# Patient Record
Sex: MALE | Race: WHITE | NOT HISPANIC OR LATINO | Employment: OTHER | URBAN - NONMETROPOLITAN AREA
[De-identification: names, ages, dates, MRNs, and addresses within clinical notes are randomized per-mention and may not be internally consistent; named-entity substitution may affect disease eponyms.]

---

## 2017-02-07 ENCOUNTER — OFFICE VISIT (OUTPATIENT)
Dept: ORTHOPEDIC SURGERY | Facility: CLINIC | Age: 67
End: 2017-02-07

## 2017-02-07 DIAGNOSIS — M25.512 ACUTE PAIN OF LEFT SHOULDER: ICD-10-CM

## 2017-02-07 DIAGNOSIS — M19.011 PRIMARY OSTEOARTHRITIS OF RIGHT SHOULDER: Primary | ICD-10-CM

## 2017-02-07 PROCEDURE — 99213 OFFICE O/P EST LOW 20 MIN: CPT | Performed by: ORTHOPAEDIC SURGERY

## 2017-02-07 PROCEDURE — 73030 X-RAY EXAM OF SHOULDER: CPT | Performed by: ORTHOPAEDIC SURGERY

## 2017-02-07 RX ORDER — ALPRAZOLAM 1 MG/1
1 TABLET ORAL 3 TIMES DAILY PRN
COMMUNITY
End: 2023-01-09 | Stop reason: SDUPTHER

## 2017-02-07 RX ORDER — LISINOPRIL 5 MG/1
5 TABLET ORAL DAILY
COMMUNITY
End: 2020-01-14 | Stop reason: SDUPTHER

## 2017-02-07 RX ORDER — IMIPRAMINE HCL 50 MG
50 TABLET ORAL EVERY MORNING
COMMUNITY
End: 2021-06-16 | Stop reason: SDUPTHER

## 2017-02-07 RX ORDER — GLIPIZIDE 10 MG/1
10 TABLET ORAL
COMMUNITY
End: 2020-01-14 | Stop reason: SDUPTHER

## 2017-02-07 RX ORDER — CARVEDILOL 12.5 MG/1
12.5 TABLET ORAL 2 TIMES DAILY
COMMUNITY
Start: 2014-07-11 | End: 2021-06-16 | Stop reason: SDUPTHER

## 2017-02-07 RX ORDER — ASPIRIN 81 MG/1
81 TABLET ORAL
COMMUNITY
End: 2020-01-14 | Stop reason: CLARIF

## 2017-02-07 RX ORDER — AMLODIPINE BESYLATE 10 MG/1
10 TABLET ORAL
COMMUNITY
Start: 2014-07-11 | End: 2020-11-13

## 2017-02-07 RX ORDER — ISOSORBIDE MONONITRATE 60 MG/1
30 TABLET, EXTENDED RELEASE ORAL
COMMUNITY
End: 2020-01-14 | Stop reason: SDUPTHER

## 2017-02-07 RX ORDER — FENOFIBRATE 145 MG/1
145 TABLET, COATED ORAL DAILY
COMMUNITY
End: 2022-07-07

## 2017-02-07 RX ORDER — ATORVASTATIN CALCIUM 20 MG/1
20 TABLET, FILM COATED ORAL DAILY
COMMUNITY
End: 2020-01-14 | Stop reason: SDUPTHER

## 2017-02-07 RX ORDER — ALLOPURINOL 300 MG/1
300 TABLET ORAL DAILY
COMMUNITY
End: 2023-01-09 | Stop reason: SDUPTHER

## 2017-02-07 NOTE — PROGRESS NOTES
Chief Complaint   Patient presents with   • Left Shoulder - Pain             HPI  Patient is an established patient with left shoulder pain.  It has been painful for over a year and worse the past 2-3 month.  He can't lay on it.  He can not recall any injury.  Patient describes his pain is sharp and stabbing in character.  It is worse when he is reaching into the overhead, abducted position.  Cross body activities bother the patient significantly.  The patient radiates to the the patients pain radiates to the lateral aspect of the shoulder and the the deltoid region.  He cannot lift any heavy weights in the overhead position for a sustained a period of time.  There is a constant dull ache that is present and then there is sharp shooting pain which is exacerbated by heavy lifting.  He has had similar symptoms on the right side where he had a rotator cuff tear which was treated with a repair and he feels like his symptoms are very similar to that side.  Both his total knee replacements are doing extremely well at this point and he has no issues whatsoever.               Allergies   Allergen Reactions   • Bupropion Other (See Comments)     MAKES PATIENT VERY AGGRESSIVE   • Lorazepam Other (See Comments)     MADE PATIENT VERY AGGRESSIVE   • Propoxyphene Nausea And Vomiting         Social History     Social History   • Marital status:      Spouse name: N/A   • Number of children: N/A   • Years of education: N/A     Occupational History   • Not on file.     Social History Main Topics   • Smoking status: Never Smoker   • Smokeless tobacco: Not on file   • Alcohol use No   • Drug use: Not on file   • Sexual activity: Not on file     Other Topics Concern   • Not on file     Social History Narrative   • No narrative on file       Family History   Problem Relation Age of Onset   • Heart attack Mother        Past Surgical History   Procedure Laterality Date   • Joint replacement     • Knee surgery       right and left    • Back surgery     • Cardiac surgery     • Cholecystectomy     • Hernia repair         Past Medical History   Diagnosis Date   • Anxiety    • Diabetes    • Joint pain    • Knee swelling    • Lumbosacral disc disease    • Rotator cuff syndrome      right           There were no vitals filed for this visit.          Review of Systems   Constitutional: Negative for chills, diaphoresis, fever and unexpected weight change.   HENT: Negative for hearing loss, nosebleeds, sore throat and tinnitus.    Eyes: Negative for pain and visual disturbance.   Respiratory: Negative for cough, shortness of breath and wheezing.    Cardiovascular: Negative for chest pain and palpitations.   Gastrointestinal: Negative for abdominal pain, diarrhea, nausea and vomiting.   Endocrine: Negative for cold intolerance, heat intolerance and polydipsia.   Genitourinary: Negative for difficulty urinating, dysuria and hematuria.   Musculoskeletal: Negative for arthralgias, joint swelling and myalgias.   Skin: Negative for rash and wound.   Allergic/Immunologic: Negative for environmental allergies.   Neurological: Negative for dizziness, syncope and numbness.   Hematological: Does not bruise/bleed easily.   Psychiatric/Behavioral: Negative for dysphoric mood and sleep disturbance. The patient is not nervous/anxious.            Physical Exam   Constitutional: He appears well-nourished.   Eyes: Conjunctivae are normal. Pupils are equal, round, and reactive to light.   Neck: Normal range of motion.   Cardiovascular: Normal rate and intact distal pulses.    Pulmonary/Chest: Effort normal and breath sounds normal.   Abdominal: Bowel sounds are normal.   Musculoskeletal: Normal range of motion.   Neurological: He is alert. He has normal reflexes.   Skin: Skin is warm.   Psychiatric: He has a normal mood and affect. His behavior is normal. Judgment and thought content normal.   Nursing note and vitals reviewed.              Joint/Body Part Specific  Exam:  left shoulder. Rotator cuff function is extremely impaired. There is a high riding humeral head with articulation of the humerus to the undersurface of the acromiohumeral articulation. AC joint is exquisitely tender and painful for patient. Axillary nerve function is well preserved. There is significant winging of the scapula with hollowing of the fossae over the proximal and distal aspects of the spine of the scapula. Forward flexion is 0-90 degrees, active abduction is 0-90 degrees. Drop arm sign is positive. External rotation against resistance is extremely painful and limited for the patient. Skin and soft tissues are essentially normal other than some amounts of swelling. The pain level is 6            X-RAY Report:  left Shoulder X-Ray  Indication: Pain with limited range of motion  AP and Axillary  Findings: High riding humeral head with some narrowing of the acromioclavicular joint space  no bony lesion  Soft tissues within normal limits  decreased joint spaces  Hardware appropriately positioned not applicable      no prior studies available for comparison.    X-RAY was ordered and reviewed by Theo Her MD              Diagnostics:            Kyler was seen today for pain.    Diagnoses and all orders for this visit:    Primary osteoarthritis of right shoulder  -     XR Shoulder 2+ View Left    Acute pain of left shoulder  -     MRI Shoulder Left Without Contrast; Future          Procedures          I provided this patient with educational materials regarding exercise and bone health.        Plan:   Home based exercise program with stretching sensing exercises to prevent worsening of loss of motion.    Tablet ibuprofen 600 mg tab 1 by mouth twice a day when necessary pain and discomfort.    Schedule an MRI of the left shoulder for evaluation of the function and structure of the rotator cuff muscles he'll    If the MRI shows that his rotator cuff is damaged beyond repair 10 he might be a candidate  for reverse total shoulder arthroplasty.    If the MRI shows that the rotator cuff structures intact then he could get by with the anatomic total shoulder arthroplasty.    Intra-articular steroid injection discussed and offered to the patient but he declines at this visit wanting to get his MRI done first.    Follow-up in my office in 2-3 weeks after the MRIs been done.            CC To Forrest Will MD

## 2017-02-11 PROBLEM — M19.011 PRIMARY OSTEOARTHRITIS OF RIGHT SHOULDER: Status: ACTIVE | Noted: 2017-02-11

## 2017-02-11 PROBLEM — M25.512 ACUTE PAIN OF LEFT SHOULDER: Status: ACTIVE | Noted: 2017-02-11

## 2017-02-21 ENCOUNTER — OFFICE VISIT (OUTPATIENT)
Dept: ORTHOPEDIC SURGERY | Facility: CLINIC | Age: 67
End: 2017-02-21

## 2017-02-21 DIAGNOSIS — M25.512 ACUTE PAIN OF LEFT SHOULDER: ICD-10-CM

## 2017-02-21 PROCEDURE — 73221 MRI JOINT UPR EXTREM W/O DYE: CPT | Performed by: ORTHOPAEDIC SURGERY

## 2017-02-24 ENCOUNTER — TELEPHONE (OUTPATIENT)
Dept: ORTHOPEDIC SURGERY | Facility: CLINIC | Age: 67
End: 2017-02-24

## 2017-02-25 NOTE — TELEPHONE ENCOUNTER
Please make an appointment to see me to re-ordering  mri  and options to proceed forwards. Apptmnt this coming Tuesday 28 February at the [place] office is okay.  Call for a exact time off appointment

## 2017-02-28 ENCOUNTER — OFFICE VISIT (OUTPATIENT)
Dept: ORTHOPEDIC SURGERY | Facility: CLINIC | Age: 67
End: 2017-02-28

## 2017-02-28 DIAGNOSIS — M75.122 COMPLETE TEAR OF LEFT ROTATOR CUFF: Primary | ICD-10-CM

## 2017-02-28 PROCEDURE — 99214 OFFICE O/P EST MOD 30 MIN: CPT | Performed by: ORTHOPAEDIC SURGERY

## 2017-03-24 ENCOUNTER — OUTSIDE FACILITY SERVICE (OUTPATIENT)
Dept: ORTHOPEDIC SURGERY | Facility: CLINIC | Age: 67
End: 2017-03-24

## 2017-04-24 ENCOUNTER — OUTSIDE FACILITY SERVICE (OUTPATIENT)
Dept: ORTHOPEDIC SURGERY | Facility: CLINIC | Age: 67
End: 2017-04-24

## 2017-04-24 PROCEDURE — 29826 SHO ARTHRS SRG DECOMPRESSION: CPT | Performed by: ORTHOPAEDIC SURGERY

## 2017-04-24 PROCEDURE — 23412 REPAIR ROTATOR CUFF CHRONIC: CPT | Performed by: ORTHOPAEDIC SURGERY

## 2017-04-24 PROCEDURE — 29824 SHO ARTHRS SRG DSTL CLAVICLC: CPT | Performed by: ORTHOPAEDIC SURGERY

## 2017-04-27 ENCOUNTER — OFFICE VISIT (OUTPATIENT)
Dept: ORTHOPEDIC SURGERY | Facility: CLINIC | Age: 67
End: 2017-04-27

## 2017-04-27 DIAGNOSIS — Z98.890 STATUS POST COMPLETE REPAIR OF ROTATOR CUFF: Primary | ICD-10-CM

## 2017-04-27 PROCEDURE — 99024 POSTOP FOLLOW-UP VISIT: CPT | Performed by: ORTHOPAEDIC SURGERY

## 2017-05-01 PROBLEM — Z98.890 STATUS POST COMPLETE REPAIR OF ROTATOR CUFF: Status: ACTIVE | Noted: 2017-05-01

## 2017-05-17 ENCOUNTER — TELEPHONE (OUTPATIENT)
Dept: ORTHOPEDIC SURGERY | Facility: CLINIC | Age: 67
End: 2017-05-17

## 2017-05-23 ENCOUNTER — OFFICE VISIT (OUTPATIENT)
Dept: ORTHOPEDIC SURGERY | Facility: CLINIC | Age: 67
End: 2017-05-23

## 2017-05-23 DIAGNOSIS — Z98.890 STATUS POST COMPLETE REPAIR OF ROTATOR CUFF: Primary | ICD-10-CM

## 2017-05-23 PROCEDURE — 99024 POSTOP FOLLOW-UP VISIT: CPT | Performed by: ORTHOPAEDIC SURGERY

## 2017-06-21 ENCOUNTER — TELEPHONE (OUTPATIENT)
Dept: ORTHOPEDIC SURGERY | Facility: CLINIC | Age: 67
End: 2017-06-21

## 2017-06-29 ENCOUNTER — OFFICE VISIT (OUTPATIENT)
Dept: ORTHOPEDIC SURGERY | Facility: CLINIC | Age: 67
End: 2017-06-29

## 2017-06-29 DIAGNOSIS — Z98.890 STATUS POST COMPLETE REPAIR OF ROTATOR CUFF: Primary | ICD-10-CM

## 2017-06-29 PROCEDURE — 99024 POSTOP FOLLOW-UP VISIT: CPT | Performed by: ORTHOPAEDIC SURGERY

## 2017-09-28 ENCOUNTER — OFFICE VISIT (OUTPATIENT)
Dept: ORTHOPEDIC SURGERY | Facility: CLINIC | Age: 67
End: 2017-09-28

## 2017-09-28 DIAGNOSIS — Z98.890 STATUS POST COMPLETE REPAIR OF ROTATOR CUFF: Primary | ICD-10-CM

## 2017-09-28 PROCEDURE — 99213 OFFICE O/P EST LOW 20 MIN: CPT | Performed by: ORTHOPAEDIC SURGERY

## 2018-07-23 ENCOUNTER — HOSPITAL ENCOUNTER (OUTPATIENT)
Dept: PREADMISSION TESTING | Facility: HOSPITAL | Age: 68
Discharge: HOME | End: 2018-07-23
Attending: ORTHOPAEDIC SURGERY | Admitting: ORTHOPAEDIC SURGERY

## 2018-07-23 LAB
ABO + RH BLD: NORMAL
ALBUMIN SERPL-MCNC: 3.7 G/DL (ref 3.5–4.8)
ALBUMIN/GLOB SERPL: 1.1 {RATIO} (ref 1–1.7)
ALP SERPL-CCNC: 62 IU/L (ref 32–91)
ALT SERPL-CCNC: 26 IU/L (ref 17–63)
ANION GAP SERPL CALC-SCNC: 11.6 MMOL/L (ref 10–20)
APTT BLD: 23.8 SEC (ref 24–31)
ARMBAND: NORMAL
AST SERPL-CCNC: 22 IU/L (ref 15–41)
BASOPHILS # BLD AUTO: 0 10*3/UL (ref 0–0.2)
BASOPHILS NFR BLD AUTO: 1 % (ref 0–2)
BILIRUB SERPL-MCNC: 0.6 MG/DL (ref 0.3–1.2)
BILIRUB UR QL STRIP: NEGATIVE MG/DL
BLD COMPONENT TYPE: NORMAL
BLD GP AB SCN SERPL QL: NEGATIVE
BPU ID: NORMAL
BUN SERPL-MCNC: 15 MG/DL (ref 8–20)
BUN/CREAT SERPL: 10 (ref 6.2–20.3)
CALCIUM SERPL-MCNC: 9.5 MG/DL (ref 8.9–10.3)
CASTS URNS QL MICRO: ABNORMAL /[LPF]
CHLORIDE SERPL-SCNC: 98 MMOL/L (ref 101–111)
COLOR UR: YELLOW
CONV BACTERIA IN URINE MICRO: NEGATIVE
CONV CLARITY OF URINE: CLEAR
CONV CO2: 23 MMOL/L (ref 22–32)
CONV HYALINE CASTS IN URINE MICRO: 0 /[LPF] (ref 0–5)
CONV PRODUCT 1 STATUS: NORMAL
CONV PROTEIN IN URINE BY AUTOMATED TEST STRIP: 30 MG/DL
CONV SMALL ROUND CELLS: ABNORMAL /[HPF]
CONV TOTAL PROTEIN: 7.2 G/DL (ref 6.1–7.9)
CONV UROBILINOGEN IN URINE BY AUTOMATED TEST STRIP: 0.2 MG/DL
CREAT UR-MCNC: 1.5 MG/DL (ref 0.7–1.2)
CROSSMATCH EXPIRATION: NORMAL
CROSSMATCH INTERPRETATION: NORMAL
CROSSMATCH: NORMAL
CULTURE INDICATED?: ABNORMAL
DIFFERENTIAL METHOD BLD: (no result)
EOSINOPHIL # BLD AUTO: 0.1 10*3/UL (ref 0–0.3)
EOSINOPHIL # BLD AUTO: 3 % (ref 0–3)
ERYTHROCYTE [DISTWIDTH] IN BLOOD BY AUTOMATED COUNT: 17.4 % (ref 11.5–14.5)
GLOBULIN UR ELPH-MCNC: 3.5 G/DL (ref 2.5–3.8)
GLUCOSE SERPL-MCNC: 346 MG/DL (ref 65–99)
GLUCOSE UR QL: >1000 MG/DL
HCT VFR BLD AUTO: 36.8 % (ref 40–54)
HGB BLD-MCNC: 12.7 G/DL (ref 14–18)
HGB UR QL STRIP: NEGATIVE
INR PPP: 1
KETONES UR QL STRIP: NEGATIVE MG/DL
LEUKOCYTE ESTERASE UR QL STRIP: NEGATIVE
LYMPHOCYTES # BLD AUTO: 1.5 10*3/UL (ref 0.8–4.8)
LYMPHOCYTES NFR BLD AUTO: 26 % (ref 18–42)
MCH RBC QN AUTO: 27.7 PG (ref 26–32)
MCHC RBC AUTO-ENTMCNC: 34.4 G/DL (ref 32–36)
MCV RBC AUTO: 80.5 FL (ref 80–94)
MONOCYTES # BLD AUTO: 0.4 10*3/UL (ref 0.1–1.3)
MONOCYTES NFR BLD AUTO: 7 % (ref 2–11)
NEUTROPHILS # BLD AUTO: 3.7 10*3/UL (ref 2.3–8.6)
NEUTROPHILS NFR BLD AUTO: 63 % (ref 50–75)
NITRITE UR QL STRIP: NEGATIVE
NRBC BLD AUTO-RTO: 0 /100{WBCS}
NRBC/RBC NFR BLD MANUAL: 0 10*3/UL
NUM BPU REQUESTED: 3
PH UR STRIP.AUTO: 5.5 [PH] (ref 4.5–8)
PLATELET # BLD AUTO: 348 10*3/UL (ref 150–450)
PMV BLD AUTO: 7.9 FL (ref 7.4–10.4)
POTASSIUM SERPL-SCNC: 4.6 MMOL/L (ref 3.6–5.1)
PRE-HGB: 12.7 MG/DL
PROTHROMBIN TIME: 10.4 SEC (ref 9.6–11.7)
RBC # BLD AUTO: 4.57 10*6/UL (ref 4.6–6)
RBC #/AREA URNS HPF: 0 /[HPF] (ref 0–3)
SODIUM SERPL-SCNC: 128 MMOL/L (ref 136–144)
SP GR UR: 1.02 (ref 1–1.03)
SPERM URNS QL MICRO: ABNORMAL /[HPF]
SQUAMOUS SPT QL MICRO: 0 /[HPF] (ref 0–5)
TRANS STATUS: NORMAL
UNIDENT CRYS URNS QL MICRO: ABNORMAL /[HPF]
UNIT DIVISION: 0
WBC # BLD AUTO: 5.7 10*3/UL (ref 4.5–11.5)
WBC #/AREA URNS HPF: 0 /[HPF] (ref 0–5)
YEAST SPEC QL WET PREP: ABNORMAL /[HPF]

## 2018-07-26 ENCOUNTER — HOSPITAL ENCOUNTER (OUTPATIENT)
Dept: OTHER | Facility: HOSPITAL | Age: 68
Setting detail: SPECIMEN
Discharge: HOME | End: 2018-07-26
Attending: ORTHOPAEDIC SURGERY | Admitting: ORTHOPAEDIC SURGERY

## 2018-07-26 LAB
BACTERIA SPEC AEROBE CULT: NORMAL
GRAM STN SPEC: NORMAL
Lab: NORMAL
MICRO REPORT STATUS: NORMAL
SPECIMEN SOURCE: NORMAL

## 2019-03-08 ENCOUNTER — HOSPITAL ENCOUNTER (OUTPATIENT)
Dept: OTHER | Facility: HOSPITAL | Age: 69
Discharge: HOME | End: 2019-03-08
Attending: INTERNAL MEDICINE

## 2019-03-08 LAB
CREAT BLD-MCNC: 1.4 MG/DL (ref 0.6–1.4)
GFR SERPLBLD BASED ON 1.73 SQ M-ARVRAT: 51 ML/MIN/{1.73_M2}

## 2019-03-25 ENCOUNTER — HOSPITAL ENCOUNTER (OUTPATIENT)
Dept: OTHER | Facility: HOSPITAL | Age: 69
Discharge: HOME | End: 2019-03-25
Attending: ORTHOPAEDIC SURGERY

## 2019-07-30 ENCOUNTER — HOSPITAL ENCOUNTER (OUTPATIENT)
Dept: OTHER | Facility: HOSPITAL | Age: 69
Discharge: HOME | End: 2019-07-30
Attending: INTERNAL MEDICINE

## 2020-01-14 ENCOUNTER — OFFICE VISIT (OUTPATIENT)
Dept: ORTHOPEDIC SURGERY | Facility: CLINIC | Age: 70
End: 2020-01-14

## 2020-01-14 VITALS — WEIGHT: 259 LBS | BODY MASS INDEX: 35.08 KG/M2 | HEIGHT: 72 IN | TEMPERATURE: 97.3 F

## 2020-01-14 DIAGNOSIS — E66.01 MORBIDLY OBESE (HCC): ICD-10-CM

## 2020-01-14 DIAGNOSIS — M25.551 PAIN OF RIGHT HIP JOINT: Primary | ICD-10-CM

## 2020-01-14 DIAGNOSIS — M76.31 IT BAND SYNDROME, RIGHT: ICD-10-CM

## 2020-01-14 DIAGNOSIS — M70.61 GREATER TROCHANTERIC BURSITIS OF RIGHT HIP: ICD-10-CM

## 2020-01-14 PROCEDURE — 99213 OFFICE O/P EST LOW 20 MIN: CPT | Performed by: PHYSICIAN ASSISTANT

## 2020-01-14 PROCEDURE — 73502 X-RAY EXAM HIP UNI 2-3 VIEWS: CPT | Performed by: PHYSICIAN ASSISTANT

## 2020-01-14 PROCEDURE — 20610 DRAIN/INJ JOINT/BURSA W/O US: CPT | Performed by: PHYSICIAN ASSISTANT

## 2020-01-14 RX ORDER — METHYLPREDNISOLONE ACETATE 80 MG/ML
160 INJECTION, SUSPENSION INTRA-ARTICULAR; INTRALESIONAL; INTRAMUSCULAR; SOFT TISSUE
Status: COMPLETED | OUTPATIENT
Start: 2020-01-14 | End: 2020-01-14

## 2020-01-14 RX ORDER — LOSARTAN POTASSIUM 50 MG/1
50 TABLET ORAL DAILY
COMMUNITY
End: 2021-01-28

## 2020-01-14 RX ORDER — GLIPIZIDE 10 MG/1
10 TABLET ORAL 2 TIMES DAILY
COMMUNITY
Start: 2018-03-09 | End: 2023-01-09

## 2020-01-14 RX ORDER — CYCLOBENZAPRINE HCL 10 MG
10 TABLET ORAL AS NEEDED
COMMUNITY
Start: 2019-10-15 | End: 2021-02-14 | Stop reason: HOSPADM

## 2020-01-14 RX ORDER — LIDOCAINE HYDROCHLORIDE 10 MG/ML
2 INJECTION, SOLUTION EPIDURAL; INFILTRATION; INTRACAUDAL; PERINEURAL
Status: COMPLETED | OUTPATIENT
Start: 2020-01-14 | End: 2020-01-14

## 2020-01-14 RX ORDER — METFORMIN HYDROCHLORIDE 500 MG/1
TABLET, EXTENDED RELEASE ORAL
COMMUNITY
Start: 2020-01-06 | End: 2020-11-13

## 2020-01-14 RX ORDER — ATORVASTATIN CALCIUM 20 MG/1
20 TABLET, FILM COATED ORAL EVERY EVENING
COMMUNITY
End: 2023-02-08 | Stop reason: DRUGHIGH

## 2020-01-14 RX ORDER — CLOPIDOGREL BISULFATE 75 MG/1
75 TABLET ORAL DAILY
Status: ON HOLD | COMMUNITY
Start: 2019-12-27 | End: 2021-02-14 | Stop reason: SDUPTHER

## 2020-01-14 RX ORDER — ASPIRIN 325 MG
325 TABLET ORAL DAILY
COMMUNITY
Start: 2019-12-24 | End: 2020-12-23

## 2020-01-14 RX ORDER — PANTOPRAZOLE SODIUM 40 MG/1
40 TABLET, DELAYED RELEASE ORAL DAILY
COMMUNITY
Start: 2020-01-01 | End: 2023-01-09 | Stop reason: SDUPTHER

## 2020-01-14 RX ORDER — ISOSORBIDE MONONITRATE 30 MG/1
30 TABLET, EXTENDED RELEASE ORAL DAILY
COMMUNITY
Start: 2020-01-08 | End: 2021-01-28

## 2020-01-14 RX ORDER — HYDROCODONE BITARTRATE AND ACETAMINOPHEN 5; 325 MG/1; MG/1
1 TABLET ORAL EVERY 4 HOURS PRN
COMMUNITY
Start: 2019-06-13 | End: 2022-07-07

## 2020-01-14 RX ADMIN — LIDOCAINE HYDROCHLORIDE 2 ML: 10 INJECTION, SOLUTION EPIDURAL; INFILTRATION; INTRACAUDAL; PERINEURAL at 10:35

## 2020-01-14 RX ADMIN — METHYLPREDNISOLONE ACETATE 160 MG: 80 INJECTION, SUSPENSION INTRA-ARTICULAR; INTRALESIONAL; INTRAMUSCULAR; SOFT TISSUE at 10:35

## 2020-01-14 NOTE — PROGRESS NOTES
Large Joint Arthrocentesis: R greater trochanteric bursa  Date/Time: 1/14/2020 10:35 AM  Consent given by: patient  Site marked: site marked  Timeout: Immediately prior to procedure a time out was called to verify the correct patient, procedure, equipment, support staff and site/side marked as required   Supporting Documentation  Indications: pain   Procedure Details  Location: hip - R greater trochanteric bursa  Preparation: Patient was prepped and draped in the usual sterile fashion  Needle size: 22 G  Medications administered: 2 mL lidocaine PF 1% 1 %; 160 mg methylPREDNISolone acetate 80 MG/ML  Patient tolerance: patient tolerated the procedure well with no immediate complications      Electronically signed by Patrick Dunn PA-C, 01/14/20, 12:31 PM.

## 2020-01-14 NOTE — PROGRESS NOTES
NEW VISIT    Patient: Kyler Staley Jr.  ?  YOB: 1950    MRN: [id number]  ?  Chief Complaint   Patient presents with   • Right Hip - Pain      ?  HPI:   Mr. Staley is a 69-year-old male patient who presents with complaint of right hip pain x6 weeks.  He reports the pain started without any injury.  He denies any bladder or bowel dysfunction or paresthesias.  He is a diabetic but unsure of his hemoglobin A1c, stating his sugar usually runs around 200 every morning.  His medical history is remarkable for several back surgeries of the thoracic and lumbar spine.  He also reports some radiation of pain into the right foot at times.  Pain Location: right hip  Radiation: none from the hip  Quality: aching, stabbing  Intensity/Severity: moderate  Duration: 6 weeks  Onset quality: sudden  Timing: intermittent  Aggravating Factors: walking, standing  Alleviating Factors: Tylenol, rest  Previous Episodes: none mentioned  Associated Symptoms: pain  ADLs Affected: ambulating, recreational activities/sports    This patient is an established patient.  This problem is new to this examiner.      Allergies:   Allergies   Allergen Reactions   • Bupropion Other (See Comments)     MAKES PATIENT VERY AGGRESSIVE  MAKES PATIENT VERY AGGRESSIVE   • Lorazepam Other (See Comments)     MADE PATIENT VERY AGGRESSIVE  MADE PATIENT VERY AGGRESSIVE   • Propoxyphene Nausea And Vomiting   • Adhesive Tape Rash       Medications:   Home Medications:  Current Outpatient Medications on File Prior to Visit   Medication Sig   • aspirin 325 MG tablet Take 325 mg by mouth Daily.   • glipizide (GLUCOTROL) 10 MG tablet Take 10 mg by mouth Daily.   • glucose blood (ACCU-CHEK SUNDAY PLUS) test strip    • HYDROcodone-acetaminophen (NORCO) 5-325 MG per tablet TAKE 1 2 (ONE HALF) TO 1 TABLET BY MOUTH TWICE DAILY AS NEEDED   • allopurinol (ZYLOPRIM) 300 MG tablet Take 300 mg by mouth.   • ALPRAZolam (XANAX) 1 MG tablet Take 1 mg by mouth Daily.   •  amLODIPine (NORVASC) 10 MG tablet Take 10 mg by mouth.   • atorvastatin (LIPITOR) 20 MG tablet Take  by mouth.   • carvedilol (COREG) 12.5 MG tablet Take 6.25 mg by mouth.   • cholecalciferol (VITAMIN D3) 86440 UNITS capsule Take 10,000 Units by mouth.   • clopidogrel (PLAVIX) 75 MG tablet Take 75 mg by mouth Daily.   • cyclobenzaprine (FLEXERIL) 10 MG tablet TAKE 1 2 TO 1 (ONE HALF TO ONE) TABLET BY MOUTH TWICE DAILY AS NEEDED   • fenofibrate (TRICOR) 145 MG tablet Take 145 mg by mouth.   • imipramine (TOFRANIL) 50 MG tablet Take 50 mg by mouth.   • isosorbide mononitrate (IMDUR) 30 MG 24 hr tablet Take 30 mg by mouth Daily.   • losartan (COZAAR) 50 MG tablet Take 50 mg by mouth Daily.   • metFORMIN (GLUCOPHAGE) 1000 MG tablet Take 1,000 mg by mouth 2 (Two) Times a Day With Meals.   • metFORMIN ER (GLUCOPHAGE-XR) 500 MG 24 hr tablet TAKE TWO TABLETS BY MOUTH ONCE DAILY IN THE MORNING AND THEN TAKE ONE ONCE DAILY IN THE EVENING   • pantoprazole (PROTONIX) 40 MG EC tablet Take 40 mg by mouth Daily.   • [DISCONTINUED] aspirin 81 MG EC tablet Take 81 mg by mouth.   • [DISCONTINUED] atorvastatin (LIPITOR) 20 MG tablet Take 20 mg by mouth Daily.   • [DISCONTINUED] glipiZIDE (GLUCOTROL) 10 MG tablet Take 10 mg by mouth.   • [DISCONTINUED] isosorbide mononitrate (IMDUR) 60 MG 24 hr tablet Take 30 mg by mouth.   • [DISCONTINUED] lisinopril (PRINIVIL,ZESTRIL) 5 MG tablet Take 5 mg by mouth Daily.     No current facility-administered medications on file prior to visit.      Current Medications:  Scheduled Meds:  PRN Meds:.    I have reviewed the patient's medical history in detail and updated the computerized patient record.  Review and summarization of old records include:    Past Medical History:   Diagnosis Date   • Anxiety    • Back pain    • Cardiac disease    • Depression    • Diabetes (CMS/HCC)    • Fatigue    • Hearing loss    • Joint pain    • Kidney disease    • Knee swelling    • Lumbosacral disc disease    •  "Rotator cuff syndrome     right   • Sleep apnea    • Thyroid condition      Past Surgical History:   Procedure Laterality Date   • BACK SURGERY  2018    X2   • BACK SURGERY  2011   • CARDIAC SURGERY     • CHOLECYSTECTOMY     • CORONARY ARTERY BYPASS GRAFT     • HERNIA REPAIR     • JOINT REPLACEMENT     • KNEE SURGERY      right and left   • NOSE SURGERY     • SHOULDER SURGERY Bilateral     rotator cuff repair     Social History     Occupational History   • Not on file   Tobacco Use   • Smoking status: Never Smoker   Substance and Sexual Activity   • Alcohol use: No   • Drug use: Defer   • Sexual activity: Defer      Social History     Social History Narrative   • Not on file     Family History   Problem Relation Age of Onset   • Heart attack Mother          Review of Systems  Constitutional: Negative.  Negative for fever.   Eyes: Negative.    Respiratory: Negative.    Cardiovascular: Negative.    Endocrine: Negative.    Musculoskeletal: Positive for arthralgias and gait problem.   Skin: Negative.  Negative for rash and wound.   Allergic/Immunologic: Negative.    Neurological: Negative for numbness.   Hematological: Negative.    Psychiatric/Behavioral: Negative.         Wt Readings from Last 3 Encounters:   01/14/20 117 kg (259 lb)     Ht Readings from Last 3 Encounters:   01/14/20 181.6 cm (71.5\")     Body mass index is 35.62 kg/m².  Facility age limit for growth percentiles is 20 years.  Vitals:    01/14/20 0953   Temp: 97.3 °F (36.3 °C)         Physical Exam  Constitutional: Patient is oriented to person, place, and time. Appears well-developed and well-nourished.   HENT:   Head: Normocephalic and atraumatic.   Eyes: Conjunctivae and EOM are normal. Pupils are equal, round, and reactive to light.   Cardiovascular: Normal rate and intact distal pulses.   Pulmonary/Chest: Effort normal and breath sounds normal.   Musculoskeletal:   See detailed exam below   Neurological: Alert and oriented to person, place, and " time. No sensory deficit. Coordination normal.   Skin: Skin is warm and dry. Capillary refill takes less than 2 seconds. No rash noted. No erythema.   Psychiatric: Patient has a normal mood and affect. His behavior is normal. Judgment and thought content normal.   Nursing note and vitals reviewed.      Ortho Exam:   Right hip (gtb): Skin and soft tissues over the greater trochanteric bursa are tender upon palpation, along with IT band tenderness. Cross body adduction is positive. Internal and external rotations are bothersome and cause tenderness over the greater trochanter. There is no clinical deformity and no shortening. He does have a limp upon walking. There is piriformis tightness and there  is capsular tightness with any rotation. Dorsalis pedis and posterior tibial artery pulses are palpable. Neurovascular status is intact and capillary refill is less than 2 seconds. Common peroneal nerve function is well preserved.        Diagnostics:  X-Ray Report:  Pelvis with Right hip X-Ray  Indication: Evaluation of right hip pain  AP, Lateral views  Findings: No acute bony abnormality and joint spaces well-preserved  Bony lesion: no  Soft tissues: within normal limits  Joint spaces: within normal limits  Hardware appropriately positioned: not applicable  Prior studies available for comparison: no   X-RAY was ordered and reviewed by Patrick Dunn PA-C           Assessment:  Kyler was seen today for pain.    Diagnoses and all orders for this visit:    Pain of right hip joint  -     XR Hip With or Without Pelvis 2 - 3 View Right  -     Large Joint Arthrocentesis: R greater trochanteric bursa    Greater trochanteric bursitis of right hip    It band syndrome, right    Morbidly obese (CMS/HCC)          Procedures  See separate procedure note      Plan    · Steroid injection discussed-patient would like to proceed with an injection today  · Risks and benefits of injection therapy discussed with patient.  Possibility  of bruising, pain, swelling, infection, increased blood sugar levels and soft tissue atrophy discussed in detail.  · Injected patient's right hip-greater trochanteric bursa joint with steroid from a(n) lateral approach.  Patient tolerated the procedure well and no complications were encountered.  · Physical Therapy discussed   · Medication options discussed and recommended  · Rest, ice, compression, and elevation (RICE) therapy  · Stretching and strengthening exercises  · Alternate OTC Ibuprofen and Tylenol as needed/if clinically appropriate  · Follow up in 3 months    Date of encounter: 01/14/2020   Patrick Dunn PA-C    Electronically signed by Patrick Dunn PA-C, 01/14/20, 12:31 PM.

## 2020-02-27 ENCOUNTER — HOSPITAL ENCOUNTER (OUTPATIENT)
Dept: OTHER | Facility: HOSPITAL | Age: 70
Discharge: HOME | End: 2020-02-27
Attending: INTERNAL MEDICINE

## 2020-10-29 DIAGNOSIS — M25.552 LEFT HIP PAIN: Primary | ICD-10-CM

## 2020-11-12 ENCOUNTER — HOSPITAL ENCOUNTER (OUTPATIENT)
Dept: OTHER | Facility: HOSPITAL | Age: 70
Discharge: HOME | End: 2020-11-12
Attending: PHYSICIAN ASSISTANT

## 2020-11-13 ENCOUNTER — OFFICE VISIT (OUTPATIENT)
Dept: ORTHOPEDIC SURGERY | Facility: CLINIC | Age: 70
End: 2020-11-13

## 2020-11-13 VITALS — TEMPERATURE: 97.7 F | BODY MASS INDEX: 34.19 KG/M2 | HEIGHT: 73 IN | WEIGHT: 258 LBS

## 2020-11-13 DIAGNOSIS — M70.62 GREATER TROCHANTERIC BURSITIS OF LEFT HIP: ICD-10-CM

## 2020-11-13 DIAGNOSIS — M25.552 LEFT HIP PAIN: Primary | ICD-10-CM

## 2020-11-13 PROCEDURE — 99213 OFFICE O/P EST LOW 20 MIN: CPT | Performed by: PHYSICIAN ASSISTANT

## 2020-11-13 PROCEDURE — 20610 DRAIN/INJ JOINT/BURSA W/O US: CPT | Performed by: PHYSICIAN ASSISTANT

## 2020-11-13 RX ORDER — AMLODIPINE BESYLATE 5 MG/1
5 TABLET ORAL DAILY
COMMUNITY
Start: 2020-11-05 | End: 2022-07-07

## 2020-11-13 RX ORDER — LEVOTHYROXINE SODIUM 0.15 MG/1
150 TABLET ORAL DAILY
COMMUNITY
Start: 2020-09-28 | End: 2021-01-28

## 2020-11-13 RX ORDER — BENZOCAINE 20 %
1200 GEL (GRAM) MUCOUS MEMBRANE 2 TIMES DAILY
COMMUNITY

## 2020-11-13 RX ADMIN — METHYLPREDNISOLONE ACETATE 160 MG: 80 INJECTION, SUSPENSION INTRA-ARTICULAR; INTRALESIONAL; INTRAMUSCULAR; SOFT TISSUE at 14:26

## 2020-11-13 NOTE — PROGRESS NOTES
NEW VISIT    Patient: Kyler Staley Jr.  ?  YOB: 1950    MRN: [id number]  ?  Chief Complaint   Patient presents with   • Left Hip - Pain   • Establish Care      ?  HPI:   Kyler Staley Jr. is a 70 y.o. male who presents with new complaint of left hip pain.  He reports pain in the hip for the last 6 weeks and denies specific injury.  He reports radiation of the anterior groin and anterior thigh.  He states it started initially as the right hip did earlier this year.  He had great relief with a steroid injection into the bursa of the right hip.    Pain Location: LEFT hip  Radiation: Anterior groin and anterior thigh  Quality: aching, stabbing  Intensity/Severity: moderate  Duration: 6 weeks  Progression of symptoms: yes, progressive worsening  Onset quality: gradual   Timing: intermittent  Aggravating Factors: sitting, walking, standing  Alleviating Factors: Tylenol, NSAIDs  Previous Episodes: yes in opposite hip  Associated Symptoms: pain  ADLs Affected: ambulating    This patient is an established patient.  This problem is new to this examiner.      Allergies:   Allergies   Allergen Reactions   • Bupropion Other (See Comments)     MAKES PATIENT VERY AGGRESSIVE  MAKES PATIENT VERY AGGRESSIVE   • Lorazepam Other (See Comments)     MADE PATIENT VERY AGGRESSIVE  MADE PATIENT VERY AGGRESSIVE   • Propoxyphene Nausea And Vomiting   • Adhesive Tape Rash       Medications:   Home Medications:  Current Outpatient Medications on File Prior to Visit   Medication Sig   • allopurinol (ZYLOPRIM) 300 MG tablet Take 300 mg by mouth.   • ALPRAZolam (XANAX) 1 MG tablet Take 1 mg by mouth Daily.   • amLODIPine (NORVASC) 5 MG tablet Take 5 mg by mouth Daily.   • aspirin 325 MG tablet Take 325 mg by mouth Daily.   • atorvastatin (LIPITOR) 20 MG tablet Take  by mouth.   • carvedilol (COREG) 12.5 MG tablet Take 6.25 mg by mouth.   • clopidogrel (PLAVIX) 75 MG tablet Take 75 mg by mouth Daily.   • fenofibrate (TRICOR) 145 MG  tablet Take 145 mg by mouth.   • Flaxseed, Linseed, 1000 MG capsule Take  by mouth.   • glipizide (GLUCOTROL) 10 MG tablet Take 10 mg by mouth Daily.   • glucose blood (ACCU-CHEK SUNDAY PLUS) test strip    • levothyroxine (SYNTHROID, LEVOTHROID) 150 MCG tablet Take 150 mcg by mouth Daily.   • losartan (COZAAR) 50 MG tablet Take 50 mg by mouth Daily.   • metFORMIN (GLUCOPHAGE) 1000 MG tablet Take 1,000 mg by mouth 2 (Two) Times a Day With Meals.   • pantoprazole (PROTONIX) 40 MG EC tablet Take 40 mg by mouth Daily.   • cholecalciferol (VITAMIN D3) 61709 UNITS capsule Take 10,000 Units by mouth.   • cyclobenzaprine (FLEXERIL) 10 MG tablet TAKE 1 2 TO 1 (ONE HALF TO ONE) TABLET BY MOUTH TWICE DAILY AS NEEDED   • HYDROcodone-acetaminophen (NORCO) 5-325 MG per tablet TAKE 1 2 (ONE HALF) TO 1 TABLET BY MOUTH TWICE DAILY AS NEEDED   • imipramine (TOFRANIL) 50 MG tablet Take 50 mg by mouth.   • isosorbide mononitrate (IMDUR) 30 MG 24 hr tablet Take 30 mg by mouth Daily.     No current facility-administered medications on file prior to visit.      Current Medications:  Scheduled Meds:  PRN Meds:.    I have reviewed the patient's medical history in detail and updated the computerized patient record.  Review and summarization of old records include:    Past Medical History:   Diagnosis Date   • Anxiety    • Back pain    • Cardiac disease    • Depression    • Diabetes (CMS/HCC)    • Fatigue    • Hearing loss    • Joint pain    • Kidney disease    • Knee swelling    • Lumbosacral disc disease    • Rotator cuff syndrome     right   • Sleep apnea    • Thyroid condition      Past Surgical History:   Procedure Laterality Date   • BACK SURGERY  2018    X2   • BACK SURGERY  2011   • CARDIAC SURGERY     • CHOLECYSTECTOMY     • CORONARY ARTERY BYPASS GRAFT     • HERNIA REPAIR     • JOINT REPLACEMENT     • KNEE SURGERY      right and left   • NOSE SURGERY     • SHOULDER SURGERY Bilateral     rotator cuff repair     Social History  "    Occupational History   • Not on file   Tobacco Use   • Smoking status: Never Smoker   • Smokeless tobacco: Never Used   Substance and Sexual Activity   • Alcohol use: No   • Drug use: Defer   • Sexual activity: Defer      Family History   Problem Relation Age of Onset   • Heart attack Mother          Review of Systems  Constitutional: Negative.  Negative for fever.   Eyes: Negative.    Respiratory: Negative.    Cardiovascular: Negative.    Endocrine: Negative.    Musculoskeletal: Positive for arthralgias and gait problem.   Skin: Negative.  Negative for rash and wound.   Allergic/Immunologic: Negative.    Neurological: Negative for numbness.   Hematological: Negative.    Psychiatric/Behavioral: Negative.         Wt Readings from Last 3 Encounters:   11/13/20 117 kg (258 lb)   01/14/20 117 kg (259 lb)     Ht Readings from Last 3 Encounters:   11/13/20 185.4 cm (73\")   01/14/20 181.6 cm (71.5\")     Body mass index is 34.04 kg/m².  Facility age limit for growth percentiles is 20 years.  Vitals:    11/13/20 1409   Temp: 97.7 °F (36.5 °C)         Physical Exam  Constitutional: Patient is oriented to person, place, and time. Appears well-developed and well-nourished.   HENT:   Head: Normocephalic and atraumatic.   Eyes: Conjunctivae and EOM are normal. Pupils are equal, round, and reactive to light.   Cardiovascular: Normal rate and intact distal pulses.   Pulmonary/Chest: Effort normal.   Musculoskeletal:   See detailed exam below   Neurological: Alert and oriented to person, place, and time. No sensory deficit. Coordination normal.   Skin: Skin is warm and dry. Capillary refill takes less than 2 seconds. No rash noted. No erythema.   Psychiatric: Patient has a normal mood and affect. His behavior is normal. Judgment and thought content normal.   Nursing note and vitals reviewed.      Ortho Exam:   LEFT hip:  · There is tenderness with palpation over the greater trochanteric bursa.   · There is tenderness with " palpation of the IT band .  · Cross body adduction is positive.   · Positive for pain over the greater trochanter with internal and external rotation.   · There is no clinical deformity and no shortening of extremity.   · He does have a limp upon walking.   · There is piriformis tightness.  · Dorsalis pedis and posterior tibial artery pulses are palpable.   · Neurovascular status is intact and capillary refill is less than 2 seconds.   · Common peroneal nerve function is well preserved.      Diagnostics:  Left hip xrays 4 views were ordered by Patrick Dunn PA-C and performed at [facility] on 11/12/20. These images were independently viewed and interpreted by myself, my impression as follows:  Findings: Vascular calcification noted bilaterally, mild to moderate degenerative changes and appears to be decreased joint space the medial aspect of the left hip.  Lumbar hardware noted  Bony lesion: no  Soft tissues: within normal limits  Joint spaces: decreased  Hardware appropriately positioned: yes-appears appropriately positioned in lumbar spine  Prior studies available for comparison: no         Assessment:  Diagnoses and all orders for this visit:    1. Left hip pain (Primary)    2. Greater trochanteric bursitis of left hip  -     Large Joint Arthrocentesis          Large Joint Arthrocentesis  Date/Time: 11/13/2020 2:26 PM  Consent given by: patient  Site marked: site marked  Timeout: Immediately prior to procedure a time out was called to verify the correct patient, procedure, equipment, support staff and site/side marked as required   Supporting Documentation  Indications: pain   Procedure Details  Location: hip -   Preparation: Patient was prepped and draped in the usual sterile fashion  Needle size: 22 G  Approach: anterolateral  Medications administered: 160 mg methylPREDNISolone acetate 80 MG/ML; 2 mL lidocaine (cardiac)  Patient tolerance: patient tolerated the procedure well with  no immediate complications        ?    Plan    Orders Placed This Encounter   Procedures   • Large Joint Arthrocentesis      · Management of an acute uncomplicated injury    · Discussion of orthopaedic goals and activities.  · Risk, benefits, and merits of treatment alternatives reviewed with the patient.  Discussed trying injection of the left greater trochanter bursa and if that does not provide relief we will consider doing an MRI of the hip.  · Injected patient's left hip-greater trochanteric bursa joint with steroid from a(n) anterolateral approach.  Patient tolerated the procedure well and no complications were encountered.   · Ice, heat, and/or modalities as beneficial  · Patient is encouraged to call or return for any issues or concerns.  · Follow up as needed      Patrick Dunn PA-C  Date of encounter: 11/13/2020     Electronically signed by Patrick Dunn PA-C, 11/13/20, 2:14 PM EST.    EMR Dragon/Transcription disclaimer:  Much of this encounter note is an electronic transcription/translation of spoken language to printed text. The electronic translation of spoken language may permit erroneous, or at times, nonsensical words or phrases to be inadvertently transcribed; Although I have reviewed the note for such errors, some may still exist.

## 2020-11-16 PROBLEM — M25.552 LEFT HIP PAIN: Status: ACTIVE | Noted: 2020-11-16

## 2020-11-16 PROBLEM — M70.62 GREATER TROCHANTERIC BURSITIS OF LEFT HIP: Status: ACTIVE | Noted: 2020-11-16

## 2020-11-16 RX ORDER — METHYLPREDNISOLONE ACETATE 80 MG/ML
160 INJECTION, SUSPENSION INTRA-ARTICULAR; INTRALESIONAL; INTRAMUSCULAR; SOFT TISSUE
Status: COMPLETED | OUTPATIENT
Start: 2020-11-13 | End: 2020-11-13

## 2020-11-17 ENCOUNTER — HOSPITAL ENCOUNTER (OUTPATIENT)
Dept: OTHER | Facility: HOSPITAL | Age: 70
Discharge: HOME | End: 2020-11-17
Attending: INTERNAL MEDICINE

## 2020-11-18 ENCOUNTER — TELEPHONE (OUTPATIENT)
Dept: ORTHOPEDIC SURGERY | Facility: CLINIC | Age: 70
End: 2020-11-18

## 2020-11-18 NOTE — TELEPHONE ENCOUNTER
PATIENT CALLED AND STATED THAT THE INJECTION HE RECEIVED IN HIS LEFT HIP ON 11/13/20 ONLY HELPED FOR ABOUT 3 OR 4 DAYS.  HE STATED THAT HE IS RIGHT BACK WHERE HE WAS IN REGARDS TO PAIN.  PLEASE ADVISE

## 2020-11-20 NOTE — TELEPHONE ENCOUNTER
LEFT VOICEMAIL FOR PATIENT THAT LINDEN RECOMMENDS GETTING AN MRI OF HIS HIP AND ASKED THAT HE CALL OUR OFFICE BACK TO LET US KNOW IF HE WOULD LIKE TO PROCEED WITH MRI

## 2020-11-20 NOTE — TELEPHONE ENCOUNTER
I would recommend proceeding with MRI of the hip. If he would like to do this I will get scheduled

## 2020-11-23 DIAGNOSIS — M25.552 LEFT HIP PAIN: Primary | ICD-10-CM

## 2020-11-30 ENCOUNTER — HOSPITAL ENCOUNTER (OUTPATIENT)
Dept: OTHER | Facility: HOSPITAL | Age: 70
Discharge: HOME | End: 2020-11-30
Attending: INTERNAL MEDICINE

## 2020-12-04 ENCOUNTER — OFFICE VISIT (OUTPATIENT)
Dept: ORTHOPEDIC SURGERY | Facility: CLINIC | Age: 70
End: 2020-12-04

## 2020-12-04 ENCOUNTER — TRANSCRIBE ORDERS (OUTPATIENT)
Dept: ADMINISTRATIVE | Facility: HOSPITAL | Age: 70
End: 2020-12-04

## 2020-12-04 DIAGNOSIS — M76.02 GLUTEAL TENDINITIS OF LEFT BUTTOCK: Primary | ICD-10-CM

## 2020-12-04 DIAGNOSIS — R93.49 ABNORMAL RADIOLOGIC FINDINGS ON DIAGNOSTIC IMAGING OF OTHER URINARY ORGANS: ICD-10-CM

## 2020-12-04 DIAGNOSIS — M48.061 SPINAL STENOSIS, LUMBAR REGION, WITHOUT NEUROGENIC CLAUDICATION: Primary | ICD-10-CM

## 2020-12-04 DIAGNOSIS — M25.552 LEFT HIP PAIN: ICD-10-CM

## 2020-12-04 PROCEDURE — 99441 PR PHYS/QHP TELEPHONE EVALUATION 5-10 MIN: CPT | Performed by: PHYSICIAN ASSISTANT

## 2020-12-04 NOTE — PROGRESS NOTES
FOLLOW UP VISIT    Patient: Kyler Staley Jr.  ?  YOB: 1950    MRN: [id number]    You have chosen to receive care through a telephone visit. Do you consent to use a telephone visit for your medical care today? Yes  ?    Chief Complaint   Patient presents with   • Left Hip - Results      ?  HPI:   Kyler Staley Jr. is a 70 y.o. male who presents for telephone visit for follow-up on MRI results of the left hip.  I initially saw him 3 weeks ago for new onset of pain of left hip for 6 weeks.  At that visit he denied specific injury.  He was reporting radiation of the anterior groin and anterior thigh.   At last visit I did administer an injection of steroid into the greater trochanter bursa, which he reports has given moderate relief.    Pain Location: LEFT hip  Radiation: Anterior groin and anterior thigh  Quality: aching, stabbing  Intensity/Severity: moderate  Aggravating Factors: sitting, walking, standing  Alleviating Factors: Tylenol, NSAIDs  ADLs Affected: ambulating    This patient is an established patient.  This problem is not new to this examiner.      Allergies:   Allergies   Allergen Reactions   • Bupropion Other (See Comments)     MAKES PATIENT VERY AGGRESSIVE  MAKES PATIENT VERY AGGRESSIVE   • Lorazepam Other (See Comments)     MADE PATIENT VERY AGGRESSIVE  MADE PATIENT VERY AGGRESSIVE   • Propoxyphene Nausea And Vomiting   • Adhesive Tape Rash       Medications:   Home Medications:  Current Outpatient Medications on File Prior to Visit   Medication Sig   • allopurinol (ZYLOPRIM) 300 MG tablet Take 300 mg by mouth.   • ALPRAZolam (XANAX) 1 MG tablet Take 1 mg by mouth Daily.   • amLODIPine (NORVASC) 5 MG tablet Take 5 mg by mouth Daily.   • aspirin 325 MG tablet Take 325 mg by mouth Daily.   • atorvastatin (LIPITOR) 20 MG tablet Take  by mouth.   • carvedilol (COREG) 12.5 MG tablet Take 6.25 mg by mouth.   • cholecalciferol (VITAMIN D3) 49039 UNITS capsule Take 10,000 Units by mouth.   •  clopidogrel (PLAVIX) 75 MG tablet Take 75 mg by mouth Daily.   • cyclobenzaprine (FLEXERIL) 10 MG tablet TAKE 1 2 TO 1 (ONE HALF TO ONE) TABLET BY MOUTH TWICE DAILY AS NEEDED   • fenofibrate (TRICOR) 145 MG tablet Take 145 mg by mouth.   • Flaxseed, Linseed, 1000 MG capsule Take  by mouth.   • glipizide (GLUCOTROL) 10 MG tablet Take 10 mg by mouth Daily.   • glucose blood (ACCU-CHEK SUNDAY PLUS) test strip    • HYDROcodone-acetaminophen (NORCO) 5-325 MG per tablet TAKE 1 2 (ONE HALF) TO 1 TABLET BY MOUTH TWICE DAILY AS NEEDED   • imipramine (TOFRANIL) 50 MG tablet Take 50 mg by mouth.   • isosorbide mononitrate (IMDUR) 30 MG 24 hr tablet Take 30 mg by mouth Daily.   • levothyroxine (SYNTHROID, LEVOTHROID) 150 MCG tablet Take 150 mcg by mouth Daily.   • losartan (COZAAR) 50 MG tablet Take 50 mg by mouth Daily.   • metFORMIN (GLUCOPHAGE) 1000 MG tablet Take 1,000 mg by mouth 2 (Two) Times a Day With Meals.   • pantoprazole (PROTONIX) 40 MG EC tablet Take 40 mg by mouth Daily.     No current facility-administered medications on file prior to visit.      Current Medications:  Scheduled Meds:  PRN Meds:.    I have reviewed the patient's medical history in detail and updated the computerized patient record.  Review and summarization of old records include:    Past Medical History:   Diagnosis Date   • Anxiety    • Back pain    • Cardiac disease    • Depression    • Diabetes (CMS/HCC)    • Fatigue    • Hearing loss    • Joint pain    • Kidney disease    • Knee swelling    • Lumbosacral disc disease    • Rotator cuff syndrome     right   • Sleep apnea    • Thyroid condition      Past Surgical History:   Procedure Laterality Date   • BACK SURGERY  2018    X2   • BACK SURGERY  2011   • CARDIAC SURGERY     • CHOLECYSTECTOMY     • CORONARY ARTERY BYPASS GRAFT     • HERNIA REPAIR     • JOINT REPLACEMENT     • KNEE SURGERY      right and left   • NOSE SURGERY     • SHOULDER SURGERY Bilateral     rotator cuff repair     Social  "History     Occupational History   • Not on file   Tobacco Use   • Smoking status: Never Smoker   • Smokeless tobacco: Never Used   Substance and Sexual Activity   • Alcohol use: No   • Drug use: Defer   • Sexual activity: Defer      Family History   Problem Relation Age of Onset   • Heart attack Mother          Review of Systems  Constitutional: Negative.  Negative for fever.   Endocrine: Negative.    : Positive for difficulty starting urine stream/dribbling.  Musculoskeletal: Positive for arthralgias and gait problem.   Neurological: Negative for numbness.   Psychiatric/Behavioral: Negative.         Wt Readings from Last 3 Encounters:   11/13/20 117 kg (258 lb)   01/14/20 117 kg (259 lb)     Ht Readings from Last 3 Encounters:   11/13/20 185.4 cm (73\")   01/14/20 181.6 cm (71.5\")     There is no height or weight on file to calculate BMI.  No height and weight on file for this encounter.  There were no vitals filed for this visit.      Physical Exam  Constitutional: Patient is oriented to person, place, and time. Appears well-developed and well-nourished.   See detailed exam below   Psychiatric: Patient has a normal mood and affect. His behavior is normal. Judgment and thought content normal.   Nursing note and vitals reviewed.      Ortho Exam:   LEFT hip:  · There is tenderness with palpation over the greater trochanteric bursa.   · There is tenderness with palpation of the IT band .  · Cross body adduction is positive.   · Positive for pain over the greater trochanter with internal and external rotation.   · There is no clinical deformity and no shortening of extremity.   · He does have a limp upon walking.   · There is piriformis tightness.  · Dorsalis pedis and posterior tibial artery pulses are palpable.   · Neurovascular status is intact and capillary refill is less than 2 seconds.   · Common peroneal nerve function is well preserved.  The above information is historical and has been reviewed with no " changes made.  This has been used as a reference for today's telephone visit.    Diagnostics:  Reviewed MRI report of Left hip without contrast, performed at  [facility] on 12/2/2020, summary of impression below:   · Postoperative changes of the lumbar spine-multilevel lumbar fusion, posterior decompression with posterior spinal fixation  · Findings that indicate likely degenerative changes of the SI joint of the right  · Hip articular cartilage appears maintained  · There are degenerative findings of the labrum bilaterally but no definitive tear  · Gluteal tendon insertions on the left show minimal tendinosis and peritendinitis without high-grade tendinosis or tear  · Hamstring origins both show mild tendinosis  · Enlarged prostate and bladder moderately distended, correlate for outlet obstruction      Assessment:  Diagnoses and all orders for this visit:    1. Gluteal tendinitis of left buttock (Primary)    2. Left hip pain    3. Abnormal radiologic findings on diagnostic imaging of other urinary organs  -     Ambulatory Referral to Urology          ?    Plan    Orders Placed This Encounter   Procedures   • Ambulatory Referral to Urology      · Discussion of MRI findings and urinary symptoms. Discussion of orthopaedic goals and activities.  · Risk, benefits, and merits of treatment alternatives reviewed with the patient.  Recommended formal physical therapy for the gluteal tendinosis and tendinosis of the hamstrings.  Patient is not interested in formal physical therapy at this time due to Covid.  He would like to return to the office when able for an injection of the greater trochanter bursa. I also consider the possibility that his thigh symptoms could be originating from his back. If he has not had a lumbar spine MRI, that would be my next suggestion.  · Ice, heat, and/or modalities as beneficial  · Patient is encouraged to call or return for any issues or concerns.   · Also informed patient of  bladder/prostate findings and recommended referral to urology for further evaluation.  · Follow up in 2 months  This visit has been rescheduled as a phone visit to comply with patient safety concerns in accordance with CDC recommendations. Total time of discussion was 5 minutes.       Patrick Dunn PA-C  Date of encounter: 12/4/2020     Electronically signed by Patrick Dunn PA-C, 12/04/20, 3:43 PM EST.        EMR Dragon/Transcription disclaimer:  Much of this encounter note is an electronic transcription/translation of spoken language to printed text. The electronic translation of spoken language may permit erroneous, or at times, nonsensical words or phrases to be inadvertently transcribed; Although I have reviewed the note for such errors, some may still exist.

## 2020-12-11 ENCOUNTER — APPOINTMENT (OUTPATIENT)
Dept: CT IMAGING | Facility: HOSPITAL | Age: 70
End: 2020-12-11

## 2020-12-11 ENCOUNTER — HOSPITAL ENCOUNTER (OUTPATIENT)
Dept: GENERAL RADIOLOGY | Facility: HOSPITAL | Age: 70
Discharge: HOME | End: 2020-12-11

## 2020-12-15 ENCOUNTER — TRANSCRIBE ORDERS (OUTPATIENT)
Dept: ADMINISTRATIVE | Facility: HOSPITAL | Age: 70
End: 2020-12-15

## 2020-12-21 ENCOUNTER — TELEPHONE (OUTPATIENT)
Dept: INTERVENTIONAL RADIOLOGY/VASCULAR | Facility: HOSPITAL | Age: 70
End: 2020-12-21

## 2020-12-21 NOTE — TELEPHONE ENCOUNTER
Pre procedure phone call completed, reviewed info with his wife for the myelogram tomorrow as the patient is sleeping.  He will call me back when he awakens to discuss his med list. Request to film library to obtain pertinent imaging pre procedure, also, wife will bring the discs she has and the reports that she has.

## 2020-12-22 RX ORDER — MULTIVIT-MIN/IRON/FOLIC ACID/K 18-600-40
500 CAPSULE ORAL DAILY
COMMUNITY
End: 2022-01-24

## 2020-12-22 RX ORDER — FUROSEMIDE 20 MG/1
TABLET ORAL EVERY 24 HOURS
COMMUNITY
End: 2021-01-28

## 2020-12-22 RX ORDER — ALPRAZOLAM 1 MG/1
TABLET ORAL
COMMUNITY
End: 2020-12-22 | Stop reason: SDUPTHER

## 2020-12-22 RX ORDER — METFORMIN HYDROCHLORIDE 500 MG/1
500 TABLET, EXTENDED RELEASE ORAL
COMMUNITY
Start: 2020-12-20 | End: 2021-06-16 | Stop reason: SDUPTHER

## 2020-12-22 RX ORDER — ISOSORBIDE MONONITRATE 60 MG/1
60 TABLET, EXTENDED RELEASE ORAL DAILY
COMMUNITY
Start: 2020-12-17 | End: 2023-01-09 | Stop reason: SDUPTHER

## 2020-12-22 RX ORDER — CARVEDILOL 25 MG/1
12.5 TABLET ORAL 2 TIMES DAILY
COMMUNITY
Start: 2020-12-17 | End: 2021-01-28

## 2020-12-22 RX ORDER — LOSARTAN POTASSIUM 100 MG/1
100 TABLET ORAL DAILY
COMMUNITY
Start: 2020-12-14

## 2020-12-22 NOTE — PROGRESS NOTES
12/23/20 0000   Pre-Procedure Phone Call   Procedure Time Verified Yes   Arrival Time 0800   Procedure Location Verified Yes   NPO Status Reinforced Yes   Ride and Caregiver Arranged Yes   Patient Knows to Bring Current Medications   (updated, will hold metformin day of, need to clarify dosages, unable to edit in epic until today.)   Bring Outside Films Requested   (film library request sent 12/21/20)

## 2020-12-23 ENCOUNTER — APPOINTMENT (OUTPATIENT)
Dept: CT IMAGING | Facility: HOSPITAL | Age: 70
End: 2020-12-23

## 2020-12-23 ENCOUNTER — APPOINTMENT (OUTPATIENT)
Dept: OTHER | Facility: HOSPITAL | Age: 70
End: 2020-12-23

## 2020-12-23 ENCOUNTER — APPOINTMENT (OUTPATIENT)
Dept: GENERAL RADIOLOGY | Facility: HOSPITAL | Age: 70
End: 2020-12-23

## 2020-12-23 ENCOUNTER — HOSPITAL ENCOUNTER (OUTPATIENT)
Dept: CT IMAGING | Facility: HOSPITAL | Age: 70
Discharge: HOME | End: 2020-12-23

## 2020-12-23 ENCOUNTER — HOSPITAL ENCOUNTER (OUTPATIENT)
Dept: GENERAL RADIOLOGY | Facility: HOSPITAL | Age: 70
Discharge: HOME | End: 2020-12-23

## 2020-12-23 VITALS
OXYGEN SATURATION: 94 % | DIASTOLIC BLOOD PRESSURE: 78 MMHG | HEART RATE: 54 BPM | SYSTOLIC BLOOD PRESSURE: 134 MMHG | RESPIRATION RATE: 16 BRPM | TEMPERATURE: 97.3 F

## 2020-12-23 DIAGNOSIS — M48.061 SPINAL STENOSIS, LUMBAR REGION, WITHOUT NEUROGENIC CLAUDICATION: ICD-10-CM

## 2020-12-23 DIAGNOSIS — Z09 FOLLOW UP: ICD-10-CM

## 2020-12-23 PROCEDURE — 62284 INJECTION FOR MYELOGRAM: CPT

## 2020-12-23 PROCEDURE — 0 IOPAMIDOL 41 % SOLUTION: Performed by: RADIOLOGY

## 2020-12-23 PROCEDURE — 25010000003 LIDOCAINE 1 % SOLUTION: Performed by: RADIOLOGY

## 2020-12-23 PROCEDURE — A9270 NON-COVERED ITEM OR SERVICE: HCPCS | Performed by: RADIOLOGY

## 2020-12-23 PROCEDURE — 63710000001 HYDROCODONE-ACETAMINOPHEN 5-325 MG TABLET: Performed by: RADIOLOGY

## 2020-12-23 PROCEDURE — 63710000001 ALPRAZOLAM 1 MG TABLET: Performed by: RADIOLOGY

## 2020-12-23 PROCEDURE — 72132 CT LUMBAR SPINE W/DYE: CPT

## 2020-12-23 PROCEDURE — 72240 MYELOGRAPHY NECK SPINE: CPT

## 2020-12-23 PROCEDURE — 62304 MYELOGRAPHY LUMBAR INJECTION: CPT

## 2020-12-23 RX ORDER — ALPRAZOLAM 1 MG/1
1 TABLET ORAL ONCE
Status: COMPLETED | OUTPATIENT
Start: 2020-12-23 | End: 2020-12-23

## 2020-12-23 RX ORDER — FUROSEMIDE 40 MG/1
40 TABLET ORAL EVERY 24 HOURS
COMMUNITY
End: 2022-07-07

## 2020-12-23 RX ORDER — HYDROCODONE BITARTRATE AND ACETAMINOPHEN 5; 325 MG/1; MG/1
1 TABLET ORAL ONCE AS NEEDED
Status: COMPLETED | OUTPATIENT
Start: 2020-12-23 | End: 2020-12-23

## 2020-12-23 RX ORDER — LIDOCAINE HYDROCHLORIDE 10 MG/ML
10 INJECTION, SOLUTION INFILTRATION; PERINEURAL ONCE
Status: COMPLETED | OUTPATIENT
Start: 2020-12-23 | End: 2020-12-23

## 2020-12-23 RX ADMIN — LIDOCAINE HYDROCHLORIDE 4 ML: 10 INJECTION, SOLUTION INFILTRATION; PERINEURAL at 09:51

## 2020-12-23 RX ADMIN — IOPAMIDOL 20 ML: 408 INJECTION, SOLUTION INTRATHECAL at 09:53

## 2020-12-23 RX ADMIN — HYDROCODONE BITARTRATE AND ACETAMINOPHEN 1 TABLET: 5; 325 TABLET ORAL at 08:20

## 2020-12-23 RX ADMIN — ALPRAZOLAM 1 MG: 1 TABLET ORAL at 08:20

## 2020-12-23 NOTE — NURSING NOTE
Patient arrived in x-ray triage for lumbar myelogram. Pt wearing surgical mask, RN wearing surgical mask and goggles for all pt interactions.

## 2020-12-23 NOTE — NURSING NOTE
D/c instructions reviewed and d/c paper signed. Pt taken out to pt discharge via wheelchair, no issues or concerns.

## 2020-12-23 NOTE — DISCHARGE INSTRUCTIONS
EDUCATION /DISCHARGE INSTRUCTIONS:  A myelogram is a special radiology procedure of the spinal cord, spinal nerves and other related structures.  You will be awake during the examination.  An area of your lower back will be cleansed with an antiseptic solution.  The physician will inject a numbing medication in your lower back.  While your back is numb, a needle will be placed in the lower back area.  A small amount of spinal fluid may be withdrawn and sent to the lab if ordered by your physician. While the needle is in the back, an injection of a contrast material (xray dye) will be given through the needle.  The contrast material will allow the physician to see the spinal cord and spinal nerves.  Once injected, the needle will be removed and a band aid will be placed over the injection site.  The table will be tilted during the process to allow the contrast material to flow to particular areas in the spine.  Following the injection and xrays, you will be taken to the CT scan where more pictures will be taken. After the procedure is finished, the contrast material will be absorbed by your body and eliminated through your kidneys.  The radiologist will study and interpret your myelogram and send the results to your physician.  Procedure risks of a myelogram include, but are not limited to:  *  Bleeding   *  seizure  *  Infection   *  Headache, possibly severe requiring  *  Contrast reaction      a blood patch  *  Nerve or cord injury  *  Paralysis and death  Benefits of the procedure:  *  Best examination for delineating pathology related to spinal cord compression from a disc and/or nerve root compression  Alternatives to the procedure:  MRI - a non invasive procedure requiring intravenous contrast injection.  Cannot be done on patients with certain pacemakers or metal in the body.  MRI risks include possible reaction to the contrast material, movement of metal located in the body.   Benefit to MRI:  Non-invasive  and usually painless procedure.  THIS EDUCATION INFORMATION WAS REVIEWED PRIOR TO THE PROCEDURE AND CONSENT. Patient initials __________________Time_____0814____________  Important information following your myelogram:  *  Lie down with your head elevated no more than 2 pillows for the next 24 hours.   *  Sit up in the car going home.  Sit up to eat and use the restroom only,  for 24 hours.  *  24 HOURS COMPLETE AT _____12/24/2020 @ 10:30 ___________________   *  Tomorrow, after 24 hours complete, take it easy and rest.  *  Do not drive for 48 hours following a myelogram  *  You may remove the bandage and shower in the morning  *  Increase your fluids for the next 24 hours.  Caffeinated drinks are encouraged.   Resume taking your blood thinner or Aspirin on _____12/24/2020 @ 10:30 ____________________    Resume taking your (Glucophage/Metformin) in 48 hrs. Your next dose will be :  _______12/25/2020 @ 10:30 am__________    CALL YOUR PHYSICIAN FOR THE FOLLOWING:  * Pain at the injection site  * Reddness, swelling, bruising or drainage at the injection site  * A fever by mouth of 101.0  * Any new symptoms  If you have problems with a headache that is not relieved with rest and medication, please call the Radiology Triage Nurse desk  [phone]

## 2020-12-24 ENCOUNTER — TELEPHONE (OUTPATIENT)
Dept: INTERVENTIONAL RADIOLOGY/VASCULAR | Facility: HOSPITAL | Age: 70
End: 2020-12-24

## 2020-12-28 ENCOUNTER — OFFICE VISIT CONVERTED (OUTPATIENT)
Dept: UROLOGY | Facility: CLINIC | Age: 70
End: 2020-12-28
Attending: NURSE PRACTITIONER

## 2020-12-28 ENCOUNTER — CONVERSION ENCOUNTER (OUTPATIENT)
Dept: SURGERY | Facility: CLINIC | Age: 70
End: 2020-12-28

## 2021-01-05 ENCOUNTER — HOSPITAL ENCOUNTER (OUTPATIENT)
Dept: OTHER | Facility: HOSPITAL | Age: 71
Discharge: HOME | End: 2021-01-05
Attending: INTERNAL MEDICINE

## 2021-01-05 LAB
ALBUMIN SERPL-MCNC: 3.4 G/DL (ref 3.5–5)
ALBUMIN/GLOB SERPL: 1.4 {RATIO} (ref 1.4–2.6)
ALP SERPL-CCNC: 34 U/L (ref 56–155)
ALT SERPL-CCNC: 12 U/L (ref 10–40)
ANION GAP SERPL CALC-SCNC: 15 MMOL/L (ref 8–19)
APPEARANCE UR: CLEAR
AST SERPL-CCNC: 15 U/L (ref 15–50)
BILIRUB SERPL-MCNC: 0.19 MG/DL (ref 0.2–1.3)
BILIRUB UR QL: NEGATIVE
BUN SERPL-MCNC: 18 MG/DL (ref 5–25)
BUN/CREAT SERPL: 10 {RATIO} (ref 6–20)
CALCIUM SERPL-MCNC: 9.9 MG/DL (ref 8.7–10.4)
CHLORIDE SERPL-SCNC: 98 MMOL/L (ref 99–111)
COLOR UR: YELLOW
CONV CO2: 25 MMOL/L (ref 22–32)
CONV LEUKOCYTE ESTERASE: NEGATIVE
CONV TOTAL PROTEIN: 5.9 G/DL (ref 6.3–8.2)
CONV UROBILINOGEN IN URINE BY AUTOMATED TEST STRIP: 0.2 {EHRLICHU}/DL (ref 0.1–1)
CREAT UR-MCNC: 1.89 MG/DL (ref 0.7–1.2)
CRP SERPL-MCNC: 0.5 MG/L (ref 0–5)
ERYTHROCYTE [DISTWIDTH] IN BLOOD BY AUTOMATED COUNT: 15.4 % (ref 11.5–14.5)
ERYTHROCYTE [SEDIMENTATION RATE] IN BLOOD: 7 MM/H (ref 0–20)
GFR SERPLBLD BASED ON 1.73 SQ M-ARVRAT: 35 ML/MIN/{1.73_M2}
GLOBULIN UR ELPH-MCNC: 2.5 G/DL (ref 2–3.5)
GLUCOSE 24H UR-MCNC: NEGATIVE MG/DL
GLUCOSE SERPL-MCNC: 200 MG/DL (ref 70–99)
HBA1C MFR BLD: 12.4 G/DL (ref 14–18)
HCT VFR BLD AUTO: 37.8 % (ref 42–52)
HGB UR QL STRIP: NEGATIVE
KETONES UR QL STRIP: NEGATIVE MG/DL
MCH RBC QN AUTO: 29.4 PG (ref 27–31)
MCHC RBC AUTO-ENTMCNC: 32.8 G/DL (ref 33–37)
MCV RBC AUTO: 89.6 FL (ref 80–96)
NITRITE UR-MCNC: NEGATIVE MG/ML
OSMOLALITY SERPL CALC.SUM OF ELEC: 286 MOSM/KG (ref 273–304)
PH UR STRIP.AUTO: 5.5 [PH] (ref 5–8)
PHOSPHATE SERPL-MCNC: 2.9 MG/DL (ref 2.4–4.5)
PLATELET # BLD AUTO: 364 10*3/UL (ref 130–400)
PMV BLD AUTO: 9.1 FL (ref 7.4–10.4)
POTASSIUM SERPL-SCNC: 3.7 MMOL/L (ref 3.5–5.3)
PROT UR-MCNC: >=300 MG/DL
RBC # BLD AUTO: 4.22 10*6/UL (ref 4.7–6.1)
SODIUM SERPL-SCNC: 134 MMOL/L (ref 135–147)
SP GR UR STRIP: 1.02 (ref 1–1.03)
SPECIMEN SOURCE: ABNORMAL
WBC # BLD AUTO: 7.1 10*3/UL (ref 4.8–10.8)

## 2021-01-06 LAB
ALBUMIN SERPL-MCNC: 3.1 G/DL (ref 2.9–4.4)
ALBUMIN/GLOB SERPL: 1.1 {RATIO} (ref 0.7–1.7)
ALPHA2 GLOB SERPL ELPH-MCNC: 1 G/DL (ref 0.4–1)
BETA GLOBULIN: 1.1 G/DL (ref 0.7–1.3)
C3 SERPL-MCNC: 141 MG/DL (ref 82–167)
C4 SERPL-MCNC: 17 MG/DL (ref 12–38)
CONV ALPHA-1-GLOBULIN: 0.2 G/DL (ref 0–0.4)
CONV CREATININE URINE, RANDOM: 123.1 MG/DL (ref 10–300)
CONV HEPATITIS B SURFACE AG W CONFIRMATION RE: NEGATIVE
CONV PE INTERPRETATION: ABNORMAL
CONV PE NOTE: ABNORMAL
CONV PROTEIN TO CREATININE RATIO (RANDOM URINE): 6.27 {RATIO} (ref 0–0.1)
CONV TOTAL PROTEIN: 5.8 G/DL (ref 6–8.5)
DSDNA AB SER-ACNC: NEGATIVE [IU]/ML
ENA AB SER IA-ACNC: NEGATIVE {RATIO}
GAMMA GLOB SERPL ELPH-MCNC: 0.4 G/DL (ref 0.4–1.8)
GLOBULIN UR ELPH-MCNC: 2.7 G/DL (ref 2.2–3.9)
HBV CORE AB SER DONR QL IA: NEGATIVE
HBV SURFACE AB SER QL: NON REACTIVE
HCV AB SER DONR QL: <0.1 S/CO RATIO (ref 0–0.9)
M-SPIKE: ABNORMAL G/DL
PROT UR-MCNC: 771.7 MG/DL
PTH-INTACT SERPL-MCNC: 33.5 PG/ML (ref 11.1–79.5)

## 2021-01-09 LAB — CONV ANTI NEUTROPHILIC CYTOPLASMIC AB W/REFLEX: NORMAL

## 2021-01-11 ENCOUNTER — TRANSCRIBE ORDERS (OUTPATIENT)
Dept: ADMINISTRATIVE | Facility: HOSPITAL | Age: 71
End: 2021-01-11

## 2021-01-11 DIAGNOSIS — M48.061 DEGENERATIVE LUMBAR SPINAL STENOSIS: ICD-10-CM

## 2021-01-11 DIAGNOSIS — M54.16 LUMBAR RADICULOPATHY: Primary | ICD-10-CM

## 2021-01-20 ENCOUNTER — HOSPITAL ENCOUNTER (OUTPATIENT)
Dept: OTHER | Facility: HOSPITAL | Age: 71
Discharge: HOME | End: 2021-01-20
Attending: INTERNAL MEDICINE

## 2021-01-20 LAB
ALBUMIN SERPL-MCNC: 3.7 G/DL (ref 3.5–5)
ALBUMIN/GLOB SERPL: 1.4 {RATIO} (ref 1.4–2.6)
ALP SERPL-CCNC: 35 U/L (ref 56–155)
ALT SERPL-CCNC: 13 U/L (ref 10–40)
ANION GAP SERPL CALC-SCNC: 14 MMOL/L (ref 8–19)
APPEARANCE UR: CLEAR
AST SERPL-CCNC: 17 U/L (ref 15–50)
BILIRUB SERPL-MCNC: 0.3 MG/DL (ref 0.2–1.3)
BILIRUB UR QL: NEGATIVE
BUN SERPL-MCNC: 18 MG/DL (ref 5–25)
BUN/CREAT SERPL: 9 {RATIO} (ref 6–20)
CALCIUM SERPL-MCNC: 9.6 MG/DL (ref 8.7–10.4)
CHLORIDE SERPL-SCNC: 99 MMOL/L (ref 99–111)
COLOR UR: YELLOW
CONV CO2: 28 MMOL/L (ref 22–32)
CONV CREATININE URINE, RANDOM: 75.6 MG/DL (ref 10–300)
CONV LEUKOCYTE ESTERASE: NEGATIVE
CONV PROTEIN TO CREATININE RATIO (RANDOM URINE): 4.66 {RATIO} (ref 0–0.1)
CONV TOTAL PROTEIN: 6.3 G/DL (ref 6.3–8.2)
CONV UROBILINOGEN IN URINE BY AUTOMATED TEST STRIP: 0.2 {EHRLICHU}/DL (ref 0.1–1)
CREAT UR-MCNC: 1.96 MG/DL (ref 0.7–1.2)
ERYTHROCYTE [DISTWIDTH] IN BLOOD BY AUTOMATED COUNT: 15.2 % (ref 11.5–14.5)
GFR SERPLBLD BASED ON 1.73 SQ M-ARVRAT: 34 ML/MIN/{1.73_M2}
GLOBULIN UR ELPH-MCNC: 2.6 G/DL (ref 2–3.5)
GLUCOSE 24H UR-MCNC: NEGATIVE MG/DL
GLUCOSE SERPL-MCNC: 154 MG/DL (ref 70–99)
HBA1C MFR BLD: 12.9 G/DL (ref 14–18)
HCT VFR BLD AUTO: 38.6 % (ref 42–52)
HGB UR QL STRIP: NEGATIVE
KETONES UR QL STRIP: NEGATIVE MG/DL
MCH RBC QN AUTO: 29.7 PG (ref 27–31)
MCHC RBC AUTO-ENTMCNC: 33.4 G/DL (ref 33–37)
MCV RBC AUTO: 88.9 FL (ref 80–96)
NITRITE UR-MCNC: NEGATIVE MG/ML
OSMOLALITY SERPL CALC.SUM OF ELEC: 289 MOSM/KG (ref 273–304)
PH UR STRIP.AUTO: 6 [PH] (ref 5–8)
PLATELET # BLD AUTO: 329 10*3/UL (ref 130–400)
PMV BLD AUTO: 9.3 FL (ref 7.4–10.4)
POTASSIUM SERPL-SCNC: 3.8 MMOL/L (ref 3.5–5.3)
PROT UR-MCNC: 352.6 MG/DL
PROT UR-MCNC: >=300 MG/DL
RBC # BLD AUTO: 4.34 10*6/UL (ref 4.7–6.1)
SODIUM SERPL-SCNC: 137 MMOL/L (ref 135–147)
SP GR UR STRIP: 1.02 (ref 1–1.03)
SPECIMEN SOURCE: ABNORMAL
WBC # BLD AUTO: 7.08 10*3/UL (ref 4.8–10.8)

## 2021-01-21 ENCOUNTER — HOSPITAL ENCOUNTER (OUTPATIENT)
Dept: OTHER | Facility: HOSPITAL | Age: 71
Discharge: HOME | End: 2021-01-21
Attending: INTERNAL MEDICINE

## 2021-01-25 ENCOUNTER — TRANSCRIBE ORDERS (OUTPATIENT)
Dept: PREADMISSION TESTING | Facility: HOSPITAL | Age: 71
End: 2021-01-25

## 2021-01-25 DIAGNOSIS — Z01.818 OTHER SPECIFIED PRE-OPERATIVE EXAMINATION: Primary | ICD-10-CM

## 2021-01-28 ENCOUNTER — HOSPITAL ENCOUNTER (OUTPATIENT)
Dept: GENERAL RADIOLOGY | Facility: HOSPITAL | Age: 71
Discharge: HOME | End: 2021-01-28

## 2021-01-28 ENCOUNTER — HOSPITAL ENCOUNTER (OUTPATIENT)
Dept: BONE DENSITY | Facility: HOSPITAL | Age: 71
Discharge: HOME | End: 2021-01-28

## 2021-01-28 ENCOUNTER — APPOINTMENT (OUTPATIENT)
Dept: PREADMISSION TESTING | Facility: HOSPITAL | Age: 71
End: 2021-01-28

## 2021-01-28 VITALS
WEIGHT: 248 LBS | BODY MASS INDEX: 32.87 KG/M2 | HEIGHT: 73 IN | RESPIRATION RATE: 18 BRPM | SYSTOLIC BLOOD PRESSURE: 139 MMHG | HEART RATE: 66 BPM | TEMPERATURE: 97.7 F | DIASTOLIC BLOOD PRESSURE: 75 MMHG | OXYGEN SATURATION: 97 %

## 2021-01-28 DIAGNOSIS — M54.16 LUMBAR RADICULOPATHY: ICD-10-CM

## 2021-01-28 DIAGNOSIS — M48.061 DEGENERATIVE LUMBAR SPINAL STENOSIS: ICD-10-CM

## 2021-01-28 LAB
25(OH)D3 SERPL-MCNC: 48.1 NG/ML (ref 30–100)
ABO GROUP BLD: NORMAL
ALBUMIN SERPL-MCNC: 3.9 G/DL (ref 3.5–5.2)
ALBUMIN/GLOB SERPL: 1.8 G/DL
ALP SERPL-CCNC: 32 U/L (ref 39–117)
ALT SERPL W P-5'-P-CCNC: 18 U/L (ref 1–41)
ANION GAP SERPL CALCULATED.3IONS-SCNC: 9.5 MMOL/L (ref 5–15)
APTT PPP: 26.9 SECONDS (ref 22.7–35.4)
AST SERPL-CCNC: 21 U/L (ref 1–40)
BACTERIA UR QL AUTO: NORMAL /HPF
BILIRUB SERPL-MCNC: 0.3 MG/DL (ref 0–1.2)
BILIRUB UR QL STRIP: NEGATIVE
BLD GP AB SCN SERPL QL: NEGATIVE
BUN SERPL-MCNC: 16 MG/DL (ref 8–23)
BUN/CREAT SERPL: 8.1 (ref 7–25)
CALCIUM SPEC-SCNC: 9.7 MG/DL (ref 8.6–10.5)
CHLORIDE SERPL-SCNC: 99 MMOL/L (ref 98–107)
CLARITY UR: CLEAR
CO2 SERPL-SCNC: 27.5 MMOL/L (ref 22–29)
COLOR UR: YELLOW
CREAT SERPL-MCNC: 1.97 MG/DL (ref 0.76–1.27)
CRP SERPL-MCNC: <0.3 MG/DL (ref 0–0.5)
DEPRECATED RDW RBC AUTO: 47.5 FL (ref 37–54)
ERYTHROCYTE [DISTWIDTH] IN BLOOD BY AUTOMATED COUNT: 14.7 % (ref 12.3–15.4)
ERYTHROCYTE [SEDIMENTATION RATE] IN BLOOD: 13 MM/HR (ref 0–20)
GFR SERPL CREATININE-BSD FRML MDRD: 34 ML/MIN/1.73
GLOBULIN UR ELPH-MCNC: 2.2 GM/DL
GLUCOSE SERPL-MCNC: 128 MG/DL (ref 65–99)
GLUCOSE UR STRIP-MCNC: ABNORMAL MG/DL
HBA1C MFR BLD: 8.08 % (ref 4.8–5.6)
HCT VFR BLD AUTO: 37.8 % (ref 37.5–51)
HGB BLD-MCNC: 12.9 G/DL (ref 13–17.7)
HGB UR QL STRIP.AUTO: NEGATIVE
HYALINE CASTS UR QL AUTO: NORMAL /LPF
INR PPP: 1.03 (ref 0.9–1.1)
KETONES UR QL STRIP: NEGATIVE
LEUKOCYTE ESTERASE UR QL STRIP.AUTO: NEGATIVE
MCH RBC QN AUTO: 30.4 PG (ref 26.6–33)
MCHC RBC AUTO-ENTMCNC: 34.1 G/DL (ref 31.5–35.7)
MCV RBC AUTO: 89.2 FL (ref 79–97)
NITRITE UR QL STRIP: NEGATIVE
PH UR STRIP.AUTO: 5.5 [PH] (ref 5–8)
PLATELET # BLD AUTO: 396 10*3/MM3 (ref 140–450)
PMV BLD AUTO: 9.6 FL (ref 6–12)
POTASSIUM SERPL-SCNC: 3.9 MMOL/L (ref 3.5–5.2)
PROT SERPL-MCNC: 6.1 G/DL (ref 6–8.5)
PROT UR QL STRIP: ABNORMAL
PROTHROMBIN TIME: 13.3 SECONDS (ref 11.7–14.2)
QT INTERVAL: 411 MS
RBC # BLD AUTO: 4.24 10*6/MM3 (ref 4.14–5.8)
RBC # UR: NORMAL /HPF
REF LAB TEST METHOD: NORMAL
RH BLD: NEGATIVE
SODIUM SERPL-SCNC: 136 MMOL/L (ref 136–145)
SP GR UR STRIP: 1.02 (ref 1–1.03)
SQUAMOUS #/AREA URNS HPF: NORMAL /HPF
T&S EXPIRATION DATE: NORMAL
UROBILINOGEN UR QL STRIP: ABNORMAL
WBC # BLD AUTO: 6.92 10*3/MM3 (ref 3.4–10.8)
WBC UR QL AUTO: NORMAL /HPF

## 2021-01-28 PROCEDURE — 71046 X-RAY EXAM CHEST 2 VIEWS: CPT

## 2021-01-28 PROCEDURE — 81001 URINALYSIS AUTO W/SCOPE: CPT

## 2021-01-28 PROCEDURE — 85730 THROMBOPLASTIN TIME PARTIAL: CPT

## 2021-01-28 PROCEDURE — 85027 COMPLETE CBC AUTOMATED: CPT

## 2021-01-28 PROCEDURE — 77080 DXA BONE DENSITY AXIAL: CPT

## 2021-01-28 PROCEDURE — 82306 VITAMIN D 25 HYDROXY: CPT

## 2021-01-28 PROCEDURE — 86901 BLOOD TYPING SEROLOGIC RH(D): CPT

## 2021-01-28 PROCEDURE — 86140 C-REACTIVE PROTEIN: CPT

## 2021-01-28 PROCEDURE — 80053 COMPREHEN METABOLIC PANEL: CPT

## 2021-01-28 PROCEDURE — 93005 ELECTROCARDIOGRAM TRACING: CPT

## 2021-01-28 PROCEDURE — 86900 BLOOD TYPING SEROLOGIC ABO: CPT

## 2021-01-28 PROCEDURE — 85610 PROTHROMBIN TIME: CPT

## 2021-01-28 PROCEDURE — 86850 RBC ANTIBODY SCREEN: CPT

## 2021-01-28 PROCEDURE — 36415 COLL VENOUS BLD VENIPUNCTURE: CPT

## 2021-01-28 PROCEDURE — 93010 ELECTROCARDIOGRAM REPORT: CPT | Performed by: INTERNAL MEDICINE

## 2021-01-28 PROCEDURE — 83036 HEMOGLOBIN GLYCOSYLATED A1C: CPT

## 2021-01-28 PROCEDURE — 85652 RBC SED RATE AUTOMATED: CPT

## 2021-01-28 RX ORDER — IMIPRAMINE HCL 50 MG
100 TABLET ORAL NIGHTLY
COMMUNITY
End: 2023-01-09 | Stop reason: SDUPTHER

## 2021-01-28 RX ORDER — SPIRONOLACTONE 25 MG/1
25 TABLET ORAL EVERY 24 HOURS
COMMUNITY
End: 2023-01-09

## 2021-01-28 RX ORDER — TAMSULOSIN HYDROCHLORIDE 0.4 MG/1
0.4 CAPSULE ORAL EVERY 24 HOURS
COMMUNITY
End: 2022-03-07

## 2021-01-28 RX ORDER — ASPIRIN 325 MG
325 TABLET ORAL DAILY
COMMUNITY

## 2021-01-28 RX ORDER — LEVOTHYROXINE SODIUM 112 UG/1
112 TABLET ORAL DAILY
COMMUNITY
End: 2022-07-07

## 2021-02-08 ENCOUNTER — OFFICE VISIT CONVERTED (OUTPATIENT)
Dept: UROLOGY | Facility: CLINIC | Age: 71
End: 2021-02-08
Attending: NURSE PRACTITIONER

## 2021-02-08 ENCOUNTER — CONVERSION ENCOUNTER (OUTPATIENT)
Dept: SURGERY | Facility: CLINIC | Age: 71
End: 2021-02-08

## 2021-02-08 LAB
BILIRUB UR QL STRIP: NEGATIVE
COLOR UR: YELLOW
CONV BACTERIA IN URINE MICRO: 0
CONV CALCIUM OXALATE CRYSTALS /HPF IN URINE SEDIMENT BY MICROSCOPY: 0
CONV CLARITY OF URINE: CLEAR
CONV PROTEIN IN URINE BY AUTOMATED TEST STRIP: NORMAL
CONV UROBILINOGEN IN URINE BY AUTOMATED TEST STRIP: NORMAL
GLUCOSE UR QL: NEGATIVE
HGB UR QL STRIP: NORMAL
KETONES UR QL STRIP: NEGATIVE
LEUKOCYTE ESTERASE UR QL STRIP: NEGATIVE
NITRITE UR QL STRIP: NEGATIVE
PH UR STRIP.AUTO: 7 [PH]
RBC #/AREA URNS HPF: 0 /[HPF]
RENAL EPI CELLS #/AREA URNS HPF: 0 /[HPF]
SP GR UR: 1.02
SQUAMOUS SPT QL MICRO: 0
WBC #/AREA URNS HPF: 0 /[HPF]

## 2021-02-09 ENCOUNTER — LAB (OUTPATIENT)
Dept: LAB | Facility: HOSPITAL | Age: 71
End: 2021-02-09

## 2021-02-09 DIAGNOSIS — Z01.818 OTHER SPECIFIED PRE-OPERATIVE EXAMINATION: ICD-10-CM

## 2021-02-09 PROCEDURE — U0004 COV-19 TEST NON-CDC HGH THRU: HCPCS

## 2021-02-09 PROCEDURE — C9803 HOPD COVID-19 SPEC COLLECT: HCPCS

## 2021-02-10 LAB — SARS-COV-2 RNA RESP QL NAA+PROBE: NOT DETECTED

## 2021-02-11 ENCOUNTER — ANESTHESIA EVENT (OUTPATIENT)
Dept: PERIOP | Facility: HOSPITAL | Age: 71
End: 2021-02-11

## 2021-02-11 ENCOUNTER — APPOINTMENT (OUTPATIENT)
Dept: GENERAL RADIOLOGY | Facility: HOSPITAL | Age: 71
End: 2021-02-11

## 2021-02-11 ENCOUNTER — ANESTHESIA (OUTPATIENT)
Dept: PERIOP | Facility: HOSPITAL | Age: 71
End: 2021-02-11

## 2021-02-11 ENCOUNTER — HOSPITAL ENCOUNTER (INPATIENT)
Facility: HOSPITAL | Age: 71
LOS: 3 days | Discharge: HOME | End: 2021-02-14
Attending: ORTHOPAEDIC SURGERY | Admitting: ORTHOPAEDIC SURGERY

## 2021-02-11 PROBLEM — Z98.1 HISTORY OF FUSION OF LUMBAR SPINE: Status: ACTIVE | Noted: 2021-02-11

## 2021-02-11 LAB
ABO GROUP BLD: NORMAL
GLUCOSE BLDC GLUCOMTR-MCNC: 159 MG/DL (ref 70–130)
GLUCOSE BLDC GLUCOMTR-MCNC: 175 MG/DL (ref 70–130)
GLUCOSE BLDC GLUCOMTR-MCNC: 180 MG/DL (ref 70–130)
GLUCOSE BLDC GLUCOMTR-MCNC: 189 MG/DL (ref 70–130)
RH BLD: NEGATIVE

## 2021-02-11 PROCEDURE — 25010000002 ALBUMIN HUMAN 5% PER 50 ML: Performed by: NURSE ANESTHETIST, CERTIFIED REGISTERED

## 2021-02-11 PROCEDURE — 82947 ASSAY GLUCOSE BLOOD QUANT: CPT

## 2021-02-11 PROCEDURE — C1889 IMPLANT/INSERT DEVICE, NOC: HCPCS | Performed by: ORTHOPAEDIC SURGERY

## 2021-02-11 PROCEDURE — 25010000002 HYDROMORPHONE PER 4 MG: Performed by: NURSE ANESTHETIST, CERTIFIED REGISTERED

## 2021-02-11 PROCEDURE — 25010000003 CEFAZOLIN IN DEXTROSE 2-4 GM/100ML-% SOLUTION: Performed by: ORTHOPAEDIC SURGERY

## 2021-02-11 PROCEDURE — 25010000002 FENTANYL CITRATE (PF) 100 MCG/2ML SOLUTION: Performed by: NURSE ANESTHETIST, CERTIFIED REGISTERED

## 2021-02-11 PROCEDURE — 76000 FLUOROSCOPY <1 HR PHYS/QHP: CPT

## 2021-02-11 PROCEDURE — 25010000002 VANCOMYCIN 1 G RECONSTITUTED SOLUTION: Performed by: ORTHOPAEDIC SURGERY

## 2021-02-11 PROCEDURE — 0SB20ZZ EXCISION OF LUMBAR VERTEBRAL DISC, OPEN APPROACH: ICD-10-PCS | Performed by: ORTHOPAEDIC SURGERY

## 2021-02-11 PROCEDURE — C1713 ANCHOR/SCREW BN/BN,TIS/BN: HCPCS | Performed by: ORTHOPAEDIC SURGERY

## 2021-02-11 PROCEDURE — 82962 GLUCOSE BLOOD TEST: CPT

## 2021-02-11 PROCEDURE — 25010000002 HYDRALAZINE PER 20 MG: Performed by: NURSE ANESTHETIST, CERTIFIED REGISTERED

## 2021-02-11 PROCEDURE — 25010000002 ONDANSETRON PER 1 MG: Performed by: NURSE ANESTHETIST, CERTIFIED REGISTERED

## 2021-02-11 PROCEDURE — 63710000001 INSULIN REGULAR HUMAN PER 5 UNITS: Performed by: NURSE ANESTHETIST, CERTIFIED REGISTERED

## 2021-02-11 PROCEDURE — 25010000002 DIPHENHYDRAMINE PER 50 MG: Performed by: NURSE ANESTHETIST, CERTIFIED REGISTERED

## 2021-02-11 PROCEDURE — 85018 HEMOGLOBIN: CPT

## 2021-02-11 PROCEDURE — P9041 ALBUMIN (HUMAN),5%, 50ML: HCPCS | Performed by: NURSE ANESTHETIST, CERTIFIED REGISTERED

## 2021-02-11 PROCEDURE — 82803 BLOOD GASES ANY COMBINATION: CPT

## 2021-02-11 PROCEDURE — 3E0U0GB INTRODUCTION OF RECOMBINANT BONE MORPHOGENETIC PROTEIN INTO JOINTS, OPEN APPROACH: ICD-10-PCS | Performed by: ORTHOPAEDIC SURGERY

## 2021-02-11 PROCEDURE — 86901 BLOOD TYPING SEROLOGIC RH(D): CPT

## 2021-02-11 PROCEDURE — 25010000002 HYDROMORPHONE PER 4 MG: Performed by: ORTHOPAEDIC SURGERY

## 2021-02-11 PROCEDURE — 25010000002 PROPOFOL 10 MG/ML EMULSION: Performed by: NURSE ANESTHETIST, CERTIFIED REGISTERED

## 2021-02-11 PROCEDURE — 25010000002 PHENYLEPHRINE PER 1 ML: Performed by: NURSE ANESTHETIST, CERTIFIED REGISTERED

## 2021-02-11 PROCEDURE — 85014 HEMATOCRIT: CPT

## 2021-02-11 PROCEDURE — 72080 X-RAY EXAM THORACOLMB 2/> VW: CPT

## 2021-02-11 PROCEDURE — 0SP004Z REMOVAL OF INTERNAL FIXATION DEVICE FROM LUMBAR VERTEBRAL JOINT, OPEN APPROACH: ICD-10-PCS | Performed by: ORTHOPAEDIC SURGERY

## 2021-02-11 PROCEDURE — 86923 COMPATIBILITY TEST ELECTRIC: CPT

## 2021-02-11 PROCEDURE — 86900 BLOOD TYPING SEROLOGIC ABO: CPT

## 2021-02-11 PROCEDURE — 25010000002 DROPERIDOL PER 5 MG: Performed by: ANESTHESIOLOGY

## 2021-02-11 DEVICE — CONNECTOR 779145555 TI SDLD SD 5.5 CL5.5
Type: IMPLANTABLE DEVICE | Site: SPINE LUMBAR | Status: FUNCTIONAL
Brand: CD HORIZON® SPINAL SYSTEM

## 2021-02-11 DEVICE — DEV CONTRL TISS STRATAFIX SYMM PDS PLUS VIL CT-1 45CM: Type: IMPLANTABLE DEVICE | Site: SPINE LUMBAR | Status: FUNCTIONAL

## 2021-02-11 DEVICE — DEV CONTRL TISS STRATAFIX SPIRAL PDS PLS CT1 2-0 45CM: Type: IMPLANTABLE DEVICE | Site: SPINE LUMBAR | Status: FUNCTIONAL

## 2021-02-11 DEVICE — BONE CCCS 20 TO 80 30CC: Type: IMPLANTABLE DEVICE | Site: SPINE LUMBAR | Status: FUNCTIONAL

## 2021-02-11 DEVICE — DEV CONTRL TISS STRATAFIX SPIRAL PLS PDS CT1 2/0 22CM: Type: IMPLANTABLE DEVICE | Site: SPINE LUMBAR | Status: FUNCTIONAL

## 2021-02-11 DEVICE — FLOSEAL HEMOSTATIC MATRIX, 10ML
Type: IMPLANTABLE DEVICE | Site: SPINE LUMBAR | Status: FUNCTIONAL
Brand: FLOSEAL HEMOSTATIC MATRIX

## 2021-02-11 DEVICE — BONE GRAFT KIT 7510600 INFUSE LARGE
Type: IMPLANTABLE DEVICE | Site: SPINE LUMBAR | Status: FUNCTIONAL
Brand: INFUSE® BONE GRAFT

## 2021-02-11 DEVICE — IMPLANTABLE DEVICE: Type: IMPLANTABLE DEVICE | Site: SPINE LUMBAR | Status: FUNCTIONAL

## 2021-02-11 RX ORDER — DIPHENHYDRAMINE HYDROCHLORIDE 50 MG/ML
12.5 INJECTION INTRAMUSCULAR; INTRAVENOUS
Status: DISCONTINUED | OUTPATIENT
Start: 2021-02-11 | End: 2021-02-11 | Stop reason: HOSPADM

## 2021-02-11 RX ORDER — HYDRALAZINE HYDROCHLORIDE 20 MG/ML
5 INJECTION INTRAMUSCULAR; INTRAVENOUS
Status: DISCONTINUED | OUTPATIENT
Start: 2021-02-11 | End: 2021-02-11 | Stop reason: HOSPADM

## 2021-02-11 RX ORDER — CYCLOBENZAPRINE HCL 10 MG
10 TABLET ORAL 3 TIMES DAILY PRN
Status: DISCONTINUED | OUTPATIENT
Start: 2021-02-11 | End: 2021-02-14 | Stop reason: HOSPADM

## 2021-02-11 RX ORDER — DOCUSATE SODIUM 100 MG/1
100 CAPSULE, LIQUID FILLED ORAL 2 TIMES DAILY
Status: DISCONTINUED | OUTPATIENT
Start: 2021-02-11 | End: 2021-02-14 | Stop reason: HOSPADM

## 2021-02-11 RX ORDER — TAMSULOSIN HYDROCHLORIDE 0.4 MG/1
0.4 CAPSULE ORAL EVERY 24 HOURS
Status: DISCONTINUED | OUTPATIENT
Start: 2021-02-11 | End: 2021-02-14 | Stop reason: HOSPADM

## 2021-02-11 RX ORDER — ATORVASTATIN CALCIUM 20 MG/1
20 TABLET, FILM COATED ORAL EVERY EVENING
Status: DISCONTINUED | OUTPATIENT
Start: 2021-02-12 | End: 2021-02-14 | Stop reason: HOSPADM

## 2021-02-11 RX ORDER — NICOTINE POLACRILEX 4 MG
15 LOZENGE BUCCAL
Status: DISCONTINUED | OUTPATIENT
Start: 2021-02-11 | End: 2021-02-14 | Stop reason: HOSPADM

## 2021-02-11 RX ORDER — NALOXONE HCL 0.4 MG/ML
0.4 VIAL (ML) INJECTION
Status: DISCONTINUED | OUTPATIENT
Start: 2021-02-11 | End: 2021-02-13

## 2021-02-11 RX ORDER — ONDANSETRON 2 MG/ML
4 INJECTION INTRAMUSCULAR; INTRAVENOUS EVERY 6 HOURS PRN
Status: DISCONTINUED | OUTPATIENT
Start: 2021-02-11 | End: 2021-02-14 | Stop reason: HOSPADM

## 2021-02-11 RX ORDER — ACETAMINOPHEN 325 MG/1
650 TABLET ORAL EVERY 8 HOURS
Status: COMPLETED | OUTPATIENT
Start: 2021-02-11 | End: 2021-02-13

## 2021-02-11 RX ORDER — CEFAZOLIN SODIUM 2 G/100ML
2 INJECTION, SOLUTION INTRAVENOUS ONCE
Status: CANCELLED | OUTPATIENT
Start: 2021-02-11 | End: 2021-02-11

## 2021-02-11 RX ORDER — ONDANSETRON 4 MG/1
4 TABLET, FILM COATED ORAL EVERY 6 HOURS PRN
Status: DISCONTINUED | OUTPATIENT
Start: 2021-02-11 | End: 2021-02-14 | Stop reason: HOSPADM

## 2021-02-11 RX ORDER — EPHEDRINE SULFATE 50 MG/ML
INJECTION, SOLUTION INTRAVENOUS AS NEEDED
Status: DISCONTINUED | OUTPATIENT
Start: 2021-02-11 | End: 2021-02-11 | Stop reason: SURG

## 2021-02-11 RX ORDER — PANTOPRAZOLE SODIUM 40 MG/1
40 TABLET, DELAYED RELEASE ORAL
Status: DISCONTINUED | OUTPATIENT
Start: 2021-02-12 | End: 2021-02-14 | Stop reason: HOSPADM

## 2021-02-11 RX ORDER — HYDROMORPHONE HYDROCHLORIDE 1 MG/ML
0.5 INJECTION, SOLUTION INTRAMUSCULAR; INTRAVENOUS; SUBCUTANEOUS
Status: DISCONTINUED | OUTPATIENT
Start: 2021-02-11 | End: 2021-02-11 | Stop reason: HOSPADM

## 2021-02-11 RX ORDER — SODIUM CHLORIDE 0.9 % (FLUSH) 0.9 %
10 SYRINGE (ML) INJECTION AS NEEDED
Status: DISCONTINUED | OUTPATIENT
Start: 2021-02-11 | End: 2021-02-14 | Stop reason: HOSPADM

## 2021-02-11 RX ORDER — INSULIN LISPRO 100 [IU]/ML
0-7 INJECTION, SOLUTION INTRAVENOUS; SUBCUTANEOUS
Status: DISCONTINUED | OUTPATIENT
Start: 2021-02-12 | End: 2021-02-12

## 2021-02-11 RX ORDER — AMLODIPINE BESYLATE 5 MG/1
5 TABLET ORAL DAILY
Status: DISCONTINUED | OUTPATIENT
Start: 2021-02-12 | End: 2021-02-14 | Stop reason: HOSPADM

## 2021-02-11 RX ORDER — CHOLECALCIFEROL (VITAMIN D3) 125 MCG
5 CAPSULE ORAL NIGHTLY PRN
Status: DISCONTINUED | OUTPATIENT
Start: 2021-02-11 | End: 2021-02-14 | Stop reason: HOSPADM

## 2021-02-11 RX ORDER — LIDOCAINE HYDROCHLORIDE 20 MG/ML
INJECTION, SOLUTION INFILTRATION; PERINEURAL AS NEEDED
Status: DISCONTINUED | OUTPATIENT
Start: 2021-02-11 | End: 2021-02-11 | Stop reason: SURG

## 2021-02-11 RX ORDER — METFORMIN HYDROCHLORIDE 500 MG/1
500 TABLET, EXTENDED RELEASE ORAL
Status: DISCONTINUED | OUTPATIENT
Start: 2021-02-11 | End: 2021-02-11

## 2021-02-11 RX ORDER — VANCOMYCIN HYDROCHLORIDE 1 G/20ML
INJECTION, POWDER, LYOPHILIZED, FOR SOLUTION INTRAVENOUS AS NEEDED
Status: DISCONTINUED | OUTPATIENT
Start: 2021-02-11 | End: 2021-02-11 | Stop reason: HOSPADM

## 2021-02-11 RX ORDER — ONDANSETRON 2 MG/ML
4 INJECTION INTRAMUSCULAR; INTRAVENOUS ONCE AS NEEDED
Status: DISCONTINUED | OUTPATIENT
Start: 2021-02-11 | End: 2021-02-11 | Stop reason: HOSPADM

## 2021-02-11 RX ORDER — LEVOTHYROXINE SODIUM 112 UG/1
112 TABLET ORAL
Status: DISCONTINUED | OUTPATIENT
Start: 2021-02-12 | End: 2021-02-14 | Stop reason: HOSPADM

## 2021-02-11 RX ORDER — SODIUM CHLORIDE 0.9 % (FLUSH) 0.9 %
10 SYRINGE (ML) INJECTION AS NEEDED
Status: DISCONTINUED | OUTPATIENT
Start: 2021-02-11 | End: 2021-02-11 | Stop reason: HOSPADM

## 2021-02-11 RX ORDER — SODIUM CHLORIDE 0.9 % (FLUSH) 0.9 %
3 SYRINGE (ML) INJECTION EVERY 12 HOURS SCHEDULED
Status: DISCONTINUED | OUTPATIENT
Start: 2021-02-11 | End: 2021-02-14 | Stop reason: HOSPADM

## 2021-02-11 RX ORDER — HYDROMORPHONE HYDROCHLORIDE 1 MG/ML
0.5 INJECTION, SOLUTION INTRAMUSCULAR; INTRAVENOUS; SUBCUTANEOUS
Status: DISCONTINUED | OUTPATIENT
Start: 2021-02-11 | End: 2021-02-13

## 2021-02-11 RX ORDER — IMIPRAMINE HCL 50 MG
50 TABLET ORAL EVERY MORNING
Status: DISCONTINUED | OUTPATIENT
Start: 2021-02-12 | End: 2021-02-14 | Stop reason: HOSPADM

## 2021-02-11 RX ORDER — LABETALOL HYDROCHLORIDE 5 MG/ML
10 INJECTION, SOLUTION INTRAVENOUS ONCE
Status: COMPLETED | OUTPATIENT
Start: 2021-02-11 | End: 2021-02-11

## 2021-02-11 RX ORDER — FENTANYL CITRATE 50 UG/ML
INJECTION, SOLUTION INTRAMUSCULAR; INTRAVENOUS AS NEEDED
Status: DISCONTINUED | OUTPATIENT
Start: 2021-02-11 | End: 2021-02-11 | Stop reason: SURG

## 2021-02-11 RX ORDER — LIDOCAINE HYDROCHLORIDE 10 MG/ML
0.5 INJECTION, SOLUTION EPIDURAL; INFILTRATION; INTRACAUDAL; PERINEURAL ONCE AS NEEDED
Status: DISCONTINUED | OUTPATIENT
Start: 2021-02-11 | End: 2021-02-11 | Stop reason: HOSPADM

## 2021-02-11 RX ORDER — FENTANYL CITRATE 50 UG/ML
50 INJECTION, SOLUTION INTRAMUSCULAR; INTRAVENOUS
Status: DISCONTINUED | OUTPATIENT
Start: 2021-02-11 | End: 2021-02-11 | Stop reason: HOSPADM

## 2021-02-11 RX ORDER — OXYCODONE HYDROCHLORIDE 5 MG/1
5 TABLET ORAL EVERY 4 HOURS PRN
Status: DISCONTINUED | OUTPATIENT
Start: 2021-02-11 | End: 2021-02-14 | Stop reason: HOSPADM

## 2021-02-11 RX ORDER — SODIUM CHLORIDE 9 MG/ML
INJECTION, SOLUTION INTRAVENOUS CONTINUOUS PRN
Status: DISCONTINUED | OUTPATIENT
Start: 2021-02-11 | End: 2021-02-11 | Stop reason: SURG

## 2021-02-11 RX ORDER — LABETALOL HYDROCHLORIDE 5 MG/ML
5 INJECTION, SOLUTION INTRAVENOUS
Status: DISCONTINUED | OUTPATIENT
Start: 2021-02-11 | End: 2021-02-11 | Stop reason: HOSPADM

## 2021-02-11 RX ORDER — GLYCOPYRROLATE 0.2 MG/ML
INJECTION INTRAMUSCULAR; INTRAVENOUS AS NEEDED
Status: DISCONTINUED | OUTPATIENT
Start: 2021-02-11 | End: 2021-02-11 | Stop reason: SURG

## 2021-02-11 RX ORDER — CEFAZOLIN SODIUM 2 G/100ML
2 INJECTION, SOLUTION INTRAVENOUS EVERY 8 HOURS
Status: COMPLETED | OUTPATIENT
Start: 2021-02-11 | End: 2021-02-12

## 2021-02-11 RX ORDER — CLOPIDOGREL BISULFATE 75 MG/1
75 TABLET ORAL DAILY
Status: DISCONTINUED | OUTPATIENT
Start: 2021-02-12 | End: 2021-02-13

## 2021-02-11 RX ORDER — ROCURONIUM BROMIDE 10 MG/ML
INJECTION, SOLUTION INTRAVENOUS AS NEEDED
Status: DISCONTINUED | OUTPATIENT
Start: 2021-02-11 | End: 2021-02-11 | Stop reason: SURG

## 2021-02-11 RX ORDER — IPRATROPIUM BROMIDE AND ALBUTEROL SULFATE 2.5; .5 MG/3ML; MG/3ML
3 SOLUTION RESPIRATORY (INHALATION) ONCE AS NEEDED
Status: DISCONTINUED | OUTPATIENT
Start: 2021-02-11 | End: 2021-02-11 | Stop reason: HOSPADM

## 2021-02-11 RX ORDER — ALLOPURINOL 300 MG/1
300 TABLET ORAL DAILY
Status: DISCONTINUED | OUTPATIENT
Start: 2021-02-12 | End: 2021-02-14 | Stop reason: HOSPADM

## 2021-02-11 RX ORDER — VASOPRESSIN 20 U/ML
INJECTION PARENTERAL AS NEEDED
Status: DISCONTINUED | OUTPATIENT
Start: 2021-02-11 | End: 2021-02-11 | Stop reason: SURG

## 2021-02-11 RX ORDER — ONDANSETRON 2 MG/ML
INJECTION INTRAMUSCULAR; INTRAVENOUS AS NEEDED
Status: DISCONTINUED | OUTPATIENT
Start: 2021-02-11 | End: 2021-02-11 | Stop reason: SURG

## 2021-02-11 RX ORDER — SODIUM CHLORIDE, SODIUM LACTATE, POTASSIUM CHLORIDE, CALCIUM CHLORIDE 600; 310; 30; 20 MG/100ML; MG/100ML; MG/100ML; MG/100ML
9 INJECTION, SOLUTION INTRAVENOUS CONTINUOUS PRN
Status: DISCONTINUED | OUTPATIENT
Start: 2021-02-11 | End: 2021-02-13

## 2021-02-11 RX ORDER — AMOXICILLIN 250 MG
1 CAPSULE ORAL 2 TIMES DAILY
Status: DISCONTINUED | OUTPATIENT
Start: 2021-02-11 | End: 2021-02-14 | Stop reason: HOSPADM

## 2021-02-11 RX ORDER — NALOXONE HCL 0.4 MG/ML
0.2 VIAL (ML) INJECTION AS NEEDED
Status: DISCONTINUED | OUTPATIENT
Start: 2021-02-11 | End: 2021-02-11 | Stop reason: HOSPADM

## 2021-02-11 RX ORDER — IMIPRAMINE HCL 50 MG
100 TABLET ORAL NIGHTLY
Status: DISCONTINUED | OUTPATIENT
Start: 2021-02-11 | End: 2021-02-14 | Stop reason: HOSPADM

## 2021-02-11 RX ORDER — DROPERIDOL 2.5 MG/ML
0.62 INJECTION, SOLUTION INTRAMUSCULAR; INTRAVENOUS
Status: DISCONTINUED | OUTPATIENT
Start: 2021-02-11 | End: 2021-02-11 | Stop reason: HOSPADM

## 2021-02-11 RX ORDER — PROMETHAZINE HYDROCHLORIDE 25 MG/1
25 TABLET ORAL ONCE AS NEEDED
Status: DISCONTINUED | OUTPATIENT
Start: 2021-02-11 | End: 2021-02-11 | Stop reason: HOSPADM

## 2021-02-11 RX ORDER — GLIPIZIDE 10 MG/1
10 TABLET ORAL
Status: DISCONTINUED | OUTPATIENT
Start: 2021-02-12 | End: 2021-02-11

## 2021-02-11 RX ORDER — MAGNESIUM HYDROXIDE 1200 MG/15ML
LIQUID ORAL AS NEEDED
Status: DISCONTINUED | OUTPATIENT
Start: 2021-02-11 | End: 2021-02-11 | Stop reason: HOSPADM

## 2021-02-11 RX ORDER — SODIUM CHLORIDE 0.9 % (FLUSH) 0.9 %
10 SYRINGE (ML) INJECTION EVERY 12 HOURS SCHEDULED
Status: DISCONTINUED | OUTPATIENT
Start: 2021-02-11 | End: 2021-02-11 | Stop reason: HOSPADM

## 2021-02-11 RX ORDER — BISACODYL 5 MG/1
5 TABLET, DELAYED RELEASE ORAL DAILY PRN
Status: DISCONTINUED | OUTPATIENT
Start: 2021-02-11 | End: 2021-02-14 | Stop reason: HOSPADM

## 2021-02-11 RX ORDER — DIPHENHYDRAMINE HCL 25 MG
25 CAPSULE ORAL
Status: DISCONTINUED | OUTPATIENT
Start: 2021-02-11 | End: 2021-02-11 | Stop reason: HOSPADM

## 2021-02-11 RX ORDER — PROPOFOL 10 MG/ML
VIAL (ML) INTRAVENOUS AS NEEDED
Status: DISCONTINUED | OUTPATIENT
Start: 2021-02-11 | End: 2021-02-11 | Stop reason: SURG

## 2021-02-11 RX ORDER — LIDOCAINE HYDROCHLORIDE 40 MG/ML
SOLUTION TOPICAL AS NEEDED
Status: DISCONTINUED | OUTPATIENT
Start: 2021-02-11 | End: 2021-02-11 | Stop reason: SURG

## 2021-02-11 RX ORDER — HYDROCODONE BITARTRATE AND ACETAMINOPHEN 7.5; 325 MG/1; MG/1
1 TABLET ORAL ONCE AS NEEDED
Status: DISCONTINUED | OUTPATIENT
Start: 2021-02-11 | End: 2021-02-11 | Stop reason: HOSPADM

## 2021-02-11 RX ORDER — EPHEDRINE SULFATE 50 MG/ML
5 INJECTION, SOLUTION INTRAVENOUS ONCE AS NEEDED
Status: DISCONTINUED | OUTPATIENT
Start: 2021-02-11 | End: 2021-02-11 | Stop reason: HOSPADM

## 2021-02-11 RX ORDER — OXYCODONE AND ACETAMINOPHEN 7.5; 325 MG/1; MG/1
1 TABLET ORAL ONCE AS NEEDED
Status: DISCONTINUED | OUTPATIENT
Start: 2021-02-11 | End: 2021-02-11 | Stop reason: HOSPADM

## 2021-02-11 RX ORDER — ALBUMIN, HUMAN INJ 5% 5 %
SOLUTION INTRAVENOUS CONTINUOUS PRN
Status: DISCONTINUED | OUTPATIENT
Start: 2021-02-11 | End: 2021-02-11 | Stop reason: SURG

## 2021-02-11 RX ORDER — BISACODYL 10 MG
10 SUPPOSITORY, RECTAL RECTAL DAILY PRN
Status: DISCONTINUED | OUTPATIENT
Start: 2021-02-11 | End: 2021-02-14 | Stop reason: HOSPADM

## 2021-02-11 RX ORDER — LOSARTAN POTASSIUM 100 MG/1
100 TABLET ORAL DAILY
Status: DISCONTINUED | OUTPATIENT
Start: 2021-02-12 | End: 2021-02-14 | Stop reason: HOSPADM

## 2021-02-11 RX ORDER — SPIRONOLACTONE 25 MG/1
25 TABLET ORAL EVERY 24 HOURS
Status: DISCONTINUED | OUTPATIENT
Start: 2021-02-11 | End: 2021-02-12

## 2021-02-11 RX ORDER — DEXTROSE MONOHYDRATE 25 G/50ML
25 INJECTION, SOLUTION INTRAVENOUS
Status: DISCONTINUED | OUTPATIENT
Start: 2021-02-11 | End: 2021-02-14 | Stop reason: HOSPADM

## 2021-02-11 RX ORDER — ALPRAZOLAM 0.5 MG/1
1 TABLET ORAL 3 TIMES DAILY PRN
Status: DISCONTINUED | OUTPATIENT
Start: 2021-02-11 | End: 2021-02-13

## 2021-02-11 RX ORDER — OXYCODONE AND ACETAMINOPHEN 10; 325 MG/1; MG/1
1 TABLET ORAL EVERY 4 HOURS PRN
Status: DISCONTINUED | OUTPATIENT
Start: 2021-02-11 | End: 2021-02-14 | Stop reason: HOSPADM

## 2021-02-11 RX ORDER — ISOSORBIDE MONONITRATE 60 MG/1
60 TABLET, EXTENDED RELEASE ORAL DAILY
Status: DISCONTINUED | OUTPATIENT
Start: 2021-02-12 | End: 2021-02-14 | Stop reason: HOSPADM

## 2021-02-11 RX ORDER — PROMETHAZINE HYDROCHLORIDE 25 MG/1
25 SUPPOSITORY RECTAL ONCE AS NEEDED
Status: DISCONTINUED | OUTPATIENT
Start: 2021-02-11 | End: 2021-02-11 | Stop reason: HOSPADM

## 2021-02-11 RX ORDER — CEFAZOLIN SODIUM 2 G/100ML
2 INJECTION, SOLUTION INTRAVENOUS ONCE
Status: COMPLETED | OUTPATIENT
Start: 2021-02-11 | End: 2021-02-11

## 2021-02-11 RX ORDER — CARVEDILOL 12.5 MG/1
12.5 TABLET ORAL 2 TIMES DAILY WITH MEALS
Status: DISCONTINUED | OUTPATIENT
Start: 2021-02-11 | End: 2021-02-14 | Stop reason: HOSPADM

## 2021-02-11 RX ADMIN — TAMSULOSIN HYDROCHLORIDE 0.4 MG: 0.4 CAPSULE ORAL at 23:57

## 2021-02-11 RX ADMIN — PROPOFOL 25 MCG/KG/MIN: 10 INJECTION, EMULSION INTRAVENOUS at 08:33

## 2021-02-11 RX ADMIN — LABETALOL HYDROCHLORIDE 5 MG: 5 INJECTION, SOLUTION INTRAVENOUS at 19:20

## 2021-02-11 RX ADMIN — PHENYLEPHRINE HYDROCHLORIDE 1 MCG/KG/MIN: 10 INJECTION, SOLUTION INTRAMUSCULAR; INTRAVENOUS; SUBCUTANEOUS at 08:17

## 2021-02-11 RX ADMIN — CEFAZOLIN SODIUM 2 G: 2 INJECTION, SOLUTION INTRAVENOUS at 11:45

## 2021-02-11 RX ADMIN — DOCUSATE SODIUM 100 MG: 100 CAPSULE, LIQUID FILLED ORAL at 23:26

## 2021-02-11 RX ADMIN — HYDROMORPHONE HYDROCHLORIDE 0.5 MG: 1 INJECTION, SOLUTION INTRAMUSCULAR; INTRAVENOUS; SUBCUTANEOUS at 18:55

## 2021-02-11 RX ADMIN — HYDRALAZINE HYDROCHLORIDE 5 MG: 20 INJECTION, SOLUTION INTRAMUSCULAR; INTRAVENOUS at 20:10

## 2021-02-11 RX ADMIN — HYDRALAZINE HYDROCHLORIDE 5 MG: 20 INJECTION, SOLUTION INTRAMUSCULAR; INTRAVENOUS at 20:00

## 2021-02-11 RX ADMIN — CEFAZOLIN SODIUM 2 G: 2 INJECTION, SOLUTION INTRAVENOUS at 07:49

## 2021-02-11 RX ADMIN — PHENYLEPHRINE HYDROCHLORIDE 200 MCG: 10 INJECTION INTRAVENOUS at 14:43

## 2021-02-11 RX ADMIN — PROPOFOL 200 MG: 10 INJECTION, EMULSION INTRAVENOUS at 07:43

## 2021-02-11 RX ADMIN — ONDANSETRON HYDROCHLORIDE 4 MG: 2 SOLUTION INTRAMUSCULAR; INTRAVENOUS at 17:12

## 2021-02-11 RX ADMIN — SODIUM CHLORIDE, POTASSIUM CHLORIDE, SODIUM LACTATE AND CALCIUM CHLORIDE: 600; 310; 30; 20 INJECTION, SOLUTION INTRAVENOUS at 08:59

## 2021-02-11 RX ADMIN — CEFAZOLIN SODIUM 2 G: 2 INJECTION, SOLUTION INTRAVENOUS at 23:58

## 2021-02-11 RX ADMIN — FENTANYL CITRATE 50 MCG: 50 INJECTION, SOLUTION INTRAMUSCULAR; INTRAVENOUS at 18:09

## 2021-02-11 RX ADMIN — LIDOCAINE HYDROCHLORIDE 1 EACH: 40 SOLUTION TOPICAL at 07:47

## 2021-02-11 RX ADMIN — LIDOCAINE HYDROCHLORIDE 100 MG: 20 INJECTION, SOLUTION INFILTRATION; PERINEURAL at 07:43

## 2021-02-11 RX ADMIN — EPHEDRINE SULFATE 10 MG: 50 INJECTION INTRAVENOUS at 08:57

## 2021-02-11 RX ADMIN — EPHEDRINE SULFATE 10 MG: 50 INJECTION INTRAVENOUS at 07:52

## 2021-02-11 RX ADMIN — LABETALOL HYDROCHLORIDE 10 MG: 5 INJECTION, SOLUTION INTRAVENOUS at 21:54

## 2021-02-11 RX ADMIN — DOCUSATE SODIUM 50MG AND SENNOSIDES 8.6MG 1 TABLET: 8.6; 5 TABLET, FILM COATED ORAL at 23:26

## 2021-02-11 RX ADMIN — DIPHENHYDRAMINE HYDROCHLORIDE 12.5 MG: 50 INJECTION, SOLUTION INTRAMUSCULAR; INTRAVENOUS at 19:00

## 2021-02-11 RX ADMIN — INSULIN HUMAN 5 UNITS: 100 INJECTION, SOLUTION PARENTERAL at 15:25

## 2021-02-11 RX ADMIN — SPIRONOLACTONE 25 MG: 25 TABLET ORAL at 23:57

## 2021-02-11 RX ADMIN — VASOPRESSIN 2 UNITS: 20 INJECTION INTRAVENOUS at 09:00

## 2021-02-11 RX ADMIN — FENTANYL CITRATE 50 MCG: 50 INJECTION, SOLUTION INTRAMUSCULAR; INTRAVENOUS at 21:08

## 2021-02-11 RX ADMIN — HYDROMORPHONE HYDROCHLORIDE 0.5 MG: 1 INJECTION, SOLUTION INTRAMUSCULAR; INTRAVENOUS; SUBCUTANEOUS at 18:28

## 2021-02-11 RX ADMIN — SODIUM CHLORIDE, POTASSIUM CHLORIDE, SODIUM LACTATE AND CALCIUM CHLORIDE 9 ML/HR: 600; 310; 30; 20 INJECTION, SOLUTION INTRAVENOUS at 07:18

## 2021-02-11 RX ADMIN — CEFAZOLIN SODIUM 2 G: 2 INJECTION, SOLUTION INTRAVENOUS at 15:44

## 2021-02-11 RX ADMIN — SODIUM CHLORIDE, POTASSIUM CHLORIDE, SODIUM LACTATE AND CALCIUM CHLORIDE: 600; 310; 30; 20 INJECTION, SOLUTION INTRAVENOUS at 14:50

## 2021-02-11 RX ADMIN — VASOPRESSIN 1 UNITS: 20 INJECTION INTRAVENOUS at 11:46

## 2021-02-11 RX ADMIN — SODIUM CHLORIDE: 9 INJECTION, SOLUTION INTRAVENOUS at 08:15

## 2021-02-11 RX ADMIN — ACETAMINOPHEN 650 MG: 325 TABLET, FILM COATED ORAL at 23:26

## 2021-02-11 RX ADMIN — VASOPRESSIN 2 UNITS: 20 INJECTION INTRAVENOUS at 08:38

## 2021-02-11 RX ADMIN — ALBUMIN HUMAN: 0.05 INJECTION, SOLUTION INTRAVENOUS at 16:05

## 2021-02-11 RX ADMIN — FENTANYL CITRATE 50 MCG: 50 INJECTION INTRAMUSCULAR; INTRAVENOUS at 15:40

## 2021-02-11 RX ADMIN — HYDROMORPHONE HYDROCHLORIDE 0.5 MG: 1 INJECTION, SOLUTION INTRAMUSCULAR; INTRAVENOUS; SUBCUTANEOUS at 23:27

## 2021-02-11 RX ADMIN — HYDRALAZINE HYDROCHLORIDE 5 MG: 20 INJECTION, SOLUTION INTRAMUSCULAR; INTRAVENOUS at 19:50

## 2021-02-11 RX ADMIN — VASOPRESSIN 2 UNITS: 20 INJECTION INTRAVENOUS at 08:30

## 2021-02-11 RX ADMIN — FENTANYL CITRATE 50 MCG: 50 INJECTION INTRAMUSCULAR; INTRAVENOUS at 11:37

## 2021-02-11 RX ADMIN — DROPERIDOL 0.62 MG: 2.5 INJECTION, SOLUTION INTRAMUSCULAR; INTRAVENOUS at 18:41

## 2021-02-11 RX ADMIN — ALBUMIN HUMAN: 0.05 INJECTION, SOLUTION INTRAVENOUS at 12:00

## 2021-02-11 RX ADMIN — ALPRAZOLAM 1 MG: 0.5 TABLET ORAL at 22:57

## 2021-02-11 RX ADMIN — LABETALOL HYDROCHLORIDE 10 MG: 5 INJECTION, SOLUTION INTRAVENOUS at 21:06

## 2021-02-11 RX ADMIN — HYDROMORPHONE HYDROCHLORIDE 0.5 MG: 1 INJECTION, SOLUTION INTRAMUSCULAR; INTRAVENOUS; SUBCUTANEOUS at 20:10

## 2021-02-11 RX ADMIN — VASOPRESSIN 2 UNITS: 20 INJECTION INTRAVENOUS at 08:17

## 2021-02-11 RX ADMIN — FENTANYL CITRATE 50 MCG: 50 INJECTION INTRAMUSCULAR; INTRAVENOUS at 07:43

## 2021-02-11 RX ADMIN — ROCURONIUM BROMIDE 30 MG: 10 INJECTION INTRAVENOUS at 07:43

## 2021-02-11 RX ADMIN — PHENYLEPHRINE HYDROCHLORIDE 200 MCG: 10 INJECTION INTRAVENOUS at 12:59

## 2021-02-11 RX ADMIN — CARVEDILOL 12.5 MG: 12.5 TABLET, FILM COATED ORAL at 22:00

## 2021-02-11 RX ADMIN — HYDRALAZINE HYDROCHLORIDE 5 MG: 20 INJECTION, SOLUTION INTRAMUSCULAR; INTRAVENOUS at 19:40

## 2021-02-11 RX ADMIN — EPHEDRINE SULFATE 10 MG: 50 INJECTION INTRAVENOUS at 07:57

## 2021-02-11 RX ADMIN — VASOPRESSIN 1 UNITS: 20 INJECTION INTRAVENOUS at 09:17

## 2021-02-11 RX ADMIN — FENTANYL CITRATE 50 MCG: 50 INJECTION INTRAMUSCULAR; INTRAVENOUS at 14:51

## 2021-02-11 RX ADMIN — GLYCOPYRROLATE 0.2 MG: 0.2 INJECTION INTRAMUSCULAR; INTRAVENOUS at 07:50

## 2021-02-11 RX ADMIN — FENTANYL CITRATE 50 MCG: 50 INJECTION, SOLUTION INTRAMUSCULAR; INTRAVENOUS at 21:55

## 2021-02-11 RX ADMIN — FENTANYL CITRATE 50 MCG: 50 INJECTION, SOLUTION INTRAMUSCULAR; INTRAVENOUS at 18:15

## 2021-02-11 RX ADMIN — PHENYLEPHRINE HYDROCHLORIDE 200 MCG: 10 INJECTION INTRAVENOUS at 14:49

## 2021-02-11 RX ADMIN — SODIUM CHLORIDE, PRESERVATIVE FREE 3 ML: 5 INJECTION INTRAVENOUS at 23:59

## 2021-02-11 RX ADMIN — HYDROMORPHONE HYDROCHLORIDE 0.5 MG: 1 INJECTION, SOLUTION INTRAMUSCULAR; INTRAVENOUS; SUBCUTANEOUS at 18:40

## 2021-02-11 RX ADMIN — FENTANYL CITRATE 50 MCG: 50 INJECTION INTRAMUSCULAR; INTRAVENOUS at 08:49

## 2021-02-11 RX ADMIN — VASOPRESSIN 2 UNITS: 20 INJECTION INTRAVENOUS at 08:01

## 2021-02-11 RX ADMIN — VASOPRESSIN 2 UNITS: 20 INJECTION INTRAVENOUS at 14:53

## 2021-02-11 RX ADMIN — CYCLOBENZAPRINE 10 MG: 10 TABLET, FILM COATED ORAL at 21:21

## 2021-02-11 RX ADMIN — LABETALOL HYDROCHLORIDE 5 MG: 5 INJECTION, SOLUTION INTRAVENOUS at 19:25

## 2021-02-11 RX ADMIN — PROPOFOL 50 MG: 10 INJECTION, EMULSION INTRAVENOUS at 09:08

## 2021-02-11 RX ADMIN — IMIPRAMINE HYDROCHLORIDE 100 MG: 50 TABLET ORAL at 23:58

## 2021-02-11 RX ADMIN — VASOPRESSIN 2 UNITS: 20 INJECTION INTRAVENOUS at 08:23

## 2021-02-11 RX ADMIN — INSULIN HUMAN 10 UNITS: 100 INJECTION, SOLUTION PARENTERAL at 14:28

## 2021-02-11 NOTE — ANESTHESIA PROCEDURE NOTES
Arterial Line      Patient reassessed immediately prior to procedure    Patient location during procedure: holding area  Start time: 2/11/2021 7:18 AM  Stop Time:2/11/2021 7:20 AM       Line placed for hemodynamic monitoring, ABGs/Labs/ISTAT and MD/Surgeon request.  Performed By   Anesthesiologist: Lauro Low MD  Preanesthetic Checklist  Completed: patient identified, site marked, surgical consent, pre-op evaluation, timeout performed, IV checked, risks and benefits discussed and monitors and equipment checked  Arterial Line Prep   Sterile Tech: cap, gloves and mask  Prep: ChloraPrep  Patient monitoring: blood pressure monitoring, continuous pulse oximetry and EKG  Arterial Line Procedure   Laterality:right  Location:  radial artery  Catheter size: 20 G   Guidance: palpation technique  Number of attempts: 1  Successful placement: yes  Post Assessment   Dressing Type: occlusive dressing applied, secured with tape and wrist guard applied.   Complications no  Circ/Move/Sens Assessment: normal.   Patient Tolerance: patient tolerated the procedure well with no apparent complications

## 2021-02-11 NOTE — BRIEF OP NOTE
LUMBAR DISCECTOMY POSTERIOR WITH FUSION INSTRUMENTATION  Progress Note    Kyler Staley JrUday  2/11/2021    Pre-op Diagnosis:   Lumbar stenosis  Hx of lumbar fusion T12-S1         Post-Op Diagnosis Codes:  same    Procedure/CPT® Codes:        Procedure(s):  EXPLORATION OF FUSION T-12 S-1 REMOVAL OF HARDWARE REVISION OF FUSION AND INSTRUMENTATION T12-S1 WITH APPLICATION OF BONE MORPHOGENIC PROTEIN    Surgeon(s):  Baltazar Blankenship MD    Anesthesia: General    Staff:   Cell Saver : Wali Valera RN  Circulator: Pastora Alvarado RN; Naima Jama RN; Karen Richard RN  Radiology Technologist: Bethel Fonseca  Scrub Person: Lianne Sung; Jessica Funes; Ritu Loo; Kory Canchola  Vendor Representative: Meir Parker  Assistant: May Stephens RN  Assistant: May Stephens RN      Estimated Blood Loss: 350 mL    Urine Voided: 280 mL    Specimens:                None          Drains:   Closed/Suction Drain Back Accordion 10 Fr. (Active)       Urethral Catheter 16 Fr. (Active)       Findings: hardware failure T12-s1    Complications: none    Assistant: May Stephens RN  was responsible for performing the following activities: Retraction, Suction, Irrigation, Suturing, Closing and Placing Dressing and their skilled assistance was necessary for the success of this case.    Baltazar Blankenship MD     Date: 2/11/2021  Time: 17:28 EST

## 2021-02-11 NOTE — ANESTHESIA PROCEDURE NOTES
Airway  Urgency: elective    Date/Time: 2/11/2021 7:47 AM  Airway not difficult    General Information and Staff    Patient location during procedure: OR  Anesthesiologist: Lauro Low MD  CRNA: Ritu Kern CRNA    Indications and Patient Condition  Indications for airway management: airway protection    Preoxygenated: yes  Mask difficulty assessment: 2 - vent by mask + OA or adjuvant +/- NMBA    Final Airway Details  Final airway type: endotracheal airway      Successful airway: ETT  Cuffed: yes   Successful intubation technique: direct laryngoscopy  Facilitating devices/methods: intubating stylet  Endotracheal tube insertion site: oral  Blade: Taj  Blade size: 4  ETT size (mm): 7.5  Cormack-Lehane Classification: grade I - full view of glottis  Placement verified by: chest auscultation and capnometry   Cuff volume (mL): 9  Measured from: lips  ETT/EBT  to lips (cm): 23  Number of attempts at approach: 1  Assessment: lips, teeth, and gum same as pre-op and atraumatic intubation

## 2021-02-12 PROBLEM — E66.9 OBESITY (BMI 30-39.9): Status: ACTIVE | Noted: 2020-01-14

## 2021-02-12 LAB
ANION GAP SERPL CALCULATED.3IONS-SCNC: 12.6 MMOL/L (ref 5–15)
BASE EXCESS BLDA CALC-SCNC: 1 MMOL/L (ref -5–5)
BASE EXCESS BLDA CALC-SCNC: 2 MMOL/L (ref -5–5)
BASOPHILS # BLD AUTO: 0.04 10*3/MM3 (ref 0–0.2)
BASOPHILS NFR BLD AUTO: 0.4 % (ref 0–1.5)
BH BB BLOOD EXPIRATION DATE: NORMAL
BH BB BLOOD EXPIRATION DATE: NORMAL
BH BB BLOOD TYPE BARCODE: 1700
BH BB BLOOD TYPE BARCODE: 1700
BH BB DISPENSE STATUS: NORMAL
BH BB DISPENSE STATUS: NORMAL
BH BB PRODUCT CODE: NORMAL
BH BB PRODUCT CODE: NORMAL
BH BB UNIT NUMBER: NORMAL
BH BB UNIT NUMBER: NORMAL
BUN SERPL-MCNC: 16 MG/DL (ref 8–23)
BUN/CREAT SERPL: 8.5 (ref 7–25)
CA-I BLDA-SCNC: ABNORMAL MMOL/L
CA-I BLDA-SCNC: ABNORMAL MMOL/L
CALCIUM SPEC-SCNC: 8.6 MG/DL (ref 8.6–10.5)
CHLORIDE SERPL-SCNC: 98 MMOL/L (ref 98–107)
CO2 BLDA-SCNC: 27 MMOL/L (ref 24–29)
CO2 BLDA-SCNC: 28 MMOL/L (ref 24–29)
CO2 SERPL-SCNC: 22.4 MMOL/L (ref 22–29)
CREAT SERPL-MCNC: 1.89 MG/DL (ref 0.76–1.27)
CROSSMATCH INTERPRETATION: NORMAL
CROSSMATCH INTERPRETATION: NORMAL
DEPRECATED RDW RBC AUTO: 47 FL (ref 37–54)
EOSINOPHIL # BLD AUTO: 0.01 10*3/MM3 (ref 0–0.4)
EOSINOPHIL NFR BLD AUTO: 0.1 % (ref 0.3–6.2)
ERYTHROCYTE [DISTWIDTH] IN BLOOD BY AUTOMATED COUNT: 14.4 % (ref 12.3–15.4)
GFR SERPL CREATININE-BSD FRML MDRD: 35 ML/MIN/1.73
GLUCOSE BLDC GLUCOMTR-MCNC: 172 MG/DL (ref 70–130)
GLUCOSE BLDC GLUCOMTR-MCNC: 206 MG/DL (ref 70–130)
GLUCOSE BLDC GLUCOMTR-MCNC: 226 MG/DL (ref 70–130)
GLUCOSE BLDC GLUCOMTR-MCNC: 231 MG/DL (ref 70–130)
GLUCOSE BLDC GLUCOMTR-MCNC: 234 MG/DL (ref 70–130)
GLUCOSE BLDC GLUCOMTR-MCNC: 251 MG/DL (ref 70–130)
GLUCOSE BLDC GLUCOMTR-MCNC: 259 MG/DL (ref 70–130)
GLUCOSE SERPL-MCNC: 243 MG/DL (ref 65–99)
HCO3 BLDA-SCNC: 25.7 MMOL/L (ref 22–26)
HCO3 BLDA-SCNC: 26.6 MMOL/L (ref 22–26)
HCT VFR BLD AUTO: 33.2 % (ref 37.5–51)
HCT VFR BLDA CALC: 30 % (ref 38–51)
HCT VFR BLDA CALC: 30 % (ref 38–51)
HGB BLD-MCNC: 11.3 G/DL (ref 13–17.7)
HGB BLDA-MCNC: 10.2 G/DL (ref 12–17)
HGB BLDA-MCNC: 10.2 G/DL (ref 12–17)
IMM GRANULOCYTES # BLD AUTO: 0.04 10*3/MM3 (ref 0–0.05)
IMM GRANULOCYTES NFR BLD AUTO: 0.4 % (ref 0–0.5)
LYMPHOCYTES # BLD AUTO: 0.95 10*3/MM3 (ref 0.7–3.1)
LYMPHOCYTES NFR BLD AUTO: 9 % (ref 19.6–45.3)
MCH RBC QN AUTO: 30.9 PG (ref 26.6–33)
MCHC RBC AUTO-ENTMCNC: 34 G/DL (ref 31.5–35.7)
MCV RBC AUTO: 90.7 FL (ref 79–97)
MONOCYTES # BLD AUTO: 0.8 10*3/MM3 (ref 0.1–0.9)
MONOCYTES NFR BLD AUTO: 7.6 % (ref 5–12)
NEUTROPHILS NFR BLD AUTO: 8.71 10*3/MM3 (ref 1.7–7)
NEUTROPHILS NFR BLD AUTO: 82.5 % (ref 42.7–76)
NRBC BLD AUTO-RTO: 0 /100 WBC (ref 0–0.2)
PCO2 BLDA: 40.3 MM HG (ref 35–45)
PCO2 BLDA: 42.1 MM HG (ref 35–45)
PH BLDA: 7.4 PH UNITS (ref 7.35–7.6)
PH BLDA: 7.43 PH UNITS (ref 7.35–7.6)
PLATELET # BLD AUTO: 266 10*3/MM3 (ref 140–450)
PMV BLD AUTO: 9.9 FL (ref 6–12)
PO2 BLDA: 235 MMHG (ref 80–105)
PO2 BLDA: 301 MMHG (ref 80–105)
POTASSIUM BLDA-SCNC: 3.8 MMOL/L (ref 3.5–4.9)
POTASSIUM BLDA-SCNC: 4.6 MMOL/L (ref 3.5–4.9)
POTASSIUM SERPL-SCNC: 3.9 MMOL/L (ref 3.5–5.2)
RBC # BLD AUTO: 3.66 10*6/MM3 (ref 4.14–5.8)
SAO2 % BLDA: 100 % (ref 95–98)
SAO2 % BLDA: 100 % (ref 95–98)
SODIUM SERPL-SCNC: 133 MMOL/L (ref 136–145)
UNIT  ABO: NORMAL
UNIT  ABO: NORMAL
UNIT  RH: NORMAL
UNIT  RH: NORMAL
WBC # BLD AUTO: 10.55 10*3/MM3 (ref 3.4–10.8)

## 2021-02-12 PROCEDURE — 25010000003 CEFAZOLIN IN DEXTROSE 2-4 GM/100ML-% SOLUTION: Performed by: ORTHOPAEDIC SURGERY

## 2021-02-12 PROCEDURE — 97161 PT EVAL LOW COMPLEX 20 MIN: CPT

## 2021-02-12 PROCEDURE — 97110 THERAPEUTIC EXERCISES: CPT

## 2021-02-12 PROCEDURE — 25010000002 HYDROMORPHONE PER 4 MG: Performed by: ORTHOPAEDIC SURGERY

## 2021-02-12 PROCEDURE — 80048 BASIC METABOLIC PNL TOTAL CA: CPT | Performed by: ORTHOPAEDIC SURGERY

## 2021-02-12 PROCEDURE — 85025 COMPLETE CBC W/AUTO DIFF WBC: CPT | Performed by: ORTHOPAEDIC SURGERY

## 2021-02-12 PROCEDURE — 63710000001 INSULIN LISPRO (HUMAN) PER 5 UNITS: Performed by: NURSE PRACTITIONER

## 2021-02-12 PROCEDURE — 63710000001 INSULIN GLARGINE PER 5 UNITS: Performed by: HOSPITALIST

## 2021-02-12 PROCEDURE — 63710000001 INSULIN LISPRO (HUMAN) PER 5 UNITS: Performed by: HOSPITALIST

## 2021-02-12 PROCEDURE — 82962 GLUCOSE BLOOD TEST: CPT

## 2021-02-12 RX ORDER — INSULIN LISPRO 100 [IU]/ML
0-9 INJECTION, SOLUTION INTRAVENOUS; SUBCUTANEOUS
Status: DISCONTINUED | OUTPATIENT
Start: 2021-02-12 | End: 2021-02-14 | Stop reason: HOSPADM

## 2021-02-12 RX ORDER — INSULIN GLARGINE 100 [IU]/ML
10 INJECTION, SOLUTION SUBCUTANEOUS NIGHTLY
Status: DISCONTINUED | OUTPATIENT
Start: 2021-02-12 | End: 2021-02-14

## 2021-02-12 RX ORDER — CASTOR OIL AND BALSAM, PERU 788; 87 MG/G; MG/G
OINTMENT TOPICAL EVERY 12 HOURS SCHEDULED
Status: DISCONTINUED | OUTPATIENT
Start: 2021-02-12 | End: 2021-02-14 | Stop reason: HOSPADM

## 2021-02-12 RX ADMIN — ALPRAZOLAM 1 MG: 0.5 TABLET ORAL at 09:10

## 2021-02-12 RX ADMIN — DOCUSATE SODIUM 50MG AND SENNOSIDES 8.6MG 1 TABLET: 8.6; 5 TABLET, FILM COATED ORAL at 20:27

## 2021-02-12 RX ADMIN — IMIPRAMINE HYDROCHLORIDE 100 MG: 50 TABLET ORAL at 20:14

## 2021-02-12 RX ADMIN — ATORVASTATIN CALCIUM 20 MG: 20 TABLET, FILM COATED ORAL at 16:09

## 2021-02-12 RX ADMIN — INSULIN LISPRO 4 UNITS: 100 INJECTION, SOLUTION INTRAVENOUS; SUBCUTANEOUS at 12:45

## 2021-02-12 RX ADMIN — CASTOR OIL AND BALSAM, PERU 5 G: 788; 87 OINTMENT TOPICAL at 20:14

## 2021-02-12 RX ADMIN — SODIUM CHLORIDE, PRESERVATIVE FREE 3 ML: 5 INJECTION INTRAVENOUS at 20:15

## 2021-02-12 RX ADMIN — AMLODIPINE BESYLATE 5 MG: 5 TABLET ORAL at 09:10

## 2021-02-12 RX ADMIN — ACETAMINOPHEN 650 MG: 325 TABLET, FILM COATED ORAL at 07:02

## 2021-02-12 RX ADMIN — INSULIN LISPRO 2 UNITS: 100 INJECTION, SOLUTION INTRAVENOUS; SUBCUTANEOUS at 20:12

## 2021-02-12 RX ADMIN — CLOPIDOGREL 75 MG: 75 TABLET, FILM COATED ORAL at 09:10

## 2021-02-12 RX ADMIN — CYCLOBENZAPRINE 10 MG: 10 TABLET, FILM COATED ORAL at 20:14

## 2021-02-12 RX ADMIN — HYDROMORPHONE HYDROCHLORIDE 0.5 MG: 1 INJECTION, SOLUTION INTRAMUSCULAR; INTRAVENOUS; SUBCUTANEOUS at 12:45

## 2021-02-12 RX ADMIN — CYCLOBENZAPRINE 10 MG: 10 TABLET, FILM COATED ORAL at 10:45

## 2021-02-12 RX ADMIN — PANTOPRAZOLE SODIUM 40 MG: 40 TABLET, DELAYED RELEASE ORAL at 07:02

## 2021-02-12 RX ADMIN — CARVEDILOL 12.5 MG: 12.5 TABLET, FILM COATED ORAL at 09:10

## 2021-02-12 RX ADMIN — ISOSORBIDE MONONITRATE 60 MG: 60 TABLET ORAL at 09:09

## 2021-02-12 RX ADMIN — DOCUSATE SODIUM 50MG AND SENNOSIDES 8.6MG 1 TABLET: 8.6; 5 TABLET, FILM COATED ORAL at 09:09

## 2021-02-12 RX ADMIN — INSULIN LISPRO 3 UNITS: 100 INJECTION, SOLUTION INTRAVENOUS; SUBCUTANEOUS at 09:10

## 2021-02-12 RX ADMIN — HYDROMORPHONE HYDROCHLORIDE 0.5 MG: 1 INJECTION, SOLUTION INTRAMUSCULAR; INTRAVENOUS; SUBCUTANEOUS at 02:11

## 2021-02-12 RX ADMIN — INSULIN GLARGINE 10 UNITS: 100 INJECTION, SOLUTION SUBCUTANEOUS at 20:28

## 2021-02-12 RX ADMIN — HYDROMORPHONE HYDROCHLORIDE 0.5 MG: 1 INJECTION, SOLUTION INTRAMUSCULAR; INTRAVENOUS; SUBCUTANEOUS at 21:51

## 2021-02-12 RX ADMIN — CEFAZOLIN SODIUM 2 G: 2 INJECTION, SOLUTION INTRAVENOUS at 07:03

## 2021-02-12 RX ADMIN — CASTOR OIL AND BALSAM, PERU 5 G: 788; 87 OINTMENT TOPICAL at 12:20

## 2021-02-12 RX ADMIN — LOSARTAN POTASSIUM 100 MG: 100 TABLET, FILM COATED ORAL at 09:10

## 2021-02-12 RX ADMIN — IMIPRAMINE HYDROCHLORIDE 50 MG: 50 TABLET ORAL at 07:03

## 2021-02-12 RX ADMIN — OXYCODONE HYDROCHLORIDE AND ACETAMINOPHEN 1 TABLET: 10; 325 TABLET ORAL at 09:10

## 2021-02-12 RX ADMIN — OXYCODONE 5 MG: 5 TABLET ORAL at 23:36

## 2021-02-12 RX ADMIN — ALPRAZOLAM 1 MG: 0.5 TABLET ORAL at 20:14

## 2021-02-12 RX ADMIN — ACETAMINOPHEN 650 MG: 325 TABLET, FILM COATED ORAL at 23:36

## 2021-02-12 RX ADMIN — INSULIN LISPRO 3 UNITS: 100 INJECTION, SOLUTION INTRAVENOUS; SUBCUTANEOUS at 17:55

## 2021-02-12 RX ADMIN — CARVEDILOL 12.5 MG: 12.5 TABLET, FILM COATED ORAL at 17:34

## 2021-02-12 RX ADMIN — OXYCODONE HYDROCHLORIDE AND ACETAMINOPHEN 1 TABLET: 10; 325 TABLET ORAL at 01:28

## 2021-02-12 RX ADMIN — HYDROMORPHONE HYDROCHLORIDE 0.5 MG: 1 INJECTION, SOLUTION INTRAMUSCULAR; INTRAVENOUS; SUBCUTANEOUS at 05:10

## 2021-02-12 RX ADMIN — TAMSULOSIN HYDROCHLORIDE 0.4 MG: 0.4 CAPSULE ORAL at 23:36

## 2021-02-12 RX ADMIN — SODIUM CHLORIDE, PRESERVATIVE FREE 3 ML: 5 INJECTION INTRAVENOUS at 09:11

## 2021-02-12 RX ADMIN — DOCUSATE SODIUM 100 MG: 100 CAPSULE, LIQUID FILLED ORAL at 20:14

## 2021-02-12 RX ADMIN — ALLOPURINOL 300 MG: 300 TABLET ORAL at 09:10

## 2021-02-12 RX ADMIN — OXYCODONE HYDROCHLORIDE AND ACETAMINOPHEN 1 TABLET: 10; 325 TABLET ORAL at 17:55

## 2021-02-12 RX ADMIN — ACETAMINOPHEN 650 MG: 325 TABLET, FILM COATED ORAL at 16:09

## 2021-02-12 RX ADMIN — LEVOTHYROXINE SODIUM 112 MCG: 112 TABLET ORAL at 07:03

## 2021-02-12 NOTE — PROGRESS NOTES
Ortho Spine Surgery  S/p T12-S1 revision fusion    Back pain controlled.  Leg pain improved. Main complaint is blistering on left side of face.  Worsened overnight.    On exam he demonstrates facial swelling.  No signs of airway compromise.  Blistering on chin and left cheek. No drainage.  Minimal erythema.  BLE's neuro intact.  Alert and oriented. Conversive.    A/P  Mobilize  PT/OT  Pain control  Wound care consult for facial blisters  SCDs for DVT ppx  Hold plavix and ASA 72 hrs post operatively  DM - Blood glucose in 200's. Appreciate recs per medicine.   Dispo - pending PT and improvement in facial swelling

## 2021-02-12 NOTE — NURSING NOTE
Had Dr Coto with anesthesia come to bedside to assess the redness and swelling of patient's face. Dr Coto states redness and swelling due to patient positioning during surgery and concerns or new orders.

## 2021-02-12 NOTE — THERAPY EVALUATION
Patient Name: Kyler Staley Jr.  : 1950    MRN: [id number]                              Today's Date: 2021       Admit Date: 2021    Visit Dx: No diagnosis found.  Patient Active Problem List   Diagnosis   • Primary osteoarthritis of right shoulder   • Acute pain of left shoulder   • Complete tear of left rotator cuff   • Status post complete repair of rotator cuff   • Morbidly obese (CMS/HCC)   • Pain of right hip joint   • Greater trochanteric bursitis of right hip   • It band syndrome, right   • Greater trochanteric bursitis of left hip   • Left hip pain   • Abnormal radiologic findings on diagnostic imaging of other urinary organs   • Gluteal tendinitis of left buttock   • History of fusion of lumbar spine     Past Medical History:   Diagnosis Date   • Anxiety    • Atrial fibrillation (CMS/HCC)    • Back pain    • Cardiac disease    • Depression    • Diabetes (CMS/HCC)    • Fatigue    • GERD (gastroesophageal reflux disease)    • Hearing loss    • History of gastritis    • [place] (hard of hearing)    • Hyperlipidemia    • Hypertension    • Joint pain    • Kidney disease    • Knee swelling    • Lumbosacral disc disease    • Narrowing of airway 2012    PER ENT   • Neuropathy    • PONV (postoperative nausea and vomiting)    • Rotator cuff syndrome     REPAIRED BILATERAL   • Sleep apnea     BIPAP   • Swallowing difficulty    • Thin skin    • Thyroid condition    • Vocal cord dysfunction      HISTORY OF DISORDER ON ONE SIDE AFTER TRACHEA     Past Surgical History:   Procedure Laterality Date   • BACK SURGERY  2018    X2   • BACK SURGERY     • CARDIAC SURGERY     • CHOLECYSTECTOMY     • CORONARY ARTERY BYPASS GRAFT     • HERNIA REPAIR     • JOINT REPLACEMENT     • KNEE SURGERY      right and left   • LUMBAR DISCECTOMY FUSION INSTRUMENTATION N/A 2021    Procedure: EXPLORATION OF FUSION T-12 S-1 REMOVAL OF HARDWARE REVISION OF FUSION AND INSTRUMENTATION T12-S1 WITH APPLICATION OF BONE MORPHOGENIC  PROTEIN;  Surgeon: Baltazar Blankenship MD;  Location: [facility] MAIN OR;  Service: Orthopedic Spine;  Laterality: N/A;   • NOSE SURGERY     • SHOULDER SURGERY Bilateral     rotator cuff repair     General Information     Row Name 02/12/21 1218          Physical Therapy Time and Intention    Document Type  evaluation  -AR     Mode of Treatment  physical therapy  -AR     Row Name 02/12/21 1218          General Information    Patient Profile Reviewed  yes  -AR     Prior Level of Function  min assist: using cane  -AR     Existing Precautions/Restrictions  fall;spinal TLSO brace, brace at home, surgeon okay for  PT without brace  -AR     Row Name 02/12/21 1218          Living Environment    Lives With  spouse  -AR     Row Name 02/12/21 1218          Home Main Entrance    Number of Stairs, Main Entrance  three  -AR     Stair Railings, Main Entrance  railings safe and in good condition  -AR     Row Name 02/12/21 1218          Stairs Within Home, Primary    Number of Stairs, Within Home, Primary  none  -AR     Row Name 02/12/21 1218          Cognition    Orientation Status (Cognition)  oriented x 3  -AR     Row Name 02/12/21 1218          Safety Issues, Functional Mobility    Impairments Affecting Function (Mobility)  balance;endurance/activity tolerance;strength;pain  -AR       User Key  (r) = Recorded By, (t) = Taken By, (c) = Cosigned By    Initials Name Provider Type    AR Amber Laird PT Physical Therapist        Mobility     Row Name 02/12/21 1220          Bed Mobility    Bed Mobility  supine-sit;sit-supine  -AR     Supine-Sit [place] (Bed Mobility)  supervision  -AR     Sit-Supine [place] (Bed Mobility)  not tested  -AR     Assistive Device (Bed Mobility)  bed rails  -AR     Comment (Bed Mobility)  HOB slightly elevated; cues for log rolling  -AR     Row Name 02/12/21 1220          Sit-Stand Transfer    Sit-Stand [place] (Transfers)  contact guard  -AR     Assistive Device (Sit-Stand Transfers)  walker,  front-wheeled  -AR     Row Name 02/12/21 1220          Gait/Stairs (Locomotion)    [place] Level (Gait)  contact guard  -AR     Assistive Device (Gait)  walker, front-wheeled  -AR     Distance in Feet (Gait)  30' cues for posture, and keeping walker closer  -AR     Deviations/Abnormal Patterns (Gait)  festinating/shuffling;gait speed decreased;antalgic  -AR     Bilateral Gait Deviations  heel strike decreased;forward flexed posture  -AR       User Key  (r) = Recorded By, (t) = Taken By, (c) = Cosigned By    Initials Name Provider Type    AR Amber Laird, PT Physical Therapist        Obj/Interventions     Row Name 02/12/21 1221          Range of Motion Comprehensive    Comment, General Range of Motion  B LE WFL  -AR     Row Name 02/12/21 1221          Strength Comprehensive (MMT)    Comment, General Manual Muscle Testing (MMT) Assessment  B LE -4/5  -AR     Row Name 02/12/21 1221          Motor Skills    Therapeutic Exercise  -- B AP, LAQ 10x  -AR     Row Name 02/12/21 1221          Balance    Balance Assessment  sitting static balance;standing static balance;standing dynamic balance  -AR     Static Sitting Balance  WNL  -AR     Dynamic Standing Balance  mild impairment  -AR       User Key  (r) = Recorded By, (t) = Taken By, (c) = Cosigned By    Initials Name Provider Type    AR Amber Laird, PT Physical Therapist        Goals/Plan     Row Name 02/12/21 1224          Bed Mobility Goal 1 (PT)    Activity/Assistive Device (Bed Mobility Goal 1, PT)  sit to supine/supine to sit  -AR     [place] Level/Cues Needed (Bed Mobility Goal 1, PT)  supervision required  -AR     Time Frame (Bed Mobility Goal 1, PT)  1 week  -AR     Strategies/Barriers (Bed Mobility Goal 1, PT)  HOB flat, no bed rail  -AR     Row Name 02/12/21 1224          Transfer Goal 1 (PT)    Activity/Assistive Device (Transfer Goal 1, PT)  sit-to-stand/stand-to-sit;walker, rolling  -AR     [place] Level/Cues Needed (Transfer Goal 1, PT)   standby assist  -AR     Time Frame (Transfer Goal 1, PT)  1 week  -AR     Row Name 02/12/21 1224          Gait Training Goal 1 (PT)    Activity/Assistive Device (Gait Training Goal 1, PT)  gait (walking locomotion);walker, rolling  -AR     [place] Level (Gait Training Goal 1, PT)  standby assist  -AR     Distance (Gait Training Goal 1, PT)  150  -AR     Time Frame (Gait Training Goal 1, PT)  1 week  -AR       User Key  (r) = Recorded By, (t) = Taken By, (c) = Cosigned By    Initials Name Provider Type    AR Amber Laird, PT Physical Therapist        Clinical Impression     Row Name 02/12/21 1222          Pain    Additional Documentation  Pain Scale: Numbers Pre/Post-Treatment (Group)  -AR     Row Name 02/12/21 1222          Pain Scale: Numbers Pre/Post-Treatment    Pretreatment Pain Rating  5/10  -AR     Posttreatment Pain Rating  5/10  -AR     Pain Location  back  -AR     Pain Intervention(s)  Medication (See MAR);Repositioned  -AR     Row Name 02/12/21 1222          Plan of Care Review    Plan of Care Reviewed With  patient  -AR     Outcome Summary  POD 1 T12-S1 revision.  Pt reports using cane at home prior to revision.  Today pt demonstrates generalized post op weakness, impaired balance, gait and ind. w/ mobility but able to ambulate 30' w/ contact assist with RW.  Cues to improve gait pattern.  TLSO brace at home, surgeon okay for PT without brace.  Pt owns RW/commode.  Anticpate progress for DC to home.  -AR     Row Name 02/12/21 1222          Therapy Assessment/Plan (PT)    Rehab Potential (PT)  good, to achieve stated therapy goals  -AR     Criteria for Skilled Interventions Met (PT)  yes  -AR     Row Name 02/12/21 1222          Vital Signs    O2 Delivery Pre Treatment  room air  -AR     Row Name 02/12/21 1222          Positioning and Restraints    Pre-Treatment Position  in bed  -AR     Post Treatment Position  chair  -AR     In Chair  notified nsg;sitting;call light within reach;encouraged to  call for assist;exit alarm on;with nsg  -AR       User Key  (r) = Recorded By, (t) = Taken By, (c) = Cosigned By    Initials Name Provider Type    AR Amber Laird PT Physical Therapist        Outcome Measures     Row Name 02/12/21 1225          How much help from another person do you currently need...    Turning from your back to your side while in flat bed without using bedrails?  3  -AR     Moving from lying on back to sitting on the side of a flat bed without bedrails?  2  -AR     Moving to and from a bed to a chair (including a wheelchair)?  3  -AR     Standing up from a chair using your arms (e.g., wheelchair, bedside chair)?  3  -AR     Climbing 3-5 steps with a railing?  2  -AR     To walk in hospital room?  3  -AR     AM-PAC 6 Clicks Score (PT)  16  -AR     Row Name 02/12/21 1225          Functional Assessment    Outcome Measure Options  AM-PAC 6 Clicks Basic Mobility (PT)  -AR       User Key  (r) = Recorded By, (t) = Taken By, (c) = Cosigned By    Initials Name Provider Type    AR Amber Laird PT Physical Therapist        Physical Therapy Education                 Title: PT OT SLP Therapies (In Progress)     Topic: Physical Therapy (In Progress)     Point: Mobility training (In Progress)     Learning Progress Summary           Patient Acceptance, E, NR by AR at 2/12/2021 1225                   Point: Home exercise program (In Progress)     Learning Progress Summary           Patient Acceptance, E, NR by AR at 2/12/2021 1225                   Point: Body mechanics (In Progress)     Learning Progress Summary           Patient Acceptance, E, NR by AR at 2/12/2021 1225                   Point: Precautions (In Progress)     Learning Progress Summary           Patient Acceptance, E, NR by AR at 2/12/2021 1225                               User Key     Initials Effective Dates Name Provider Type Discipline    AR 04/03/18 -  Amber Laird PT Physical Therapist PT              PT Recommendation and  Plan  Planned Therapy Interventions (PT): balance training, bed mobility training, gait training, home exercise program, patient/family education, transfer training, ROM (range of motion), stair training, strengthening  Plan of Care Reviewed With: patient  Outcome Summary: POD 1 T12-S1 revision.  Pt reports using cane at home prior to revision.  Today pt demonstrates generalized post op weakness, impaired balance, gait and ind. w/ mobility but able to ambulate 30' w/ contact assist with RW.  Cues to improve gait pattern.  TLSO brace at home, surgeon okay for PT without brace.  Pt owns RW/commode.  Anticpate progress for DC to home.     Time Calculation:   PT Charges     Row Name 02/12/21 1217             Time Calculation    Start Time  0840  -AR      Stop Time  0908  -AR      Time Calculation (min)  28 min  -AR      PT Received On  02/12/21  -AR      PT - Next Appointment  02/13/21  -AR      PT Goal Re-Cert Due Date  02/19/21  -AR        User Key  (r) = Recorded By, (t) = Taken By, (c) = Cosigned By    Initials Name Provider Type    AR Amber Laird, PT Physical Therapist        Therapy Charges for Today     Code Description Service Date Service Provider Modifiers Qty    88402020610 HC PT EVAL LOW COMPLEXITY 2 2/12/2021 Amber Laird, PT GP 1    28529287556 HC PT THER PROC EA 15 MIN 2/12/2021 Amber Laird, PT GP 1          PT G-Codes  Outcome Measure Options: AM-PAC 6 Clicks Basic Mobility (PT)  AM-PAC 6 Clicks Score (PT): 16    Amber Laird PT  2/12/2021

## 2021-02-12 NOTE — PROGRESS NOTES
Ortho Spine Note  S/p T12-Sacrum revision fusion  Immediate post op    Doing wall.  Pain controlled.      Some swelling in the face as expected  Neuro intact both lower extremities  Sitting up in bed and appears comfortable    A/P  Tx to floor   Pain control  SCDs for DVT ppx  PT/OT - WBAT Mobilize as tolerated  Will bring TLSO brace from home  24 hrs IV abx  Disp - pending PT

## 2021-02-12 NOTE — NURSING NOTE
Spoke with Dr Barrios again regarding patient hypertension. Order for another dose of labetalol 10mg and to give patient's home dose of carvedilol (12.5mg) PO.    Call out to pharmacy to request a dose of carvedilol to be sent via tube system

## 2021-02-12 NOTE — NURSING NOTE
CWOCN consult- patient has pressure injury to face post op. Additionally his left face is quite edematous. Patient not able to put his dentures in. Recommend venelex to skin and ice pack for face. Will monitor.      02/12/21 0935   Wound 02/12/21 0933 chin Pressure Injury   Placement Date/Time: 02/12/21 0933   Present on Hospital Admission: No  Location: chin  Primary Wound Type: Pressure Injury  Stage, Pressure Injury : deep tissue injury   Dressing Appearance open to air   Base non-blanchable;maroon/purple;moist   Periwound intact   Periwound Temperature warm   Periwound Skin Turgor soft   Wound Length (cm) 4 cm   Wound Width (cm) 5 cm   Drainage Characteristics/Odor serous   Drainage Amount small   Care, Wound other (see comments)  (recommend venelex and dry dressing)   Wound 02/12/21 0933 Left cheek Pressure Injury   Placement Date/Time: 02/12/21 0933   Present on Hospital Admission: No  Side: Left  Location: cheek  Primary Wound Type: Pressure Injury  Stage, Pressure Injury : Stage 2   Dressing Appearance open to air   Base moist;pink;white;other (see comments)  (blisters- serous fluid filled)   Periwound swelling;other (see comments)  (tender)   Periwound Temperature warm   Periwound Skin Turgor soft   Edges irregular   Wound Length (cm) 1.5 cm   Wound Width (cm) 5 cm   Drainage Characteristics/Odor serous   Care, Wound   (recommend to gently apply venelex, may cover- dry dressing)   Wound 02/12/21 0934 forehead Other (comment)   Placement Date/Time: 02/12/21 0934   Present on Hospital Admission: No  Location: forehead  Primary Wound Type: Other (comment)   Dressing Appearance open to air   Base blanchable;red   Periwound intact   Periwound Temperature warm   Periwound Skin Turgor soft   Edges open   Wound Length (cm) 2 cm   Wound Width (cm) 8 cm   Drainage Amount none   Care, Wound   (apply venelex )   Wound 02/12/21 0934 Right cheek Other (comment)   Placement Date/Time: 02/12/21 0934   Present on Hospital  Admission: No  Side: Right  Location: cheek  Primary Wound Type: Other (comment)   Dressing Appearance open to air   Base blanchable;pink;red   Periwound intact   Periwound Temperature warm   Periwound Skin Turgor soft   Wound Length (cm)   (1.5x1.5cm & 2x5cm)   Drainage Amount none   Care, Wound   (venelex)

## 2021-02-12 NOTE — PLAN OF CARE
Goal Outcome Evaluation:     Progress: improving  Outcome Summary: Pt received from PACU around 2200. Patient had been having hypertension as well as severe anxiety. Xanax re-ordered and given along with dilaudid for pain control. BP much better after pain and anxiety were under control. Vital signs stable. Incision CDI with no drainage noted. Catheter removed and patient able to get up into chair for around an hour. Will continue to monitor.

## 2021-02-12 NOTE — CONSULTS
Patient Name:  Kyler Staley Jr.  YOB: 1950  MRN:  [id number]  Date of Admission:  2/11/2021  Date of Consult:  2/12/2021  Patient Care Team:  Forrest Will MD as PCP - General (Internal Medicine)  Baltazar Hardy APRN (Family Medicine)    Inpatient Hospitalist Consult  Consult performed by: Luis Diaz MD  Consult ordered by: Baltazar Blankenship MD  Reason for consult: Evaluate status and make recommendations regarding treatment for diabetes and hypertension.        Subjective   History of Present Illness  Mr. Staley is a 70 y.o. male that has been admitted to [facility] following elective EXPLORATION OF FUSION T-12 S-1 REMOVAL OF HARDWARE REVISION OF FUSION AND INSTRUMENTATION T12-S1 WITH APPLICATION OF BONE MORPHOGENIC PROTEIN.  He has been admitted to an orthopedic floor following surgery and we were asked to see and assist with his medical problems, specifically relating to his diabetes.  His surgery was very prolonged evidently taking approximately 10 hours.  He has resultant pressure injuries left side of his face.  7 difficulty chewing and swallowing because of this.  Denies any chest pain.  Diabetes has been under reasonable control prior to admission.  Patient has achieved about 20 pound weight loss and has his A1c from 10% down to 8%.  He does not take insulin at home.      Past Medical History:   Diagnosis Date   • Anxiety    • Atrial fibrillation (CMS/HCC)    • Back pain    • Cardiac disease    • Depression    • Diabetes (CMS/HCC)    • Fatigue    • GERD (gastroesophageal reflux disease)    • Hearing loss    • History of gastritis    • [place] (hard of hearing)    • Hyperlipidemia    • Hypertension    • Joint pain    • Kidney disease    • Knee swelling    • Lumbosacral disc disease    • Narrowing of airway 2012    PER ENT   • Neuropathy    • PONV (postoperative nausea and vomiting)    • Rotator cuff syndrome     REPAIRED BILATERAL   • Sleep apnea     BIPAP   • Swallowing  difficulty    • Thin skin    • Thyroid condition    • Vocal cord dysfunction      HISTORY OF DISORDER ON ONE SIDE AFTER TRACHEA     Past Surgical History:   Procedure Laterality Date   • BACK SURGERY  2018    X2   • BACK SURGERY     • CARDIAC SURGERY     • CHOLECYSTECTOMY     • CORONARY ARTERY BYPASS GRAFT     • HERNIA REPAIR     • JOINT REPLACEMENT     • KNEE SURGERY      right and left   • LUMBAR DISCECTOMY FUSION INSTRUMENTATION N/A 2021    Procedure: EXPLORATION OF FUSION T-12 S-1 REMOVAL OF HARDWARE REVISION OF FUSION AND INSTRUMENTATION T12-S1 WITH APPLICATION OF BONE MORPHOGENIC PROTEIN;  Surgeon: Baltazar Blankenship MD;  Location: [facility] OR;  Service: Orthopedic Spine;  Laterality: N/A;   • NOSE SURGERY     • SHOULDER SURGERY Bilateral     rotator cuff repair     Family History   Problem Relation Age of Onset   • Heart attack Mother    • Malig Hyperthermia Neg Hx      Social History     Tobacco Use   • Smoking status: Former Smoker     Packs/day: 2.00     Years: 10.00     Pack years: 20.00     Types: Cigarettes     Quit date:      Years since quittin.1   • Smokeless tobacco: Never Used   Substance Use Topics   • Alcohol use: No   • Drug use: Yes     Types: Hydrocodone     Medications Prior to Admission   Medication Sig Dispense Refill Last Dose   • ALPRAZolam (XANAX) 1 MG tablet Take 1 mg by mouth 3 (Three) Times a Day As Needed for Anxiety.   2021 at 0400   • amLODIPine (NORVASC) 5 MG tablet Take 5 mg by mouth Daily.   2021 at 0400   • atorvastatin (LIPITOR) 20 MG tablet Take 20 mg by mouth Every Evening.   2/10/2021 at 2130   • carvedilol (COREG) 12.5 MG tablet Take 12.5 mg by mouth 2 (two) times a day.   2021 at 0400   • cholecalciferol (VITAMIN D3) 98861 UNITS capsule Take 10,000 Units by mouth Daily.   2/10/2021 at Unknown time   • clopidogrel (PLAVIX) 75 MG tablet Take 75 mg by mouth Daily. TO FOLLOW MD ORDERS WHEN TO STOP   Past Week at Unknown time   • fenofibrate  (TRICOR) 145 MG tablet Take 145 mg by mouth Daily.   2/11/2021 at 0400   • Flaxseed, Linseed, 1000 MG capsule Take 1,200 mg by mouth Daily. HOLD FOR SURGERY   Past Week at Unknown time   • furosemide (LASIX) 40 MG tablet Take 40 mg by mouth Daily.   2/10/2021 at Unknown time   • glipizide (GLUCOTROL) 10 MG tablet Take 10 mg by mouth 2 (two) times a day.   2/10/2021 at 2130   • glucose blood (ACCU-CHEK SUNDAY PLUS) test strip    2/10/2021 at Unknown time   • HYDROcodone-acetaminophen (NORCO) 5-325 MG per tablet Take 1 tablet by mouth Every 4 (Four) Hours As Needed.   2/10/2021 at 2355   • imipramine (TOFRANIL) 50 MG tablet Take 50 mg by mouth Every Morning.   2/11/2021 at 0400   • imipramine (TOFRANIL) 50 MG tablet Take 100 mg by mouth Every Night.   2/10/2021 at Unknown time   • isosorbide mononitrate (IMDUR) 60 MG 24 hr tablet Take 60 mg by mouth Daily.   2/11/2021 at 0400   • levothyroxine (SYNTHROID, LEVOTHROID) 112 MCG tablet Take 112 mcg by mouth Daily.   2/11/2021 at 0400   • losartan (COZAAR) 100 MG tablet Take 100 mg by mouth Daily.   2/10/2021 at 0800   • metFORMIN (GLUCOPHAGE) 1000 MG tablet Take 1,000 mg by mouth Daily With Breakfast.   2/10/2021 at 0800   • metFORMIN ER (GLUCOPHAGE-XR) 500 MG 24 hr tablet Take 500 mg by mouth Daily Before Supper.   2/10/2021 at 2130   • pantoprazole (PROTONIX) 40 MG EC tablet Take 40 mg by mouth Daily.   2/11/2021 at 0400   • spironolactone (ALDACTONE) 25 MG tablet Take 25 mg by mouth Daily.   2/10/2021 at Unknown time   • tamsulosin (FLOMAX) 0.4 MG capsule 24 hr capsule Take 0.4 mg by mouth Daily.   2/10/2021 at Unknown time   • Zinc 50 MG capsule Take 50 mg by mouth Daily.   Past Week at Unknown time   • allopurinol (ZYLOPRIM) 300 MG tablet Take 300 mg by mouth Daily.      • Ascorbic Acid (Vitamin C) 500 MG capsule Take 500 mg by mouth Daily.   1/21/2021   • aspirin 325 MG tablet Take 325 mg by mouth Daily. TO FOLLOW MD ORDERS WHEN TO STOP   1/28/2021   •  cyclobenzaprine (FLEXERIL) 10 MG tablet Take 10 mg by mouth As Needed.   Unknown at Unknown time   • Melatonin 2.5 MG capsule Take 2.5 mg by mouth Every Night.   Unknown at Unknown time     Allergies:  Ativan [lorazepam], Bupropion, Propoxyphene, and Adhesive tape    Review of Systems   Constitutional: Negative.    HENT: Positive for mouth sores and trouble swallowing.    Eyes: Negative.    Respiratory: Negative.    Gastrointestinal: Negative.    Endocrine: Negative.    Genitourinary: Negative.    Musculoskeletal: Positive for back pain.   Skin: Negative.    Neurological: Negative.    Hematological: Negative.    Psychiatric/Behavioral: Negative.        Objective      Vital Signs  Temp:  [97.5 °F (36.4 °C)-98.1 °F (36.7 °C)] 97.9 °F (36.6 °C)  Heart Rate:  [82-96] 91  Resp:  [16-20] 20  BP: ()/() 155/82  Body mass index is 31.91 kg/m².    Physical Exam  Vitals signs and nursing note reviewed.   Constitutional:       General: He is not in acute distress.     Appearance: He is well-developed. He is ill-appearing.   HENT:      Mouth/Throat:      Mouth: Mucous membranes are dry.      Comments: Left upper lip swollen  Neck:      Vascular: No JVD.      Trachea: No tracheal deviation.   Cardiovascular:      Rate and Rhythm: Normal rate and regular rhythm.      Heart sounds: No murmur.   Pulmonary:      Effort: Pulmonary effort is normal. No respiratory distress.      Breath sounds: Normal breath sounds.   Abdominal:      General: Bowel sounds are normal. There is no distension.      Palpations: Abdomen is soft.      Tenderness: There is no abdominal tenderness.   Skin:     General: Skin is warm and dry.      Comments: Blistering and erythema left side face and chin   Neurological:      Mental Status: He is alert and oriented to person, place, and time.   Psychiatric:         Behavior: Behavior normal. Behavior is cooperative.         Results Review:   I reviewed the patient's new clinical results.  I reviewed  the patient's new imaging results and agree with the interpretation.  I reviewed the patient's other test results and agree with the interpretation  I personally viewed and interpreted the patient's EKG/Telemetry data         Assessment/Plan     Active Hospital Problems    Diagnosis POA   • **History of fusion of lumbar spine [Z98.1] Not Applicable   • Sleep apnea [G47.30] Yes     BIPAP     • Type 2 diabetes mellitus with hyperglycemia, without long-term current use of insulin (CMS/HCC) [E11.65] Yes   • Paroxysmal atrial fibrillation (CMS/HCC) [I48.0] Yes   • Hypertension [I10] Yes   • Obesity (BMI 30-39.9) [E66.9] Yes       Mr. Staley is a 70 y.o. male who is s/p EXPLORATION OF FUSION T-12 S-1 REMOVAL OF HARDWARE REVISION OF FUSION AND INSTRUMENTATION T12-S1 WITH APPLICATION OF BONE MORPHOGENIC PROTEIN.    Change diet to GI soft.  Holding parameters ordered for amlodipine, Imdur and Cozaar.  Will discontinue Aldactone for now.  Monitor volume status.  Add Lantus given hyperglycemia.  Change SSI from low-dose to moderate dose.  Continue to hold oral hypoglycemic medication.  Continue daily monitoring labs.  Will have RN reach out to surgery regarding Plavix.  Progress note states plan to hold 72 hours postoperatively.  There is active order for Plavix and appears he received this morning.  Pressure injuries related to prolonged operation addressed by primary and wound care nurse.      Thank you very much for asking [facility] to be involved in this patient's care. We will follow along with you.      Luis Diaz MD  [facility]  02/12/21  18:18 EST

## 2021-02-12 NOTE — ANESTHESIA POSTPROCEDURE EVALUATION
"Patient: Kyler Staley Jr.    Procedure Summary     Date: 02/11/21 Room / Location: [facility] OR [address] [place] [address] MAIN OR    Anesthesia Start: 0734 Anesthesia Stop: 1803    Procedure: EXPLORATION OF FUSION T-12 S-1 REMOVAL OF HARDWARE REVISION OF FUSION AND INSTRUMENTATION T12-S1 WITH APPLICATION OF BONE MORPHOGENIC PROTEIN (N/A Spine Lumbar) Diagnosis:     Surgeon: Baltazar Blankenship MD Provider: Lauro Lwo MD    Anesthesia Type: general ASA Status: 3          Anesthesia Type: general    Vitals  Vitals Value Taken Time   /84 02/11/21 2020   Temp     Pulse 84 02/11/21 2020   Resp 16 02/11/21 2020   SpO2 97 % 02/11/21 2020           Post Anesthesia Care and Evaluation    Patient location during evaluation: bedside  Patient participation: complete - patient participated  Level of consciousness: awake and alert  Pain management: adequate  Airway patency: patent  Anesthetic complications: No anesthetic complications    Cardiovascular status: acceptable  Respiratory status: acceptable  Hydration status: acceptable    Comments: /84   Pulse 84   Temp 36.4 °C (97.5 °F) (Oral)   Resp 16   Ht 185.4 cm (73\")   Wt 110 kg (241 lb 13.5 oz)   SpO2 97%   BMI 31.91 kg/m²       "

## 2021-02-12 NOTE — NURSING NOTE
Spoke with Dr Barrios with anesthesia regarding patient hypertension continued after treatment with PRN meds. Order to give 10mg labetalol now

## 2021-02-12 NOTE — PLAN OF CARE
Goal Outcome Evaluation:  Plan of Care Reviewed With: patient     Outcome Summary: POD 1 T12-S1 revision.  Pt reports using cane at home prior to revision.  Today pt demonstrates generalized post op weakness, impaired balance, gait and ind. w/ mobility but able to ambulate 30' w/ contact assist with RW.  Cues to improve gait pattern.  TLSO brace at home, surgeon okay for PT without brace.  Pt owns RW/commode.  Anticpate progress for DC to home.      ..Patient was intermittently wearing a face mask during this therapy encounter. Therapist used appropriate personal protective equipment including eye protection, mask, and gloves.  Mask used was standard procedure mask. Appropriate PPE was worn during the entire therapy session. Hand hygiene was completed before and after therapy session. Patient is not in enhanced droplet precautions. Damian present.

## 2021-02-12 NOTE — PROGRESS NOTES
Discharge Planning Assessment  [facility]     Patient Name: Kyler Staley Jr.  MRN: [id number]  Today's Date: 2/12/2021    Admit Date: 2/11/2021    Discharge Needs Assessment     Row Name 02/12/21 0958       Living Environment    Lives With  spouse    Name(s) of Who Lives With Patient  Citlaly Staley    Current Living Arrangements  home/apartment/condo    Potentially Unsafe Housing Conditions  other (see comments) no concerns    Primary Care Provided by  self    Provides Primary Care For  no one    Family Caregiver if Needed  spouse    Quality of Family Relationships  helpful;involved;supportive    Able to Return to Prior Arrangements  yes       Resource/Environmental Concerns    Resource/Environmental Concerns  none    Transportation Concerns  car, none       Transition Planning    Patient/Family Anticipates Transition to  home with family    Patient/Family Anticipated Services at Transition  home health care    Transportation Anticipated  family or friend will provide       Discharge Needs Assessment    Readmission Within the Last 30 Days  no previous admission in last 30 days    Current Outpatient/Agency/Support Group  homecare agency    Equipment Currently Used at Home  cane, straight;walker, rolling;shower chair    Concerns to be Addressed  no discharge needs identified;denies needs/concerns at this time    Anticipated Changes Related to Illness  none    Equipment Needed After Discharge  none        Discharge Plan     Row Name 02/12/21 0959       Plan    Plan  Home with spouse; Follow PT eval    Patient/Family in Agreement with Plan  yes    Plan Comments  CCP met with patient at bedside. CCP role explained and discharge planning discussed. Face sheet verified and IMM given. Patient’s PCP is Dr. Will. Patient lives with his wife, with three steps to the entrance of his home. Patient reports his son in law is installing a handrail to the steps at his entrance. Patient’s bedroom and bathroom are on the main  level. Patient has a shower chair present in his bathroom. Patient uses a cane for mobility but has access to a walker if needed. Patient has used [facility] in the past and denies any subacute rehab history. Patient’s preferred pharmacy is [facility] in [place]; patient denies having trouble obtaining his medications. Patient plans to return home with his wife at discharge. CCP discussed possible home health; patient reports he will discuss with his wife. CCP left home health list at bedside. CCP will need to follow for PT eval and discuss recommendations with patient. Rebeca BANEGAS        Continued Care and Services - Admitted Since 2/11/2021    Coordination has not been started for this encounter.         Demographic Summary     Row Name 02/12/21 0957       General Information    Admission Type  inpatient    Required Notices Provided  Important Message from Medicare    Referral Source  admission list    Reason for Consult  discharge planning    Preferred Language  English     Used During This Interaction  no        Functional Status     Row Name 02/12/21 0957       Functional Status    Usual Activity Tolerance  good    Current Activity Tolerance  moderate       Functional Status, IADL    Medications  assistive equipment    Meal Preparation  assistive equipment    Housekeeping  assistive equipment    Laundry  assistive equipment    Shopping  assistive equipment       Mental Status    General Appearance WDL  WDL       Mental Status Summary    Recent Changes in Mental Status/Cognitive Functioning  no changes        Psychosocial    No documentation.       Abuse/Neglect    No documentation.       Legal    No documentation.       Substance Abuse    No documentation.       Patient Forms    No documentation.           BASSAM Pa

## 2021-02-13 PROBLEM — N18.32 STAGE 3B CHRONIC KIDNEY DISEASE (HCC): Status: ACTIVE | Noted: 2021-02-13

## 2021-02-13 LAB
ANION GAP SERPL CALCULATED.3IONS-SCNC: 8.1 MMOL/L (ref 5–15)
BUN SERPL-MCNC: 15 MG/DL (ref 8–23)
BUN/CREAT SERPL: 8.9 (ref 7–25)
CALCIUM SPEC-SCNC: 8.4 MG/DL (ref 8.6–10.5)
CHLORIDE SERPL-SCNC: 101 MMOL/L (ref 98–107)
CO2 SERPL-SCNC: 25.9 MMOL/L (ref 22–29)
CREAT SERPL-MCNC: 1.68 MG/DL (ref 0.76–1.27)
DEPRECATED RDW RBC AUTO: 49 FL (ref 37–54)
ERYTHROCYTE [DISTWIDTH] IN BLOOD BY AUTOMATED COUNT: 14.1 % (ref 12.3–15.4)
GFR SERPL CREATININE-BSD FRML MDRD: 41 ML/MIN/1.73
GLUCOSE BLDC GLUCOMTR-MCNC: 173 MG/DL (ref 70–130)
GLUCOSE BLDC GLUCOMTR-MCNC: 191 MG/DL (ref 70–130)
GLUCOSE BLDC GLUCOMTR-MCNC: 198 MG/DL (ref 70–130)
GLUCOSE BLDC GLUCOMTR-MCNC: 210 MG/DL (ref 70–130)
GLUCOSE SERPL-MCNC: 202 MG/DL (ref 65–99)
HCT VFR BLD AUTO: 30.4 % (ref 37.5–51)
HGB BLD-MCNC: 9.9 G/DL (ref 13–17.7)
MCH RBC QN AUTO: 30.4 PG (ref 26.6–33)
MCHC RBC AUTO-ENTMCNC: 32.6 G/DL (ref 31.5–35.7)
MCV RBC AUTO: 93.3 FL (ref 79–97)
PLATELET # BLD AUTO: 222 10*3/MM3 (ref 140–450)
PMV BLD AUTO: 10 FL (ref 6–12)
POTASSIUM SERPL-SCNC: 3.6 MMOL/L (ref 3.5–5.2)
RBC # BLD AUTO: 3.26 10*6/MM3 (ref 4.14–5.8)
SODIUM SERPL-SCNC: 135 MMOL/L (ref 136–145)
WBC # BLD AUTO: 7.54 10*3/MM3 (ref 3.4–10.8)

## 2021-02-13 PROCEDURE — 25010000002 HYDROMORPHONE PER 4 MG: Performed by: ORTHOPAEDIC SURGERY

## 2021-02-13 PROCEDURE — 63710000001 INSULIN LISPRO (HUMAN) PER 5 UNITS: Performed by: HOSPITALIST

## 2021-02-13 PROCEDURE — 97166 OT EVAL MOD COMPLEX 45 MIN: CPT

## 2021-02-13 PROCEDURE — 85027 COMPLETE CBC AUTOMATED: CPT | Performed by: HOSPITALIST

## 2021-02-13 PROCEDURE — 97535 SELF CARE MNGMENT TRAINING: CPT

## 2021-02-13 PROCEDURE — 82962 GLUCOSE BLOOD TEST: CPT

## 2021-02-13 PROCEDURE — 80048 BASIC METABOLIC PNL TOTAL CA: CPT | Performed by: HOSPITALIST

## 2021-02-13 PROCEDURE — 63710000001 INSULIN GLARGINE PER 5 UNITS: Performed by: HOSPITALIST

## 2021-02-13 RX ORDER — ALPRAZOLAM 0.5 MG/1
0.5 TABLET ORAL 3 TIMES DAILY PRN
Status: DISCONTINUED | OUTPATIENT
Start: 2021-02-13 | End: 2021-02-14 | Stop reason: HOSPADM

## 2021-02-13 RX ORDER — INSULIN LISPRO 100 [IU]/ML
5 INJECTION, SOLUTION INTRAVENOUS; SUBCUTANEOUS
Status: DISCONTINUED | OUTPATIENT
Start: 2021-02-13 | End: 2021-02-14

## 2021-02-13 RX ADMIN — DOCUSATE SODIUM 100 MG: 100 CAPSULE, LIQUID FILLED ORAL at 21:09

## 2021-02-13 RX ADMIN — ACETAMINOPHEN 650 MG: 325 TABLET, FILM COATED ORAL at 12:12

## 2021-02-13 RX ADMIN — HYDROMORPHONE HYDROCHLORIDE 0.5 MG: 1 INJECTION, SOLUTION INTRAMUSCULAR; INTRAVENOUS; SUBCUTANEOUS at 08:36

## 2021-02-13 RX ADMIN — CLOPIDOGREL 75 MG: 75 TABLET, FILM COATED ORAL at 08:32

## 2021-02-13 RX ADMIN — CASTOR OIL AND BALSAM, PERU 5 G: 788; 87 OINTMENT TOPICAL at 08:32

## 2021-02-13 RX ADMIN — PANTOPRAZOLE SODIUM 40 MG: 40 TABLET, DELAYED RELEASE ORAL at 05:40

## 2021-02-13 RX ADMIN — LEVOTHYROXINE SODIUM 112 MCG: 112 TABLET ORAL at 05:42

## 2021-02-13 RX ADMIN — TAMSULOSIN HYDROCHLORIDE 0.4 MG: 0.4 CAPSULE ORAL at 22:55

## 2021-02-13 RX ADMIN — CARVEDILOL 12.5 MG: 12.5 TABLET, FILM COATED ORAL at 17:43

## 2021-02-13 RX ADMIN — INSULIN LISPRO 2 UNITS: 100 INJECTION, SOLUTION INTRAVENOUS; SUBCUTANEOUS at 17:43

## 2021-02-13 RX ADMIN — IMIPRAMINE HYDROCHLORIDE 100 MG: 50 TABLET ORAL at 21:09

## 2021-02-13 RX ADMIN — OXYCODONE HYDROCHLORIDE AND ACETAMINOPHEN 1 TABLET: 10; 325 TABLET ORAL at 02:35

## 2021-02-13 RX ADMIN — ALLOPURINOL 300 MG: 300 TABLET ORAL at 08:32

## 2021-02-13 RX ADMIN — ATORVASTATIN CALCIUM 20 MG: 20 TABLET, FILM COATED ORAL at 17:43

## 2021-02-13 RX ADMIN — AMLODIPINE BESYLATE 5 MG: 5 TABLET ORAL at 08:32

## 2021-02-13 RX ADMIN — CYCLOBENZAPRINE 10 MG: 10 TABLET, FILM COATED ORAL at 14:44

## 2021-02-13 RX ADMIN — CASTOR OIL AND BALSAM, PERU 5 G: 788; 87 OINTMENT TOPICAL at 21:09

## 2021-02-13 RX ADMIN — ISOSORBIDE MONONITRATE 60 MG: 60 TABLET ORAL at 08:32

## 2021-02-13 RX ADMIN — INSULIN LISPRO 5 UNITS: 100 INJECTION, SOLUTION INTRAVENOUS; SUBCUTANEOUS at 11:49

## 2021-02-13 RX ADMIN — DOCUSATE SODIUM 50MG AND SENNOSIDES 8.6MG 1 TABLET: 8.6; 5 TABLET, FILM COATED ORAL at 21:09

## 2021-02-13 RX ADMIN — INSULIN GLARGINE 10 UNITS: 100 INJECTION, SOLUTION SUBCUTANEOUS at 21:11

## 2021-02-13 RX ADMIN — HYDROMORPHONE HYDROCHLORIDE 0.5 MG: 1 INJECTION, SOLUTION INTRAMUSCULAR; INTRAVENOUS; SUBCUTANEOUS at 03:05

## 2021-02-13 RX ADMIN — CARVEDILOL 12.5 MG: 12.5 TABLET, FILM COATED ORAL at 08:32

## 2021-02-13 RX ADMIN — DOCUSATE SODIUM 50MG AND SENNOSIDES 8.6MG 1 TABLET: 8.6; 5 TABLET, FILM COATED ORAL at 08:32

## 2021-02-13 RX ADMIN — DOCUSATE SODIUM 100 MG: 100 CAPSULE, LIQUID FILLED ORAL at 08:32

## 2021-02-13 RX ADMIN — SODIUM CHLORIDE, PRESERVATIVE FREE 3 ML: 5 INJECTION INTRAVENOUS at 21:09

## 2021-02-13 RX ADMIN — SODIUM CHLORIDE, PRESERVATIVE FREE 3 ML: 5 INJECTION INTRAVENOUS at 08:31

## 2021-02-13 RX ADMIN — CYCLOBENZAPRINE 10 MG: 10 TABLET, FILM COATED ORAL at 05:40

## 2021-02-13 RX ADMIN — IMIPRAMINE HYDROCHLORIDE 50 MG: 50 TABLET ORAL at 07:16

## 2021-02-13 RX ADMIN — ALPRAZOLAM 0.5 MG: 0.5 TABLET ORAL at 14:44

## 2021-02-13 RX ADMIN — LOSARTAN POTASSIUM 100 MG: 100 TABLET, FILM COATED ORAL at 08:32

## 2021-02-13 RX ADMIN — INSULIN LISPRO 4 UNITS: 100 INJECTION, SOLUTION INTRAVENOUS; SUBCUTANEOUS at 08:32

## 2021-02-13 RX ADMIN — OXYCODONE 5 MG: 5 TABLET ORAL at 14:44

## 2021-02-13 RX ADMIN — INSULIN LISPRO 2 UNITS: 100 INJECTION, SOLUTION INTRAVENOUS; SUBCUTANEOUS at 11:48

## 2021-02-13 RX ADMIN — ALPRAZOLAM 1 MG: 0.5 TABLET ORAL at 05:40

## 2021-02-13 RX ADMIN — ACETAMINOPHEN 650 MG: 325 TABLET, FILM COATED ORAL at 07:15

## 2021-02-13 RX ADMIN — INSULIN LISPRO 5 UNITS: 100 INJECTION, SOLUTION INTRAVENOUS; SUBCUTANEOUS at 17:43

## 2021-02-13 NOTE — PLAN OF CARE
VSS, NSR ON MONITOR, 3L NC, PAIN CONTROLLED, AMB WITH WALKER AND 2 ASSIST. UP IN CHAIR X2. NO OTHER ISSUES WILL CONTINUE TO MONITOR    Problem: Adult Inpatient Plan of Care  Goal: Plan of Care Review  Outcome: Ongoing, Progressing  Flowsheets  Taken 2/12/2021 1222 by Abmer Laird PT  Plan of Care Reviewed With: patient  Outcome Summary: POD 1 T12-S1 revision.  Pt reports using cane at home prior to revision.  Today pt demonstrates generalized post op weakness, impaired balance, gait and ind. w/ mobility but able to ambulate 30' w/ contact assist with RW.  Cues to improve gait pattern.  TLSO brace at home, surgeon okay for PT without brace.  Pt owns RW/commode.  Anticpate progress for DC to home.  Taken 2/12/2021 0811 by Morris Card, RN  Progress: improving     Problem: Fall Injury Risk  Goal: Absence of Fall and Fall-Related Injury  Outcome: Ongoing, Progressing  Intervention: Identify and Manage Contributors to Fall Injury Risk  Recent Flowsheet Documentation  Taken 2/12/2021 2010 by Donny Delgadillo RN  Medication Review/Management: medications reviewed  Intervention: Promote Injury-Free Environment  Recent Flowsheet Documentation  Taken 2/13/2021 0400 by Donny Delgadillo RN  Safety Promotion/Fall Prevention:   activity supervised   assistive device/personal items within reach   clutter free environment maintained   fall prevention program maintained   safety round/check completed  Taken 2/13/2021 0219 by Donny Delgadillo RN  Safety Promotion/Fall Prevention:   activity supervised   assistive device/personal items within reach   clutter free environment maintained   fall prevention program maintained   safety round/check completed  Taken 2/13/2021 0006 by Donny Delgadillo, RN  Safety Promotion/Fall Prevention:   activity supervised   assistive device/personal items within reach   clutter free environment maintained   fall prevention program maintained   safety round/check completed  Taken 2/12/2021  2235 by Elie, Donny, RN  Safety Promotion/Fall Prevention:   activity supervised   assistive device/personal items within reach   clutter free environment maintained   fall prevention program maintained   safety round/check completed  Taken 2/12/2021 2010 by Donny Delgadillo RN  Safety Promotion/Fall Prevention:   activity supervised   assistive device/personal items within reach   clutter free environment maintained   fall prevention program maintained   safety round/check completed     Problem: Skin Injury Risk Increased  Goal: Skin Health and Integrity  Outcome: Ongoing, Progressing  Intervention: Optimize Skin Protection  Recent Flowsheet Documentation  Taken 2/13/2021 0400 by Donny Delgadillo RN  Head of Bed (HOB): HOB at 20 degrees  Taken 2/13/2021 0219 by Donny Delgadillo RN  Head of Bed (HOB): HOB at 20 degrees  Taken 2/13/2021 0006 by Donny Delgadillo RN  Head of Bed (HOB): HOB at 20 degrees  Taken 2/12/2021 2235 by Donny Delgadillo RN  Head of Bed (HOB): HOB at 20-30 degrees  Taken 2/12/2021 2010 by Donny Delgadillo RN  Pressure Reduction Techniques:   frequent weight shift encouraged   weight shift assistance provided  Pressure Reduction Devices:   pressure-redistributing mattress utilized   positioning supports utilized   Goal Outcome Evaluation:

## 2021-02-13 NOTE — PLAN OF CARE
Goal Outcome Evaluation:  Plan of Care Reviewed With: patient, spouse  Progress: improving       Vss. 2L NC while sleeping. A&Ox4. Mod assist X 2 with walker.  OOB to chair. Unable to walk today due to pain, fatigue, and weakness. Lilly COLLIER. Wife at bedside. Pain controlled with PRN medication. No distress noted. Will ctm.

## 2021-02-13 NOTE — THERAPY EVALUATION
Patient Name: Kyler Staley Jr.  : 1950    MRN: [id number]                              Today's Date: 2021       Admit Date: 2021    Visit Dx: No diagnosis found.  Patient Active Problem List   Diagnosis   • Primary osteoarthritis of right shoulder   • Acute pain of left shoulder   • Complete tear of left rotator cuff   • Status post complete repair of rotator cuff   • Obesity (BMI 30-39.9)   • Pain of right hip joint   • Greater trochanteric bursitis of right hip   • It band syndrome, right   • Greater trochanteric bursitis of left hip   • Left hip pain   • Abnormal radiologic findings on diagnostic imaging of other urinary organs   • Gluteal tendinitis of left buttock   • History of fusion of lumbar spine   • Sleep apnea   • Type 2 diabetes mellitus with hyperglycemia, without long-term current use of insulin (CMS/HCC)   • Paroxysmal atrial fibrillation (CMS/HCC)   • Hypertension   • Stage 3b chronic kidney disease (CMS/HCC)     Past Medical History:   Diagnosis Date   • Anxiety    • Atrial fibrillation (CMS/HCC)    • Back pain    • Cardiac disease    • Depression    • Diabetes (CMS/HCC)    • Fatigue    • GERD (gastroesophageal reflux disease)    • Hearing loss    • History of gastritis    • [place] (hard of hearing)    • Hyperlipidemia    • Hypertension    • Joint pain    • Kidney disease    • Knee swelling    • Lumbosacral disc disease    • Narrowing of airway 2012    PER ENT   • Neuropathy    • PONV (postoperative nausea and vomiting)    • Rotator cuff syndrome     REPAIRED BILATERAL   • Sleep apnea     BIPAP   • Swallowing difficulty    • Thin skin    • Thyroid condition    • Vocal cord dysfunction      HISTORY OF DISORDER ON ONE SIDE AFTER TRACHEA     Past Surgical History:   Procedure Laterality Date   • BACK SURGERY  2018    X2   • BACK SURGERY     • CARDIAC SURGERY     • CHOLECYSTECTOMY     • CORONARY ARTERY BYPASS GRAFT     • HERNIA REPAIR     • JOINT REPLACEMENT     • KNEE SURGERY       right and left   • LUMBAR DISCECTOMY FUSION INSTRUMENTATION N/A 2/11/2021    Procedure: EXPLORATION OF FUSION T-12 S-1 REMOVAL OF HARDWARE REVISION OF FUSION AND INSTRUMENTATION T12-S1 WITH APPLICATION OF BONE MORPHOGENIC PROTEIN;  Surgeon: Baltazar Blankenship MD;  Location: [facility] MAIN OR;  Service: Orthopedic Spine;  Laterality: N/A;   • NOSE SURGERY     • SHOULDER SURGERY Bilateral     rotator cuff repair     General Information     Row Name 02/13/21 1203          OT Time and Intention    Document Type  evaluation  -JOO     Mode of Treatment  individual therapy;occupational therapy  -JOO     Row Name 02/13/21 1203          General Information    Patient Profile Reviewed  yes  -JOO     Prior Level of Function  independent:;ADL's  -JOO     Existing Precautions/Restrictions  fall;spinal TLSO brace at home. Per chart, okay for therapy w/o it. Pt reports supposed to be brought to hospital  -JOO     Row Name 02/13/21 1203          Living Environment    Lives With  spouse  -JOO     Row Name 02/13/21 1203          Cognition    Orientation Status (Cognition)  oriented x 3  -JOO     Row Name 02/13/21 1203          Safety Issues, Functional Mobility    Impairments Affecting Function (Mobility)  balance;endurance/activity tolerance;strength;pain  -JOO       User Key  (r) = Recorded By, (t) = Taken By, (c) = Cosigned By    Initials Name Provider Type    JOO Keyona Foster OT Occupational Therapist          Mobility/ADL's     Row Name 02/13/21 1204          Bed Mobility    Bed Mobility  supine-sit;sit-supine  -JOO     Supine-Sit [place] (Bed Mobility)  minimum assist (75% patient effort);verbal cues  -JOO     Sit-Supine [place] (Bed Mobility)  minimum assist (75% patient effort);verbal cues;nonverbal cues (demo/gesture)  -JOO     Assistive Device (Bed Mobility)  bed rails;head of bed elevated  -JOO     Comment (Bed Mobility)  required cues for log rolling technique. A required for UB management from supine > sit and for lifting  BLEs back into bed sit > supine.  -[facility] Name 02/13/21 1204          Transfers    Transfers  sit-stand transfer  -     Sit-Stand [place] (Transfers)  contact guard  -[facility] Name 02/13/21 1204          Sit-Stand Transfer    Assistive Device (Sit-Stand Transfers)  walker, front-wheeled  -[facility] Name 02/13/21 1204          Activities of Daily Living    BADL Assessment/Intervention  toileting;feeding;lower body dressing;bathing  -[facility] 02/13/21 1204          Self-Feeding Assessment/Training    Comment (Feeding)  reports being able to feed self  -JOO     Row Name 02/13/21 1204          Toileting Assessment/Training    [place] Level (Toileting)  toileting skills;contact guard assist  -     Comment (Toileting)  CGA for standing balance during use of urinal in supported stand with RWx.  -JOO     Row Name 02/13/21 1204          Lower Body Dressing Assessment/Training    Comment (Lower Body Dressing)  educated pt on appriopriate AE for LB dressing ADLs (reacher, long handled shoe horn, sock aid)  -JOO     Row Name 02/13/21 1204          Bathing Assessment/Intervention    Comment (Bathing)  educated pt on appriopriate AE forbathing ADLs (long handled sponge)  -       User Key  (r) = Recorded By, (t) = Taken By, (c) = Cosigned By    Initials Name Provider Type    JOO Keyona Foster, OT Occupational Therapist        Obj/Interventions     [facility] Name 02/13/21 1207          Sensory Assessment (Somatosensory)    Sensory Assessment (Somatosensory)  UE sensation intact  -JOO     Row Name 02/13/21 1207          Vision Assessment/Intervention    Visual Impairment/Limitations  -- difficult for pt to keep L eye open, pt reports his eye has always been like that.  -[facility] Name 02/13/21 1207          Range of Motion Comprehensive    Comment, General Range of Motion  B UE WFL  -JOO     Row Name 02/13/21 1207          Strength Comprehensive (MMT)    Comment, General Manual Muscle Testing (MMT) Assessment  B  UE not formally assessed due to spinal precautions, grossly appears 4/5 during functional tasks and transfers  -JOO     Row Name 02/13/21 1207          Balance    Balance Assessment  sitting static balance;sitting dynamic balance;sit to stand dynamic balance;standing static balance  -JOO     Static Sitting Balance  WFL  -JOO     Dynamic Sitting Balance  WFL  -JOO     Sit to Stand Dynamic Balance  mild impairment;supported;standing  -JOO     Static Standing Balance  mild impairment;supported;standing CGA with rwx  -JOO     Dynamic Standing Balance  mild impairment;supported;standing CGA with rwx  -JOO     Balance Interventions  sitting;standing;sit to stand;supported;dynamic;static;occupation based/functional task  -JOO     Comment, Balance  performed toileting routine in standing via urinal usage. Required CGA with 1 UE supported on Rwx while one hand manipulated urinal. No overt LOBs  -JOO       User Key  (r) = Recorded By, (t) = Taken By, (c) = Cosigned By    Initials Name Provider Type    JOOKeyona Malave, OT Occupational Therapist        Goals/Plan     Row Name 02/13/21 1216          Bathing Goal 1 (OT)    Activity/Device (Bathing Goal 1, OT)  bathing skills, all;upper body bathing;lower body bathing  -JOO     [place] Level/Cues Needed (Bathing Goal 1, OT)  supervision required  -JOO     Time Frame (Bathing Goal 1, OT)  2 weeks  -JOO     Strategies/Barriers (Bathing Goal 1, OT)  with appropriate AE for adherence to spinal precautions  -JOO     Progress/Outcomes (Bathing Goal 1, OT)  goal ongoing  -JOO     Row Name 02/13/21 1216          Dressing Goal 1 (OT)    Activity/Device (Dressing Goal 1, OT)  lower body dressing  -JOO     [place]/Cues Needed (Dressing Goal 1, OT)  supervision required  -JOO     Time Frame (Dressing Goal 1, OT)  2 weeks;short term goal (STG)  -JOO     Strategies/Barriers (Dressing Goal 1, OT)  with appropriate AE for adherence to spinal precautions.  -JOO     Progress/Outcome (Dressing Goal 1,  OT)  goal ongoing  -JOO     Row Name 02/13/21 1216          Toileting Goal 1 (OT)    Activity/Device (Toileting Goal 1, OT)  toileting skills, all  -JOO     [place] Level/Cues Needed (Toileting Goal 1, OT)  standby assist  -JOO     Time Frame (Toileting Goal 1, OT)  2 weeks  -JOO     Progress/Outcome (Toileting Goal 1, OT)  goal ongoing  -JOO     Row Name 02/13/21 1216          ROM Goal 1 (OT)    ROM Goal 1 (OT)  pt will demonstrate indep with understanding spinal precautions during completion of ADL tasks.  -JOO     Time Frame (ROM Goal 1, OT)  2 weeks;short term goal (STG)  -JOO     Progress/Outcome (ROM Goal 1, OT)  goal ongoing  -JOO       User Key  (r) = Recorded By, (t) = Taken By, (c) = Cosigned By    Initials Name Provider Type    Keyona Jacobo, OT Occupational Therapist        Clinical Impression     Row Name 02/13/21 1211          Pain Assessment    Additional Documentation  Pain Scale: Numbers Pre/Post-Treatment (Group)  -JOO     Row Name 02/13/21 1211          Pain Scale: Numbers Pre/Post-Treatment    Pretreatment Pain Rating  3/10  -JOO     Posttreatment Pain Rating  3/10  -JOO     Pain Location  back  -JOO     Pain Intervention(s)  Repositioned;Rest  -JOO     Row Name 02/13/21 1211          Plan of Care Review    Plan of Care Reviewed With  patient  -JOO     Outcome Summary  Pt is 70 y.o. male presenting to OT evaluation POD 2 from T12-S1 revision. Pt reports living with spouse and indep with ADLs PTA. Pt able to perform bed mobility with min A utilizing log rolling techniquie to adhere to spinal precautions. Pt CGA with RWx for functional transfers. Pt CGA for completion of toileting routine in supported stand. Pt with TLSO at home, per chart pt okay to engage in therapy without. Pt overall demonstrated decreased functional activity tolerance, generalized weakness, impaired balance, and decreased overall safety and indep with ADLs. Pt would benefit from skilled OT services while admitted acutely. Pt with  support at home, anticipating d/c to home with assist when medically appropriate.  -JOO     Row Name 02/13/21 1211          Therapy Assessment/Plan (OT)    Rehab Potential (OT)  good, to achieve stated therapy goals  -JOO     Criteria for Skilled Therapeutic Interventions Met (OT)  yes;meets criteria  -JOO     Therapy Frequency (OT)  5 times/wk  -JOO     Row Name 02/13/21 1211          Positioning and Restraints    Pre-Treatment Position  in bed  -JOO     Post Treatment Position  bed  -JOO     In Bed  supine;call light within reach;encouraged to call for assist;exit alarm on  -JOO       User Key  (r) = Recorded By, (t) = Taken By, (c) = Cosigned By    Initials Name Provider Type    Keyona Jacobo OT Occupational Therapist        Outcome Measures     Row Name 02/13/21 1218          How much help from another is currently needed...    Putting on and taking off regular lower body clothing?  1  -JOO     Bathing (including washing, rinsing, and drying)  2  -JOO     Toileting (which includes using toilet bed pan or urinal)  3  -JOO     Putting on and taking off regular upper body clothing  2  -JOO     Taking care of personal grooming (such as brushing teeth)  3  -JOO     Eating meals  3  -JOO     AM-PAC 6 Clicks Score (OT)  14  -JOO     Row Name 02/13/21 1218          Functional Assessment    Outcome Measure Options  AM-PAC 6 Clicks Daily Activity (OT)  -JOO       User Key  (r) = Recorded By, (t) = Taken By, (c) = Cosigned By    Initials Name Provider Type    Keyona Jacobo OT Occupational Therapist        Occupational Therapy Education                 Title: PT OT SLP Therapies (In Progress)     Topic: Occupational Therapy (In Progress)     Point: ADL training (Done)     Description:   Instruct learner(s) on proper safety adaptation and remediation techniques during self care or transfers.   Instruct in proper use of assistive devices.              Learning Progress Summary           Patient Acceptance, E, VU by JOO at  2/13/2021 1219    Comment: Educated pt on OT role, OT plan of care, spinal precuations, and safety techniques for completion of ADLs. Educated on use of appropriate AE to adhere to precautions once d/c'd                   Point: Home exercise program (Not Started)     Description:   Instruct learner(s) on appropriate technique for monitoring, assisting and/or progressing therapeutic exercises/activities.              Learner Progress:  Not documented in this visit.          Point: Precautions (Done)     Description:   Instruct learner(s) on prescribed precautions during self-care and functional transfers.              Learning Progress Summary           Patient Acceptance, E, VU by JOO at 2/13/2021 1219    Comment: Educated pt on OT role, OT plan of care, spinal precuations, and safety techniques for completion of ADLs. Educated on use of appropriate AE to adhere to precautions once d/c'd                   Point: Body mechanics (Not Started)     Description:   Instruct learner(s) on proper positioning and spine alignment during self-care, functional mobility activities and/or exercises.              Learner Progress:  Not documented in this visit.                      User Key     Initials Effective Dates Name Provider Type Discipline    JOO 09/14/20 -  Keyona Foster, OT Occupational Therapist OT              OT Recommendation and Plan  Therapy Frequency (OT): 5 times/wk  Plan of Care Review  Plan of Care Reviewed With: patient  Outcome Summary: Pt is 70 y.o. male presenting to OT evaluation POD 2 from T12-S1 revision. Pt reports living with spouse and indep with ADLs PTA. Pt able to perform bed mobility with min A utilizing log rolling techniquie to adhere to spinal precautions. Pt CGA with RWx for functional transfers. Pt CGA for completion of toileting routine in supported stand. Pt with TLSO at home, per chart pt okay to engage in therapy without. Pt overall demonstrated decreased functional activity tolerance,  generalized weakness, impaired balance, and decreased overall safety and indep with ADLs. Pt would benefit from skilled OT services while admitted acutely. Pt with support at home, anticipating d/c to home with assist when medically appropriate.     Time Calculation:   Time Calculation- OT     Row Name 02/13/21 1221             Time Calculation- OT    OT Start Time  1000  -JOO      OT Stop Time  1023  -JOO      OT Time Calculation (min)  23 min  -JOO      Total Timed Code Minutes- OT  15 minute(s)  -JOO      OT Received On  02/13/21  -JOO      OT - Next Appointment  02/15/21  -JOO      OT Goal Re-Cert Due Date  02/27/21  -JOO        User Key  (r) = Recorded By, (t) = Taken By, (c) = Cosigned By    Initials Name Provider Type    Keyona Jacobo OT Occupational Therapist        Therapy Charges for Today     Code Description Service Date Service Provider Modifiers Qty    81045410369 HC OT EVAL MOD COMPLEXITY 2 2/13/2021 Keyona Foster OT GO 1    73839598119 HC OT SELF CARE/MGMT/TRAIN EA 15 MIN 2/13/2021 Keyona Foster OT GO 1               Keyona Foster OT  2/13/2021

## 2021-02-13 NOTE — PROGRESS NOTES
Name: Kyler Staley Jr. ADMIT: 2021   : 1950  PCP: Forrest Will MD    MRN: [id number] LOS: 2 days   AGE/SEX: 70 y.o. male  ROOM: [facility]     Subjective   Subjective   Feels rough in general.  Tolerating good PO.  No N/V.       Objective   Objective   Vital Signs  Temp:  [97.5 °F (36.4 °C)-99.6 °F (37.6 °C)] 97.9 °F (36.6 °C)  Heart Rate:  [84-91] 90  Resp:  [18-20] 18  BP: ()/(60-86) 119/70  SpO2:  [92 %-98 %] 98 %  on  Flow (L/min):  [2-3] 2;   Device (Oxygen Therapy): nasal cannula  Body mass index is 31.91 kg/m².  Physical Exam  Vitals signs and nursing note reviewed.   Constitutional:       General: He is not in acute distress.     Appearance: He is well-developed. He is ill-appearing.   HENT:      Mouth/Throat:      Comments: Left upper lip swollen  Neck:      Vascular: No JVD.      Trachea: No tracheal deviation.   Cardiovascular:      Rate and Rhythm: Normal rate and regular rhythm.      Heart sounds: No murmur.   Pulmonary:      Effort: Pulmonary effort is normal. No respiratory distress.      Breath sounds: Normal breath sounds.   Abdominal:      General: Bowel sounds are normal. There is no distension.      Palpations: Abdomen is soft.      Tenderness: There is no abdominal tenderness.   Skin:     General: Skin is warm and dry.      Comments: Facial rash slightly improved.   Neurological:      Mental Status: He is alert and oriented to person, place, and time.   Psychiatric:         Behavior: Behavior normal. Behavior is cooperative.         Results Review:       I reviewed the patient's new clinical results.  Results from last 7 days   Lab Units 21  0642 21  0531 21  1406 21  1059   WBC 10*3/mm3 7.54 10.55  --   --    HEMOGLOBIN g/dL 9.9* 11.3*  --   --    HEMOGLOBIN, POC g/dL  --   --  10.2* 10.2*   PLATELETS 10*3/mm3 222 266  --   --      Results from last 7 days   Lab Units 21  0642 21  0531   SODIUM mmol/L 135* 133*   POTASSIUM mmol/L 3.6 3.9  Subjective:       Patient ID: Carli Gunn is a 17 y.o. male.    Chief Complaint: Hearing evaluation; and Neurofibromatosis     HPI     HPI Carli Gunn is a 16 yo male presenting with his mother for hearing evaluation and with a history of Neurofibromatosis type I. He was last seen in 9/15/2016. He denies any change in his hearing. He also denies tinnitus, vertigo, headaches, seizures, vision changes, or balance problems. Today, audiogram and tympanogram were WNL.    Review of Systems   Constitutional: Negative for chills, fever and unexpected weight change.   HENT: Negative for facial swelling and hearing loss.    Eyes: Negative for visual disturbance.   Respiratory: Negative for wheezing and stridor.    Cardiovascular:        Neg for CHD   Gastrointestinal: Negative.  Negative for nausea and vomiting.   Genitourinary: Negative.         Neg for congenital abn   Musculoskeletal: Negative for arthralgias and myalgias.   Skin: Negative.    Neurological: Negative for seizures, speech difficulty and weakness.   Hematological: Negative for adenopathy. Does not bruise/bleed easily.   Psychiatric/Behavioral: Negative for behavioral problems.   All other systems reviewed and are negative.      Objective:      Physical Exam   Constitutional: He is oriented to person, place, and time. He appears well-developed and well-nourished.   HENT:   Head: Normocephalic.   Right Ear: Tympanic membrane and external ear normal. No middle ear effusion (ci).   Left Ear: Tympanic membrane and external ear normal.  No middle ear effusion (ci).   Nose: Nose normal. No nasal deformity.   Mouth/Throat: Oropharynx is clear and moist and mucous membranes are normal.   Eyes: Conjunctivae, EOM and lids are normal. Pupils are equal, round, and reactive to light.   Neck: Trachea normal. No thyroid mass present.   Cardiovascular: Normal rate and regular rhythm.    Pulmonary/Chest: Effort normal. No respiratory distress.   Musculoskeletal: Normal range    CHLORIDE mmol/L 101 98   CO2 mmol/L 25.9 22.4   BUN mg/dL 15 16   CREATININE mg/dL 1.68* 1.89*   GLUCOSE mg/dL 202* 243*   Estimated Creatinine Clearance: 53.2 mL/min (A) (by C-G formula based on SCr of 1.68 mg/dL (H)).    Results from last 7 days   Lab Units 02/13/21  0642 02/12/21  0531   CALCIUM mg/dL 8.4* 8.6       Glucose   Date/Time Value Ref Range Status   02/13/2021 1142 173 (H) 70 - 130 mg/dL Final   02/13/2021 0750 210 (H) 70 - 130 mg/dL Final   02/12/2021 2150 259 (H) 70 - 130 mg/dL Final   02/12/2021 1750 234 (H) 70 - 130 mg/dL Final   02/12/2021 1323 231 (H) 70 - 130 mg/dL Final   02/12/2021 1226 251 (H) 70 - 130 mg/dL Final   02/12/2021 0657 226 (H) 70 - 130 mg/dL Final       acetaminophen, 650 mg, Oral, Q8H  allopurinol, 300 mg, Oral, Daily  amLODIPine, 5 mg, Oral, Daily  atorvastatin, 20 mg, Oral, Q PM  carvedilol, 12.5 mg, Oral, BID With Meals  castor oil-balsam peru, , Topical, Q12H  docusate sodium, 100 mg, Oral, BID  imipramine, 100 mg, Oral, Nightly  imipramine, 50 mg, Oral, QAM  insulin glargine, 10 Units, Subcutaneous, Nightly  insulin lispro, 0-9 Units, Subcutaneous, TID AC  insulin lispro, 5 Units, Subcutaneous, TID With Meals  isosorbide mononitrate, 60 mg, Oral, Daily  levothyroxine, 112 mcg, Oral, Q AM  losartan, 100 mg, Oral, Daily  pantoprazole, 40 mg, Oral, Q AM  senna-docusate sodium, 1 tablet, Oral, BID  sodium chloride, 3 mL, Intravenous, Q12H  tamsulosin, 0.4 mg, Oral, Q24H      lactated ringers, 9 mL/hr, Last Rate: 9 mL/hr (02/11/21 0718)    Diet Regular; Consistent Carbohydrate, GI Soft       Assessment/Plan     Active Hospital Problems    Diagnosis  POA   • **History of fusion of lumbar spine [Z98.1]  Not Applicable   • Stage 3b chronic kidney disease (CMS/HCC) [N18.32]  Yes   • Sleep apnea [G47.30]  Yes   • Type 2 diabetes mellitus with hyperglycemia, without long-term current use of insulin (CMS/[facility]) [E11.65]  Yes   • Paroxysmal atrial fibrillation (CMS/[facility]) [I48.0]  Yes   •  of motion.   Lymphadenopathy:     He has no cervical adenopathy.   Neurological: He is alert and oriented to person, place, and time. No cranial nerve deficit.   Skin: Skin is warm. No rash noted.   Psychiatric: He has a normal mood and affect. His behavior is normal.         Cerumen removal: Ears cleared under microscopic vision with curette, forceps and suction as necessary. Child appropriately restrained by parent or/and papoose board.          Assessment:       1. Encounter for hearing evaluation - nl    2. NF (neurofibromatosis)    3. Impacted cerumen of right ear        Plan:       1. Reassure normal hearing   2. RTC in one year         Hypertension [I10]  Yes   • Obesity (BMI 30-39.9) [E66.9]  Yes      Resolved Hospital Problems   No resolved problems to display.       Mr. Staley is a 70 y.o. male who is s/p EXPLORATION OF FUSION T-12 S-1 REMOVAL OF HARDWARE REVISION OF FUSION AND INSTRUMENTATION T12-S1 WITH APPLICATION OF BONE MORPHOGENIC PROTEIN.    · /81 this am.  Continue holding parameters for amlodipine, Imdur and Cozaar.  · Continue to hold aldactone.  Can restart at discharge.  · Lantus started last PM.  Will add AC Humalog as well while here.  Can resume preadmit oral regimen at discharge.   · Continue to monitor renal function.   · WOCN following.   · Discussed with Dr. Blankenship.        Luis Diaz MD  [facility]  02/13/21  12:50 EST

## 2021-02-13 NOTE — SIGNIFICANT NOTE
02/13/21 1447   OTHER   Discipline physical therapist   Rehab Time/Intention   Session Not Performed other (see comments)  (When asked nursing about PT, she said not appropriate for today. He has been up, they just got him to the bed, and now giving pain meds. Will check back on patient tomorrow for PT treatment.)   Recommendation   PT - Next Appointment 02/14/21

## 2021-02-13 NOTE — PLAN OF CARE
Goal Outcome Evaluation:  Plan of Care Reviewed With: patient     Outcome Summary: Pt is 70 y.o. male presenting to OT evaluation POD 2 from T12-S1 revision. Pt reports living with spouse and indep with ADLs PTA. Pt able to perform bed mobility with min A utilizing log rolling techniquie to adhere to spinal precautions. Pt CGA with RWx for functional transfers. Pt CGA for completion of toileting routine in supported stand. Pt with TLSO at home, per chart pt okay to engage in therapy without. Pt overall demonstrated decreased functional activity tolerance, generalized weakness, impaired balance, and decreased overall safety and indep with ADLs. Pt would benefit from skilled OT services while admitted acutely. Pt with support at home, anticipating d/c to home with assist when medically appropriate. OT to monitor pt's level of progress.    Patient was not wearing a face mask during this therapy encounter. Therapist used appropriate personal protective equipment including surgical mask, eye shield and gloves during the entire therapy session. Hand hygiene was completed before and after therapy session. Patient is not in enhanced droplet precautions.

## 2021-02-13 NOTE — OP NOTE
LUMBAR DISCECTOMY POSTERIOR WITH FUSION INSTRUMENTATION  Procedure Report    Patient Name:  Kyler Staley Jr.  YOB: 1950    Date of Surgery:  2/11/2021     Indications: This is a 70-year-old male who presented to the office with low back pain and severe bilateral leg pain.  He has a history of multiple lumbar spine surgeries.  He now has a fusion and instrumentation from T12 to the sacrum.  He has fracturing of his rods over the L2-3 level as well as migration of the interbody cage at L2-3 impinging on the nerve roots within the canal.  I discussed treatment options and alternatives with him.  He is very miserable overall and his quality of life is suffered substantially.  Conservative interventions have failed to provide any lasting relief.  I discussed surgical intervention.  He wants to proceed with that.  And exploration of fusion with hardware removal and revision instrumentation and fusion was recommended.  I discussed the risk of the procedure which include but not limited to risk of anesthesia including heart attack stroke and even death as well as risk of the procedure including paralysis, nerve deficit, spinal fluid leak, infection, failure of fusion, failure of instrumentation, blood clot, and need for reoperation.  Patient understood these risks and agreed to proceed.  Informed consent was signed.    Pre-op Diagnosis:   Status post T12 to the sacrum instrumentation and fusion with and pseudoarthrosis with fracture of spinal rods and cage migration at L2-3 with subsequent back pain and lumbar radiculopathy       Post-Op Diagnosis Codes:  Same    Procedure/CPT® Codes:      Procedure(s):  EXPLORATION OF FUSION T-12 S-1   REMOVAL OF HARDWARE T-12 to S-1  REVISION RICHA-LAMINECTOMY LEFT AT L2-3  REVISION OF FUSION AND INSTRUMENTATION T12-S1 WITH APPLICATION OF BONE MORPHOGENIC PROTEIN.       Staff:  Surgeon(s):  Baltazar Blankenship MD    Assistant: May Stephens RN    Anesthesia:  General    Estimated Blood Loss: 350 mL    Implants:    Implant Name Type Inv. Item Serial No.  Lot No. LRB No. Used Action   KT SEAL HEMOS ABS FLOSEAL MATRX FAST/PREP 10ML - [id number] Implant KT SEAL HEMOS ABS FLOSEAL MATRX FAST/PREP 10ML  [employer] . N/A 1 Implanted   BONE CCCS 20 TO 80 30CC - [id number] Implant BONE CCCS 20 TO 80 30CC  [employer]IN[employer] CO [id number] N/A 1 Implanted   BONE CCCS 20 TO 80 30CC - [id number] Implant BONE CCCS 20 TO 80 30CC  SPINAL GRA[employer] A [employer] CO [id number] N/A 1 Implanted   KT GRFT BONE INFUSE ABS/COLLGN/SPNG 1X2IN 8CC LG - [id number] Implant KT GRFT BONE INFUSE ABS/COLLGN/SPNG 1X2IN 8CC LG  [employer] [id number] N/A 1 Implanted   LIZZY SOLERA STR TI 5.3S249K - [id number] Implant LIZZY SOLERA STR TI 5.9U411S  [employer] . N/A 2 Implanted   SCRW SET BREAKOFF TI FOR LEGACY - [id number] Implant SCRW SET BREAKOFF TI FOR LEGACY  [employer] . N/A 8 Implanted   CONN LIZZY LEGACY SD LOAD TI 5.5X5.5MM - [id number] Implant CONN LIZZY LEGACY SD LOAD TI 5.5X5.5MM  [employer] . N/A 4 Implanted   SCRW ST STD TI 1/4 32 GRY - [id number] Implant SCRW ST STD TI 1/4 32 GRY  [employer] . N/A 8 Implanted   SUT CONTRL TISS STRATAFIX SPIRAL PLS PDS CT1 2/0 22CM - [id number] Implant SUT CONTRL TISS STRATAFIX SPIRAL PLS PDS CT1 2/0 22CM  [employer]  DIV OF J AND J [id number] N/A 1 Implanted   SUT CONTRL TISS STRATAFIXSPIRALMNCRYL PLSPS2 REV3/0 30CM - [id number] Implant SUT CONTRL TISS STRATAFIXSPIRALMNCRYL PLSPS2 REV3/0 30CM  [employer]  DIV OF J AND J [id number] N/A 1 Implanted   SCRW ST SOLERA BRKOFF TI 5.5MM - [id number] Implant SCRW ST SOLERA BRKOFF TI 5.5MM  [employer] . N/A 6 Implanted   SUT CONTRL TISS STRATAFIX SYMM PDS PLUS DARYL CT-1 45CM - [id number] Implant SUT CONTRL TISS STRATAFIX SYMM PDS PLUS DARYL CT-1 45CM  [employer]  RUI GROVE N/A 2 Implanted   SUT CONTRL TISS STRATAFIX SPIRAL PDS PLS CT1 2-0 45CM - [id number] Implant SUT CONTRL TISS STRATAFIX  SPIRAL PDS PLS CT1 2-0 45CM  [employer]  DIV OF CARSON AND CARSON [id number] N/A 2 Implanted       Specimen:          None        Findings: Fractured rods at L2-3.  Posterior cage migration at L2-3 paracentral to the left.  Pseudoarthrosis status at L2-3 and L3-4 with remaining levels showing solid fusion    Complications: none    Description of Procedure: Patient was identified in the preoperative holding area.  Surgical area was marked.  Patient was brought to the operative suite by anesthesia.  General endotracheal anesthesia was induced on the hospital bed without difficulty.  A timeout was performed identifying patient characteristics.  Preoperative antibiotics were given.  Cell Saver was used during the case.  Neuro monitoring leads were hooked to the patient for intraoperative EMGs which were stable throughout the case.  Sequential compression devices were used during the case for DVT prophylaxis.  The patient was then placed prone on a Fredi frame.  Bony prominences were well-padded.  The operative site was prepped and draped in usual sterile manner.  An incision was made using a 10 blade through the prior scar.  Self-retaining retractors were placed.  Dissection was carried out exposing the instrumentation starting at T12 working down to S1.  Hemostasis was achieved using electrocautery and Surgi-Anibal.  The prior instrumentation including screw heads rods and connectors were all exposed.  The rods were then removed sequentially.  The set screws were removed as well as the dina connectors.  We were left with well fixed screws from T12-S1 bilaterally.  A substantial amount of bone was removed using osteotome around the instrumentation and saved for bone graft later.  We then turned our attention to L2-3.  We performed a hemilaminectomy on the left at L2-3 using a series of curettes and Kerrison punches.  Bone was removed over the remaining lamina of L2 working her way laterally exposing around the cage insertion  site.  Large amount of bone were  noted in the canal behind the cage through the insertion track.  This was removed using Kerrison rongeurs as well as a high-speed bur.  Taking care to protect the dura medially bone was removed working the way down to the cage interface.  The cage was identified and rather than remove the cage in its entirety and risk damage to the thecal sac it was decided to tamp the cage anteriorly further into the disc space.  This was done under fluoroscopic guidance and we noted that the cage was then reduced within the disc space and no longer within the canal.  Thrombin Gelfoam was placed over the laminectomy defect.  We then decorticated around the remaining bone particularly at L2-3 and L3-4 where there was pseudoarthrosis noted indicated by the broken rods at that level, cage migration all indications of excess motion.  Bone graft was then placed in the gutters and over the decorticated areas spanning from L2-L4.  We then selected a 5 5 titanium dina which was bent in the appropriate position and fixed in the tulip heads of the screws setscrews were then placed and tightened down appropriately for a well fitted dina on the right.  On the left in a similar manner a dina was bent and reduced within the screw heads followed by set screws to secure the dina.  We were unable to reduce the dina into the screw head on the left at L2 and a set screw was left out at that level.  We then placed connectors between the screw heads at L1-L2 and L4-5.  Within these dina connectors another dina was placed bilaterally.  For a quad dina construct spanning from L1-2 to L4-5, final x-rays were then obtained verifying the construct and screw positioning.  We then irrigated with copious amounts of normal saline.  A 10 Marshallese Hemovac drain was applied.  Vancomycin powder was applied to the wound.  The fascia was closed with a #1 PDS strata fix suture in a running fashion for a tight closure.  Subcu was closed with a  running 2-0 PDS strata fix suture.  Skin was closed with staples.  Local anesthetic was applied.  A sterile dressing was placed.  The patient was then placed supine back on the hospital bed.  He was woke from anesthesia and brought back to recovery area in stable condition.    Post procedure plan: Patient be admitted to floor status.  He will receive 24 hours of IV antibiotics.  He will work with physical therapy and be weightbearing as tolerated.  We will anticipate a home discharge versus rehab depending on his progress.    Assistant: May Stephens RN  was responsible for performing the following activities: Retraction, Suction, Irrigation, Suturing, Closing and Placing Dressing and their skilled assistance was necessary for the success of this case.    Baltazar Blankenship MD     Date: 2/13/2021  Time: 12:09 EST

## 2021-02-13 NOTE — PROGRESS NOTES
Ortho Spine Surgery  S/p T12-S1 revision fusion    Somnolent today. Back pain controlled. Left face swelling slightly improved.  Walked 30 feet yesterday. Voiding on own. No BM.  No leg pain    On exam he demonstrates facial swelling.  No signs of airway compromise.  Blistering on chin and left cheek. No drainage.  Minimal erythema.  BLE's neuro intact.  No focal deficits.   Hvac output ~500s.    A/P  Mobilize  PT/OT  Pain control - d/c IV pain meds.  Tx to oral only  Post op somnolence - decrease dose of xanax prn  Wound care consult for facial blisters - appreciate recs  SCDs for DVT ppx  Hold plavix and ASA 72 hrs post operatively  Maintain hemovac  Acute post op blood loss anemia - Hgb 9.9 today. Will monitor.  DM - tight glucose control.  Appreciate recs per medicine.   Dispo - pending PT and improvement in facial swelling.  Not a candidate for d/c today

## 2021-02-14 VITALS
SYSTOLIC BLOOD PRESSURE: 120 MMHG | BODY MASS INDEX: 32.05 KG/M2 | HEART RATE: 87 BPM | HEIGHT: 73 IN | DIASTOLIC BLOOD PRESSURE: 90 MMHG | WEIGHT: 241.84 LBS | RESPIRATION RATE: 16 BRPM | OXYGEN SATURATION: 94 % | TEMPERATURE: 98.8 F

## 2021-02-14 LAB
ANION GAP SERPL CALCULATED.3IONS-SCNC: 10.8 MMOL/L (ref 5–15)
BUN SERPL-MCNC: 17 MG/DL (ref 8–23)
BUN/CREAT SERPL: 9.9 (ref 7–25)
CALCIUM SPEC-SCNC: 9 MG/DL (ref 8.6–10.5)
CHLORIDE SERPL-SCNC: 99 MMOL/L (ref 98–107)
CO2 SERPL-SCNC: 23.2 MMOL/L (ref 22–29)
CREAT SERPL-MCNC: 1.72 MG/DL (ref 0.76–1.27)
GFR SERPL CREATININE-BSD FRML MDRD: 40 ML/MIN/1.73
GLUCOSE BLDC GLUCOMTR-MCNC: 162 MG/DL (ref 70–130)
GLUCOSE BLDC GLUCOMTR-MCNC: 182 MG/DL (ref 70–130)
GLUCOSE SERPL-MCNC: 152 MG/DL (ref 65–99)
POTASSIUM SERPL-SCNC: 3.5 MMOL/L (ref 3.5–5.2)
SODIUM SERPL-SCNC: 133 MMOL/L (ref 136–145)

## 2021-02-14 PROCEDURE — 82962 GLUCOSE BLOOD TEST: CPT

## 2021-02-14 PROCEDURE — 80048 BASIC METABOLIC PNL TOTAL CA: CPT | Performed by: HOSPITALIST

## 2021-02-14 PROCEDURE — 63710000001 INSULIN LISPRO (HUMAN) PER 5 UNITS: Performed by: HOSPITALIST

## 2021-02-14 PROCEDURE — 97110 THERAPEUTIC EXERCISES: CPT

## 2021-02-14 RX ORDER — AMOXICILLIN 250 MG
1 CAPSULE ORAL 2 TIMES DAILY
Qty: 30 TABLET | Refills: 0 | OUTPATIENT
Start: 2021-02-14 | End: 2022-07-07

## 2021-02-14 RX ORDER — CLOPIDOGREL BISULFATE 75 MG/1
TABLET ORAL
Qty: 30 TABLET
Start: 2021-02-14 | End: 2022-07-07

## 2021-02-14 RX ORDER — INSULIN GLARGINE 100 [IU]/ML
13 INJECTION, SOLUTION SUBCUTANEOUS NIGHTLY
Status: DISCONTINUED | OUTPATIENT
Start: 2021-02-14 | End: 2021-02-14 | Stop reason: HOSPADM

## 2021-02-14 RX ORDER — ONDANSETRON 4 MG/1
4 TABLET, FILM COATED ORAL EVERY 8 HOURS PRN
Qty: 25 TABLET | Refills: 0 | Status: SHIPPED | OUTPATIENT
Start: 2021-02-14 | End: 2022-07-07

## 2021-02-14 RX ORDER — TIZANIDINE 4 MG/1
4 TABLET ORAL EVERY 8 HOURS PRN
Qty: 60 TABLET | Refills: 0 | OUTPATIENT
Start: 2021-02-14 | End: 2022-07-07

## 2021-02-14 RX ORDER — INSULIN LISPRO 100 [IU]/ML
6 INJECTION, SOLUTION INTRAVENOUS; SUBCUTANEOUS
Status: DISCONTINUED | OUTPATIENT
Start: 2021-02-14 | End: 2021-02-14 | Stop reason: HOSPADM

## 2021-02-14 RX ADMIN — CASTOR OIL AND BALSAM, PERU 5 G: 788; 87 OINTMENT TOPICAL at 08:22

## 2021-02-14 RX ADMIN — LEVOTHYROXINE SODIUM 112 MCG: 112 TABLET ORAL at 06:35

## 2021-02-14 RX ADMIN — MAGNESIUM HYDROXIDE 10 ML: 2400 SUSPENSION ORAL at 08:22

## 2021-02-14 RX ADMIN — INSULIN LISPRO 2 UNITS: 100 INJECTION, SOLUTION INTRAVENOUS; SUBCUTANEOUS at 08:22

## 2021-02-14 RX ADMIN — ALLOPURINOL 300 MG: 300 TABLET ORAL at 08:22

## 2021-02-14 RX ADMIN — CARVEDILOL 12.5 MG: 12.5 TABLET, FILM COATED ORAL at 08:22

## 2021-02-14 RX ADMIN — DOCUSATE SODIUM 100 MG: 100 CAPSULE, LIQUID FILLED ORAL at 08:22

## 2021-02-14 RX ADMIN — OXYCODONE HYDROCHLORIDE AND ACETAMINOPHEN 1 TABLET: 10; 325 TABLET ORAL at 01:25

## 2021-02-14 RX ADMIN — SODIUM CHLORIDE, PRESERVATIVE FREE 3 ML: 5 INJECTION INTRAVENOUS at 08:23

## 2021-02-14 RX ADMIN — OXYCODONE 5 MG: 5 TABLET ORAL at 15:55

## 2021-02-14 RX ADMIN — OXYCODONE HYDROCHLORIDE AND ACETAMINOPHEN 1 TABLET: 10; 325 TABLET ORAL at 13:29

## 2021-02-14 RX ADMIN — PANTOPRAZOLE SODIUM 40 MG: 40 TABLET, DELAYED RELEASE ORAL at 06:34

## 2021-02-14 RX ADMIN — IMIPRAMINE HYDROCHLORIDE 50 MG: 50 TABLET ORAL at 06:35

## 2021-02-14 RX ADMIN — AMLODIPINE BESYLATE 5 MG: 5 TABLET ORAL at 08:22

## 2021-02-14 RX ADMIN — INSULIN LISPRO 5 UNITS: 100 INJECTION, SOLUTION INTRAVENOUS; SUBCUTANEOUS at 08:23

## 2021-02-14 RX ADMIN — INSULIN LISPRO 6 UNITS: 100 INJECTION, SOLUTION INTRAVENOUS; SUBCUTANEOUS at 13:31

## 2021-02-14 RX ADMIN — DOCUSATE SODIUM 50MG AND SENNOSIDES 8.6MG 1 TABLET: 8.6; 5 TABLET, FILM COATED ORAL at 08:22

## 2021-02-14 RX ADMIN — ISOSORBIDE MONONITRATE 60 MG: 60 TABLET ORAL at 08:22

## 2021-02-14 RX ADMIN — INSULIN LISPRO 2 UNITS: 100 INJECTION, SOLUTION INTRAVENOUS; SUBCUTANEOUS at 13:30

## 2021-02-14 RX ADMIN — OXYCODONE 5 MG: 5 TABLET ORAL at 08:22

## 2021-02-14 RX ADMIN — LOSARTAN POTASSIUM 100 MG: 100 TABLET, FILM COATED ORAL at 08:22

## 2021-02-14 NOTE — PROGRESS NOTES
Name: Kyler Staley Jr. ADMIT: 2021   : 1950  PCP: Forrest Will MD    MRN: [id number] LOS: 3 days   AGE/SEX: 70 y.o. male  [facility]     Subjective   Subjective   Looks and feels much better today.     Objective   Objective   Vital Signs  Temp:  [97.8 °F (36.6 °C)-98.4 °F (36.9 °C)] 98.2 °F (36.8 °C)  Heart Rate:  [79-90] 84  Resp:  [16-18] 16  BP: (103-149)/(60-88) 144/81  SpO2:  [92 %-98 %] 95 %  on  Flow (L/min):  [2-3] 2;   Device (Oxygen Therapy): nasal cannula  Body mass index is 31.91 kg/m².  Physical Exam  Vitals signs and nursing note reviewed.   Constitutional:       General: He is not in acute distress.     Appearance: He is well-developed. He is ill-appearing.   HENT:      Mouth/Throat:      Comments: Left upper lip swollen  Neck:      Vascular: No JVD.      Trachea: No tracheal deviation.   Cardiovascular:      Rate and Rhythm: Normal rate and regular rhythm.      Heart sounds: No murmur.   Pulmonary:      Effort: Pulmonary effort is normal. No respiratory distress.      Breath sounds: Normal breath sounds.   Abdominal:      General: Bowel sounds are normal. There is no distension.      Palpations: Abdomen is soft.      Tenderness: There is no abdominal tenderness.   Skin:     General: Skin is warm and dry.      Comments: Facial rash slightly improved.   Neurological:      Mental Status: He is alert and oriented to person, place, and time.   Psychiatric:         Behavior: Behavior normal. Behavior is cooperative.         Results Review:       I reviewed the patient's new clinical results.  Results from last 7 days   Lab Units 21  0642 21  0531 21  1406 21  1059   WBC 10*3/mm3 7.54 10.55  --   --    HEMOGLOBIN g/dL 9.9* 11.3*  --   --    HEMOGLOBIN, POC g/dL  --   --  10.2* 10.2*   PLATELETS 10*3/mm3 222 266  --   --      Results from last 7 days   Lab Units 21  0608 21  0642 21  0531   SODIUM mmol/L 133* 135* 133*   POTASSIUM mmol/L 3.5 3.6  3.9   CHLORIDE mmol/L 99 101 98   CO2 mmol/L 23.2 25.9 22.4   BUN mg/dL 17 15 16   CREATININE mg/dL 1.72* 1.68* 1.89*   GLUCOSE mg/dL 152* 202* 243*   Estimated Creatinine Clearance: 51.9 mL/min (A) (by C-G formula based on SCr of 1.72 mg/dL (H)).    Results from last 7 days   Lab Units 02/14/21  0608 02/13/21  0642 02/12/21  0531   CALCIUM mg/dL 9.0 8.4* 8.6       Glucose   Date/Time Value Ref Range Status   02/14/2021 0719 162 (H) 70 - 130 mg/dL Final   02/13/2021 2122 191 (H) 70 - 130 mg/dL Final   02/13/2021 1720 198 (H) 70 - 130 mg/dL Final   02/13/2021 1142 173 (H) 70 - 130 mg/dL Final   02/13/2021 0750 210 (H) 70 - 130 mg/dL Final   02/12/2021 2150 259 (H) 70 - 130 mg/dL Final   02/12/2021 1750 234 (H) 70 - 130 mg/dL Final       allopurinol, 300 mg, Oral, Daily  amLODIPine, 5 mg, Oral, Daily  atorvastatin, 20 mg, Oral, Q PM  carvedilol, 12.5 mg, Oral, BID With Meals  castor oil-balsam peru, , Topical, Q12H  docusate sodium, 100 mg, Oral, BID  imipramine, 100 mg, Oral, Nightly  imipramine, 50 mg, Oral, QAM  insulin glargine, 10 Units, Subcutaneous, Nightly  insulin lispro, 0-9 Units, Subcutaneous, TID AC  insulin lispro, 5 Units, Subcutaneous, TID With Meals  isosorbide mononitrate, 60 mg, Oral, Daily  levothyroxine, 112 mcg, Oral, Q AM  losartan, 100 mg, Oral, Daily  pantoprazole, 40 mg, Oral, Q AM  senna-docusate sodium, 1 tablet, Oral, BID  sodium chloride, 3 mL, Intravenous, Q12H  tamsulosin, 0.4 mg, Oral, Q24H       Diet Regular; Consistent Carbohydrate, GI Soft       Assessment/Plan     Active Hospital Problems    Diagnosis  POA   • **History of fusion of lumbar spine [Z98.1]  Not Applicable   • Stage 3b chronic kidney disease (CMS/HCC) [N18.32]  Yes   • Sleep apnea [G47.30]  Yes   • Type 2 diabetes mellitus with hyperglycemia, without long-term current use of insulin (CMS/HCC) [E11.65]  Yes   • Paroxysmal atrial fibrillation (CMS/HCC) [I48.0]  Yes   • Hypertension [I10]  Yes   • Obesity (BMI 30-39.9)  [E66.9]  Yes      Resolved Hospital Problems   No resolved problems to display.       Mr. Staley is a 70 y.o. male who is s/p EXPLORATION OF FUSION T-12 S-1 REMOVAL OF HARDWARE REVISION OF FUSION AND INSTRUMENTATION T12-S1 WITH APPLICATION OF BONE MORPHOGENIC PROTEIN.    · /81 this am.    · Okay to resume antihypertensive regimen at discharge.  · Lantus started on admit.  Received 6 units CF insulin since starting AC Humalog.  Will add this to regimen.   · Lantus 10 -> 13 units HS  · Humalong 5 -> 6 units AC  · Can resume preadmit oral diabetic regimen at discharge.   · SCr near baseline.  He is followed by a nephrologist as outpatient.    Okay with me to discharge.  Continue LWC per wound nurse.      Luis Diaz MD  [facility]  02/14/21  08:45 EST

## 2021-02-14 NOTE — PLAN OF CARE
Goal Outcome Evaluation:  Plan of Care Reviewed With: patient  Progress: improving  Outcome Summary: Patient participated well with skilled PT. Increased ambulation distance this visit; had increased lumbar pain after the ambulation. Will continue to progress patient towards goals. PT wore gloves, mask, and face shield.

## 2021-02-14 NOTE — CONSULTS
Ortho Spine Note  POD 3 s/p T12-S1 revision fusion      Pain controlled.  Walked significant distance with PT. Voiding on own an passing gas.  Would like to be discharged home    Neuro intact both lower extremities  Dresssing dry.  Abdomen soft/non distended.    A/P  Mobilize  PT/OT  SCDs for DVT ppx  May restart plavix tomorrow  Remove hemovac drain  Appreciate wound care recs for facial swelling  Dispo - home d/c follow up Dr. Blankenship in 2 weeks

## 2021-02-14 NOTE — PLAN OF CARE
VSS, 3L NC, NSR ON MONITOR, VOIDING INCREASING, ADLs INCREASING, FACIAL SWELLING DECREASING. AMB TO CHAIR AND BED. NO OTHER ISSUES NOTED WILL CONTINUE TO MONITOR    Problem: Adult Inpatient Plan of Care  Goal: Plan of Care Review  Outcome: Ongoing, Progressing  Flowsheets  Taken 2/13/2021 1802 by Stormy Marshall, RN  Progress: improving  Plan of Care Reviewed With:   patient   spouse  Taken 2/13/2021 1211 by Keyona Foster, OT  Outcome Summary: Pt is 70 y.o. male presenting to OT evaluation POD 2 from T12-S1 revision. Pt reports living with spouse and indep with ADLs PTA. Pt able to perform bed mobility with min A utilizing log rolling techniquie to adhere to spinal precautions. Pt CGA with RWx for functional transfers. Pt CGA for completion of toileting routine in supported stand. Pt with TLSO at home, per chart pt okay to engage in therapy without. Pt overall demonstrated decreased functional activity tolerance, generalized weakness, impaired balance, and decreased overall safety and indep with ADLs. Pt would benefit from skilled OT services while admitted acutely. Pt with support at home, anticipating d/c to home with assist when medically appropriate.     Problem: Fall Injury Risk  Goal: Absence of Fall and Fall-Related Injury  Outcome: Ongoing, Progressing  Intervention: Identify and Manage Contributors to Fall Injury Risk  Recent Flowsheet Documentation  Taken 2/13/2021 2005 by Donny Delgadillo RN  Medication Review/Management: medications reviewed  Self-Care Promotion: independence encouraged  Intervention: Promote Injury-Free Environment  Recent Flowsheet Documentation  Taken 2/14/2021 0402 by Donny Delgadillo, RN  Safety Promotion/Fall Prevention:   activity supervised   assistive device/personal items within reach   clutter free environment maintained   fall prevention program maintained   safety round/check completed  Taken 2/14/2021 0152 by Donny Delgadillo, RN  Safety Promotion/Fall Prevention:    activity supervised   assistive device/personal items within reach   clutter free environment maintained   fall prevention program maintained   safety round/check completed  Taken 2/14/2021 0022 by Donny Delgadillo RN  Safety Promotion/Fall Prevention:   activity supervised   assistive device/personal items within reach   clutter free environment maintained   fall prevention program maintained   safety round/check completed  Taken 2/13/2021 2243 by Donny Delgadillo RN  Safety Promotion/Fall Prevention:   activity supervised   assistive device/personal items within reach   clutter free environment maintained   fall prevention program maintained   safety round/check completed  Taken 2/13/2021 2005 by Donny Delgadillo RN  Safety Promotion/Fall Prevention:   activity supervised   assistive device/personal items within reach   clutter free environment maintained   fall prevention program maintained   safety round/check completed     Problem: Skin Injury Risk Increased  Goal: Skin Health and Integrity  Outcome: Ongoing, Progressing  Intervention: Optimize Skin Protection  Recent Flowsheet Documentation  Taken 2/14/2021 0402 by Donny Delgadillo RN  Head of Bed (HOB): HOB at 20-30 degrees  Taken 2/14/2021 0152 by Donny Delgadillo RN  Head of Bed (HOB): HOB at 20-30 degrees  Taken 2/14/2021 0022 by Donny Delgadillo RN  Head of Bed (HOB): HOB at 20-30 degrees  Taken 2/13/2021 2243 by Donny Delgadillo RN  Head of Bed (HOB): HOB elevated  Taken 2/13/2021 2005 by Donny Delgadillo RN  Pressure Reduction Techniques:   weight shift assistance provided   frequent weight shift encouraged  Head of Bed (HOB): HOB at 20-30 degrees  Pressure Reduction Devices:   pressure-redistributing mattress utilized   positioning supports utilized   alternating pressure pump (ADD)  Skin Protection:   adhesive use limited   incontinence pads utilized   tubing/devices free from skin contact   Goal Outcome Evaluation:

## 2021-02-14 NOTE — THERAPY TREATMENT NOTE
Patient Name: Kyler Staley Jr.  : 1950    MRN: [id number]                              Today's Date: 2021       Admit Date: 2021    Visit Dx: No diagnosis found.  Patient Active Problem List   Diagnosis   • Primary osteoarthritis of right shoulder   • Acute pain of left shoulder   • Complete tear of left rotator cuff   • Status post complete repair of rotator cuff   • Obesity (BMI 30-39.9)   • Pain of right hip joint   • Greater trochanteric bursitis of right hip   • It band syndrome, right   • Greater trochanteric bursitis of left hip   • Left hip pain   • Abnormal radiologic findings on diagnostic imaging of other urinary organs   • Gluteal tendinitis of left buttock   • History of fusion of lumbar spine   • Sleep apnea   • Type 2 diabetes mellitus with hyperglycemia, without long-term current use of insulin (CMS/HCC)   • Paroxysmal atrial fibrillation (CMS/HCC)   • Hypertension   • Stage 3b chronic kidney disease (CMS/HCC)     Past Medical History:   Diagnosis Date   • Anxiety    • Atrial fibrillation (CMS/HCC)    • Back pain    • Cardiac disease    • Depression    • Diabetes (CMS/HCC)    • Fatigue    • GERD (gastroesophageal reflux disease)    • Hearing loss    • History of gastritis    • [place] (hard of hearing)    • Hyperlipidemia    • Hypertension    • Joint pain    • Kidney disease    • Knee swelling    • Lumbosacral disc disease    • Narrowing of airway 2012    PER ENT   • Neuropathy    • PONV (postoperative nausea and vomiting)    • Rotator cuff syndrome     REPAIRED BILATERAL   • Sleep apnea     BIPAP   • Swallowing difficulty    • Thin skin    • Thyroid condition    • Vocal cord dysfunction      HISTORY OF DISORDER ON ONE SIDE AFTER TRACHEA     Past Surgical History:   Procedure Laterality Date   • BACK SURGERY  2018    X2   • BACK SURGERY     • CARDIAC SURGERY     • CHOLECYSTECTOMY     • CORONARY ARTERY BYPASS GRAFT     • HERNIA REPAIR     • JOINT REPLACEMENT     • KNEE SURGERY       right and left   • LUMBAR DISCECTOMY FUSION INSTRUMENTATION N/A 2/11/2021    Procedure: EXPLORATION OF FUSION T-12 S-1 REMOVAL OF HARDWARE REVISION OF FUSION AND INSTRUMENTATION T12-S1 WITH APPLICATION OF BONE MORPHOGENIC PROTEIN;  Surgeon: Baltazar Blankenship MD;  Location: [facility] OR;  Service: Orthopedic Spine;  Laterality: N/A;   • NOSE SURGERY     • SHOULDER SURGERY Bilateral     rotator cuff repair     General Information     Row Name 02/14/21 1040          Physical Therapy Time and Intention    Document Type  therapy note (daily note)  -AL     Mode of Treatment  physical therapy  -AL       User Key  (r) = Recorded By, (t) = Taken By, (c) = Cosigned By    Initials Name Provider Type    AL Marilyn Hoffmann, PT Physical Therapist        Mobility     Row Name 02/14/21 1041          Bed Mobility    Supine-Sit [place] (Bed Mobility)  not tested Up in chair.  -AL     Sit-Supine [place] (Bed Mobility)  not tested Up in chair.  -AL     Row Name 02/14/21 1041          Sit-Stand Transfer    Sit-Stand [place] (Transfers)  contact guard;verbal cues  -AL     Assistive Device (Sit-Stand Transfers)  walker, front-wheeled  -AL     Row Name 02/14/21 1041          Gait/Stairs (Locomotion)    [place] Level (Gait)  contact guard;verbal cues  -AL     Assistive Device (Gait)  walker, front-wheeled  -AL     Distance in Feet (Gait)  80  -AL     Comment (Gait/Stairs)  Increased ambulation distance. One standing rest; about 15 seconds. Increased fatigue.  -AL       User Key  (r) = Recorded By, (t) = Taken By, (c) = Cosigned By    Initials Name Provider Type    AL Marilyn Hoffmann, PT Physical Therapist        Obj/Interventions    No documentation.       Goals/Plan    No documentation.       Clinical Impression     Row Name 02/14/21 1041          Pain    Additional Documentation  Pain Scale: Numbers Pre/Post-Treatment (Group)  -AL     Row Name 02/14/21 1041          Pain Scale: Numbers Pre/Post-Treatment    Pretreatment Pain  Rating  4/10  -AL     Posttreatment Pain Rating  7/10  -AL     Pain Location - Side  Bilateral  -AL     Pain Location - Orientation  lower  -AL     Pain Location  back  -AL     Pain Intervention(s)  Ambulation/increased activity;Repositioned  -AL     Row Name 02/14/21 1041          Plan of Care Review    Plan of Care Reviewed With  patient  -AL     Progress  improving  -AL     [facility] Name 02/14/21 1041          Therapy Assessment/Plan (PT)    Rehab Potential (PT)  good, to achieve stated therapy goals  -AL     Criteria for Skilled Interventions Met (PT)  skilled treatment is necessary  -AL     [facility] Name 02/14/21 1041          Positioning and Restraints    Pre-Treatment Position  sitting in chair/recliner  -AL     Post Treatment Position  chair  -AL     In Chair  notified nsg;sitting;call light within reach;encouraged to call for assist;exit alarm on  -AL       User Key  (r) = Recorded By, (t) = Taken By, (c) = Cosigned By    Initials Name Provider Type    Marilyn Yusuf, PT Physical Therapist        Outcome Measures     Row Name 02/14/21 1042          How much help from another person do you currently need...    Turning from your back to your side while in flat bed without using bedrails?  3  -AL     Moving from lying on back to sitting on the side of a flat bed without bedrails?  3  -AL     Moving to and from a bed to a chair (including a wheelchair)?  3  -AL     Standing up from a chair using your arms (e.g., wheelchair, bedside chair)?  3  -AL     Climbing 3-5 steps with a railing?  2  -AL     To walk in hospital room?  3  -AL     AM-PAC 6 Clicks Score (PT)  17  -AL     [facility] Name 02/14/21 1042          Functional Assessment    Outcome Measure Options  AM-PAC 6 Clicks Basic Mobility (PT)  -AL       User Key  (r) = Recorded By, (t) = Taken By, (c) = Cosigned By    Initials Name Provider Type    Marilyn Yusuf, PT Physical Therapist        Physical Therapy Education                 Title: PT OT SLP Therapies (In  Progress)     Topic: Physical Therapy (Done)     Point: Mobility training (Done)     Learning Progress Summary           Patient Acceptance, E, VU by AL at 2/14/2021 1043    Acceptance, E, NR by AR at 2/12/2021 1225                   Point: Home exercise program (Done)     Learning Progress Summary           Patient Acceptance, E, VU by AL at 2/14/2021 1043    Acceptance, E, NR by AR at 2/12/2021 1225                   Point: Body mechanics (Done)     Learning Progress Summary           Patient Acceptance, E, VU by AL at 2/14/2021 1043    Acceptance, E, NR by AR at 2/12/2021 1225                   Point: Precautions (Done)     Learning Progress Summary           Patient Acceptance, E, VU by AL at 2/14/2021 1043    Acceptance, E, NR by AR at 2/12/2021 1225                               User Key     Initials Effective Dates Name Provider Type Discipline    AL 04/03/18 -  Marilyn Hoffmann, PT Physical Therapist PT    AR 04/03/18 -  Amber Laird PT Physical Therapist PT              PT Recommendation and Plan     Plan of Care Reviewed With: patient  Progress: improving  Outcome Summary: Patient participated well with skilled PT. Increased ambulation distance this visit; had increased lumbar pain after the ambulation. Will continue to progress patient towards goals. PT wore gloves, mask, and face shield.     Time Calculation:   PT Charges     Row Name 02/14/21 1042             Time Calculation    Start Time  1013  -AL      Stop Time  1023  -AL      Time Calculation (min)  10 min  -AL      PT Received On  02/14/21  -AL      PT - Next Appointment  02/15/21  -AL        User Key  (r) = Recorded By, (t) = Taken By, (c) = Cosigned By    Initials Name Provider Type    AL Marilyn Hoffmann, PT Physical Therapist        Therapy Charges for Today     Code Description Service Date Service Provider Modifiers Qty    49948570183 HC PT THER PROC EA 15 MIN 2/14/2021 Marilyn Hoffmann, PT GP 1          PT G-Codes  Outcome Measure Options: AM-PAC  6 Clicks Basic Mobility (PT)  AM-PAC 6 Clicks Score (PT): 17  AM-PAC 6 Clicks Score (OT): 14    Marilyn Hoffmann, PT  2/14/2021

## 2021-02-15 ENCOUNTER — READMISSION MANAGEMENT (OUTPATIENT)
Dept: CALL CENTER | Facility: HOSPITAL | Age: 71
End: 2021-02-15

## 2021-02-15 NOTE — OUTREACH NOTE
Prep Survey      Responses   [facility] facility patient discharged from?  [place]   Is LACE score < 7 ?  No   Emergency Room discharge w/ pulse ox?  No   Eligibility  Readm Mgmt   Discharge diagnosis  EXPLORATION OF FUSION T-12 S-1 REMOVAL OF HARDWARE REVISION OF FUSION AND INSTRUMENTATION T12-S1 WITH APPLICATION OF BONE MORPHOGENIC PROTEIN   Does the patient have one of the following disease processes/diagnoses(primary or secondary)?  General Surgery   Does the patient have Home health ordered?  No   Is there a DME ordered?  No   Prep survey completed?  Yes          Stormy Bacon RN

## 2021-02-15 NOTE — PROGRESS NOTES
Case Management Discharge Note                Selected Continued Care - Discharged on 2/14/2021 Admission date: 2/11/2021 - Discharge disposition: Home or Self Care    Destination    No services have been selected for the patient.              Durable Medical Equipment    No services have been selected for the patient.              Dialysis/Infusion    No services have been selected for the patient.              Home Medical Care    No services have been selected for the patient.              Therapy    No services have been selected for the patient.              Community Resources    No services have been selected for the patient.                  Transportation Services  Private: Car    Final Discharge Disposition Code: 01 - home or self-care

## 2021-02-16 NOTE — DISCHARGE SUMMARY
Date of Discharge:  2/14/2021    Discharge Diagnosis: s/p revision lumbar fusion    Presenting Problem/History of Present Illness  Active Hospital Problems    Diagnosis  POA   • **History of fusion of lumbar spine [Z98.1]  Not Applicable   • Stage 3b chronic kidney disease (CMS/[facility]) [N18.32]  Yes   • Sleep apnea [G47.30]  Yes   • Type 2 diabetes mellitus with hyperglycemia, without long-term current use of insulin (CMS/[facility]) [E11.65]  Yes   • Paroxysmal atrial fibrillation (CMS/[facility]) [I48.0]  Yes   • Hypertension [I10]  Yes   • Obesity (BMI 30-39.9) [E66.9]  Yes            Hospital Course  Patient is a 70 y.o. male presented with low back pain and bilateral leg pain.  He underwent revision thoracolumbar fusion from T12 to sacrum.  He tolerated the procedure well.  He was admitted to the floor post operatively.  His hospital course was complicated by left sided facial blistering.  Wound care was consulted with improvement in symptoms during his hospital course.  On day of discharge he was ambulatory, and voiding on own.  Pain was controlled.  Pain medication was called into patient pharmacy.  Hemovac drain was removed.    Procedures Performed    Procedure(s):  EXPLORATION OF FUSION T-12 S-1 REMOVAL OF HARDWARE REVISION OF FUSION AND INSTRUMENTATION T12-S1 WITH APPLICATION OF BONE MORPHOGENIC PROTEIN  -------------------       Consults:   Consults     Date and Time Order Name Status Description    2/11/2021 2228 Inpatient Hospitalist Consult Completed             Condition on Discharge:  stable    Vital Signs       Physical Exam:   Mild erythema to left side of chin and blistering left cheek without ulceration. Dressing dry. Neuro intact both lower extremities    Discharge Disposition  Home or Self Care    Discharge Medications     Discharge Medications      New Medications      Instructions Start Date   ondansetron 4 MG tablet  Commonly known as: Zofran   4 mg, Oral, Every 8 Hours PRN      sennosides-docusate 8.6-50  MG per tablet  Commonly known as: PERICOLACE   1 tablet, Oral, 2 times daily      tiZANidine 4 MG tablet  Commonly known as: Zanaflex   4 mg, Oral, Every 8 Hours PRN         Changes to Medications      Instructions Start Date   clopidogrel 75 MG tablet  Commonly known as: PLAVIX  What changed:   · how much to take  · how to take this  · when to take this  · additional instructions   Restart home dose of plavix tomorrow 2/15         Continue These Medications      Instructions Start Date   Accu-Chek Divina Plus test strip  Generic drug: glucose blood   No dose, route, or frequency recorded.      allopurinol 300 MG tablet  Commonly known as: ZYLOPRIM   300 mg, Oral, Daily      ALPRAZolam 1 MG tablet  Commonly known as: XANAX   1 mg, Oral, 3 Times Daily PRN      amLODIPine 5 MG tablet  Commonly known as: NORVASC   5 mg, Oral, Daily      aspirin 325 MG tablet   325 mg, Oral, Daily, TO FOLLOW MD ORDERS WHEN TO STOP       atorvastatin 20 MG tablet  Commonly known as: LIPITOR   20 mg, Oral, Every Evening      carvedilol 12.5 MG tablet  Commonly known as: COREG   12.5 mg, Oral, 2 times daily      cholecalciferol 250 MCG (21089 UT) capsule  Commonly known as: VITAMIN D3   10,000 Units, Oral, Daily      fenofibrate 145 MG tablet  Commonly known as: TRICOR   145 mg, Oral, Daily      Flaxseed (Linseed) 1000 MG capsule   1,200 mg, Oral, Daily, HOLD FOR SURGERY      furosemide 40 MG tablet  Commonly known as: LASIX   40 mg, Oral, Every 24 Hours      glipizide 10 MG tablet  Commonly known as: GLUCOTROL   10 mg, Oral, 2 times daily      HYDROcodone-acetaminophen 5-325 MG per tablet  Commonly known as: NORCO   1 tablet, Oral, Every 4 Hours PRN      imipramine 50 MG tablet  Commonly known as: TOFRANIL   100 mg, Oral, Nightly      imipramine 50 MG tablet  Commonly known as: TOFRANIL   50 mg, Oral, Every Morning      isosorbide mononitrate 60 MG 24 hr tablet  Commonly known as: IMDUR   60 mg, Oral, Daily      levothyroxine 112 MCG  tablet  Commonly known as: SYNTHROID, LEVOTHROID   112 mcg, Oral, Daily      losartan 100 MG tablet  Commonly known as: COZAAR   100 mg, Oral, Daily      Melatonin 2.5 MG capsule   2.5 mg, Oral, Nightly      metFORMIN  MG 24 hr tablet  Commonly known as: GLUCOPHAGE-XR   500 mg, Oral, Daily Before Supper      metFORMIN 1000 MG tablet  Commonly known as: GLUCOPHAGE   1,000 mg, Oral, Daily With Breakfast      pantoprazole 40 MG EC tablet  Commonly known as: PROTONIX   40 mg, Oral, Daily      spironolactone 25 MG tablet  Commonly known as: ALDACTONE   25 mg, Oral, Every 24 Hours      tamsulosin 0.4 MG capsule 24 hr capsule  Commonly known as: FLOMAX   0.4 mg, Oral, Every 24 Hours      Vitamin C 500 MG capsule   500 mg, Oral, Daily      Zinc 50 MG capsule   50 mg, Oral, Daily         Stop These Medications    cyclobenzaprine 10 MG tablet  Commonly known as: FLEXERIL            Discharge Diet:     Activity at Discharge:     Follow-up Appointments  No future appointments.      Test Results Pending at Discharge       Baltazar Blankenship MD  02/16/21  09:51 EST    Time: Discharge 25 min

## 2021-02-17 ENCOUNTER — READMISSION MANAGEMENT (OUTPATIENT)
Dept: CALL CENTER | Facility: HOSPITAL | Age: 71
End: 2021-02-17

## 2021-02-17 NOTE — OUTREACH NOTE
General Surgery Week 1 Survey      Responses   [facility] patient discharged from?  [place]   Does the patient have one of the following disease processes/diagnoses(primary or secondary)?  General Surgery   Week 1 attempt successful?  Yes   Call start time  1724   Call end time  1732   Discharge diagnosis  EXPLORATION OF FUSION T-12 S-1 REMOVAL OF HARDWARE REVISION OF FUSION AND INSTRUMENTATION T12-S1 WITH APPLICATION OF BONE MORPHOGENIC PROTEIN   List who call center can speak with  wife-Citlaly    Person spoke with today (if not patient) and relationship  citlaly   Meds reviewed with patient/caregiver?  Yes   Is the patient having any side effects they believe may be caused by any medication additions or changes?  No   Does the patient have all medications related to this admission filled (includes all antibiotics, pain medications, etc.)  Yes   Is the patient taking all medications as directed (includes completed medication regime)?  Yes   Does the patient have a follow up appointment scheduled with their surgeon?  Yes   Has the patient kept scheduled appointments due by today?  N/A   Comments  march 5 th   Psychosocial issues?  No   Did the patient receive a copy of their discharge instructions?  Yes   Nursing interventions  Reviewed instructions with patient   What is the patient's perception of their health status since discharge?  Improving   Nursing interventions  Nurse provided patient education   Is the patient /caregiver able to teach back basic post-op care?  Keep incision areas clean,dry and protected, Take showers only when approved by MD-sponge bathe until then   Is the patient/caregiver able to teach back signs and symptoms of incisional infection?  Increased redness, swelling or pain at the incisonal site, Increased drainage or bleeding, Incisional warmth, Pus or odor from incision, Fever   Is the patient/caregiver able to teach back steps to recovery at home?  Eat a well-balance diet   If  the patient is a current smoker, are they able to teach back resources for cessation?  Not a smoker   Is the patient/caregiver able to teach back the hierarchy of who to call/visit for symptoms/problems? PCP, Specialist, Home health nurse, Urgent Care, ED, 911  Yes   Week 1 call completed?  Yes   Wrap up additional comments  pressure sores from surgery Wife  is an RN [facility].          Belkis Lechuga, RN

## 2021-02-19 ENCOUNTER — HOSPITAL ENCOUNTER (OUTPATIENT)
Dept: VACCINE CLINIC | Facility: HOSPITAL | Age: 71
Discharge: HOME | End: 2021-02-19
Attending: INTERNAL MEDICINE

## 2021-02-25 ENCOUNTER — READMISSION MANAGEMENT (OUTPATIENT)
Dept: CALL CENTER | Facility: HOSPITAL | Age: 71
End: 2021-02-25

## 2021-02-25 NOTE — OUTREACH NOTE
General Surgery Week 2 Survey      Responses   [facility] patient discharged from?  [place]   Does the patient have one of the following disease processes/diagnoses(primary or secondary)?  General Surgery   Week 2 attempt successful?  Yes   Call start time  1051   Call end time  1052   Discharge diagnosis  EXPLORATION OF FUSION T-12 S-1 REMOVAL OF HARDWARE REVISION OF FUSION AND INSTRUMENTATION T12-S1 WITH APPLICATION OF BONE MORPHOGENIC PROTEIN   Is patient permission given to speak with other caregiver?  Yes   List who call center can speak with  wife-Citlaly    Person spoke with today (if not patient) and relationship  citlaly   Meds reviewed with patient/caregiver?  Yes   Is the patient having any side effects they believe may be caused by any medication additions or changes?  No   Does the patient have all medications related to this admission filled (includes all antibiotics, pain medications, etc.)  Yes   Is the patient taking all medications as directed (includes completed medication regime)?  Yes   Does the patient have a follow up appointment scheduled with their surgeon?  Yes   Has the patient kept scheduled appointments due by today?  N/A   Comments  Has followup on March 5th with surgeon and will have staples removed.    Has home health visited the patient within 72 hours of discharge?  N/A   Psychosocial issues?  No   Did the patient receive a copy of their discharge instructions?  Yes   Nursing interventions  Reviewed instructions with patient   What is the patient's perception of their health status since discharge?  Improving   Nursing interventions  Nurse provided patient education   Is the patient /caregiver able to teach back basic post-op care?  Keep incision areas clean,dry and protected, Take showers only when approved by MD-sponge bathe until then   Is the patient/caregiver able to teach back signs and symptoms of incisional infection?  Increased redness, swelling or pain at the  incisonal site, Increased drainage or bleeding, Incisional warmth, Pus or odor from incision, Fever   Is the patient/caregiver able to teach back steps to recovery at home?  Eat a well-balance diet   If the patient is a current smoker, are they able to teach back resources for cessation?  Not a smoker   Is the patient/caregiver able to teach back the hierarchy of who to call/visit for symptoms/problems? PCP, Specialist, Home health nurse, Urgent Care, ED, 911  Yes   Week 2 call completed?  Yes          Nikos Brower RN

## 2021-03-05 ENCOUNTER — READMISSION MANAGEMENT (OUTPATIENT)
Dept: CALL CENTER | Facility: HOSPITAL | Age: 71
End: 2021-03-05

## 2021-03-05 NOTE — OUTREACH NOTE
General Surgery Week 3 Survey      Responses   [facility] patient discharged from?  [place]   Does the patient have one of the following disease processes/diagnoses(primary or secondary)?  General Surgery   Week 3 attempt successful?  No   Unsuccessful attempts  Attempt 1          Hilary Alvarez RN

## 2021-03-09 ENCOUNTER — READMISSION MANAGEMENT (OUTPATIENT)
Dept: CALL CENTER | Facility: HOSPITAL | Age: 71
End: 2021-03-09

## 2021-03-09 ENCOUNTER — CONVERSION ENCOUNTER (OUTPATIENT)
Dept: PLASTIC SURGERY | Facility: CLINIC | Age: 71
End: 2021-03-09

## 2021-03-09 ENCOUNTER — OFFICE VISIT CONVERTED (OUTPATIENT)
Dept: PLASTIC SURGERY | Facility: CLINIC | Age: 71
End: 2021-03-09
Attending: NURSE PRACTITIONER

## 2021-03-09 NOTE — OUTREACH NOTE
General Surgery Week 3 Survey      Responses   [facility] patient discharged from?  [place]   Does the patient have one of the following disease processes/diagnoses(primary or secondary)?  General Surgery   Week 3 attempt successful?  No   Unsuccessful attempts  Attempt 2          Baltazar Lilly RN

## 2021-03-15 ENCOUNTER — CONVERSION ENCOUNTER (OUTPATIENT)
Dept: PLASTIC SURGERY | Facility: CLINIC | Age: 71
End: 2021-03-15

## 2021-03-15 ENCOUNTER — OFFICE VISIT CONVERTED (OUTPATIENT)
Dept: PLASTIC SURGERY | Facility: CLINIC | Age: 71
End: 2021-03-15
Attending: PLASTIC SURGERY

## 2021-03-22 ENCOUNTER — OFFICE VISIT CONVERTED (OUTPATIENT)
Dept: PLASTIC SURGERY | Facility: CLINIC | Age: 71
End: 2021-03-22
Attending: NURSE PRACTITIONER

## 2021-04-08 ENCOUNTER — OFFICE VISIT CONVERTED (OUTPATIENT)
Dept: PLASTIC SURGERY | Facility: CLINIC | Age: 71
End: 2021-04-08
Attending: NURSE PRACTITIONER

## 2021-05-10 ENCOUNTER — OFFICE VISIT CONVERTED (OUTPATIENT)
Dept: UROLOGY | Facility: CLINIC | Age: 71
End: 2021-05-10
Attending: NURSE PRACTITIONER

## 2021-05-10 LAB
BILIRUB UR QL STRIP: NEGATIVE
COLOR UR: YELLOW
CONV BACTERIA IN URINE MICRO: 0
CONV CALCIUM OXALATE CRYSTALS /HPF IN URINE SEDIMENT BY MICROSCOPY: 0
CONV CLARITY OF URINE: CLEAR
CONV PROTEIN IN URINE BY AUTOMATED TEST STRIP: NORMAL
CONV UROBILINOGEN IN URINE BY AUTOMATED TEST STRIP: NORMAL
GLUCOSE UR QL: NORMAL
HGB UR QL STRIP: NEGATIVE
KETONES UR QL STRIP: NEGATIVE
LEUKOCYTE ESTERASE UR QL STRIP: NEGATIVE
NITRITE UR QL STRIP: NEGATIVE
PH UR STRIP.AUTO: 6 [PH]
RBC #/AREA URNS HPF: 0 /[HPF]
RENAL EPI CELLS #/AREA URNS HPF: 0 /[HPF]
SP GR UR: 1.01
SQUAMOUS SPT QL MICRO: 0
WBC #/AREA URNS HPF: 0 /[HPF]

## 2021-05-10 NOTE — H&P
History and Physical      Patient Name: Kyler Staley Jr.   Patient ID: [id number]   Sex: Male   YOB: 1950    Primary Care Provider: Forrest Will MD   Referring Provider: Baltazar Blankenship    Visit Date: March 9, 2021    Provider: CASSIDY Guerra   Location: [facility]   Location Address: [address] [place] [address]  [id number]   Location Phone: [phone]          History Of Present Illness  Kyler Staley Jr. is a 70 year old /White male who presents to the office today as a consult from Baltazar Blankenship.      Here today to discuss pressure ulcer on chin and also scabbed areas above his eyebrows. Had back surgery on February 11th with Dr. Baltazar Blankenship. He woke up from surgery and areas started as a blisters. Surgery was 10 hours. Face was super swollen. Had a few open areas that was draining. Scabbed areas above eyebrows are still numb. Had 4 back surgeries now. Has degenerative disc disease. Open area pulls and is aggravating on chin. Puts Vaseline on the area at night. Was suppose to be set up to see wound care but no one ever got an appointment. On Plavix. Has lost 40lbs and hasn't had an appetite.              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); Thyroid Problems         Past Surgical History  Back surgery; CABG; cardiac stents; Gallbladder; Hernia Repair; Knee replacement, left; Knee replacement, right; nose; Rotator Cuff repair         Medication List  allopurinol oral; amlodipine besylate (bulk) 100 %  "miscellaneous powder; aspirin oral; atorvastatin 20 mg oral tablet; carvedilol oral; clopidogrel 75 mg oral tablet; fenofibrate 150 mg oral capsule; flaxseed oil 1,000 mg oral capsule; furosemide 40 mg oral tablet; glipizide oral; imipramine HCl oral; isosorbide (bulk) 98 % miscellaneous powder; Master 7-7-1.5 gram oral powder in packet; losartan oral; melatonin 3 mg oral capsule; metformin 1,000 mg oral tablet extended release 24hr; metformin 500 mg oral tablet extended release 24 hr; Protonix oral; Santyl 250 unit/gram topical ointment; spironolactone 25 mg oral tablet; Synthroid oral; tamsulosin 0.4 mg oral capsule; Tricor oral; Vitamin C 500 mg oral tablet; Vitamin D3 100 mcg (4,000 unit) oral capsule; Xanax oral; zinc 50 mg oral tablet         Allergy List  Ativan; Darvon; Wellbutrin SR         Family Medical History  *No Known Family History         Social History  Tobacco (Former)         Review of Systems  · Cardiovascular  o Denies  o : chest pain, lower extremity edema, Heart Attack, Abnormal EKG  · Respiratory  o Denies  o : shortness of breath, wheezing, cough  · Neurologic  o Denies  o : muscular weakness, incoordination, tingling or numbness, headaches  · Psychiatric  o Denies  o : anxiety, depression, difficulty sleeping      Vitals  Date Time BP Position Site L\R Cuff Size HR RR TEMP (F) WT  HT  BMI kg/m2 BSA m2 O2 Sat FR L/min FiO2 HC       03/09/2021 10:24 /72 Sitting    60 - R  98 236lbs 6oz 6'  1\" 31.19 2.35 99 %  21%          Physical Examination  · Head and Face  o Face  o :   § Inspection  § : resolving blisters over left cheek area with no erythema or pus; 3 x 2.5 cm pressure ulcer over chin with a thick dark eschar covering, 2 cm area of redness around ulceration, intact sensation  § Forehead  § : superior medial eyebrow ulcerations, 2 in x 0.5 cm running parallel to eye brows, epidermis peeling, decreased sensation of touch  o Visia  o :   § Spots  § : %  § Wrinkles  § : " %  § Texture  § : %  § Pores  § : %  § UV Spots  § : %  § Brown Spots  § : %  § Red Areas  § : %  § Porphyrins  § : %  · Respiratory  o Respiratory Effort  o : breathing unlabored   · Cardiovascular  o Heart  o : regular rate          Assessment  · Pressure ulcer       Pressure ulcer of unspecified site, unspecified stage     707.00/L89.90      Plan  · Orders  o Plastics reconstructive visit (PSREC) - - 03/10/2021  o Debridement (54923) - - 03/10/2021  · Medications  o Medications have been Reconciled  o Transition of Care or Provider Policy  · Instructions  o Cleaned with betadine then debrided small amount, cleaned with NS and applied bacitracin and santyl ointment. Will have patient continue Santyl until Monday and will try to debride necrotic tissue, may apply bacitracin to healing areas over eyebrows and midface, avoid any pressure to area, increase protein in diet, sent in Master, RTC Monday  o Electronically Identified Patient Education Materials Provided Electronically            Electronically Signed by: CASSIDY Guerra -Author on March 10, 2021 10:52:10 AM

## 2021-05-10 NOTE — H&P
History and Physical      Patient Name: Kyler Staley Jr.   Patient ID: [id number]   Sex: Male   YOB: 1950    Primary Care Provider: Forrest Will MD   Referring Provider: Patrick Dunn PA-C    Visit Date: December 28, 2020    Provider: CASSIDY Greer   Location: [facility] [place]   Location Address: [address] [place] [address]  [address] [place] [address]  [id number]   Location Phone: [phone]          Chief Complaint  · pt here for urological concerns      History Of Present Illness  The patient is a 70 year old /White male, who is sent by Patrick Dunn PA-C, for evaluation of Enlarged prostate found on MRI..   Symptoms  The patient's complaints are described as nocturia, hesitancy, and weak stream. He describes getting up 1-2 times during the night. The patient reports no additional symptoms. This patient denies gross hematuria, dysuria, and frequency.   He states that nothing has made symptoms better in the past, and nothing makes it worse. The patient's past medical history non-contributory.      Patient was recently found to have an enlarged prostate and distended bladder on MRI of his hip.    The patient reports that he does have some post void dribbling, hesitancy with urination, straining to urinate, nocturia x1-2.       Past Medical History  Arthritis; CHF (congestive heart failure); CKD (chronic kidney disease), stage 3 (moderate); Coronary artery disease; Depression; Diabetes; Hematospermia; High blood pressure; High cholesterol; Paralyzed vocal cords; Sleep apnea         Past Surgical History  Back surgery; CABG; cardiac stents; Gallbladder; Hernia Repair; Knee replacement, left; Knee replacement, right; nose; Rotator Cuff repair         Medication List  allopurinol oral; amlodipine besylate (bulk) 100 % miscellaneous powder; aspirin oral; atorvastatin 20 mg oral tablet; carvedilol oral; clopidogrel 75 mg oral tablet; fenofibrate 150 mg oral  "capsule; flaxseed oil 1,000 mg oral capsule; furosemide 40 mg oral tablet; glipizide oral; imipramine HCl oral; isosorbide (bulk) 98 % miscellaneous powder; losartan oral; melatonin 3 mg oral capsule; metformin 1,000 mg oral tablet extended release 24hr; metformin 500 mg oral tablet extended release 24 hr; Protonix oral; Synthroid oral; Tricor oral; Vitamin C 500 mg oral tablet; Vitamin D3 100 mcg (4,000 unit) oral capsule; Xanax oral; zinc 50 mg oral tablet         Allergy List  Ativan; Darvon; Wellbutrin SR         Family Medical History  *No Known Family History         Social History  Tobacco (Former)         Review of Systems  · Constitutional  o Denies  o : fever, chills  · Eyes  o Denies  o : double vision, cataracts  · HENT  o Denies  o : hearing loss, headaches  · Cardiovascular  o Denies  o : chest pain at rest, chest pain with exercise, irregular heart beats, palpitations, leg cramps with exercise  · Respiratory  o Denies  o : shortness of breath, wheezing, sleep apnea  · Gastrointestinal  o Denies  o : heartburn or indigestion, nausea or vomiting, change in abdominal girth, diarrhea, constipation, blood in stools  · Genitourinary  o Admits  o : additional symptoms as noted in HPI  · Integument  o Denies  o : rash, new skin lesions  · Neurologic  o Denies  o : memory difficulties, headache, mini-strokes, seizures  · Endocrine  o Denies  o : hot flashes, thyroid disorders  · Psychiatric  o Denies  o : depression, schizophrenia, bipolar disorder  · Heme-Lymph  o Denies  o : easy bleeding, easy bruising, sickle cell disease or trait, lymph node enlargement or tenderness  · Allergic-Immunologic  o Denies  o : immune deficiency, HIV, Hepatitis C      Vitals  Date Time BP Position Site L\R Cuff Size HR RR TEMP (F) WT  HT  BMI kg/m2 BSA m2 O2 Sat FR L/min FiO2 HC       12/28/2020 11:16 AM       16  253lbs 16oz 6'  1\" 33.51 2.44             Physical Examination  · Constitutional  o Appearance  o : Well nourished, " well developed patient in no acute distress. Ambulating without difficulty.  · Head and Face  o Head  o :   § Inspection  § : atraumatic, normocephalic  o Face  o :   § Inspection  § : no facial lesions  · Eyes  o Sclerae  o : sclerae white  · Ears, Nose, Mouth and Throat  o Ears  o :   § External Ears  § : appearance within normal limits, no lesions present  o Nose  o :   § External Nose  § : appearance normal  · Neck  o Inspection/Palpation  o : normal appearance, trachea midline  · Respiratory  o Respiratory Effort  o : breathing unlabored  o Inspection of Chest  o : normal appearance, no retractions  · Skin and Subcutaneous Tissue  o General Inspection  o : no rashes or lesions present, no lesions present, no areas of discoloration  · Neurologic  o Mental Status Examination  o :   § Orientation  § : grossly oriented to person, place and time  § Speech/Language  § : communication ability within normal limits  o Gait and Station  o : normal gait, able to stand without difficulty  · Psychiatric  o Judgement and Insight  o : judgment and insight intact, judgement for everyday activities and social situations within normal limits, insight intact  o Mood and Affect  o : mood normal, affect appropriate          Results  · In-Office Procedures  o Surgical procedure  § IOP - Bladder Scan/Residual Urine (05547)   § Specimen vol Ur: 0       Assessment  · BPH with Urinary Obstruction       Benign prostatic hyperplasia with lower urinary tract symptoms     600.01/N40.1  Other obstructive and reflux uropathy     600.01/N13.8      Plan  · Medications  o Medications have been Reconciled  o Transition of Care or Provider Policy  · Instructions  o DISCUSSION:  o Findings, possible etiologies and management options were discussed with patient in comprehensive detail. Discussed side effects of medication and for patient to call with any bothersome side effects and discontinue medication if it causes any additional  dizziness.  o PLAN: We will follow-up with patient in 6 weeks or sooner if needed.  o Rx. given for tamsulosin 0.4 mg daily  o Electronically Identified Patient Education Materials Provided Electronically            Electronically Signed by: CASSIDY Greer -Author on December 28, 2020 12:23:55 PM

## 2021-05-14 VITALS
OXYGEN SATURATION: 99 % | TEMPERATURE: 98 F | WEIGHT: 236.37 LBS | SYSTOLIC BLOOD PRESSURE: 139 MMHG | BODY MASS INDEX: 31.33 KG/M2 | DIASTOLIC BLOOD PRESSURE: 72 MMHG | HEART RATE: 60 BPM | HEIGHT: 73 IN

## 2021-05-14 VITALS — WEIGHT: 254 LBS | BODY MASS INDEX: 33.66 KG/M2 | RESPIRATION RATE: 16 BRPM | HEIGHT: 73 IN

## 2021-05-14 VITALS — BODY MASS INDEX: 32.49 KG/M2 | HEIGHT: 73 IN | WEIGHT: 245.12 LBS | RESPIRATION RATE: 16 BRPM

## 2021-05-14 VITALS
DIASTOLIC BLOOD PRESSURE: 70 MMHG | SYSTOLIC BLOOD PRESSURE: 129 MMHG | OXYGEN SATURATION: 100 % | HEART RATE: 58 BPM | TEMPERATURE: 97.7 F

## 2021-05-14 VITALS
TEMPERATURE: 97.7 F | OXYGEN SATURATION: 98 % | DIASTOLIC BLOOD PRESSURE: 80 MMHG | HEART RATE: 63 BPM | SYSTOLIC BLOOD PRESSURE: 194 MMHG

## 2021-05-14 VITALS
HEART RATE: 59 BPM | DIASTOLIC BLOOD PRESSURE: 97 MMHG | OXYGEN SATURATION: 100 % | TEMPERATURE: 98.5 F | SYSTOLIC BLOOD PRESSURE: 196 MMHG

## 2021-05-14 NOTE — PROGRESS NOTES
Progress Note      Patient Name: Kyler Staley Jr.   Patient ID: [id number]   Sex: Male   YOB: 1950    Primary Care Provider: Forrest Will MD   Referring Provider: Patrick Dunn PA-C    Visit Date: February 8, 2021    Provider: CASSIDY Greer   Location: [facility] [place]   Location Address: [address] [place] [address]  [address] [place] [address]  [id number]   Location Phone: [phone]          Chief Complaint  · pt here for urological concerns      History Of Present Illness  The patient is a 70 year old /White male , returns for follow-up on BPH with lower urinary tract symptoms.        Reports that he feels as if he does not see much of a difference in starting tamsulosin 0.4 mg.    He does state that he has nocturia 0-1 times a night.    PVR  0    The patient is having back surgery this week and is apprehensive to trial increasing medication.           Past Medical History  Arthritis; CHF (congestive heart failure); CKD (chronic kidney disease), stage 3 (moderate); Coronary artery disease; Depression; Diabetes; Hematospermia; High blood pressure; High cholesterol; Paralyzed vocal cords; Sleep apnea         Past Surgical History  Back surgery; CABG; cardiac stents; Gallbladder; Hernia Repair; Knee replacement, left; Knee replacement, right; nose; Rotator Cuff repair         Medication List  allopurinol oral; amlodipine besylate (bulk) 100 % miscellaneous powder; aspirin oral; atorvastatin 20 mg oral tablet; carvedilol oral; clopidogrel 75 mg oral tablet; fenofibrate 150 mg oral capsule; flaxseed oil 1,000 mg oral capsule; furosemide 40 mg oral tablet; glipizide oral; imipramine HCl oral; isosorbide (bulk) 98 % miscellaneous powder; losartan oral; melatonin 3 mg oral capsule; metformin 1,000 mg oral tablet extended release 24hr; metformin 500 mg oral tablet extended release 24 hr; Protonix oral; Synthroid oral; tamsulosin 0.4 mg oral capsule; Tricor oral;  "Vitamin C 500 mg oral tablet; Vitamin D3 100 mcg (4,000 unit) oral capsule; Xanax oral; zinc 50 mg oral tablet         Allergy List  Ativan; Darvon; Wellbutrin SR       Allergies Reconciled  Family Medical History  *No Known Family History         Social History  Tobacco (Former)         Review of Systems  · Constitutional  o Denies  o : fever, chills  · Eyes  o Denies  o : double vision, cataracts  · HENT  o Denies  o : hearing loss, headaches  · Cardiovascular  o Denies  o : chest pain at rest, chest pain with exercise, irregular heart beats, palpitations, leg cramps with exercise  · Respiratory  o Denies  o : shortness of breath, wheezing, sleep apnea  · Gastrointestinal  o Denies  o : heartburn or indigestion, nausea or vomiting, change in abdominal girth, diarrhea, constipation, blood in stools  · Genitourinary  o Admits  o : additional symptoms as noted in HPI  · Integument  o Denies  o : rash, new skin lesions  · Neurologic  o Denies  o : memory difficulties, headache, mini-strokes, seizures  · Endocrine  o Denies  o : hot flashes, thyroid disorders  · Psychiatric  o Denies  o : depression, schizophrenia, bipolar disorder  · Heme-Lymph  o Denies  o : easy bleeding, easy bruising, sickle cell disease or trait, lymph node enlargement or tenderness  · Allergic-Immunologic  o Denies  o : immune deficiency, HIV, Hepatitis C      Vitals  Date Time BP Position Site L\R Cuff Size HR RR TEMP (F) WT  HT  BMI kg/m2 BSA m2 O2 Sat FR L/min FiO2 HC       02/08/2021 01:09 PM       16  245lbs 2oz 6'  1\" 32.34 2.39             Physical Examination  · Constitutional  o Appearance  o : Well nourished, well developed patient in no acute distress. Ambulating without difficulty.  · Head and Face  o Head  o :   § Inspection  § : atraumatic, normocephalic  o Face  o :   § Inspection  § : no facial lesions  · Eyes  o Sclerae  o : sclerae white  · Ears, Nose, Mouth and Throat  o Ears  o :   § External Ears  § : appearance within " normal limits, no lesions present  o Nose  o :   § External Nose  § : appearance normal  · Neck  o Inspection/Palpation  o : normal appearance, trachea midline  · Respiratory  o Respiratory Effort  o : breathing unlabored  o Inspection of Chest  o : normal appearance, no retractions  · Skin and Subcutaneous Tissue  o General Inspection  o : no rashes or lesions present, no lesions present, no areas of discoloration  · Neurologic  o Mental Status Examination  o :   § Orientation  § : grossly oriented to person, place and time  § Speech/Language  § : communication ability within normal limits  o Gait and Station  o : normal gait, able to stand without difficulty  · Psychiatric  o Judgement and Insight  o : judgment and insight intact, judgement for everyday activities and social situations within normal limits, insight intact  o Mood and Affect  o : mood normal, affect appropriate          Results  · In-Office Procedures  o Lab procedure  § Automated dipstick urinalysis with microscopy (54533)   § Color Ur: Yellow   § Clarity Ur: Clear   § Glucose Ur Ql Strip: Negative   § Bilirub Ur Ql Strip: Negative   § Ketones Ur Ql Strip: Negative   § Sp Gr Ur Qn: 1.025   § Hgb Ur Ql Strip: Trace-Intact   § pH Ur-LsCnc: 7.0   § Prot Ur Ql Strip: 300 mg/dL   § Urobilinogen Ur Strip-mCnc: 0.2 E.U./dL   § Nitrite Ur Ql Strip: Negative   § WBC Est Ur Ql Strip: Negative   § RBC UrnS Qn HPF: 0   § WBC UrnS Qn HPF: 0   § Bacteria UrnS Qn HPF: 0   § Crystals UrnS Qn HPF: 0   § Epithelial Cells (non renal): 0 /HPF  § Epithelial Cells (renal): 0   o Surgical procedure  § IOP - Bladder Scan/Residual Urine (69208)   § Specimen vol Ur: 0       Assessment  · BPH with Urinary Obstruction       Benign prostatic hyperplasia with lower urinary tract symptoms     600.01/N40.1  Other obstructive and reflux uropathy     600.01/N13.8      Plan  · Medications  o tamsulosin 0.4 mg oral capsule   SIG: take 2 capsules (0.8 mg) by oral route once daily 1/2  hour following the same meal each day for 90 days   DISP: (180) Capsule with 4 refills  Adjusted on 02/08/2021     o Medications have been Reconciled  o Transition of Care or Provider Policy  · Instructions  o DISCUSSION:  o Lakhwinder with the patient that as anesthesia often causes urinary retention that it may benefit him further to increase his tamsulosin dosage to see if he can gain better control of his symptoms.  o PLAN: We will follow-up with patient in 3 months or sooner if needed. Will increase tamsulosin dosage to 0.8 mg daily.  o Rx. given for tamsulosin 0.4 mg daily  o Electronically Identified Patient Education Materials Provided Electronically            Electronically Signed by: CASSIDY Greer -Author on February 8, 2021 08:35:15 PM

## 2021-05-14 NOTE — PROGRESS NOTES
Progress Note      Patient Name: Kyler Staley Jr.   Patient ID: [id number]   Sex: Male   YOB: 1950    Primary Care Provider: Forrest Will MD   Referring Provider: Baltazar Blankenship    Visit Date: April 8, 2021    Provider: CASSIDY Guerra   Location: [facility]   Location Address: [address] [place] [address]  [id number]   Location Phone: [phone]          History Of Present Illness  Kyler Staley Jr. is a 70 year old /White male who presents to the office today as a consult from Baltazar Blankenship.      Here today to discuss pressure ulcer on chin and also scabbed areas above his eyebrows. Had back surgery on February 11th with Dr. Baltazar Blankenship. He woke up from surgery and areas started as a blisters. Surgery was 10 hours. Face was super swollen. Had a few open areas that was draining. Scabbed areas above eyebrows are still numb. Had 4 back surgeries now. Has degenerative disc disease. Open area pulls and is aggravating on chin. Puts Vaseline on the area at night. Was suppose to be set up to see wound care but no one ever got an appointment. On Plavix. Has lost 40lbs and hasn't had an appetite.      Chin ulcer has decreased in size ( 1 inch x 0.25 in) no necrotic or fibrous tissue. Ulcers above brows resolving. Wounds continuing to improve. Still has numbness over top of brows and gets nerve pain in ear when chin ulcer is touched. Left cheek mild scaring.              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); Thyroid  Problems         Past Surgical History  Back surgery; CABG; cardiac stents; Gallbladder; Hernia Repair; Knee replacement, left; Knee replacement, right; nose; Rotator Cuff repair         Medication List  allopurinol oral; amlodipine besylate (bulk) 100 % miscellaneous powder; aspirin oral; atorvastatin 20 mg oral tablet; carvedilol oral; clopidogrel 75 mg oral tablet; fenofibrate 150 mg oral capsule; flaxseed oil 1,000 mg oral capsule; furosemide 40 mg oral tablet; glipizide oral; imipramine HCl oral; isosorbide (bulk) 98 % miscellaneous powder; Master 7-7-1.5 gram oral powder in packet; losartan oral; melatonin 3 mg oral capsule; metformin 1,000 mg oral tablet extended release 24hr; metformin 500 mg oral tablet extended release 24 hr; Protonix oral; Santyl 250 unit/gram topical ointment; spironolactone 25 mg oral tablet; Synthroid oral; tamsulosin 0.4 mg oral capsule; Tricor oral; Vitamin C 500 mg oral tablet; Vitamin D3 100 mcg (4,000 unit) oral capsule; Xanax oral; zinc 50 mg oral tablet         Allergy List  Ativan; Darvon; Wellbutrin SR       Allergies Reconciled  Family Medical History  *No Known Family History         Social History  Tobacco (Former)         Vitals  Date Time BP Position Site L\R Cuff Size HR RR TEMP (F) WT  HT  BMI kg/m2 BSA m2 O2 Sat FR L/min FiO2 HC       04/08/2021 01:06 /80 Sitting    63 - R  97.7     98 %  21%          Physical Examination  · Head and Face  o Face  o :   § Inspection  § : healing pressure ulcer over chin with a two dark areas of eschar remaining, 2 cm area of redness around ulceration, intact sensation  § Forehead  § : superior medial eyebrow ulcerations, 2 in x 0.5 cm running parallel to eye brows, epidermis peeling, decreased sensation of touch  o Visia  o :   § Spots  § : %  § Wrinkles  § : %  § Texture  § : %  § Pores  § : %  § UV Spots  § : %  § Brown Spots  § : %  § Red Areas  § : %  § Porphyrins  § : %  · Respiratory  o Respiratory Effort  o : breathing  unlabored   · Cardiovascular  o Heart  o : regular rate          Assessment  · Pressure ulcer       Pressure ulcer of unspecified site, unspecified stage     707.00/L89.90      Plan  · Orders  o Plastics reconstructive visit (PSREC) - - 04/08/2021  · Medications  o Medications have been Reconciled  o Transition of Care or Provider Policy  · Instructions  o Chin ulcer has improved. Aquaphor to healing areas over eyebrows and midface, avoid any pressure to area, increase protein, Keep clean and dry, may apply bacitracin, RTC one month, keep appt with neurosurgeon to discuss nerve pain            Electronically Signed by: CASSIDY Guerra -Author on April 8, 2021 04:08:11 PM

## 2021-05-14 NOTE — PROGRESS NOTES
Progress Note      Patient Name: Kyler Staley Jr.   Patient ID: [id number]   Sex: Male   YOB: 1950    Primary Care Provider: Forrest Will MD   Referring Provider: Baltazar Blankenship    Visit Date: March 15, 2021    Provider: Shakira Alfaro MD   Location: [facility]   Location Address: [address] [place] [address]  [id number]   Location Phone: [phone]          History Of Present Illness  Kyler Staley Jr. is a 70 year old /White male who presents to the office today as a consult from Baltazar Blankenship.      Here today to discuss pressure ulcer on chin and also scabbed areas above his eyebrows. Had back surgery on February 11th with Dr. Baltazar Blankenship. He woke up from surgery and areas started as a blisters. Surgery was 10 hours. Face was super swollen. Had a few open areas that was draining. Scabbed areas above eyebrows are still numb. Had 4 back surgeries now. Has degenerative disc disease. Open area pulls and is aggravating on chin. Puts Vaseline on the area at night. Was suppose to be set up to see wound care but no one ever got an appointment. On Plavix. Has lost 40lbs and hasn't had an appetite.     Here for follow up. Has been using Santyl since last week. Chin ulcer has decreased in size with a few areas remaining of necrotic and fibrous tissue. Ulcers above brows resolving.              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); Thyroid Problems         Past Surgical History  Back surgery; CABG; cardiac stents;  Gallbladder; Hernia Repair; Knee replacement, left; Knee replacement, right; nose; Rotator Cuff repair         Medication List  allopurinol oral; amlodipine besylate (bulk) 100 % miscellaneous powder; aspirin oral; atorvastatin 20 mg oral tablet; carvedilol oral; clopidogrel 75 mg oral tablet; fenofibrate 150 mg oral capsule; flaxseed oil 1,000 mg oral capsule; furosemide 40 mg oral tablet; glipizide oral; imipramine HCl oral; isosorbide (bulk) 98 % miscellaneous powder; Master 7-7-1.5 gram oral powder in packet; losartan oral; melatonin 3 mg oral capsule; metformin 1,000 mg oral tablet extended release 24hr; metformin 500 mg oral tablet extended release 24 hr; Protonix oral; Santyl 250 unit/gram topical ointment; spironolactone 25 mg oral tablet; Synthroid oral; tamsulosin 0.4 mg oral capsule; Tricor oral; Vitamin C 500 mg oral tablet; Vitamin D3 100 mcg (4,000 unit) oral capsule; Xanax oral; zinc 50 mg oral tablet         Allergy List  Ativan; Darvon; Wellbutrin SR       Allergies Reconciled  Family Medical History  *No Known Family History         Social History  Tobacco (Former)         Vitals  Date Time BP Position Site L\R Cuff Size HR RR TEMP (F) WT  HT  BMI kg/m2 BSA m2 O2 Sat FR L/min FiO2 HC       03/15/2021 11:32 /70 Sitting    58 - R  97.7     100 %  21%          Physical Examination  · Head and Face  o Face  o :   § Inspection  § : resolving blisters over left cheek area with no erythema or pus; improved 3 x 2.5 cm pressure ulcer over chin with a few dark areas of eschar, 2 cm area of redness around ulceration, intact sensation  § Forehead  § : superior medial eyebrow ulcerations, 2 in x 0.5 cm running parallel to eye brows, epidermis peeling, decreased sensation of touch  o Visia  o :   § Spots  § : %  § Wrinkles  § : %  § Texture  § : %  § Pores  § : %  § UV Spots  § : %  § Brown Spots  § : %  § Red Areas  § : %  § Porphyrins  § : %  · Respiratory  o Respiratory Effort  o : breathing unlabored    · Cardiovascular  o Heart  o : regular rate          Assessment  · Pressure ulcer       Pressure ulcer of unspecified site, unspecified stage     707.00/L89.90      Plan  · Orders  o Plastics reconstructive visit ([facility]) - - 03/15/2021  o Debridement (46801) - - 03/16/2021  · Medications  o Medications have been Reconciled  o Transition of Care or Provider Policy  · Instructions  o Chin ulcer has improved. Cleaned with betadine then debrided eschar, cleaned with NS and applied bacitracin and santyl ointment. Will have patient continue Santyl until another week, may apply Aquaphor to healing areas over eyebrows and midface, avoid any pressure to area, increase protein, insurance denied Santyl and Master so patient did not obtain but used Santly provided by office, RTC Monday            Electronically Signed by: Shakira Alfaro MD -Author on March 16, 2021 07:55:34 AM

## 2021-05-14 NOTE — PROGRESS NOTES
Progress Note      Patient Name: Kyler Staley Jr.   Patient ID: [id number]   Sex: Male   YOB: 1950    Primary Care Provider: Forrest Will MD   Referring Provider: Baltazar Blankenship    Visit Date: March 22, 2021    Provider: CASSIDY Guerra   Location: [facility]   Location Address: [address] [place] [address]  [id number]   Location Phone: [phone]          History Of Present Illness  Kyler Staley Jr. is a 70 year old /White male who presents to the office today as a consult from Baltazar Blankenship.      Here today to discuss pressure ulcer on chin and also scabbed areas above his eyebrows. Had back surgery on February 11th with Dr. Baltazar Blankenship. He woke up from surgery and areas started as a blisters. Surgery was 10 hours. Face was super swollen. Had a few open areas that was draining. Scabbed areas above eyebrows are still numb. Had 4 back surgeries now. Has degenerative disc disease. Open area pulls and is aggravating on chin. Puts Vaseline on the area at night. Was suppose to be set up to see wound care but no one ever got an appointment. On Plavix. Has lost 40lbs and hasn't had an appetite.     Here for follow up. Has been using Santyl x 2 weeks. Chin ulcer has decreased in size with a few areas remaining of necrotic and fibrous tissue. Ulcers above brows resolving. Wounds continuing to improve. Still has numbness over top of brows and gets nerve pain in ear when chin ulcer is touched.              Past Medical History  Acne; Alcoholism; Anxiety; Arthritis; Cataracts, bilateral; CHF (congestive heart failure); CKD (chronic kidney disease), stage 3 (moderate); Coronary artery disease; Depression; Depression; Diabetes; Gout; Hematospermia; Hernia; High blood pressure; High cholesterol; History of cold sores; Paralyzed vocal cords; Psychiatric diagnosis; Shortness of Breath; Sinus trouble; Sleep apnea; Steroid-induced diabetes mellitus (correct  and properly administered); Thyroid Problems         Past Surgical History  Back surgery; CABG; cardiac stents; Gallbladder; Hernia Repair; Knee replacement, left; Knee replacement, right; nose; Rotator Cuff repair         Medication List  allopurinol oral; amlodipine besylate (bulk) 100 % miscellaneous powder; aspirin oral; atorvastatin 20 mg oral tablet; carvedilol oral; clopidogrel 75 mg oral tablet; fenofibrate 150 mg oral capsule; flaxseed oil 1,000 mg oral capsule; furosemide 40 mg oral tablet; glipizide oral; imipramine HCl oral; isosorbide (bulk) 98 % miscellaneous powder; Master 7-7-1.5 gram oral powder in packet; losartan oral; melatonin 3 mg oral capsule; metformin 1,000 mg oral tablet extended release 24hr; metformin 500 mg oral tablet extended release 24 hr; Protonix oral; Santyl 250 unit/gram topical ointment; spironolactone 25 mg oral tablet; Synthroid oral; tamsulosin 0.4 mg oral capsule; Tricor oral; Vitamin C 500 mg oral tablet; Vitamin D3 100 mcg (4,000 unit) oral capsule; Xanax oral; zinc 50 mg oral tablet         Allergy List  Ativan; Darvon; Wellbutrin SR         Family Medical History  *No Known Family History         Social History  Tobacco (Former)         Vitals  Date Time BP Position Site L\R Cuff Size HR RR TEMP (F) WT  HT  BMI kg/m2 BSA m2 O2 Sat FR L/min FiO2 HC       03/22/2021 02:48 /97 Sitting    59 - R  98.5     100 %  21%          Physical Examination  · Head and Face  o Face  o :   § Inspection  § : healing pressure ulcer over chin with a two dark areas of eschar remaining, 2 cm area of redness around ulceration, intact sensation  § Forehead  § : superior medial eyebrow ulcerations, 2 in x 0.5 cm running parallel to eye brows, epidermis peeling, decreased sensation of touch  o Visia  o :   § Spots  § : %  § Wrinkles  § : %  § Texture  § : %  § Pores  § : %  § UV Spots  § : %  § Brown Spots  § : %  § Red Areas  § : %  § Porphyrins  § : %  · Respiratory  o Respiratory  Effort  o : breathing unlabored   · Cardiovascular  o Heart  o : regular rate          Assessment  · Pressure ulcer       Pressure ulcer of unspecified site, unspecified stage     707.00/L89.90      Plan  · Orders  o Plastics reconstructive visit ([facility]) - - 03/22/2021  · Medications  o Medications have been Reconciled  o Transition of Care or Provider Policy  · Instructions  o Chin ulcer has improved. Cleaned with betadine then debrided eschar, cleaned with NS and applied bacitracin and santyl ointment. Will have patient continue Santyl another week, may apply Aquaphor to healing areas over eyebrows and midface, avoid any pressure to area, increase protein, insurance denied Santyl and Master so patient did not obtain but used Santly provided by office, RTC one week and may transfer to just using bacitracin.            Electronically Signed by: CASSIDY Guerra -Author on March 22, 2021 04:31:21 PM

## 2021-06-05 NOTE — PROGRESS NOTES
Progress Note      Patient Name: Kyler Staley Jr.   Patient ID: [id number]   Sex: Male   YOB: 1950    Primary Care Provider: Forrest Will MD   Referring Provider: Baltazar Fry    Visit Date: May 10, 2021    Provider: CASSIDY Greer   Location: [facility] [place]   Location Address: [address] [place] [address]  [address] [place] [address]  [id number]   Location Phone: [phone]          Chief Complaint  · pt here for urological concerns      History Of Present Illness  The patient is a 70 year old /White male , returns for follow-up on BPH with lower urinary tract symptoms.        Reports that he is having nocturia upwards of 4-5 times a night.    He is on tamsulosin 0.8 mg daily.    He does state he is also on a diuretic 2 times a day.    The patient is drinking up until that time he goes to bed and is drinking tea at night as well.     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); Thyroid Problems         Past Surgical History  Back surgery; CABG; cardiac stents; Gallbladder; Hernia Repair; Knee replacement, left; Knee replacement, right; nose; Rotator Cuff repair         Medication List  allopurinol oral; amlodipine besylate (bulk) 100 % miscellaneous powder; aspirin oral; atorvastatin 20 mg oral tablet; carvedilol oral; clopidogrel 75 mg oral tablet; fenofibrate 150 mg oral capsule; flaxseed oil 1,000 mg oral capsule; furosemide 40 mg oral tablet; glipizide oral; imipramine HCl oral; isosorbide (bulk) 98 %  "miscellaneous powder; Master 7-7-1.5 gram oral powder in packet; losartan oral; melatonin 3 mg oral capsule; metformin 1,000 mg oral tablet extended release 24hr; metformin 500 mg oral tablet extended release 24 hr; Protonix oral; Santyl 250 unit/gram topical ointment; spironolactone 25 mg oral tablet; Synthroid oral; tamsulosin 0.4 mg oral capsule; Tricor oral; Vitamin C 500 mg oral tablet; Vitamin D3 100 mcg (4,000 unit) oral capsule; Xanax oral; zinc 50 mg oral tablet         Allergy List  Ativan; Darvon; Wellbutrin SR       Allergies Reconciled  Family Medical History  *No Known Family History         Social History  Tobacco (Former)         Review of Systems  · Constitutional  o Denies  o : fever, chills  · Eyes  o Denies  o : double vision, cataracts  · HENT  o Denies  o : hearing loss, headaches  · Cardiovascular  o Denies  o : chest pain at rest, chest pain with exercise, irregular heart beats, palpitations, leg cramps with exercise  · Respiratory  o Denies  o : shortness of breath, wheezing, sleep apnea  · Gastrointestinal  o Denies  o : heartburn or indigestion, nausea or vomiting, change in abdominal girth, diarrhea, constipation, blood in stools  · Genitourinary  o Admits  o : additional symptoms as noted in HPI  · Integument  o Denies  o : rash, new skin lesions  · Neurologic  o Denies  o : memory difficulties, headache, mini-strokes, seizures  · Endocrine  o Denies  o : hot flashes, thyroid disorders  · Psychiatric  o Denies  o : depression, schizophrenia, bipolar disorder  · Heme-Lymph  o Denies  o : easy bleeding, easy bruising, sickle cell disease or trait, lymph node enlargement or tenderness  · Allergic-Immunologic  o Denies  o : immune deficiency, HIV, Hepatitis C      Vitals  Date Time BP Position Site L\R Cuff Size HR RR TEMP (F) WT  HT  BMI kg/m2 BSA m2 O2 Sat FR L/min FiO2        05/10/2021 02:18 PM       14  251lbs 16oz 6'  1\" 33.25 2.43             Physical " Examination  · Constitutional  o Appearance  o : Well nourished, well developed patient in no acute distress. Ambulating without difficulty.  · Head and Face  o Head  o :   § Inspection  § : atraumatic, normocephalic  o Face  o :   § Inspection  § : no facial lesions  · Eyes  o Sclerae  o : sclerae white  · Ears, Nose, Mouth and Throat  o Ears  o :   § External Ears  § : appearance within normal limits, no lesions present  o Nose  o :   § External Nose  § : appearance normal  · Neck  o Inspection/Palpation  o : normal appearance, trachea midline  · Respiratory  o Respiratory Effort  o : breathing unlabored  o Inspection of Chest  o : normal appearance, no retractions  · Skin and Subcutaneous Tissue  o General Inspection  o : no rashes or lesions present, no lesions present, no areas of discoloration  · Neurologic  o Mental Status Examination  o :   § Orientation  § : grossly oriented to person, place and time  § Speech/Language  § : communication ability within normal limits  o Gait and Station  o : normal gait, able to stand without difficulty  · Psychiatric  o Judgement and Insight  o : judgment and insight intact, judgement for everyday activities and social situations within normal limits, insight intact  o Mood and Affect  o : mood normal, affect appropriate          Results  · In-Office Procedures  o Surgical procedure  § IOP - Bladder Scan/Residual Urine (48533)   § Specimen vol Ur: 0   o Lab procedure  § Automated dipstick urinalysis with microscopy (23634)   § Color Ur: Yellow   § Clarity Ur: Clear   § Glucose Ur Ql Strip: 250 mg/dL   § Bilirub Ur Ql Strip: Negative   § Ketones Ur Ql Strip: Negative   § Sp Gr Ur Qn: 1.015   § Hgb Ur Ql Strip: Negative   § pH Ur-LsCnc: 6.0   § Prot Ur Ql Strip: >=300 mg/dL   § Urobilinogen Ur Strip-mCnc: 0.2 E.U./dL   § Nitrite Ur Ql Strip: Negative   § WBC Est Ur Ql Strip: Negative   § RBC UrnS Qn HPF: 0   § WBC UrnS Qn HPF: 0   § Bacteria UrnS Qn HPF: 0   § Crystals UrnS  Qn HPF: 0   § Epithelial Cells (non renal): 0 /HPF  § Epithelial Cells (renal): 0       Assessment  · BPH with Urinary Obstruction       Benign prostatic hyperplasia with lower urinary tract symptoms     600.01/N40.1  Other obstructive and reflux uropathy     600.01/N13.8      Plan  · Medications  o Medications have been Reconciled  o Transition of Care or Provider Policy  · Instructions  o DISCUSSION:  o Discussed with the patient that I believe his nighttime symptoms are related to him drinking until he goes to bed and his caffeine intake later in the day. Also discussed with the patient that his diuretic could be a contributing factor to the reason that he is getting up 4-5 times a night as well.  o PLAN: We will follow-up with patient in 4 weeks or sooner if needed. He will implement behavioral modifications such as cessation of oral fluids 2 to 3 hours prior to bed and propping his feet up for at least 2 to 3 hours prior to bedtime.  o Electronically Identified Patient Education Materials Provided Electronically            Electronically Signed by: CASSIDY Greer -Author on May 10, 2021 02:41:37 PM

## 2021-06-14 ENCOUNTER — TRANSCRIBE ORDERS (OUTPATIENT)
Dept: ADMINISTRATIVE | Facility: HOSPITAL | Age: 71
End: 2021-06-14

## 2021-06-14 DIAGNOSIS — I65.02 OCCLUSION AND STENOSIS OF LEFT VERTEBRAL ARTERY: Primary | ICD-10-CM

## 2021-06-16 ENCOUNTER — OFFICE VISIT (OUTPATIENT)
Dept: UROLOGY | Facility: CLINIC | Age: 71
End: 2021-06-16

## 2021-06-16 VITALS — RESPIRATION RATE: 14 BRPM | BODY MASS INDEX: 33.45 KG/M2 | HEIGHT: 73 IN | WEIGHT: 252.4 LBS

## 2021-06-16 DIAGNOSIS — N40.1 BPH WITH URINARY OBSTRUCTION: Primary | ICD-10-CM

## 2021-06-16 DIAGNOSIS — N13.8 BPH WITH URINARY OBSTRUCTION: Primary | ICD-10-CM

## 2021-06-16 LAB
BILIRUB BLD-MCNC: NEGATIVE MG/DL
CLARITY, POC: CLEAR
COLOR UR: YELLOW
GLUCOSE UR STRIP-MCNC: ABNORMAL MG/DL
KETONES UR QL: NEGATIVE
LEUKOCYTE EST, POC: NEGATIVE
NITRITE UR-MCNC: NEGATIVE MG/ML
PH UR: 6.5 [PH] (ref 5–8)
PROT UR STRIP-MCNC: ABNORMAL MG/DL
RBC # UR STRIP: ABNORMAL /UL
SP GR UR: 1.03 (ref 1–1.03)
UROBILINOGEN UR QL: NORMAL

## 2021-06-16 PROCEDURE — 99212 OFFICE O/P EST SF 10 MIN: CPT | Performed by: NURSE PRACTITIONER

## 2021-06-16 PROCEDURE — 81003 URINALYSIS AUTO W/O SCOPE: CPT | Performed by: NURSE PRACTITIONER

## 2021-06-16 RX ORDER — CARVEDILOL 25 MG/1
25 TABLET ORAL 2 TIMES DAILY
COMMUNITY
Start: 2021-06-12 | End: 2022-07-07

## 2021-06-16 NOTE — PROGRESS NOTES
Chief Complaint: Benign Prostatic Hypertrophy (Doing better on tamsulosin)    Subjective         History of Present Illness  Kyler Staley is a 71 y.o. male presents to [facility] to be seen for f/u on BPH and nocturia.    He has been implementing behavioral modification and is now getting up 0-1 x a night.    He is with no other bothersome voiding symptoms at this point in time.    Objective     Past Medical History:   Diagnosis Date   • Acne    • Alcoholism (CMS/[facility])    • Anxiety    • Arthritis    • Atrial fibrillation (CMS/HCC)    • Back pain    • Cardiac disease    • Cataracts, bilateral    • Chronic kidney disease, stage 3 (CMS/HCC)     moderate   • CKD (chronic kidney disease)    • Condition not found     hernia   • Coronary artery disease    • Depression    • Diabetes (CMS/[facility])    • Fatigue    • GERD (gastroesophageal reflux disease)    • Gout    • Hearing loss    • Hematospermia 12/09/2015   • High blood pressure    • High cholesterol    • History of cold sores    • History of gastritis    • [place] (hard of hearing)    • Hyperlipidemia    • Hypertension    • Joint pain    • Kidney disease    • Knee swelling    • Lumbosacral disc disease    • Narrowing of airway 2012    PER ENT   • Neuropathy    • 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    • PONV (postoperative nausea and vomiting)    • Psychiatric diagnosis    • Rotator cuff syndrome     REPAIRED BILATERAL   • Shortness of breath    • Sinus trouble    • Sleep apnea     BIPAP   • 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)    • Swallowing difficulty    • Thin skin    • Thyroid condition    • Vocal cord dysfunction      HISTORY OF DISORDER ON ONE SIDE AFTER TRACHEA       Past Surgical History:   Procedure Laterality Date   • BACK SURGERY  2018    X2   • BACK SURGERY  2011   • CARDIAC SURGERY     • CHOLECYSTECTOMY     • CORONARY ARTERY BYPASS GRAFT  2012   • HERNIA REPAIR     • JOINT  REPLACEMENT     • KNEE SURGERY      right and left   • LUMBAR DISCECTOMY FUSION INSTRUMENTATION N/A 2/11/2021    Procedure: EXPLORATION OF FUSION T-12 S-1 REMOVAL OF HARDWARE REVISION OF FUSION AND INSTRUMENTATION T12-S1 WITH APPLICATION OF BONE MORPHOGENIC PROTEIN;  Surgeon: Baltazar Blankenship MD;  Location: [facility] OR;  Service: Orthopedic Spine;  Laterality: N/A;   • NOSE SURGERY     • REPLACEMENT TOTAL KNEE Bilateral    • SHOULDER SURGERY Bilateral     rotator cuff repair         Current Outpatient Medications:   •  allopurinol (ZYLOPRIM) 300 MG tablet, Take 300 mg by mouth Daily., Disp: , Rfl:   •  ALPRAZolam (XANAX) 1 MG tablet, Take 1 mg by mouth 3 (Three) Times a Day As Needed for Anxiety., Disp: , Rfl:   •  amLODIPine (NORVASC) 5 MG tablet, Take 5 mg by mouth Daily., Disp: , Rfl:   •  Ascorbic Acid (Vitamin C) 500 MG capsule, Take 500 mg by mouth Daily., Disp: , Rfl:   •  aspirin 325 MG tablet, Take 325 mg by mouth Daily. TO FOLLOW MD ORDERS WHEN TO STOP, Disp: , Rfl:   •  atorvastatin (LIPITOR) 20 MG tablet, Take 20 mg by mouth Every Evening., Disp: , Rfl:   •  carvedilol (COREG) 25 MG tablet, Take 25 mg by mouth 2 (Two) Times a Day., Disp: , Rfl:   •  Cholecalciferol 100 MCG (4000 UT) capsule, Vitamin D3 100 mcg (4,000 unit) oral capsule take 1 capsule by oral route daily   Active, Disp: , Rfl:   •  clopidogrel (PLAVIX) 75 MG tablet, Restart home dose of plavix tomorrow 2/15, Disp: 30 tablet, Rfl:   •  fenofibrate (TRICOR) 145 MG tablet, Take 145 mg by mouth Daily., Disp: , Rfl:   •  Flaxseed, Linseed, 1000 MG capsule, Take 1,200 mg by mouth Daily. HOLD FOR SURGERY, Disp: , Rfl:   •  furosemide (LASIX) 40 MG tablet, Take 40 mg by mouth Daily., Disp: , Rfl:   •  glipizide (GLUCOTROL) 10 MG tablet, Take 10 mg by mouth 2 (two) times a day., Disp: , Rfl:   •  glucose blood (ACCU-CHEK SUNDAY PLUS) test strip, , Disp: , Rfl:   •  HYDROcodone-acetaminophen (NORCO) 5-325 MG per tablet, Take 1 tablet by mouth  Every 4 (Four) Hours As Needed., Disp: , Rfl:   •  imipramine (TOFRANIL) 50 MG tablet, Take 100 mg by mouth Every Night., Disp: , Rfl:   •  isosorbide mononitrate (IMDUR) 60 MG 24 hr tablet, Take 60 mg by mouth Daily., Disp: , Rfl:   •  levothyroxine (SYNTHROID, LEVOTHROID) 112 MCG tablet, Take 112 mcg by mouth Daily., Disp: , Rfl:   •  losartan (COZAAR) 100 MG tablet, Take 100 mg by mouth Daily., Disp: , Rfl:   •  Melatonin 2.5 MG capsule, Take 2.5 mg by mouth Every Night., Disp: , Rfl:   •  metFORMIN (GLUCOPHAGE) 1000 MG tablet, Take 1,000 mg by mouth Daily With Breakfast., Disp: , Rfl:   •  pantoprazole (PROTONIX) 40 MG EC tablet, Take 40 mg by mouth Daily., Disp: , Rfl:   •  sennosides-docusate (senna-docusate sodium) 8.6-50 MG per tablet, Take 1 tablet by mouth 2 (two) times a day., Disp: 30 tablet, Rfl: 0  •  tamsulosin (FLOMAX) 0.4 MG capsule 24 hr capsule, Take 0.4 mg by mouth Daily., Disp: , Rfl:   •  tiZANidine (Zanaflex) 4 MG tablet, Take 1 tablet by mouth Every 8 (Eight) Hours As Needed for Muscle Spasms., Disp: 60 tablet, Rfl: 0  •  Zinc 50 MG capsule, Take 50 mg by mouth Daily., Disp: , Rfl:   •  ondansetron (Zofran) 4 MG tablet, Take 1 tablet by mouth Every 8 (Eight) Hours As Needed for Nausea or Vomiting., Disp: 25 tablet, Rfl: 0  •  spironolactone (ALDACTONE) 25 MG tablet, Take 25 mg by mouth Daily., Disp: , Rfl:     Allergies   Allergen Reactions   • Ativan [Lorazepam] Other (See Comments)     MADE PATIENT VERY AGGRESSIVE     • Bupropion Other (See Comments)     MAKES PATIENT VERY AGGRESSIVE     • Propoxyphene Nausea And Vomiting   • Adhesive Tape Rash        Family History   Problem Relation Age of Onset   • Heart attack Mother    • Malig Hyperthermia Neg Hx        Social History     Socioeconomic History   • Marital status:      Spouse name: Not on file   • Number of children: Not on file   • Years of education: Not on file   • Highest education level: Not on file   Tobacco Use   • Smoking  "status: Former Smoker     Packs/day: 2.00     Years: 10.00     Pack years: 20.00     Types: Cigarettes     Quit date:      Years since quittin.4   • Smokeless tobacco: Never Used   Vaping Use   • Vaping Use: Never used   Substance and Sexual Activity   • Alcohol use: No   • Drug use: Yes     Types: Hydrocodone   • Sexual activity: Defer       Vital Signs:   Resp 14   Ht 185.4 cm (73\")   Wt 114 kg (252 lb 6.4 oz)   BMI 33.30 kg/m²      Physical Exam     Result Review :   The following data was reviewed by: CASSIDY Wilhelm on 2021:  Results for orders placed or performed in visit on 21   POC Urinalysis Dipstick, Automated    Specimen: Urine   Result Value Ref Range    Color Yellow Yellow, Straw, Dark Yellow, Laura    Clarity, UA Clear Clear    Specific Gravity  1.030 1.005 - 1.030    pH, Urine 6.5 5.0 - 8.0    Leukocytes Negative Negative    Nitrite, UA Negative Negative    Protein,  mg/dL (A) Negative mg/dL    Glucose,  mg/dL (A) Negative, 1000 mg/dL (3+) mg/dL    Ketones, UA Negative Negative    Urobilinogen, UA Normal Normal    Bilirubin Negative Negative    Blood, UA Trace (A) Negative          Procedures        Assessment and Plan    Diagnoses and all orders for this visit:    1. BPH with urinary obstruction (Primary)  -     POC Urinalysis Dipstick, Automated        Patient will continue tamsulosin at current dosage and we will follow up with him in 7 months or sooner if needed.  He will call the office if any new or worsening symptoms to be seen sooner.    I spent 10 minutes caring for Kyler on this date of service. This time includes time spent by me in the following activities:reviewing tests, obtaining and/or reviewing a separately obtained history, performing a medically appropriate examination and/or evaluation , counseling and educating the patient/family/caregiver, ordering medications, tests, or procedures, and documenting information in the medical " record  Follow Up   Return in about 7 months (around 1/16/2022) for f/u BPH Nocturia .  Patient was given instructions and counseling regarding his condition or for health maintenance advice. Please see specific information pulled into the AVS if appropriate.

## 2021-06-21 ENCOUNTER — TRANSCRIBE ORDERS (OUTPATIENT)
Dept: ADMINISTRATIVE | Facility: HOSPITAL | Age: 71
End: 2021-06-21

## 2021-06-21 ENCOUNTER — LAB (OUTPATIENT)
Dept: LAB | Facility: HOSPITAL | Age: 71
End: 2021-06-21

## 2021-06-21 DIAGNOSIS — I13.10 MALIGNANT HYPERTENSIVE HEART AND CHRONIC KIDNEY DISEASE STAGE III (HCC): Primary | ICD-10-CM

## 2021-06-21 DIAGNOSIS — I13.10 MALIGNANT HYPERTENSIVE HEART AND CHRONIC KIDNEY DISEASE STAGE III (HCC): ICD-10-CM

## 2021-06-21 DIAGNOSIS — N18.30 MALIGNANT HYPERTENSIVE HEART AND CHRONIC KIDNEY DISEASE STAGE III (HCC): Primary | ICD-10-CM

## 2021-06-21 DIAGNOSIS — I10 ESSENTIAL HYPERTENSION, MALIGNANT: ICD-10-CM

## 2021-06-21 DIAGNOSIS — N18.30 MALIGNANT HYPERTENSIVE HEART AND CHRONIC KIDNEY DISEASE STAGE III (HCC): ICD-10-CM

## 2021-06-21 LAB
BACTERIA UR QL AUTO: ABNORMAL /HPF
BILIRUB UR QL STRIP: NEGATIVE
CLARITY UR: CLEAR
COLOR UR: YELLOW
CREAT UR-MCNC: 54.3 MG/DL
DEPRECATED RDW RBC AUTO: 49.7 FL (ref 37–54)
ERYTHROCYTE [DISTWIDTH] IN BLOOD BY AUTOMATED COUNT: 15.2 % (ref 12.3–15.4)
GLUCOSE UR STRIP-MCNC: NEGATIVE MG/DL
HCT VFR BLD AUTO: 34.8 % (ref 37.5–51)
HGB BLD-MCNC: 11.4 G/DL (ref 13–17.7)
HGB UR QL STRIP.AUTO: ABNORMAL
KETONES UR QL STRIP: NEGATIVE
LEUKOCYTE ESTERASE UR QL STRIP.AUTO: NEGATIVE
MCH RBC QN AUTO: 29 PG (ref 26.6–33)
MCHC RBC AUTO-ENTMCNC: 32.8 G/DL (ref 31.5–35.7)
MCV RBC AUTO: 88.5 FL (ref 79–97)
NITRITE UR QL STRIP: NEGATIVE
PH UR STRIP.AUTO: 6 [PH] (ref 5–8)
PLATELET # BLD AUTO: 372 10*3/MM3 (ref 140–450)
PMV BLD AUTO: 10.1 FL (ref 6–12)
PROT UR QL STRIP: ABNORMAL
PROT UR-MCNC: 163.4 MG/DL
PROT/CREAT UR: 3 MG/G{CREAT}
RBC # BLD AUTO: 3.93 10*6/MM3 (ref 4.14–5.8)
RBC # UR: ABNORMAL /HPF
REF LAB TEST METHOD: ABNORMAL
SP GR UR STRIP: >=1.03 (ref 1–1.03)
SQUAMOUS #/AREA URNS HPF: ABNORMAL /HPF
UROBILINOGEN UR QL STRIP: ABNORMAL
WBC # BLD AUTO: 7.37 10*3/MM3 (ref 3.4–10.8)
WBC UR QL AUTO: ABNORMAL /HPF

## 2021-06-21 PROCEDURE — 84156 ASSAY OF PROTEIN URINE: CPT

## 2021-06-21 PROCEDURE — 85027 COMPLETE CBC AUTOMATED: CPT

## 2021-06-21 PROCEDURE — 36415 COLL VENOUS BLD VENIPUNCTURE: CPT

## 2021-06-21 PROCEDURE — 81001 URINALYSIS AUTO W/SCOPE: CPT

## 2021-06-21 PROCEDURE — 82570 ASSAY OF URINE CREATININE: CPT

## 2021-06-21 PROCEDURE — 80053 COMPREHEN METABOLIC PANEL: CPT

## 2021-06-22 LAB
ALBUMIN SERPL-MCNC: 4.4 G/DL (ref 3.5–5.2)
ALBUMIN/GLOB SERPL: 1.8 G/DL
ALP SERPL-CCNC: 43 U/L (ref 39–117)
ALT SERPL W P-5'-P-CCNC: 10 U/L (ref 1–41)
ANION GAP SERPL CALCULATED.3IONS-SCNC: 8.6 MMOL/L (ref 5–15)
AST SERPL-CCNC: 16 U/L (ref 1–40)
BILIRUB SERPL-MCNC: <0.2 MG/DL (ref 0–1.2)
BUN SERPL-MCNC: 26 MG/DL (ref 8–23)
BUN/CREAT SERPL: 13.5 (ref 7–25)
CALCIUM SPEC-SCNC: 9.5 MG/DL (ref 8.6–10.5)
CHLORIDE SERPL-SCNC: 98 MMOL/L (ref 98–107)
CO2 SERPL-SCNC: 25.4 MMOL/L (ref 22–29)
CREAT SERPL-MCNC: 1.92 MG/DL (ref 0.76–1.27)
GFR SERPL CREATININE-BSD FRML MDRD: 35 ML/MIN/1.73
GLOBULIN UR ELPH-MCNC: 2.4 GM/DL
GLUCOSE SERPL-MCNC: 188 MG/DL (ref 65–99)
POTASSIUM SERPL-SCNC: 4.5 MMOL/L (ref 3.5–5.2)
PROT SERPL-MCNC: 6.8 G/DL (ref 6–8.5)
SODIUM SERPL-SCNC: 132 MMOL/L (ref 136–145)

## 2021-07-15 VITALS — RESPIRATION RATE: 14 BRPM | HEIGHT: 73 IN | WEIGHT: 252 LBS | BODY MASS INDEX: 33.4 KG/M2

## 2021-08-02 ENCOUNTER — TRANSCRIBE ORDERS (OUTPATIENT)
Dept: ADMINISTRATIVE | Facility: HOSPITAL | Age: 71
End: 2021-08-02

## 2021-08-02 DIAGNOSIS — I65.23 BILATERAL CAROTID ARTERY STENOSIS: Primary | ICD-10-CM

## 2021-08-03 ENCOUNTER — TRANSCRIBE ORDERS (OUTPATIENT)
Dept: ADMINISTRATIVE | Facility: HOSPITAL | Age: 71
End: 2021-08-03

## 2021-08-03 ENCOUNTER — LAB (OUTPATIENT)
Dept: LAB | Facility: HOSPITAL | Age: 71
End: 2021-08-03

## 2021-08-03 ENCOUNTER — HOSPITAL ENCOUNTER (OUTPATIENT)
Dept: GENERAL RADIOLOGY | Facility: HOSPITAL | Age: 71
Discharge: HOME | End: 2021-08-03

## 2021-08-03 DIAGNOSIS — E03.9 HYPOTHYROIDISM, UNSPECIFIED TYPE: ICD-10-CM

## 2021-08-03 DIAGNOSIS — R63.5 WEIGHT GAIN: ICD-10-CM

## 2021-08-03 DIAGNOSIS — R53.81 MALAISE AND FATIGUE: Primary | ICD-10-CM

## 2021-08-03 DIAGNOSIS — E55.9 VITAMIN D DEFICIENCY: ICD-10-CM

## 2021-08-03 DIAGNOSIS — E61.1 IRON DEFICIENCY: ICD-10-CM

## 2021-08-03 DIAGNOSIS — R10.11 RUQ PAIN: ICD-10-CM

## 2021-08-03 DIAGNOSIS — R63.4 LOSS OF WEIGHT: ICD-10-CM

## 2021-08-03 DIAGNOSIS — R53.83 MALAISE AND FATIGUE: ICD-10-CM

## 2021-08-03 DIAGNOSIS — R53.83 FATIGUE, UNSPECIFIED TYPE: Primary | ICD-10-CM

## 2021-08-03 DIAGNOSIS — I10 HYPERTENSION, ESSENTIAL: ICD-10-CM

## 2021-08-03 DIAGNOSIS — R53.83 FATIGUE, UNSPECIFIED TYPE: ICD-10-CM

## 2021-08-03 DIAGNOSIS — E78.5 HYPERLIPIDEMIA, UNSPECIFIED HYPERLIPIDEMIA TYPE: ICD-10-CM

## 2021-08-03 DIAGNOSIS — R10.11 RUQ PAIN: Primary | ICD-10-CM

## 2021-08-03 DIAGNOSIS — Z00.00 ROUTINE GENERAL MEDICAL EXAMINATION AT A HEALTH CARE FACILITY: ICD-10-CM

## 2021-08-03 DIAGNOSIS — R53.83 MALAISE AND FATIGUE: Primary | ICD-10-CM

## 2021-08-03 DIAGNOSIS — R53.81 MALAISE AND FATIGUE: ICD-10-CM

## 2021-08-03 LAB
25(OH)D3 SERPL-MCNC: 56.2 NG/ML
BASOPHILS # BLD AUTO: 0.03 10*3/MM3 (ref 0–0.2)
BASOPHILS NFR BLD AUTO: 0.5 % (ref 0–1.5)
DEPRECATED RDW RBC AUTO: 50.5 FL (ref 37–54)
EOSINOPHIL # BLD AUTO: 0.13 10*3/MM3 (ref 0–0.4)
EOSINOPHIL NFR BLD AUTO: 2 % (ref 0.3–6.2)
ERYTHROCYTE [DISTWIDTH] IN BLOOD BY AUTOMATED COUNT: 15.7 % (ref 12.3–15.4)
FERRITIN SERPL-MCNC: 25.7 NG/ML (ref 30–400)
HCT VFR BLD AUTO: 34.3 % (ref 37.5–51)
HGB BLD-MCNC: 11.1 G/DL (ref 13–17.7)
IMM GRANULOCYTES # BLD AUTO: 0.01 10*3/MM3 (ref 0–0.05)
IMM GRANULOCYTES NFR BLD AUTO: 0.2 % (ref 0–0.5)
LYMPHOCYTES # BLD AUTO: 1.61 10*3/MM3 (ref 0.7–3.1)
LYMPHOCYTES NFR BLD AUTO: 25.4 % (ref 19.6–45.3)
MCH RBC QN AUTO: 28.6 PG (ref 26.6–33)
MCHC RBC AUTO-ENTMCNC: 32.4 G/DL (ref 31.5–35.7)
MCV RBC AUTO: 88.4 FL (ref 79–97)
MONOCYTES # BLD AUTO: 0.39 10*3/MM3 (ref 0.1–0.9)
MONOCYTES NFR BLD AUTO: 6.1 % (ref 5–12)
NEUTROPHILS NFR BLD AUTO: 4.18 10*3/MM3 (ref 1.7–7)
NEUTROPHILS NFR BLD AUTO: 65.8 % (ref 42.7–76)
PLATELET # BLD AUTO: 343 10*3/MM3 (ref 140–450)
PMV BLD AUTO: 9.7 FL (ref 6–12)
RBC # BLD AUTO: 3.88 10*6/MM3 (ref 4.14–5.8)
T4 FREE SERPL-MCNC: 1.22 NG/DL (ref 0.93–1.7)
TSH SERPL DL<=0.05 MIU/L-ACNC: 4.24 UIU/ML (ref 0.27–4.2)
WBC # BLD AUTO: 6.35 10*3/MM3 (ref 3.4–10.8)

## 2021-08-03 PROCEDURE — 85025 COMPLETE CBC W/AUTO DIFF WBC: CPT

## 2021-08-03 PROCEDURE — 80061 LIPID PANEL: CPT

## 2021-08-03 PROCEDURE — 80053 COMPREHEN METABOLIC PANEL: CPT

## 2021-08-03 PROCEDURE — 83036 HEMOGLOBIN GLYCOSYLATED A1C: CPT

## 2021-08-03 PROCEDURE — 84439 ASSAY OF FREE THYROXINE: CPT

## 2021-08-03 PROCEDURE — 36415 COLL VENOUS BLD VENIPUNCTURE: CPT

## 2021-08-03 PROCEDURE — 84443 ASSAY THYROID STIM HORMONE: CPT

## 2021-08-03 PROCEDURE — 84403 ASSAY OF TOTAL TESTOSTERONE: CPT

## 2021-08-03 PROCEDURE — 82728 ASSAY OF FERRITIN: CPT

## 2021-08-03 PROCEDURE — 82306 VITAMIN D 25 HYDROXY: CPT

## 2021-08-03 PROCEDURE — 74022 RADEX COMPL AQT ABD SERIES: CPT

## 2021-08-04 LAB
ALBUMIN SERPL-MCNC: 3.9 G/DL (ref 3.5–5.2)
ALBUMIN/GLOB SERPL: 1.4 G/DL
ALP SERPL-CCNC: 42 U/L (ref 39–117)
ALT SERPL W P-5'-P-CCNC: 12 U/L (ref 1–41)
ANION GAP SERPL CALCULATED.3IONS-SCNC: 16.1 MMOL/L (ref 5–15)
AST SERPL-CCNC: 15 U/L (ref 1–40)
BILIRUB SERPL-MCNC: 0.2 MG/DL (ref 0–1.2)
BUN SERPL-MCNC: 20 MG/DL (ref 8–23)
BUN/CREAT SERPL: 8.6 (ref 7–25)
CALCIUM SPEC-SCNC: 9.9 MG/DL (ref 8.6–10.5)
CHLORIDE SERPL-SCNC: 99 MMOL/L (ref 98–107)
CHOLEST SERPL-MCNC: 190 MG/DL (ref 0–200)
CO2 SERPL-SCNC: 21.9 MMOL/L (ref 22–29)
CREAT SERPL-MCNC: 2.32 MG/DL (ref 0.76–1.27)
GFR SERPL CREATININE-BSD FRML MDRD: 28 ML/MIN/1.73
GLOBULIN UR ELPH-MCNC: 2.8 GM/DL
GLUCOSE SERPL-MCNC: 301 MG/DL (ref 65–99)
HBA1C MFR BLD: 8.95 % (ref 4.8–5.6)
HDLC SERPL-MCNC: 26 MG/DL (ref 40–60)
LDLC SERPL CALC-MCNC: 85 MG/DL (ref 0–100)
LDLC/HDLC SERPL: 2.59 {RATIO}
POTASSIUM SERPL-SCNC: 4.4 MMOL/L (ref 3.5–5.2)
PROT SERPL-MCNC: 6.7 G/DL (ref 6–8.5)
SODIUM SERPL-SCNC: 137 MMOL/L (ref 136–145)
TESTOST SERPL-MCNC: 239 NG/DL (ref 193–740)
TRIGL SERPL-MCNC: 483 MG/DL (ref 0–150)
VLDLC SERPL-MCNC: 79 MG/DL (ref 5–40)

## 2021-08-05 ENCOUNTER — TRANSCRIBE ORDERS (OUTPATIENT)
Dept: ADMINISTRATIVE | Facility: HOSPITAL | Age: 71
End: 2021-08-05

## 2021-08-05 DIAGNOSIS — R10.9 ABDOMINAL PAIN, UNSPECIFIED ABDOMINAL LOCATION: Primary | ICD-10-CM

## 2021-08-06 ENCOUNTER — HOSPITAL ENCOUNTER (OUTPATIENT)
Dept: CT IMAGING | Facility: HOSPITAL | Age: 71
Discharge: HOME | End: 2021-08-06
Admitting: INTERNAL MEDICINE

## 2021-08-06 DIAGNOSIS — R10.9 ABDOMINAL PAIN, UNSPECIFIED ABDOMINAL LOCATION: ICD-10-CM

## 2021-08-06 PROCEDURE — 74176 CT ABD & PELVIS W/O CONTRAST: CPT

## 2021-08-10 ENCOUNTER — TRANSCRIBE ORDERS (OUTPATIENT)
Dept: ADMINISTRATIVE | Facility: HOSPITAL | Age: 71
End: 2021-08-10

## 2021-08-10 DIAGNOSIS — R52 PAIN: Primary | ICD-10-CM

## 2021-08-11 ENCOUNTER — HOSPITAL ENCOUNTER (OUTPATIENT)
Dept: GENERAL RADIOLOGY | Facility: HOSPITAL | Age: 71
Discharge: HOME | End: 2021-08-11

## 2021-08-11 DIAGNOSIS — R52 PAIN: ICD-10-CM

## 2021-08-11 PROCEDURE — 71046 X-RAY EXAM CHEST 2 VIEWS: CPT

## 2021-08-11 PROCEDURE — 71100 X-RAY EXAM RIBS UNI 2 VIEWS: CPT

## 2021-08-17 ENCOUNTER — TRANSCRIBE ORDERS (OUTPATIENT)
Dept: ADMINISTRATIVE | Facility: HOSPITAL | Age: 71
End: 2021-08-17

## 2021-08-17 DIAGNOSIS — R50.9 FEVER, UNSPECIFIED: ICD-10-CM

## 2021-08-17 DIAGNOSIS — R52 PAIN: Primary | ICD-10-CM

## 2021-08-18 ENCOUNTER — TRANSCRIBE ORDERS (OUTPATIENT)
Dept: ADMINISTRATIVE | Facility: HOSPITAL | Age: 71
End: 2021-08-18

## 2021-08-18 ENCOUNTER — HOSPITAL ENCOUNTER (OUTPATIENT)
Dept: CARDIOLOGY | Facility: HOSPITAL | Age: 71
Discharge: HOME | End: 2021-08-18
Admitting: NURSE PRACTITIONER

## 2021-08-18 DIAGNOSIS — I65.23 BILATERAL CAROTID ARTERY STENOSIS: ICD-10-CM

## 2021-08-18 LAB
BH CV XLRA MEAS LEFT CAROTID BULB EDV: 12.4 CM/SEC
BH CV XLRA MEAS LEFT CAROTID BULB PSV: 51.6 CM/SEC
BH CV XLRA MEAS LEFT DIST CCA EDV: 16.2 CM/SEC
BH CV XLRA MEAS LEFT DIST CCA PSV: 70.8 CM/SEC
BH CV XLRA MEAS LEFT DIST ICA EDV: 12.6 CM/SEC
BH CV XLRA MEAS LEFT DIST ICA PSV: 48.3 CM/SEC
BH CV XLRA MEAS LEFT MID ICA EDV: 18 CM/SEC
BH CV XLRA MEAS LEFT MID ICA PSV: 52.2 CM/SEC
BH CV XLRA MEAS LEFT PROX CCA EDV: 13.7 CM/SEC
BH CV XLRA MEAS LEFT PROX CCA PSV: 95.7 CM/SEC
BH CV XLRA MEAS LEFT PROX ECA EDV: 11.2 CM/SEC
BH CV XLRA MEAS LEFT PROX ECA PSV: 73.9 CM/SEC
BH CV XLRA MEAS LEFT PROX ICA EDV: 16.2 CM/SEC
BH CV XLRA MEAS LEFT PROX ICA PSV: 52.8 CM/SEC
BH CV XLRA MEAS LEFT VERTEBRAL A EDV: 14.9 CM/SEC
BH CV XLRA MEAS LEFT VERTEBRAL A PSV: 57.8 CM/SEC
BH CV XLRA MEAS RIGHT CAROTID BULB EDV: 10.6 CM/SEC
BH CV XLRA MEAS RIGHT CAROTID BULB PSV: 46.6 CM/SEC
BH CV XLRA MEAS RIGHT DIST CCA EDV: 13 CM/SEC
BH CV XLRA MEAS RIGHT DIST CCA PSV: 68.3 CM/SEC
BH CV XLRA MEAS RIGHT DIST ICA EDV: 18 CM/SEC
BH CV XLRA MEAS RIGHT DIST ICA PSV: 47.8 CM/SEC
BH CV XLRA MEAS RIGHT MID ICA EDV: 23.6 CM/SEC
BH CV XLRA MEAS RIGHT MID ICA PSV: 69.6 CM/SEC
BH CV XLRA MEAS RIGHT PROX CCA EDV: 13.7 CM/SEC
BH CV XLRA MEAS RIGHT PROX CCA PSV: 87 CM/SEC
BH CV XLRA MEAS RIGHT PROX ECA EDV: 9.32 CM/SEC
BH CV XLRA MEAS RIGHT PROX ECA PSV: 70.8 CM/SEC
BH CV XLRA MEAS RIGHT PROX ICA EDV: 10.6 CM/SEC
BH CV XLRA MEAS RIGHT PROX ICA PSV: 50.3 CM/SEC
BH CV XLRA MEAS RIGHT VERTEBRAL A EDV: 16.8 CM/SEC
BH CV XLRA MEAS RIGHT VERTEBRAL A PSV: 40.4 CM/SEC
LEFT ARM BP: NORMAL MMHG
MAXIMAL PREDICTED HEART RATE: 149 BPM
RIGHT ARM BP: NORMAL MMHG
STRESS TARGET HR: 127 BPM

## 2021-08-18 PROCEDURE — 93880 EXTRACRANIAL BILAT STUDY: CPT | Performed by: SURGERY

## 2021-08-18 PROCEDURE — 93880 EXTRACRANIAL BILAT STUDY: CPT

## 2021-08-25 ENCOUNTER — HOSPITAL ENCOUNTER (OUTPATIENT)
Dept: ULTRASOUND IMAGING | Facility: HOSPITAL | Age: 71
Discharge: HOME | End: 2021-08-25
Admitting: INTERNAL MEDICINE

## 2021-08-25 DIAGNOSIS — R50.9 FEVER, UNSPECIFIED: ICD-10-CM

## 2021-08-25 DIAGNOSIS — R52 PAIN: ICD-10-CM

## 2021-08-25 PROCEDURE — 76705 ECHO EXAM OF ABDOMEN: CPT

## 2021-10-06 ENCOUNTER — TRANSCRIBE ORDERS (OUTPATIENT)
Dept: ADMINISTRATIVE | Facility: HOSPITAL | Age: 71
End: 2021-10-06

## 2021-10-11 ENCOUNTER — TRANSCRIBE ORDERS (OUTPATIENT)
Dept: ADMINISTRATIVE | Facility: HOSPITAL | Age: 71
End: 2021-10-11

## 2021-10-11 ENCOUNTER — LAB (OUTPATIENT)
Dept: LAB | Facility: HOSPITAL | Age: 71
End: 2021-10-11

## 2021-10-11 DIAGNOSIS — R80.9 PROTEINURIA, UNSPECIFIED TYPE: ICD-10-CM

## 2021-10-11 DIAGNOSIS — N18.30 STAGE 3 CHRONIC KIDNEY DISEASE, UNSPECIFIED WHETHER STAGE 3A OR 3B CKD (HCC): Primary | ICD-10-CM

## 2021-10-11 DIAGNOSIS — N18.30 STAGE 3 CHRONIC KIDNEY DISEASE, UNSPECIFIED WHETHER STAGE 3A OR 3B CKD (HCC): ICD-10-CM

## 2021-10-11 LAB
ANION GAP SERPL CALCULATED.3IONS-SCNC: 13.9 MMOL/L (ref 5–15)
BACTERIA UR QL AUTO: NORMAL /HPF
BASOPHILS # BLD AUTO: 0.04 10*3/MM3 (ref 0–0.2)
BASOPHILS NFR BLD AUTO: 0.6 % (ref 0–1.5)
BILIRUB UR QL STRIP: NEGATIVE
BUN SERPL-MCNC: 26 MG/DL (ref 8–23)
BUN/CREAT SERPL: 10.8 (ref 7–25)
CALCIUM SPEC-SCNC: 9.9 MG/DL (ref 8.6–10.5)
CHLORIDE SERPL-SCNC: 98 MMOL/L (ref 98–107)
CLARITY UR: CLEAR
CO2 SERPL-SCNC: 22.1 MMOL/L (ref 22–29)
COLOR UR: YELLOW
CREAT SERPL-MCNC: 2.41 MG/DL (ref 0.76–1.27)
CREAT UR-MCNC: 106.3 MG/DL
DEPRECATED RDW RBC AUTO: 48.6 FL (ref 37–54)
EOSINOPHIL # BLD AUTO: 0.1 10*3/MM3 (ref 0–0.4)
EOSINOPHIL NFR BLD AUTO: 1.4 % (ref 0.3–6.2)
ERYTHROCYTE [DISTWIDTH] IN BLOOD BY AUTOMATED COUNT: 15.2 % (ref 12.3–15.4)
GFR SERPL CREATININE-BSD FRML MDRD: 27 ML/MIN/1.73
GLUCOSE SERPL-MCNC: 152 MG/DL (ref 65–99)
GLUCOSE UR STRIP-MCNC: NEGATIVE MG/DL
HCT VFR BLD AUTO: 35.9 % (ref 37.5–51)
HGB BLD-MCNC: 12.1 G/DL (ref 13–17.7)
HGB UR QL STRIP.AUTO: ABNORMAL
HYALINE CASTS UR QL AUTO: NORMAL /LPF
IMM GRANULOCYTES # BLD AUTO: 0.02 10*3/MM3 (ref 0–0.05)
IMM GRANULOCYTES NFR BLD AUTO: 0.3 % (ref 0–0.5)
KETONES UR QL STRIP: NEGATIVE
LEUKOCYTE ESTERASE UR QL STRIP.AUTO: NEGATIVE
LYMPHOCYTES # BLD AUTO: 1.75 10*3/MM3 (ref 0.7–3.1)
LYMPHOCYTES NFR BLD AUTO: 24.1 % (ref 19.6–45.3)
MCH RBC QN AUTO: 29.2 PG (ref 26.6–33)
MCHC RBC AUTO-ENTMCNC: 33.7 G/DL (ref 31.5–35.7)
MCV RBC AUTO: 86.5 FL (ref 79–97)
MONOCYTES # BLD AUTO: 0.46 10*3/MM3 (ref 0.1–0.9)
MONOCYTES NFR BLD AUTO: 6.3 % (ref 5–12)
NEUTROPHILS NFR BLD AUTO: 4.9 10*3/MM3 (ref 1.7–7)
NEUTROPHILS NFR BLD AUTO: 67.3 % (ref 42.7–76)
NITRITE UR QL STRIP: NEGATIVE
PH UR STRIP.AUTO: 7 [PH] (ref 5–8)
PHOSPHATE SERPL-MCNC: 3.4 MG/DL (ref 2.5–4.5)
PLATELET # BLD AUTO: 402 10*3/MM3 (ref 140–450)
PMV BLD AUTO: 9.3 FL (ref 6–12)
POTASSIUM SERPL-SCNC: 4.4 MMOL/L (ref 3.5–5.2)
PROT UR QL STRIP: ABNORMAL
PROT UR-MCNC: 210.5 MG/DL
PROT/CREAT UR: 2 MG/G{CREAT}
PTH-INTACT SERPL-MCNC: 20 PG/ML (ref 15–65)
RBC # BLD AUTO: 4.15 10*6/MM3 (ref 4.14–5.8)
RBC # UR: NORMAL /HPF
REF LAB TEST METHOD: NORMAL
SODIUM SERPL-SCNC: 134 MMOL/L (ref 136–145)
SP GR UR STRIP: 1.02 (ref 1–1.03)
SQUAMOUS #/AREA URNS HPF: NORMAL /HPF
UROBILINOGEN UR QL STRIP: ABNORMAL
WBC # BLD AUTO: 7.27 10*3/MM3 (ref 3.4–10.8)
WBC UR QL AUTO: NORMAL /HPF

## 2021-10-11 PROCEDURE — 84156 ASSAY OF PROTEIN URINE: CPT

## 2021-10-11 PROCEDURE — 83970 ASSAY OF PARATHORMONE: CPT

## 2021-10-11 PROCEDURE — 36415 COLL VENOUS BLD VENIPUNCTURE: CPT

## 2021-10-11 PROCEDURE — 85025 COMPLETE CBC W/AUTO DIFF WBC: CPT

## 2021-10-11 PROCEDURE — 81001 URINALYSIS AUTO W/SCOPE: CPT

## 2021-10-11 PROCEDURE — 82570 ASSAY OF URINE CREATININE: CPT

## 2021-10-11 PROCEDURE — 84100 ASSAY OF PHOSPHORUS: CPT

## 2021-10-11 PROCEDURE — 80048 BASIC METABOLIC PNL TOTAL CA: CPT

## 2021-11-08 ENCOUNTER — TRANSCRIBE ORDERS (OUTPATIENT)
Dept: ADMINISTRATIVE | Facility: HOSPITAL | Age: 71
End: 2021-11-08

## 2021-11-08 ENCOUNTER — LAB (OUTPATIENT)
Dept: LAB | Facility: HOSPITAL | Age: 71
End: 2021-11-08

## 2021-11-08 DIAGNOSIS — N18.4 CHRONIC KIDNEY DISEASE, STAGE IV (SEVERE) (HCC): ICD-10-CM

## 2021-11-08 DIAGNOSIS — N18.4 CHRONIC KIDNEY DISEASE, STAGE IV (SEVERE) (HCC): Primary | ICD-10-CM

## 2021-11-08 LAB
ANION GAP SERPL CALCULATED.3IONS-SCNC: 8.1 MMOL/L (ref 5–15)
BUN SERPL-MCNC: 32 MG/DL (ref 8–23)
BUN/CREAT SERPL: 12.3 (ref 7–25)
CALCIUM SPEC-SCNC: 9.6 MG/DL (ref 8.6–10.5)
CHLORIDE SERPL-SCNC: 102 MMOL/L (ref 98–107)
CO2 SERPL-SCNC: 25.9 MMOL/L (ref 22–29)
CREAT SERPL-MCNC: 2.6 MG/DL (ref 0.76–1.27)
GFR SERPL CREATININE-BSD FRML MDRD: 24 ML/MIN/1.73
GLUCOSE SERPL-MCNC: 251 MG/DL (ref 65–99)
POTASSIUM SERPL-SCNC: 4.1 MMOL/L (ref 3.5–5.2)
SODIUM SERPL-SCNC: 136 MMOL/L (ref 136–145)

## 2021-11-08 PROCEDURE — 80048 BASIC METABOLIC PNL TOTAL CA: CPT

## 2021-11-08 PROCEDURE — 36415 COLL VENOUS BLD VENIPUNCTURE: CPT

## 2021-12-06 ENCOUNTER — TRANSCRIBE ORDERS (OUTPATIENT)
Dept: ADMINISTRATIVE | Facility: HOSPITAL | Age: 71
End: 2021-12-06

## 2021-12-06 ENCOUNTER — LAB (OUTPATIENT)
Dept: LAB | Facility: HOSPITAL | Age: 71
End: 2021-12-06

## 2021-12-06 DIAGNOSIS — N18.4 CHRONIC KIDNEY DISEASE, STAGE IV (SEVERE) (HCC): Primary | ICD-10-CM

## 2021-12-06 DIAGNOSIS — I10 ESSENTIAL HYPERTENSION, MALIGNANT: ICD-10-CM

## 2021-12-06 DIAGNOSIS — N18.4 CHRONIC KIDNEY DISEASE, STAGE IV (SEVERE) (HCC): ICD-10-CM

## 2021-12-06 LAB
BACTERIA UR QL AUTO: ABNORMAL /HPF
BILIRUB UR QL STRIP: NEGATIVE
CLARITY UR: CLEAR
COLOR UR: YELLOW
CREAT UR-MCNC: 152.7 MG/DL
DEPRECATED RDW RBC AUTO: 50.9 FL (ref 37–54)
ERYTHROCYTE [DISTWIDTH] IN BLOOD BY AUTOMATED COUNT: 15.6 % (ref 12.3–15.4)
GLUCOSE UR STRIP-MCNC: NEGATIVE MG/DL
HCT VFR BLD AUTO: 36.3 % (ref 37.5–51)
HGB BLD-MCNC: 11.7 G/DL (ref 13–17.7)
HGB UR QL STRIP.AUTO: ABNORMAL
HYALINE CASTS UR QL AUTO: ABNORMAL /LPF
KETONES UR QL STRIP: NEGATIVE
LEUKOCYTE ESTERASE UR QL STRIP.AUTO: NEGATIVE
MCH RBC QN AUTO: 28.9 PG (ref 26.6–33)
MCHC RBC AUTO-ENTMCNC: 32.2 G/DL (ref 31.5–35.7)
MCV RBC AUTO: 89.6 FL (ref 79–97)
NITRITE UR QL STRIP: NEGATIVE
PH UR STRIP.AUTO: 5.5 [PH] (ref 5–8)
PLATELET # BLD AUTO: 404 10*3/MM3 (ref 140–450)
PMV BLD AUTO: 9.9 FL (ref 6–12)
PROT ?TM UR-MCNC: 202 MG/DL
PROT UR QL STRIP: ABNORMAL
PROT/CREAT UR: 1.3 MG/G{CREAT}
RBC # BLD AUTO: 4.05 10*6/MM3 (ref 4.14–5.8)
RBC # UR STRIP: ABNORMAL /HPF
REF LAB TEST METHOD: ABNORMAL
SP GR UR STRIP: 1.02 (ref 1–1.03)
SQUAMOUS #/AREA URNS HPF: ABNORMAL /HPF
UROBILINOGEN UR QL STRIP: ABNORMAL
WBC # UR STRIP: ABNORMAL /HPF
WBC NRBC COR # BLD: 5.63 10*3/MM3 (ref 3.4–10.8)

## 2021-12-06 PROCEDURE — 80048 BASIC METABOLIC PNL TOTAL CA: CPT

## 2021-12-06 PROCEDURE — 81001 URINALYSIS AUTO W/SCOPE: CPT

## 2021-12-06 PROCEDURE — 84156 ASSAY OF PROTEIN URINE: CPT

## 2021-12-06 PROCEDURE — 36415 COLL VENOUS BLD VENIPUNCTURE: CPT

## 2021-12-06 PROCEDURE — 83970 ASSAY OF PARATHORMONE: CPT

## 2021-12-06 PROCEDURE — 82570 ASSAY OF URINE CREATININE: CPT

## 2021-12-06 PROCEDURE — 85027 COMPLETE CBC AUTOMATED: CPT

## 2021-12-06 PROCEDURE — 84100 ASSAY OF PHOSPHORUS: CPT

## 2021-12-07 LAB
ANION GAP SERPL CALCULATED.3IONS-SCNC: 13.1 MMOL/L (ref 5–15)
BUN SERPL-MCNC: 29 MG/DL (ref 8–23)
BUN/CREAT SERPL: 9.9 (ref 7–25)
CALCIUM SPEC-SCNC: 9.7 MG/DL (ref 8.6–10.5)
CHLORIDE SERPL-SCNC: 100 MMOL/L (ref 98–107)
CO2 SERPL-SCNC: 23.9 MMOL/L (ref 22–29)
CREAT SERPL-MCNC: 2.92 MG/DL (ref 0.76–1.27)
GFR SERPL CREATININE-BSD FRML MDRD: 21 ML/MIN/1.73
GLUCOSE SERPL-MCNC: 185 MG/DL (ref 65–99)
PHOSPHATE SERPL-MCNC: 3.9 MG/DL (ref 2.5–4.5)
POTASSIUM SERPL-SCNC: 4.6 MMOL/L (ref 3.5–5.2)
PTH-INTACT SERPL-MCNC: 34.7 PG/ML (ref 15–65)
SODIUM SERPL-SCNC: 137 MMOL/L (ref 136–145)

## 2022-01-03 ENCOUNTER — LAB (OUTPATIENT)
Dept: LAB | Facility: HOSPITAL | Age: 72
End: 2022-01-03

## 2022-01-03 ENCOUNTER — TRANSCRIBE ORDERS (OUTPATIENT)
Dept: ADMINISTRATIVE | Facility: HOSPITAL | Age: 72
End: 2022-01-03

## 2022-01-03 DIAGNOSIS — N18.4 CHRONIC KIDNEY DISEASE, STAGE IV (SEVERE): Primary | ICD-10-CM

## 2022-01-03 DIAGNOSIS — N18.4 CHRONIC KIDNEY DISEASE, STAGE IV (SEVERE): ICD-10-CM

## 2022-01-03 LAB
25(OH)D3 SERPL-MCNC: 71.1 NG/ML (ref 30–100)
ANION GAP SERPL CALCULATED.3IONS-SCNC: 10.9 MMOL/L (ref 5–15)
BACTERIA UR QL AUTO: ABNORMAL /HPF
BILIRUB UR QL STRIP: NEGATIVE
BUN SERPL-MCNC: 37 MG/DL (ref 8–23)
BUN/CREAT SERPL: 13.1 (ref 7–25)
CALCIUM SPEC-SCNC: 9.7 MG/DL (ref 8.6–10.5)
CHLORIDE SERPL-SCNC: 99 MMOL/L (ref 98–107)
CLARITY UR: CLEAR
CO2 SERPL-SCNC: 27.1 MMOL/L (ref 22–29)
COLOR UR: YELLOW
CREAT SERPL-MCNC: 2.83 MG/DL (ref 0.76–1.27)
CREAT UR-MCNC: 70.1 MG/DL
DEPRECATED RDW RBC AUTO: 51.2 FL (ref 37–54)
ERYTHROCYTE [DISTWIDTH] IN BLOOD BY AUTOMATED COUNT: 15.4 % (ref 12.3–15.4)
GFR SERPL CREATININE-BSD FRML MDRD: 22 ML/MIN/1.73
GLUCOSE SERPL-MCNC: 171 MG/DL (ref 65–99)
GLUCOSE UR STRIP-MCNC: NEGATIVE MG/DL
HCT VFR BLD AUTO: 36.8 % (ref 37.5–51)
HGB BLD-MCNC: 11.8 G/DL (ref 13–17.7)
HGB UR QL STRIP.AUTO: NEGATIVE
KETONES UR QL STRIP: NEGATIVE
LEUKOCYTE ESTERASE UR QL STRIP.AUTO: NEGATIVE
MCH RBC QN AUTO: 28.8 PG (ref 26.6–33)
MCHC RBC AUTO-ENTMCNC: 32.1 G/DL (ref 31.5–35.7)
MCV RBC AUTO: 89.8 FL (ref 79–97)
NITRITE UR QL STRIP: NEGATIVE
PH UR STRIP.AUTO: 6 [PH] (ref 5–8)
PHOSPHATE SERPL-MCNC: 4.1 MG/DL (ref 2.5–4.5)
PLATELET # BLD AUTO: 403 10*3/MM3 (ref 140–450)
PMV BLD AUTO: 9.7 FL (ref 6–12)
POTASSIUM SERPL-SCNC: 4.7 MMOL/L (ref 3.5–5.2)
PROT ?TM UR-MCNC: 73 MG/DL
PROT UR QL STRIP: ABNORMAL
PROT/CREAT UR: 1 MG/G{CREAT}
PTH-INTACT SERPL-MCNC: 35.5 PG/ML (ref 15–65)
RBC # BLD AUTO: 4.1 10*6/MM3 (ref 4.14–5.8)
RBC # UR STRIP: ABNORMAL /HPF
REF LAB TEST METHOD: ABNORMAL
SODIUM SERPL-SCNC: 137 MMOL/L (ref 136–145)
SP GR UR STRIP: 1.01 (ref 1–1.03)
SQUAMOUS #/AREA URNS HPF: ABNORMAL /HPF
UROBILINOGEN UR QL STRIP: ABNORMAL
WBC # UR STRIP: ABNORMAL /HPF
WBC NRBC COR # BLD: 7.83 10*3/MM3 (ref 3.4–10.8)

## 2022-01-03 PROCEDURE — 84156 ASSAY OF PROTEIN URINE: CPT

## 2022-01-03 PROCEDURE — 83970 ASSAY OF PARATHORMONE: CPT

## 2022-01-03 PROCEDURE — 36415 COLL VENOUS BLD VENIPUNCTURE: CPT

## 2022-01-03 PROCEDURE — 85027 COMPLETE CBC AUTOMATED: CPT

## 2022-01-03 PROCEDURE — 82570 ASSAY OF URINE CREATININE: CPT

## 2022-01-03 PROCEDURE — 82306 VITAMIN D 25 HYDROXY: CPT

## 2022-01-03 PROCEDURE — 84100 ASSAY OF PHOSPHORUS: CPT

## 2022-01-03 PROCEDURE — 80048 BASIC METABOLIC PNL TOTAL CA: CPT

## 2022-01-03 PROCEDURE — 81001 URINALYSIS AUTO W/SCOPE: CPT

## 2022-01-24 ENCOUNTER — OFFICE VISIT (OUTPATIENT)
Dept: UROLOGY | Facility: CLINIC | Age: 72
End: 2022-01-24

## 2022-01-24 ENCOUNTER — LAB (OUTPATIENT)
Dept: LAB | Facility: HOSPITAL | Age: 72
End: 2022-01-24

## 2022-01-24 ENCOUNTER — TRANSCRIBE ORDERS (OUTPATIENT)
Dept: ADMINISTRATIVE | Facility: HOSPITAL | Age: 72
End: 2022-01-24

## 2022-01-24 VITALS — RESPIRATION RATE: 16 BRPM | BODY MASS INDEX: 33.66 KG/M2 | HEIGHT: 73 IN | WEIGHT: 254 LBS

## 2022-01-24 DIAGNOSIS — N40.0 BENIGN PROSTATIC HYPERPLASIA, UNSPECIFIED WHETHER LOWER URINARY TRACT SYMPTOMS PRESENT: Primary | ICD-10-CM

## 2022-01-24 DIAGNOSIS — N18.4 CHRONIC KIDNEY DISEASE, STAGE IV (SEVERE): ICD-10-CM

## 2022-01-24 DIAGNOSIS — N18.4 CHRONIC KIDNEY DISEASE, STAGE IV (SEVERE): Primary | ICD-10-CM

## 2022-01-24 PROBLEM — I25.9 CHRONIC ISCHEMIC HEART DISEASE: Status: ACTIVE | Noted: 2022-01-24

## 2022-01-24 PROBLEM — E11.8 DISORDER ASSOCIATED WITH TYPE 2 DIABETES MELLITUS: Status: ACTIVE | Noted: 2022-01-24

## 2022-01-24 PROBLEM — I50.9 CHF (CONGESTIVE HEART FAILURE): Status: ACTIVE | Noted: 2022-01-24

## 2022-01-24 PROBLEM — I12.9: Status: ACTIVE | Noted: 2018-03-08

## 2022-01-24 PROBLEM — I20.8 ATYPICAL ANGINA: Status: ACTIVE | Noted: 2020-02-03

## 2022-01-24 PROBLEM — H26.9 CATARACTS, BILATERAL: Status: ACTIVE | Noted: 2022-01-24

## 2022-01-24 PROBLEM — I20.89 ATYPICAL ANGINA: Status: ACTIVE | Noted: 2020-02-03

## 2022-01-24 PROBLEM — M51.369 DEGENERATION OF INTERVERTEBRAL DISC OF LUMBAR REGION: Status: ACTIVE | Noted: 2018-03-06

## 2022-01-24 PROBLEM — M51.36 DEGENERATION OF INTERVERTEBRAL DISC OF LUMBAR REGION: Status: ACTIVE | Noted: 2018-03-06

## 2022-01-24 PROBLEM — N17.9 ACUTE RENAL FAILURE SYNDROME: Status: ACTIVE | Noted: 2022-01-24

## 2022-01-24 LAB
BACTERIA UR QL AUTO: ABNORMAL /HPF
BASOPHILS # BLD AUTO: 0.03 10*3/MM3 (ref 0–0.2)
BASOPHILS NFR BLD AUTO: 0.4 % (ref 0–1.5)
BILIRUB UR QL STRIP: NEGATIVE
CLARITY UR: CLEAR
COLOR UR: YELLOW
CREAT UR-MCNC: 95.9 MG/DL
DEPRECATED RDW RBC AUTO: 52.1 FL (ref 37–54)
EOSINOPHIL # BLD AUTO: 0.14 10*3/MM3 (ref 0–0.4)
EOSINOPHIL NFR BLD AUTO: 2.1 % (ref 0.3–6.2)
ERYTHROCYTE [DISTWIDTH] IN BLOOD BY AUTOMATED COUNT: 15.7 % (ref 12.3–15.4)
GLUCOSE UR STRIP-MCNC: NEGATIVE MG/DL
HCT VFR BLD AUTO: 36 % (ref 37.5–51)
HGB BLD-MCNC: 11.7 G/DL (ref 13–17.7)
HGB UR QL STRIP.AUTO: NEGATIVE
HYALINE CASTS UR QL AUTO: ABNORMAL /LPF
IMM GRANULOCYTES # BLD AUTO: 0.02 10*3/MM3 (ref 0–0.05)
IMM GRANULOCYTES NFR BLD AUTO: 0.3 % (ref 0–0.5)
KETONES UR QL STRIP: NEGATIVE
LEUKOCYTE ESTERASE UR QL STRIP.AUTO: NEGATIVE
LYMPHOCYTES # BLD AUTO: 1.53 10*3/MM3 (ref 0.7–3.1)
LYMPHOCYTES NFR BLD AUTO: 22.9 % (ref 19.6–45.3)
MCH RBC QN AUTO: 29.1 PG (ref 26.6–33)
MCHC RBC AUTO-ENTMCNC: 32.5 G/DL (ref 31.5–35.7)
MCV RBC AUTO: 89.6 FL (ref 79–97)
MONOCYTES # BLD AUTO: 0.37 10*3/MM3 (ref 0.1–0.9)
MONOCYTES NFR BLD AUTO: 5.5 % (ref 5–12)
NEUTROPHILS NFR BLD AUTO: 4.59 10*3/MM3 (ref 1.7–7)
NEUTROPHILS NFR BLD AUTO: 68.8 % (ref 42.7–76)
NITRITE UR QL STRIP: NEGATIVE
PH UR STRIP.AUTO: 5.5 [PH] (ref 5–8)
PLATELET # BLD AUTO: 358 10*3/MM3 (ref 140–450)
PMV BLD AUTO: 9.3 FL (ref 6–12)
PROT ?TM UR-MCNC: 83.2 MG/DL
PROT UR QL STRIP: ABNORMAL
PROT/CREAT UR: 0.9 MG/G{CREAT}
RBC # BLD AUTO: 4.02 10*6/MM3 (ref 4.14–5.8)
RBC # UR STRIP: ABNORMAL /HPF
REF LAB TEST METHOD: ABNORMAL
SP GR UR STRIP: 1.02 (ref 1–1.03)
SQUAMOUS #/AREA URNS HPF: ABNORMAL /HPF
URINE VOLUME: 21
UROBILINOGEN UR QL STRIP: ABNORMAL
WBC # UR STRIP: ABNORMAL /HPF
WBC NRBC COR # BLD: 6.68 10*3/MM3 (ref 3.4–10.8)

## 2022-01-24 PROCEDURE — 99212 OFFICE O/P EST SF 10 MIN: CPT | Performed by: NURSE PRACTITIONER

## 2022-01-24 PROCEDURE — 81001 URINALYSIS AUTO W/SCOPE: CPT

## 2022-01-24 PROCEDURE — 84156 ASSAY OF PROTEIN URINE: CPT

## 2022-01-24 PROCEDURE — 82570 ASSAY OF URINE CREATININE: CPT

## 2022-01-24 PROCEDURE — 36415 COLL VENOUS BLD VENIPUNCTURE: CPT

## 2022-01-24 PROCEDURE — 85025 COMPLETE CBC W/AUTO DIFF WBC: CPT

## 2022-01-24 PROCEDURE — 80053 COMPREHEN METABOLIC PANEL: CPT

## 2022-01-24 RX ORDER — LEVOTHYROXINE SODIUM 0.15 MG/1
TABLET ORAL
COMMUNITY
Start: 2022-01-16 | End: 2023-01-09 | Stop reason: SDUPTHER

## 2022-01-24 RX ORDER — INSULIN GLARGINE 100 [IU]/ML
26 INJECTION, SOLUTION SUBCUTANEOUS DAILY
COMMUNITY
Start: 2021-11-30

## 2022-01-24 RX ORDER — ASCORBIC ACID 500 MG
TABLET ORAL
COMMUNITY
End: 2022-07-07

## 2022-01-24 RX ORDER — AMLODIPINE BESYLATE 10 MG/1
10 TABLET ORAL DAILY
COMMUNITY
Start: 2022-01-03

## 2022-01-24 RX ORDER — DIAZEPAM 10 MG/1
TABLET ORAL
COMMUNITY
Start: 2021-10-29 | End: 2022-07-07

## 2022-01-24 NOTE — PROGRESS NOTES
Chief Complaint: Follow-up (bph)    Subjective         History of Present Illness  Kyler Staley is a 71 y.o. male presents to [facility] UROLOGY to be seen for f/u on BPH and nocturia.    He has been implementing behavioral modification and is now getting up 0-1 x a night.    Denies straining to void     Denies hesitancy    No intermittent stream    Good stream    He is with no other bothersome voiding symptoms at this point in time.    Objective     Past Medical History:   Diagnosis Date   • Acne    • Alcoholism ([facility])    • Anxiety    • Arthritis    • Atrial fibrillation ([facility])    • Back pain    • Cardiac disease    • Cataracts, bilateral    • Chronic kidney disease, stage 3 ([facility])     moderate   • CKD (chronic kidney disease)    • Condition not found     hernia   • Coronary artery disease    • Depression    • Diabetes ([facility])    • Fatigue    • GERD (gastroesophageal reflux disease)    • Gout    • Hearing loss    • Hematospermia 12/09/2015   • High blood pressure    • High cholesterol    • History of cold sores    • History of gastritis    • [place] (hard of hearing)    • Hyperlipidemia    • Hypertension    • Joint pain    • Kidney disease    • Knee swelling    • Lumbosacral disc disease    • Narrowing of airway 2012    PER ENT   • Neuropathy    • 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    • PONV (postoperative nausea and vomiting)    • Psychiatric diagnosis    • Rotator cuff syndrome     REPAIRED BILATERAL   • Shortness of breath    • Sinus trouble    • Sleep apnea     BIPAP   • 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) ([facility])    • Swallowing difficulty    • Thin skin    • Thyroid condition    • Vocal cord dysfunction      HISTORY OF DISORDER ON ONE SIDE AFTER TRACHEA       Past Surgical History:   Procedure Laterality Date   • BACK SURGERY  2018    X2   • BACK SURGERY  2011   • CARDIAC SURGERY     • CHOLECYSTECTOMY     • CORONARY ARTERY BYPASS GRAFT  2012   •  HERNIA REPAIR     • JOINT REPLACEMENT     • KNEE SURGERY      right and left   • LUMBAR DISCECTOMY FUSION INSTRUMENTATION N/A 2/11/2021    Procedure: EXPLORATION OF FUSION T-12 S-1 REMOVAL OF HARDWARE REVISION OF FUSION AND INSTRUMENTATION T12-S1 WITH APPLICATION OF BONE MORPHOGENIC PROTEIN;  Surgeon: Baltazar Blankenship MD;  Location: [facility] MA[facility] OR;  Service: Orthopedic Spine;  Laterality: N/A;   • NOSE SURGERY     • REPLACEMENT TOTAL KNEE Bilateral    • SHOULDER SURGERY Bilateral     rotator cuff repair         Current Outpatient Medications:   •  allopurinol (ZYLOPRIM) 300 MG tablet, Take 300 mg by mouth Daily., Disp: , Rfl:   •  ALPRAZolam (XANAX) 1 MG tablet, Take 1 mg by mouth 3 (Three) Times a Day As Needed for Anxiety., Disp: , Rfl:   •  amLODIPine (NORVASC) 10 MG tablet, Take 10 mg by mouth Daily., Disp: , Rfl:   •  amLODIPine (NORVASC) 5 MG tablet, Take 5 mg by mouth Daily., Disp: , Rfl:   •  ascorbic acid (VITAMIN C) 500 MG tablet, Vitamin C 500 mg oral tablet take 1 tablet by oral route daily   Active, Disp: , Rfl:   •  aspirin 325 MG tablet, Take 325 mg by mouth Daily. TO FOLLOW MD ORDERS WHEN TO STOP, Disp: , Rfl:   •  atorvastatin (LIPITOR) 20 MG tablet, Take 20 mg by mouth Every Evening., Disp: , Rfl:   •  carvedilol (COREG) 25 MG tablet, Take 25 mg by mouth 2 (Two) Times a Day., Disp: , Rfl:   •  Cholecalciferol 100 MCG (4000 UT) capsule, Vitamin D3 100 mcg (4,000 unit) oral capsule take 1 capsule by oral route daily   Active, Disp: , Rfl:   •  clopidogrel (PLAVIX) 75 MG tablet, Restart home dose of plavix tomorrow 2/15, Disp: 30 tablet, Rfl:   •  diazePAM (VALIUM) 10 MG tablet, , Disp: , Rfl:   •  fenofibrate (TRICOR) 145 MG tablet, Take 145 mg by mouth Daily., Disp: , Rfl:   •  Flaxseed, Linseed, 1000 MG capsule, Take 1,200 mg by mouth Daily. HOLD FOR SURGERY, Disp: , Rfl:   •  furosemide (LASIX) 40 MG tablet, Take 40 mg by mouth Daily., Disp: , Rfl:   •  glipizide (GLUCOTROL) 10 MG tablet, Take 10  mg by mouth 2 (two) times a day., Disp: , Rfl:   •  glucose blood (ACCU-CHEK SUNDAY PLUS) test strip, , Disp: , Rfl:   •  HYDROcodone-acetaminophen (NORCO) 5-325 MG per tablet, Take 1 tablet by mouth Every 4 (Four) Hours As Needed., Disp: , Rfl:   •  imipramine (TOFRANIL) 50 MG tablet, Take 100 mg by mouth Every Night., Disp: , Rfl:   •  insulin regular (humuLIN R,novoLIN R) 100 UNIT/ML injection, Inject  under the skin into the appropriate area as directed., Disp: , Rfl:   •  isosorbide mononitrate (IMDUR) 60 MG 24 hr tablet, Take 60 mg by mouth Daily., Disp: , Rfl:   •  Lantus SoloStar 100 UNIT/ML injection pen, , Disp: , Rfl:   •  levothyroxine (SYNTHROID, LEVOTHROID) 112 MCG tablet, Take 112 mcg by mouth Daily., Disp: , Rfl:   •  levothyroxine (SYNTHROID, LEVOTHROID) 150 MCG tablet, , Disp: , Rfl:   •  losartan (COZAAR) 100 MG tablet, Take 100 mg by mouth Daily., Disp: , Rfl:   •  Melatonin 2.5 MG capsule, Take 2.5 mg by mouth Every Night., Disp: , Rfl:   •  ondansetron (Zofran) 4 MG tablet, Take 1 tablet by mouth Every 8 (Eight) Hours As Needed for Nausea or Vomiting., Disp: 25 tablet, Rfl: 0  •  pantoprazole (PROTONIX) 40 MG EC tablet, Take 40 mg by mouth Daily., Disp: , Rfl:   •  sennosides-docusate (senna-docusate sodium) 8.6-50 MG per tablet, Take 1 tablet by mouth 2 (two) times a day., Disp: 30 tablet, Rfl: 0  •  tamsulosin (FLOMAX) 0.4 MG capsule 24 hr capsule, Take 0.4 mg by mouth Daily., Disp: , Rfl:   •  tiZANidine (Zanaflex) 4 MG tablet, Take 1 tablet by mouth Every 8 (Eight) Hours As Needed for Muscle Spasms., Disp: 60 tablet, Rfl: 0  •  Zinc 50 MG capsule, Take 50 mg by mouth Daily., Disp: , Rfl:   •  metFORMIN (GLUCOPHAGE) 1000 MG tablet, Take 1,000 mg by mouth Daily With Breakfast., Disp: , Rfl:   •  spironolactone (ALDACTONE) 25 MG tablet, Take 25 mg by mouth Daily., Disp: , Rfl:     Allergies   Allergen Reactions   • Ativan [Lorazepam] Other (See Comments)     MADE PATIENT VERY AGGRESSIVE     •  "Bupropion Other (See Comments)     MAKES PATIENT VERY AGGRESSIVE     • Propoxyphene Nausea And Vomiting   • Adhesive Tape Rash        Family History   Problem Relation Age of Onset   • Heart attack Mother    • Malig Hyperthermia Neg Hx        Social History     Socioeconomic History   • Marital status:    Tobacco Use   • Smoking status: Former Smoker     Packs/day: 2.00     Years: 10.00     Pack years: 20.00     Types: Cigarettes     Quit date:      Years since quittin.0   • Smokeless tobacco: Never Used   Vaping Use   • Vaping Use: Never used   Substance and Sexual Activity   • Alcohol use: No   • Drug use: Yes     Types: Hydrocodone   • Sexual activity: Defer       Vital Signs:   Resp 16   Ht 185.4 cm (73\")   Wt 115 kg (254 lb)   BMI 33.51 kg/m²      Physical Exam     Result Review :   The following data was reviewed by: CASSIDY Wilhelm on 2021:  Results for orders placed or performed in visit on 22   Bladder Scan   Result Value Ref Range    Urine Volume 21        Bladder Scan interpretation 2022    Estimation of residual urine via BVI 3000 [address]athon Bladder Scan  Residual Urine: 21 ml  Indication: Benign prostatic hyperplasia, unspecified whether lower urinary tract symptoms present   Position: Supine  Examination: Incremental scanning of the suprapubic area using 2.0 MHz transducer using copious amounts of acoustic gel.   Findings: An anechoic area was demonstrated which represented the bladder, with measurement of residual urine as noted. I inspected this myself. In that the residual urine was insignificant, refer to plan for treatment and plan necessary at this time.         Procedures        Assessment and Plan    Diagnoses and all orders for this visit:    1. Benign prostatic hyperplasia, unspecified whether lower urinary tract symptoms present (Primary)  -     Bladder Scan    Patient doing well at this point in time.  We will have him follow-up with us in 1 year or " sooner if needed.  Refills for medications sent today      I spent 10 minutes caring for Kyler on this date of service. This time includes time spent by me in the following activities:reviewing tests, obtaining and/or reviewing a separately obtained history, performing a medically appropriate examination and/or evaluation , counseling and educating the patient/family/caregiver, ordering medications, tests, or procedures, and documenting information in the medical record  Follow Up   Return in about 1 year (around 1/24/2023) for annual F/U BPH .  Patient was given instructions and counseling regarding his condition or for health maintenance advice. Please see specific information pulled into the AVS if appropriate.

## 2022-01-25 LAB
ALBUMIN SERPL-MCNC: 4.1 G/DL (ref 3.5–5.2)
ALBUMIN/GLOB SERPL: 1.6 G/DL
ALP SERPL-CCNC: 42 U/L (ref 39–117)
ALT SERPL W P-5'-P-CCNC: 14 U/L (ref 1–41)
ANION GAP SERPL CALCULATED.3IONS-SCNC: 11.5 MMOL/L (ref 5–15)
AST SERPL-CCNC: 16 U/L (ref 1–40)
BILIRUB SERPL-MCNC: 0.2 MG/DL (ref 0–1.2)
BUN SERPL-MCNC: 40 MG/DL (ref 8–23)
BUN/CREAT SERPL: 12.7 (ref 7–25)
CALCIUM SPEC-SCNC: 9.8 MG/DL (ref 8.6–10.5)
CHLORIDE SERPL-SCNC: 98 MMOL/L (ref 98–107)
CO2 SERPL-SCNC: 24.5 MMOL/L (ref 22–29)
CREAT SERPL-MCNC: 3.14 MG/DL (ref 0.76–1.27)
GFR SERPL CREATININE-BSD FRML MDRD: 20 ML/MIN/1.73
GLOBULIN UR ELPH-MCNC: 2.6 GM/DL
GLUCOSE SERPL-MCNC: 367 MG/DL (ref 65–99)
POTASSIUM SERPL-SCNC: 4.8 MMOL/L (ref 3.5–5.2)
PROT SERPL-MCNC: 6.7 G/DL (ref 6–8.5)
SODIUM SERPL-SCNC: 134 MMOL/L (ref 136–145)

## 2022-02-01 ENCOUNTER — TRANSCRIBE ORDERS (OUTPATIENT)
Dept: ADMINISTRATIVE | Facility: HOSPITAL | Age: 72
End: 2022-02-01

## 2022-02-01 ENCOUNTER — LAB (OUTPATIENT)
Dept: LAB | Facility: HOSPITAL | Age: 72
End: 2022-02-01

## 2022-02-01 DIAGNOSIS — E13.9 DIABETES 1.5, MANAGED AS TYPE 2: ICD-10-CM

## 2022-02-01 DIAGNOSIS — N18.4 CHRONIC KIDNEY DISEASE, STAGE IV (SEVERE): ICD-10-CM

## 2022-02-01 DIAGNOSIS — N18.4 CHRONIC KIDNEY DISEASE, STAGE IV (SEVERE): Primary | ICD-10-CM

## 2022-02-01 DIAGNOSIS — R80.9 PROTEINURIA, UNSPECIFIED TYPE: ICD-10-CM

## 2022-02-01 LAB
ALBUMIN SERPL-MCNC: 4.1 G/DL (ref 3.5–5.2)
ALBUMIN/GLOB SERPL: 1.6 G/DL
ALP SERPL-CCNC: 39 U/L (ref 39–117)
ALT SERPL W P-5'-P-CCNC: 11 U/L (ref 1–41)
ANION GAP SERPL CALCULATED.3IONS-SCNC: 10.7 MMOL/L (ref 5–15)
AST SERPL-CCNC: 14 U/L (ref 1–40)
BILIRUB SERPL-MCNC: 0.2 MG/DL (ref 0–1.2)
BILIRUB UR QL STRIP: NEGATIVE
BUN SERPL-MCNC: 37 MG/DL (ref 8–23)
BUN/CREAT SERPL: 13.3 (ref 7–25)
CALCIUM SPEC-SCNC: 10 MG/DL (ref 8.6–10.5)
CHLORIDE SERPL-SCNC: 102 MMOL/L (ref 98–107)
CLARITY UR: CLEAR
CO2 SERPL-SCNC: 23.3 MMOL/L (ref 22–29)
COLOR UR: YELLOW
CREAT SERPL-MCNC: 2.78 MG/DL (ref 0.76–1.27)
CREAT UR-MCNC: 112.3 MG/DL
GFR SERPL CREATININE-BSD FRML MDRD: 23 ML/MIN/1.73
GLOBULIN UR ELPH-MCNC: 2.6 GM/DL
GLUCOSE SERPL-MCNC: 146 MG/DL (ref 65–99)
GLUCOSE UR STRIP-MCNC: NEGATIVE MG/DL
HGB UR QL STRIP.AUTO: NEGATIVE
KETONES UR QL STRIP: NEGATIVE
LEUKOCYTE ESTERASE UR QL STRIP.AUTO: NEGATIVE
NITRITE UR QL STRIP: NEGATIVE
PH UR STRIP.AUTO: 5.5 [PH] (ref 5–8)
POTASSIUM SERPL-SCNC: 4.8 MMOL/L (ref 3.5–5.2)
PROT ?TM UR-MCNC: 85.4 MG/DL
PROT SERPL-MCNC: 6.7 G/DL (ref 6–8.5)
PROT UR QL STRIP: ABNORMAL
PROT/CREAT UR: 0.8 MG/G{CREAT}
SODIUM SERPL-SCNC: 136 MMOL/L (ref 136–145)
SP GR UR STRIP: 1.02 (ref 1–1.03)
UROBILINOGEN UR QL STRIP: ABNORMAL

## 2022-02-01 PROCEDURE — 80053 COMPREHEN METABOLIC PANEL: CPT

## 2022-02-01 PROCEDURE — 81003 URINALYSIS AUTO W/O SCOPE: CPT

## 2022-02-01 PROCEDURE — 36415 COLL VENOUS BLD VENIPUNCTURE: CPT

## 2022-02-01 PROCEDURE — 84156 ASSAY OF PROTEIN URINE: CPT

## 2022-02-01 PROCEDURE — 82570 ASSAY OF URINE CREATININE: CPT

## 2022-03-06 DIAGNOSIS — N40.0 BENIGN PROSTATIC HYPERPLASIA, UNSPECIFIED WHETHER LOWER URINARY TRACT SYMPTOMS PRESENT: Primary | ICD-10-CM

## 2022-03-07 RX ORDER — TAMSULOSIN HYDROCHLORIDE 0.4 MG/1
CAPSULE ORAL
Qty: 180 CAPSULE | Refills: 0 | Status: SHIPPED | OUTPATIENT
Start: 2022-03-07 | End: 2022-06-06

## 2022-04-11 ENCOUNTER — LAB (OUTPATIENT)
Dept: LAB | Facility: HOSPITAL | Age: 72
End: 2022-04-11

## 2022-04-11 ENCOUNTER — TRANSCRIBE ORDERS (OUTPATIENT)
Dept: ADMINISTRATIVE | Facility: HOSPITAL | Age: 72
End: 2022-04-11

## 2022-04-11 DIAGNOSIS — E13.9 MATURITY ONSET DIABETES MELLITUS IN YOUNG: ICD-10-CM

## 2022-04-11 DIAGNOSIS — N18.4 CHRONIC KIDNEY DISEASE, STAGE IV (SEVERE): Primary | ICD-10-CM

## 2022-04-11 DIAGNOSIS — N18.4 CHRONIC KIDNEY DISEASE, STAGE IV (SEVERE): ICD-10-CM

## 2022-04-11 LAB
ALBUMIN SERPL-MCNC: 4.1 G/DL (ref 3.5–5.2)
ALBUMIN/GLOB SERPL: 1.5 G/DL
ALP SERPL-CCNC: 41 U/L (ref 39–117)
ALT SERPL W P-5'-P-CCNC: 11 U/L (ref 1–41)
ANION GAP SERPL CALCULATED.3IONS-SCNC: 13.9 MMOL/L (ref 5–15)
AST SERPL-CCNC: 15 U/L (ref 1–40)
BACTERIA UR QL AUTO: ABNORMAL /HPF
BASOPHILS # BLD AUTO: 0.02 10*3/MM3 (ref 0–0.2)
BASOPHILS NFR BLD AUTO: 0.3 % (ref 0–1.5)
BILIRUB SERPL-MCNC: 0.2 MG/DL (ref 0–1.2)
BILIRUB UR QL STRIP: NEGATIVE
BUN SERPL-MCNC: 34 MG/DL (ref 8–23)
BUN/CREAT SERPL: 12.7 (ref 7–25)
CALCIUM SPEC-SCNC: 9.7 MG/DL (ref 8.6–10.5)
CHLORIDE SERPL-SCNC: 100 MMOL/L (ref 98–107)
CLARITY UR: CLEAR
CO2 SERPL-SCNC: 21.1 MMOL/L (ref 22–29)
COLOR UR: YELLOW
CREAT SERPL-MCNC: 2.68 MG/DL (ref 0.76–1.27)
CREAT UR-MCNC: 108.5 MG/DL
DEPRECATED RDW RBC AUTO: 52.3 FL (ref 37–54)
EGFRCR SERPLBLD CKD-EPI 2021: 24.7 ML/MIN/1.73
EOSINOPHIL # BLD AUTO: 0.39 10*3/MM3 (ref 0–0.4)
EOSINOPHIL NFR BLD AUTO: 5.8 % (ref 0.3–6.2)
ERYTHROCYTE [DISTWIDTH] IN BLOOD BY AUTOMATED COUNT: 15.7 % (ref 12.3–15.4)
GLOBULIN UR ELPH-MCNC: 2.7 GM/DL
GLUCOSE SERPL-MCNC: 232 MG/DL (ref 65–99)
GLUCOSE UR STRIP-MCNC: NEGATIVE MG/DL
HCT VFR BLD AUTO: 37.7 % (ref 37.5–51)
HGB BLD-MCNC: 11.6 G/DL (ref 13–17.7)
HGB UR QL STRIP.AUTO: ABNORMAL
IMM GRANULOCYTES # BLD AUTO: 0.02 10*3/MM3 (ref 0–0.05)
IMM GRANULOCYTES NFR BLD AUTO: 0.3 % (ref 0–0.5)
KETONES UR QL STRIP: NEGATIVE
LEUKOCYTE ESTERASE UR QL STRIP.AUTO: NEGATIVE
LYMPHOCYTES # BLD AUTO: 1.99 10*3/MM3 (ref 0.7–3.1)
LYMPHOCYTES NFR BLD AUTO: 29.6 % (ref 19.6–45.3)
MCH RBC QN AUTO: 28 PG (ref 26.6–33)
MCHC RBC AUTO-ENTMCNC: 30.8 G/DL (ref 31.5–35.7)
MCV RBC AUTO: 91.1 FL (ref 79–97)
MONOCYTES # BLD AUTO: 0.47 10*3/MM3 (ref 0.1–0.9)
MONOCYTES NFR BLD AUTO: 7 % (ref 5–12)
NEUTROPHILS NFR BLD AUTO: 3.84 10*3/MM3 (ref 1.7–7)
NEUTROPHILS NFR BLD AUTO: 57 % (ref 42.7–76)
NITRITE UR QL STRIP: NEGATIVE
PH UR STRIP.AUTO: 5.5 [PH] (ref 5–8)
PLATELET # BLD AUTO: 320 10*3/MM3 (ref 140–450)
PMV BLD AUTO: 10.2 FL (ref 6–12)
POTASSIUM SERPL-SCNC: 4.9 MMOL/L (ref 3.5–5.2)
PROT ?TM UR-MCNC: 66 MG/DL
PROT SERPL-MCNC: 6.8 G/DL (ref 6–8.5)
PROT UR QL STRIP: ABNORMAL
PROT/CREAT UR: 0.61 MG/G{CREAT}
RBC # BLD AUTO: 4.14 10*6/MM3 (ref 4.14–5.8)
RBC # UR STRIP: ABNORMAL /HPF
REF LAB TEST METHOD: ABNORMAL
SODIUM SERPL-SCNC: 135 MMOL/L (ref 136–145)
SP GR UR STRIP: 1.02 (ref 1–1.03)
SQUAMOUS #/AREA URNS HPF: ABNORMAL /HPF
UROBILINOGEN UR QL STRIP: ABNORMAL
WBC # UR STRIP: ABNORMAL /HPF
WBC NRBC COR # BLD: 6.73 10*3/MM3 (ref 3.4–10.8)

## 2022-04-11 PROCEDURE — 36415 COLL VENOUS BLD VENIPUNCTURE: CPT

## 2022-04-11 PROCEDURE — 80053 COMPREHEN METABOLIC PANEL: CPT

## 2022-04-11 PROCEDURE — 85025 COMPLETE CBC W/AUTO DIFF WBC: CPT

## 2022-04-11 PROCEDURE — 81001 URINALYSIS AUTO W/SCOPE: CPT

## 2022-04-11 PROCEDURE — 84156 ASSAY OF PROTEIN URINE: CPT

## 2022-04-11 PROCEDURE — 82570 ASSAY OF URINE CREATININE: CPT

## 2022-06-04 DIAGNOSIS — N40.0 BENIGN PROSTATIC HYPERPLASIA, UNSPECIFIED WHETHER LOWER URINARY TRACT SYMPTOMS PRESENT: ICD-10-CM

## 2022-06-06 RX ORDER — TAMSULOSIN HYDROCHLORIDE 0.4 MG/1
CAPSULE ORAL
Qty: 180 CAPSULE | Refills: 0 | Status: SHIPPED | OUTPATIENT
Start: 2022-06-06 | End: 2022-07-07

## 2022-07-01 ENCOUNTER — TRANSCRIBE ORDERS (OUTPATIENT)
Dept: ADMINISTRATIVE | Facility: HOSPITAL | Age: 72
End: 2022-07-01

## 2022-07-01 DIAGNOSIS — N18.4 CHRONIC KIDNEY DISEASE, STAGE IV (SEVERE): Primary | ICD-10-CM

## 2022-07-06 ENCOUNTER — HOSPITAL ENCOUNTER (OUTPATIENT)
Dept: GENERAL RADIOLOGY | Facility: HOSPITAL | Age: 72
Discharge: HOME | End: 2022-07-06

## 2022-07-06 ENCOUNTER — LAB (OUTPATIENT)
Dept: LAB | Facility: HOSPITAL | Age: 72
End: 2022-07-06

## 2022-07-06 ENCOUNTER — TRANSCRIBE ORDERS (OUTPATIENT)
Dept: ADMINISTRATIVE | Facility: HOSPITAL | Age: 72
End: 2022-07-06

## 2022-07-06 DIAGNOSIS — N18.4 CHRONIC KIDNEY DISEASE, STAGE IV (SEVERE): Primary | ICD-10-CM

## 2022-07-06 DIAGNOSIS — R80.9 PROTEINURIA, UNSPECIFIED TYPE: ICD-10-CM

## 2022-07-06 DIAGNOSIS — N18.4 CHRONIC KIDNEY DISEASE, STAGE IV (SEVERE): ICD-10-CM

## 2022-07-06 DIAGNOSIS — Z96.652 S/P TKR (TOTAL KNEE REPLACEMENT), LEFT: ICD-10-CM

## 2022-07-06 DIAGNOSIS — Z96.652 S/P TKR (TOTAL KNEE REPLACEMENT), LEFT: Primary | ICD-10-CM

## 2022-07-06 PROCEDURE — 73560 X-RAY EXAM OF KNEE 1 OR 2: CPT

## 2022-07-06 PROCEDURE — 36415 COLL VENOUS BLD VENIPUNCTURE: CPT

## 2022-07-06 PROCEDURE — 80048 BASIC METABOLIC PNL TOTAL CA: CPT

## 2022-07-07 ENCOUNTER — OFFICE VISIT (OUTPATIENT)
Dept: ORTHOPEDIC SURGERY | Facility: CLINIC | Age: 72
End: 2022-07-07

## 2022-07-07 VITALS — TEMPERATURE: 97.3 F | BODY MASS INDEX: 33.13 KG/M2 | WEIGHT: 250 LBS | HEIGHT: 73 IN

## 2022-07-07 DIAGNOSIS — Z96.652 S/P TKR (TOTAL KNEE REPLACEMENT), LEFT: Primary | ICD-10-CM

## 2022-07-07 LAB
ANION GAP SERPL CALCULATED.3IONS-SCNC: 10 MMOL/L (ref 5–15)
BUN SERPL-MCNC: 28 MG/DL (ref 8–23)
BUN/CREAT SERPL: 10.2 (ref 7–25)
CALCIUM SPEC-SCNC: 9 MG/DL (ref 8.6–10.5)
CHLORIDE SERPL-SCNC: 100 MMOL/L (ref 98–107)
CO2 SERPL-SCNC: 22 MMOL/L (ref 22–29)
CREAT SERPL-MCNC: 2.74 MG/DL (ref 0.76–1.27)
EGFRCR SERPLBLD CKD-EPI 2021: 23.9 ML/MIN/1.73
GLUCOSE SERPL-MCNC: 238 MG/DL (ref 65–99)
POTASSIUM SERPL-SCNC: 5.2 MMOL/L (ref 3.5–5.2)
SODIUM SERPL-SCNC: 132 MMOL/L (ref 136–145)

## 2022-07-07 PROCEDURE — 99213 OFFICE O/P EST LOW 20 MIN: CPT | Performed by: ORTHOPAEDIC SURGERY

## 2022-07-07 RX ORDER — ATORVASTATIN CALCIUM 20 MG/1
1 TABLET, FILM COATED ORAL DAILY
COMMUNITY
Start: 2022-06-06 | End: 2023-01-09

## 2022-07-07 RX ORDER — FENOFIBRATE 145 MG/1
1 TABLET, COATED ORAL DAILY
COMMUNITY
Start: 2022-06-06 | End: 2023-01-09

## 2022-07-07 NOTE — PROGRESS NOTES
Chief Complaint  Follow-up of the Left Knee (S/P TKA ), Rash, and Joint Swelling    Subjective    History of Present Illness      Kyler Staley is a 72 y.o. male who presents to [facility] for left knee pain and discomfort.  History of Present Illness: Patient who is known to me from several years ago.  He states that he had a left total hip arthroplasty performed by me in 2014.  He has done extremely well without surgical intervention.  There are no limitations on his range of motion.  However for the past 3 to 4 days his pain suddenly intensified.  He denies any history of trauma or falls.  He states that he developed some rash with itching and swelling.  Some petechiae have also developed in this location.  He is on Eliquis long-term for cardiovascular issues.  He states that his pain and swelling have gotten somewhat worse over the past few days.  He states there are no changes in his detergents or clothing that could have caused an allergic reaction.  He did start Jardiance in June to get his blood sugars better under control.  The patient has a history of cardiac disease and in fact has a pacemaker which requires the use of Eliquis.  He does not have any fevers, rigors or chills.  There is no history suggestive of viral disease such as COVID or any other rickettsial disease that might have led to petechial hemorrhages.  He rates his pain as a 5 on a scale of 1-10.  Pain Location:  LEFT knee  Radiation: none  Quality: aching, throbbing  Intensity/Severity: moderate  Duration: 4-5 days  Progression of symptoms: yes, progressive worsening  Onset quality: gradual   Timing: intermittent  Aggravating Factors: going up and down stairs, walking, standing  Alleviating Factors: NSAIDs, rest, cane/walker  Previous Episodes: none mentioned  Associated Symptoms: pain, swelling, clicking/popping, stiffness  ADLs Affected: dressing, ambulating, recreational activities/sports  Previous  "Treatment: NSAIDs and prior surgery       Objective   Vital Signs:   Temp 97.3 °F (36.3 °C)   Ht 185.4 cm (73\")   Wt 113 kg (250 lb)   BMI 32.98 kg/m²     Physical Exam  Physical Exam  Vitals signs and nursing note reviewed.   Constitutional:       Appearance: Normal appearance.   Pulmonary:      Effort: Pulmonary effort is normal.   Skin:     General: Skin is warm and dry.      Capillary Refill: Capillary refill takes less than 2 seconds.   Neurological:      General: No focal deficit present.      Mental Status: He is alert and oriented to person, place, and time. Mental status is at baseline.   Psychiatric:         Mood and Affect: Mood normal.         Behavior: Behavior normal.         Thought Content: Thought content normal.         Judgment: Judgment normal.     Ortho Exam     Left knee.The patient is status post total knee arthroplasty postoperative 8 year(s). Incision is clean. Calf is soft and nontender. Homans sign is negative. There is no clicking, popping or catching. Anterior and posterior drawer signs are negative.  There is no instability of the components. Appropriate amounts of swelling and bruising are noted. Dorsalis pedis and posterior tibial artery pulses are palpable. Common peroneal nerve function is well preserved. Range of motion is from 0-120 degrees of flexion. Gait is cautious but otherwise fairly normal. There is no evidence of a deep seated joint infection.  There are some small petechiae which are noted to be present over the anterior and medial aspect of the shin.  There is no hemorrhage related to the knee joint itself.  There is no evidence of a compartmental syndrome.            Result Review :   The following data was reviewed by: Theo Her MD on 07/07/2022:  Radiologic studies - see below for interpretation  LEFT knee xrays  weightbearing/standing ap/lateral views were ordered by Theo Her MD. Performed at [facility] on 07/06/2022. Images were " independently viewed and interpreted by myself, my impression as follows:    left Knee X-Ray  Indication: Evaluation of implant position after total knee arthroplasty.  AP, Lateral views  Findings: Well-placed implants with a good cement mantle without any subsidence of the implant.  no bony lesion  Soft tissues within normal limits  within normal limits joint spaces  Hardware appropriately positioned yes      no prior studies available for comparison.        X-RAY was ordered and reviewed by Theo Her MD        Procedures           Assessment   Assessment and Plan    Diagnoses and all orders for this visit:    1. S/P TKR (total knee replacement), left (Primary)          Follow Up   · Compression/brace to the knee to prevent it from buckling and giving out.  · Calcium and vitamin D for bone health.  · I have discussed with the patient and his wife Pao that if his symptoms do not resolve in 1 week with nonoperative management including the use of Tylenol and topical hydrocortisone cream we will proceed with trying to get blood work done for infection parameters.  · My recommendations are to get a CBC with differential count, sedimentation rate and a C-reactive protein.  · Also discussed with the patient about getting a triple phase bone scan and a CT scan to make sure that there is no localized infection.  · Rest, ice, compression, and elevation (RICE) therapy  · Stretching and strengthening exercises  · OTC Alternate Ibuprofen and Tylenol as needed  · Follow up in 4 week(s)  • Patient was given instructions and counseling regarding his condition or for health maintenance advice. Please see specific information pulled into the AVS if appropriate.     Theo Her MD   Date of Encounter: 7/7/2022   Electronically signed by Theo Her MD, 07/07/22, 9:28 AM EDT.     EMR Dragon/Transcription disclaimer:  Much of this encounter note is an electronic transcription/translation of spoken language to printed text.  The electronic translation of spoken language may permit erroneous, or at times, nonsensical words or phrases to be inadvertently transcribed; Although I have reviewed the note for such errors, some may still exist.

## 2022-07-08 ENCOUNTER — APPOINTMENT (OUTPATIENT)
Dept: ULTRASOUND IMAGING | Facility: HOSPITAL | Age: 72
End: 2022-07-08

## 2022-07-13 ENCOUNTER — LAB (OUTPATIENT)
Dept: LAB | Facility: HOSPITAL | Age: 72
End: 2022-07-13

## 2022-07-13 ENCOUNTER — TRANSCRIBE ORDERS (OUTPATIENT)
Dept: ADMINISTRATIVE | Facility: HOSPITAL | Age: 72
End: 2022-07-13

## 2022-07-13 DIAGNOSIS — R80.9 PROTEINURIA, UNSPECIFIED TYPE: ICD-10-CM

## 2022-07-13 DIAGNOSIS — N18.4 CHRONIC KIDNEY DISEASE, STAGE IV (SEVERE): ICD-10-CM

## 2022-07-13 DIAGNOSIS — E13.9 MATURITY ONSET DIABETES MELLITUS IN YOUNG: ICD-10-CM

## 2022-07-13 DIAGNOSIS — N18.4 CHRONIC KIDNEY DISEASE, STAGE IV (SEVERE): Primary | ICD-10-CM

## 2022-07-13 LAB
ANION GAP SERPL CALCULATED.3IONS-SCNC: 15.1 MMOL/L (ref 5–15)
BACTERIA UR QL AUTO: ABNORMAL /HPF
BASOPHILS # BLD AUTO: 0.04 10*3/MM3 (ref 0–0.2)
BASOPHILS NFR BLD AUTO: 0.5 % (ref 0–1.5)
BILIRUB UR QL STRIP: NEGATIVE
BUN SERPL-MCNC: 27 MG/DL (ref 8–23)
BUN/CREAT SERPL: 10 (ref 7–25)
CALCIUM SPEC-SCNC: 10 MG/DL (ref 8.6–10.5)
CHLORIDE SERPL-SCNC: 100 MMOL/L (ref 98–107)
CLARITY UR: CLEAR
CO2 SERPL-SCNC: 20.9 MMOL/L (ref 22–29)
COLOR UR: YELLOW
CREAT SERPL-MCNC: 2.7 MG/DL (ref 0.76–1.27)
CREAT UR-MCNC: 88.8 MG/DL
DEPRECATED RDW RBC AUTO: 51.6 FL (ref 37–54)
EGFRCR SERPLBLD CKD-EPI 2021: 24.3 ML/MIN/1.73
EOSINOPHIL # BLD AUTO: 0.19 10*3/MM3 (ref 0–0.4)
EOSINOPHIL NFR BLD AUTO: 2.5 % (ref 0.3–6.2)
ERYTHROCYTE [DISTWIDTH] IN BLOOD BY AUTOMATED COUNT: 16 % (ref 12.3–15.4)
GLUCOSE SERPL-MCNC: 256 MG/DL (ref 65–99)
GLUCOSE UR STRIP-MCNC: ABNORMAL MG/DL
HCT VFR BLD AUTO: 40.4 % (ref 37.5–51)
HGB BLD-MCNC: 12.6 G/DL (ref 13–17.7)
HGB UR QL STRIP.AUTO: NEGATIVE
IMM GRANULOCYTES # BLD AUTO: 0.03 10*3/MM3 (ref 0–0.05)
IMM GRANULOCYTES NFR BLD AUTO: 0.4 % (ref 0–0.5)
KETONES UR QL STRIP: NEGATIVE
LEUKOCYTE ESTERASE UR QL STRIP.AUTO: NEGATIVE
LYMPHOCYTES # BLD AUTO: 1.61 10*3/MM3 (ref 0.7–3.1)
LYMPHOCYTES NFR BLD AUTO: 21.3 % (ref 19.6–45.3)
MCH RBC QN AUTO: 27.4 PG (ref 26.6–33)
MCHC RBC AUTO-ENTMCNC: 31.2 G/DL (ref 31.5–35.7)
MCV RBC AUTO: 87.8 FL (ref 79–97)
MONOCYTES # BLD AUTO: 0.53 10*3/MM3 (ref 0.1–0.9)
MONOCYTES NFR BLD AUTO: 7 % (ref 5–12)
NEUTROPHILS NFR BLD AUTO: 5.15 10*3/MM3 (ref 1.7–7)
NEUTROPHILS NFR BLD AUTO: 68.3 % (ref 42.7–76)
NITRITE UR QL STRIP: NEGATIVE
PH UR STRIP.AUTO: 6 [PH] (ref 5–8)
PLATELET # BLD AUTO: 342 10*3/MM3 (ref 140–450)
PMV BLD AUTO: 8.9 FL (ref 6–12)
POTASSIUM SERPL-SCNC: 5.3 MMOL/L (ref 3.5–5.2)
PROT ?TM UR-MCNC: 40.4 MG/DL
PROT UR QL STRIP: ABNORMAL
PROT/CREAT UR: 0.45 MG/G{CREAT}
RBC # BLD AUTO: 4.6 10*6/MM3 (ref 4.14–5.8)
RBC # UR STRIP: ABNORMAL /HPF
REF LAB TEST METHOD: ABNORMAL
SODIUM SERPL-SCNC: 136 MMOL/L (ref 136–145)
SP GR UR STRIP: 1.01 (ref 1–1.03)
SQUAMOUS #/AREA URNS HPF: ABNORMAL /HPF
UROBILINOGEN UR QL STRIP: ABNORMAL
WBC # UR STRIP: ABNORMAL /HPF
WBC NRBC COR # BLD: 7.55 10*3/MM3 (ref 3.4–10.8)

## 2022-07-13 PROCEDURE — 84156 ASSAY OF PROTEIN URINE: CPT

## 2022-07-13 PROCEDURE — 80048 BASIC METABOLIC PNL TOTAL CA: CPT

## 2022-07-13 PROCEDURE — 36415 COLL VENOUS BLD VENIPUNCTURE: CPT

## 2022-07-13 PROCEDURE — 81001 URINALYSIS AUTO W/SCOPE: CPT

## 2022-07-13 PROCEDURE — 82570 ASSAY OF URINE CREATININE: CPT

## 2022-07-13 PROCEDURE — 85025 COMPLETE CBC W/AUTO DIFF WBC: CPT

## 2022-07-18 ENCOUNTER — HOSPITAL ENCOUNTER (OUTPATIENT)
Dept: ULTRASOUND IMAGING | Facility: HOSPITAL | Age: 72
Discharge: HOME | End: 2022-07-18
Admitting: INTERNAL MEDICINE

## 2022-07-18 DIAGNOSIS — N18.4 CHRONIC KIDNEY DISEASE, STAGE IV (SEVERE): ICD-10-CM

## 2022-07-18 PROCEDURE — 76775 US EXAM ABDO BACK WALL LIM: CPT

## 2022-07-22 PROBLEM — Z96.652 S/P TKR (TOTAL KNEE REPLACEMENT), LEFT: Status: ACTIVE | Noted: 2022-07-22

## 2022-08-15 ENCOUNTER — LAB (OUTPATIENT)
Dept: LAB | Facility: HOSPITAL | Age: 72
End: 2022-08-15

## 2022-08-15 ENCOUNTER — TRANSCRIBE ORDERS (OUTPATIENT)
Dept: ADMINISTRATIVE | Facility: HOSPITAL | Age: 72
End: 2022-08-15

## 2022-08-15 DIAGNOSIS — R80.9 PROTEINURIA, UNSPECIFIED TYPE: ICD-10-CM

## 2022-08-15 DIAGNOSIS — E13.9 MATURITY ONSET DIABETES MELLITUS IN YOUNG: ICD-10-CM

## 2022-08-15 DIAGNOSIS — N18.4 CHRONIC KIDNEY DISEASE, STAGE IV (SEVERE): ICD-10-CM

## 2022-08-15 DIAGNOSIS — N18.4 CHRONIC KIDNEY DISEASE, STAGE IV (SEVERE): Primary | ICD-10-CM

## 2022-08-15 LAB
BACTERIA UR QL AUTO: NORMAL /HPF
BASOPHILS # BLD AUTO: 0.03 10*3/MM3 (ref 0–0.2)
BASOPHILS NFR BLD AUTO: 0.3 % (ref 0–1.5)
BILIRUB UR QL STRIP: NEGATIVE
CLARITY UR: CLEAR
COLOR UR: YELLOW
CREAT UR-MCNC: 148 MG/DL
DEPRECATED RDW RBC AUTO: 51.1 FL (ref 37–54)
EOSINOPHIL # BLD AUTO: 0.34 10*3/MM3 (ref 0–0.4)
EOSINOPHIL NFR BLD AUTO: 3.9 % (ref 0.3–6.2)
ERYTHROCYTE [DISTWIDTH] IN BLOOD BY AUTOMATED COUNT: 16.1 % (ref 12.3–15.4)
GLUCOSE UR STRIP-MCNC: ABNORMAL MG/DL
HCT VFR BLD AUTO: 40.4 % (ref 37.5–51)
HGB BLD-MCNC: 12.4 G/DL (ref 13–17.7)
HGB UR QL STRIP.AUTO: NEGATIVE
IMM GRANULOCYTES # BLD AUTO: 0.02 10*3/MM3 (ref 0–0.05)
IMM GRANULOCYTES NFR BLD AUTO: 0.2 % (ref 0–0.5)
KETONES UR QL STRIP: NEGATIVE
LEUKOCYTE ESTERASE UR QL STRIP.AUTO: NEGATIVE
LYMPHOCYTES # BLD AUTO: 1.31 10*3/MM3 (ref 0.7–3.1)
LYMPHOCYTES NFR BLD AUTO: 15 % (ref 19.6–45.3)
MCH RBC QN AUTO: 26.8 PG (ref 26.6–33)
MCHC RBC AUTO-ENTMCNC: 30.7 G/DL (ref 31.5–35.7)
MCV RBC AUTO: 87.3 FL (ref 79–97)
MONOCYTES # BLD AUTO: 0.55 10*3/MM3 (ref 0.1–0.9)
MONOCYTES NFR BLD AUTO: 6.3 % (ref 5–12)
NEUTROPHILS NFR BLD AUTO: 6.49 10*3/MM3 (ref 1.7–7)
NEUTROPHILS NFR BLD AUTO: 74.3 % (ref 42.7–76)
NITRITE UR QL STRIP: NEGATIVE
PH UR STRIP.AUTO: 5.5 [PH] (ref 5–8)
PLATELET # BLD AUTO: 303 10*3/MM3 (ref 140–450)
PMV BLD AUTO: 9.5 FL (ref 6–12)
PROT ?TM UR-MCNC: 80.4 MG/DL
PROT UR QL STRIP: ABNORMAL
PROT/CREAT UR: 0.54 MG/G{CREAT}
RBC # BLD AUTO: 4.63 10*6/MM3 (ref 4.14–5.8)
RBC # UR STRIP: NORMAL /HPF
REF LAB TEST METHOD: NORMAL
SP GR UR STRIP: 1.02 (ref 1–1.03)
SQUAMOUS #/AREA URNS HPF: NORMAL /HPF
UROBILINOGEN UR QL STRIP: ABNORMAL
WBC # UR STRIP: NORMAL /HPF
WBC NRBC COR # BLD: 8.74 10*3/MM3 (ref 3.4–10.8)

## 2022-08-15 PROCEDURE — 80048 BASIC METABOLIC PNL TOTAL CA: CPT

## 2022-08-15 PROCEDURE — 36415 COLL VENOUS BLD VENIPUNCTURE: CPT

## 2022-08-15 PROCEDURE — 85025 COMPLETE CBC W/AUTO DIFF WBC: CPT

## 2022-08-15 PROCEDURE — 81001 URINALYSIS AUTO W/SCOPE: CPT

## 2022-08-15 PROCEDURE — 84156 ASSAY OF PROTEIN URINE: CPT

## 2022-08-15 PROCEDURE — 82570 ASSAY OF URINE CREATININE: CPT

## 2022-08-16 LAB
ANION GAP SERPL CALCULATED.3IONS-SCNC: 10.8 MMOL/L (ref 5–15)
BUN SERPL-MCNC: 27 MG/DL (ref 8–23)
BUN/CREAT SERPL: 12.4 (ref 7–25)
CALCIUM SPEC-SCNC: 9.4 MG/DL (ref 8.6–10.5)
CHLORIDE SERPL-SCNC: 101 MMOL/L (ref 98–107)
CO2 SERPL-SCNC: 21.2 MMOL/L (ref 22–29)
CREAT SERPL-MCNC: 2.18 MG/DL (ref 0.76–1.27)
EGFRCR SERPLBLD CKD-EPI 2021: 31.4 ML/MIN/1.73
GLUCOSE SERPL-MCNC: 169 MG/DL (ref 65–99)
POTASSIUM SERPL-SCNC: 4.8 MMOL/L (ref 3.5–5.2)
SODIUM SERPL-SCNC: 133 MMOL/L (ref 136–145)

## 2022-09-10 DIAGNOSIS — N40.0 BENIGN PROSTATIC HYPERPLASIA, UNSPECIFIED WHETHER LOWER URINARY TRACT SYMPTOMS PRESENT: ICD-10-CM

## 2022-09-12 ENCOUNTER — TRANSCRIBE ORDERS (OUTPATIENT)
Dept: ADMINISTRATIVE | Facility: HOSPITAL | Age: 72
End: 2022-09-12

## 2022-09-12 DIAGNOSIS — M47.812 CERVICAL SPONDYLOSIS WITHOUT MYELOPATHY: Primary | ICD-10-CM

## 2022-09-12 DIAGNOSIS — S22.009A: ICD-10-CM

## 2022-09-12 DIAGNOSIS — M43.26 FUSION OF SPINE OF LUMBAR REGION: ICD-10-CM

## 2022-09-12 RX ORDER — TAMSULOSIN HYDROCHLORIDE 0.4 MG/1
CAPSULE ORAL
Qty: 180 CAPSULE | Refills: 0 | Status: SHIPPED | OUTPATIENT
Start: 2022-09-12 | End: 2022-12-14 | Stop reason: SDUPTHER

## 2022-09-16 ENCOUNTER — HOSPITAL ENCOUNTER (OUTPATIENT)
Dept: CT IMAGING | Facility: HOSPITAL | Age: 72
Discharge: HOME | End: 2022-09-16
Admitting: ORTHOPAEDIC SURGERY

## 2022-09-16 DIAGNOSIS — S22.009A: ICD-10-CM

## 2022-09-16 DIAGNOSIS — M47.812 CERVICAL SPONDYLOSIS WITHOUT MYELOPATHY: ICD-10-CM

## 2022-09-16 DIAGNOSIS — M43.26 FUSION OF SPINE OF LUMBAR REGION: ICD-10-CM

## 2022-09-16 PROCEDURE — 72131 CT LUMBAR SPINE W/O DYE: CPT

## 2022-09-16 PROCEDURE — 72125 CT NECK SPINE W/O DYE: CPT

## 2022-09-16 PROCEDURE — 72128 CT CHEST SPINE W/O DYE: CPT

## 2022-11-07 ENCOUNTER — LAB (OUTPATIENT)
Dept: LAB | Facility: HOSPITAL | Age: 72
End: 2022-11-07

## 2022-11-07 ENCOUNTER — TRANSCRIBE ORDERS (OUTPATIENT)
Dept: ADMINISTRATIVE | Facility: HOSPITAL | Age: 72
End: 2022-11-07

## 2022-11-07 DIAGNOSIS — N18.4 CHRONIC KIDNEY DISEASE, STAGE IV (SEVERE): Primary | ICD-10-CM

## 2022-11-07 DIAGNOSIS — R80.9 PROTEINURIA, UNSPECIFIED TYPE: ICD-10-CM

## 2022-11-07 DIAGNOSIS — N18.4 CHRONIC KIDNEY DISEASE, STAGE IV (SEVERE): ICD-10-CM

## 2022-11-07 LAB
BACTERIA UR QL AUTO: NORMAL /HPF
BASOPHILS # BLD AUTO: 0.02 10*3/MM3 (ref 0–0.2)
BASOPHILS NFR BLD AUTO: 0.2 % (ref 0–1.5)
BILIRUB UR QL STRIP: NEGATIVE
CLARITY UR: CLEAR
COLOR UR: YELLOW
CREAT UR-MCNC: 150.7 MG/DL
DEPRECATED RDW RBC AUTO: 55.5 FL (ref 37–54)
EOSINOPHIL # BLD AUTO: 0.25 10*3/MM3 (ref 0–0.4)
EOSINOPHIL NFR BLD AUTO: 3 % (ref 0.3–6.2)
ERYTHROCYTE [DISTWIDTH] IN BLOOD BY AUTOMATED COUNT: 16.9 % (ref 12.3–15.4)
GLUCOSE UR STRIP-MCNC: ABNORMAL MG/DL
HCT VFR BLD AUTO: 44.4 % (ref 37.5–51)
HGB BLD-MCNC: 13.3 G/DL (ref 13–17.7)
HGB UR QL STRIP.AUTO: ABNORMAL
IMM GRANULOCYTES # BLD AUTO: 0.01 10*3/MM3 (ref 0–0.05)
IMM GRANULOCYTES NFR BLD AUTO: 0.1 % (ref 0–0.5)
KETONES UR QL STRIP: NEGATIVE
LEUKOCYTE ESTERASE UR QL STRIP.AUTO: NEGATIVE
LYMPHOCYTES # BLD AUTO: 1.44 10*3/MM3 (ref 0.7–3.1)
LYMPHOCYTES NFR BLD AUTO: 17 % (ref 19.6–45.3)
MCH RBC QN AUTO: 26.5 PG (ref 26.6–33)
MCHC RBC AUTO-ENTMCNC: 30 G/DL (ref 31.5–35.7)
MCV RBC AUTO: 88.4 FL (ref 79–97)
MONOCYTES # BLD AUTO: 0.49 10*3/MM3 (ref 0.1–0.9)
MONOCYTES NFR BLD AUTO: 5.8 % (ref 5–12)
NEUTROPHILS NFR BLD AUTO: 6.25 10*3/MM3 (ref 1.7–7)
NEUTROPHILS NFR BLD AUTO: 73.9 % (ref 42.7–76)
NITRITE UR QL STRIP: NEGATIVE
PH UR STRIP.AUTO: 6 [PH] (ref 5–8)
PLATELET # BLD AUTO: 306 10*3/MM3 (ref 140–450)
PMV BLD AUTO: 9.4 FL (ref 6–12)
PROT ?TM UR-MCNC: 100.8 MG/DL
PROT UR QL STRIP: ABNORMAL
PROT/CREAT UR: 0.67 MG/G{CREAT}
RBC # BLD AUTO: 5.02 10*6/MM3 (ref 4.14–5.8)
RBC # UR STRIP: NORMAL /HPF
REF LAB TEST METHOD: NORMAL
SP GR UR STRIP: 1.02 (ref 1–1.03)
SQUAMOUS #/AREA URNS HPF: NORMAL /HPF
UROBILINOGEN UR QL STRIP: ABNORMAL
WBC # UR STRIP: NORMAL /HPF
WBC NRBC COR # BLD: 8.46 10*3/MM3 (ref 3.4–10.8)

## 2022-11-07 PROCEDURE — 36415 COLL VENOUS BLD VENIPUNCTURE: CPT

## 2022-11-07 PROCEDURE — 84156 ASSAY OF PROTEIN URINE: CPT

## 2022-11-07 PROCEDURE — 81001 URINALYSIS AUTO W/SCOPE: CPT

## 2022-11-07 PROCEDURE — 84100 ASSAY OF PHOSPHORUS: CPT

## 2022-11-07 PROCEDURE — 85025 COMPLETE CBC W/AUTO DIFF WBC: CPT

## 2022-11-07 PROCEDURE — 82570 ASSAY OF URINE CREATININE: CPT

## 2022-11-07 PROCEDURE — 83970 ASSAY OF PARATHORMONE: CPT

## 2022-11-07 PROCEDURE — 80053 COMPREHEN METABOLIC PANEL: CPT

## 2022-11-08 LAB
ALBUMIN SERPL-MCNC: 3.8 G/DL (ref 3.5–5.2)
ALBUMIN/GLOB SERPL: 1.3 G/DL
ALP SERPL-CCNC: 137 U/L (ref 39–117)
ALT SERPL W P-5'-P-CCNC: 18 U/L (ref 1–41)
ANION GAP SERPL CALCULATED.3IONS-SCNC: 10.5 MMOL/L (ref 5–15)
AST SERPL-CCNC: 16 U/L (ref 1–40)
BILIRUB SERPL-MCNC: 0.2 MG/DL (ref 0–1.2)
BUN SERPL-MCNC: 15 MG/DL (ref 8–23)
BUN/CREAT SERPL: 7.9 (ref 7–25)
CALCIUM SPEC-SCNC: 9.2 MG/DL (ref 8.6–10.5)
CHLORIDE SERPL-SCNC: 97 MMOL/L (ref 98–107)
CO2 SERPL-SCNC: 25.5 MMOL/L (ref 22–29)
CREAT SERPL-MCNC: 1.91 MG/DL (ref 0.76–1.27)
EGFRCR SERPLBLD CKD-EPI 2021: 36.8 ML/MIN/1.73
GLOBULIN UR ELPH-MCNC: 2.9 GM/DL
GLUCOSE SERPL-MCNC: 197 MG/DL (ref 65–99)
PHOSPHATE SERPL-MCNC: 3.2 MG/DL (ref 2.5–4.5)
POTASSIUM SERPL-SCNC: 4.9 MMOL/L (ref 3.5–5.2)
PROT SERPL-MCNC: 6.7 G/DL (ref 6–8.5)
PTH-INTACT SERPL-MCNC: 44.6 PG/ML (ref 15–65)
SODIUM SERPL-SCNC: 133 MMOL/L (ref 136–145)

## 2022-12-14 DIAGNOSIS — N40.0 BENIGN PROSTATIC HYPERPLASIA, UNSPECIFIED WHETHER LOWER URINARY TRACT SYMPTOMS PRESENT: ICD-10-CM

## 2022-12-14 RX ORDER — TAMSULOSIN HYDROCHLORIDE 0.4 MG/1
2 CAPSULE ORAL DAILY
Qty: 60 CAPSULE | Refills: 0 | Status: SHIPPED | OUTPATIENT
Start: 2022-12-14 | End: 2023-01-16

## 2022-12-14 NOTE — TELEPHONE ENCOUNTER
Caller: Kyler Staley    Relationship: Self    Best call back number: [phone]    Requested Prescriptions:   Requested Prescriptions     Pending Prescriptions Disp Refills   • tamsulosin (FLOMAX) 0.4 MG capsule 24 hr capsule 180 capsule 0        Pharmacy where request should be sent:   [facility]  [address] [facility]  [phone]     Additional details provided by patient: N/A    Does the patient have less than a 3 day supply:  [x] Yes  [] No    Would you like a call back once the refill request has been completed: [x] Yes [] No    If the office needs to give you a call back, can they leave a voicemail: [x] Yes [] No

## 2023-01-09 ENCOUNTER — OFFICE VISIT (OUTPATIENT)
Dept: FAMILY MEDICINE CLINIC | Age: 73
End: 2023-01-09
Payer: MEDICARE

## 2023-01-09 ENCOUNTER — LAB (OUTPATIENT)
Dept: LAB | Facility: HOSPITAL | Age: 73
End: 2023-01-09
Payer: MEDICARE

## 2023-01-09 VITALS
BODY MASS INDEX: 32.34 KG/M2 | HEART RATE: 79 BPM | HEIGHT: 73 IN | DIASTOLIC BLOOD PRESSURE: 61 MMHG | WEIGHT: 244 LBS | SYSTOLIC BLOOD PRESSURE: 95 MMHG | TEMPERATURE: 98.7 F

## 2023-01-09 DIAGNOSIS — L72.3 INFECTED SEBACEOUS CYST: ICD-10-CM

## 2023-01-09 DIAGNOSIS — K21.9 GASTROESOPHAGEAL REFLUX DISEASE, UNSPECIFIED WHETHER ESOPHAGITIS PRESENT: ICD-10-CM

## 2023-01-09 DIAGNOSIS — E78.00 HYPERCHOLESTEROLEMIA: ICD-10-CM

## 2023-01-09 DIAGNOSIS — Z79.4 TYPE 2 DIABETES MELLITUS WITH STAGE 3 CHRONIC KIDNEY DISEASE, WITH LONG-TERM CURRENT USE OF INSULIN, UNSPECIFIED WHETHER STAGE 3A OR 3B CKD: ICD-10-CM

## 2023-01-09 DIAGNOSIS — E11.22 TYPE 2 DIABETES MELLITUS WITH STAGE 3 CHRONIC KIDNEY DISEASE, WITH LONG-TERM CURRENT USE OF INSULIN, UNSPECIFIED WHETHER STAGE 3A OR 3B CKD: ICD-10-CM

## 2023-01-09 DIAGNOSIS — Z79.899 HIGH RISK MEDICATION USE: ICD-10-CM

## 2023-01-09 DIAGNOSIS — G47.30 SLEEP APNEA, UNSPECIFIED TYPE: ICD-10-CM

## 2023-01-09 DIAGNOSIS — F41.9 ANXIETY: ICD-10-CM

## 2023-01-09 DIAGNOSIS — N64.4 BREAST PAIN, LEFT: ICD-10-CM

## 2023-01-09 DIAGNOSIS — E03.8 OTHER SPECIFIED HYPOTHYROIDISM: Primary | ICD-10-CM

## 2023-01-09 DIAGNOSIS — I10 PRIMARY HYPERTENSION: ICD-10-CM

## 2023-01-09 DIAGNOSIS — I48.0 PAROXYSMAL ATRIAL FIBRILLATION: ICD-10-CM

## 2023-01-09 DIAGNOSIS — Z11.59 ENCOUNTER FOR SCREENING FOR OTHER VIRAL DISEASES: ICD-10-CM

## 2023-01-09 DIAGNOSIS — L08.9 INFECTED SEBACEOUS CYST: ICD-10-CM

## 2023-01-09 DIAGNOSIS — N18.30 TYPE 2 DIABETES MELLITUS WITH STAGE 3 CHRONIC KIDNEY DISEASE, WITH LONG-TERM CURRENT USE OF INSULIN, UNSPECIFIED WHETHER STAGE 3A OR 3B CKD: ICD-10-CM

## 2023-01-09 DIAGNOSIS — M1A.9XX0 CHRONIC GOUT WITHOUT TOPHUS, UNSPECIFIED CAUSE, UNSPECIFIED SITE: ICD-10-CM

## 2023-01-09 PROBLEM — M76.02 GLUTEAL TENDINITIS OF LEFT BUTTOCK: Status: RESOLVED | Noted: 2020-12-04 | Resolved: 2023-01-09

## 2023-01-09 PROBLEM — M70.61 GREATER TROCHANTERIC BURSITIS OF RIGHT HIP: Status: RESOLVED | Noted: 2020-01-14 | Resolved: 2023-01-09

## 2023-01-09 PROBLEM — M70.62 GREATER TROCHANTERIC BURSITIS OF LEFT HIP: Status: RESOLVED | Noted: 2020-11-16 | Resolved: 2023-01-09

## 2023-01-09 PROBLEM — M75.122 COMPLETE TEAR OF LEFT ROTATOR CUFF: Status: RESOLVED | Noted: 2017-02-28 | Resolved: 2023-01-09

## 2023-01-09 PROBLEM — M25.512 ACUTE PAIN OF LEFT SHOULDER: Status: RESOLVED | Noted: 2017-02-11 | Resolved: 2023-01-09

## 2023-01-09 PROBLEM — E66.9 OBESITY (BMI 30-39.9): Status: RESOLVED | Noted: 2020-01-14 | Resolved: 2023-01-09

## 2023-01-09 PROBLEM — N17.9 ACUTE RENAL FAILURE SYNDROME: Status: RESOLVED | Noted: 2022-01-24 | Resolved: 2023-01-09

## 2023-01-09 PROBLEM — R93.49 ABNORMAL RADIOLOGIC FINDINGS ON DIAGNOSTIC IMAGING OF OTHER URINARY ORGANS: Status: RESOLVED | Noted: 2020-12-04 | Resolved: 2023-01-09

## 2023-01-09 PROBLEM — E11.8 DISORDER ASSOCIATED WITH TYPE 2 DIABETES MELLITUS: Status: RESOLVED | Noted: 2022-01-24 | Resolved: 2023-01-09

## 2023-01-09 LAB
AMPHET+METHAMPHET UR QL: NEGATIVE
AMPHETAMINE INTERNAL CONTROL: ABNORMAL
AMPHETAMINES UR QL: NEGATIVE
BARBITURATE INTERNAL CONTROL: ABNORMAL
BARBITURATES UR QL SCN: NEGATIVE
BENZODIAZ UR QL SCN: POSITIVE
BENZODIAZEPINE INTERNAL CONTROL: ABNORMAL
BUPRENORPHINE INTERNAL CONTROL: ABNORMAL
BUPRENORPHINE SERPL-MCNC: NEGATIVE NG/ML
CANNABINOIDS SERPL QL: NEGATIVE
COCAINE INTERNAL CONTROL: ABNORMAL
COCAINE UR QL: NEGATIVE
EXPIRATION DATE: ABNORMAL
Lab: ABNORMAL
MDMA (ECSTASY) INTERNAL CONTROL: ABNORMAL
MDMA UR QL SCN: NEGATIVE
METHADONE INTERNAL CONTROL: ABNORMAL
METHADONE UR QL SCN: NEGATIVE
METHAMPHETAMINE INTERNAL CONTROL: ABNORMAL
OPIATES INTERNAL CONTROL: ABNORMAL
OPIATES UR QL: NEGATIVE
OXYCODONE INTERNAL CONTROL: ABNORMAL
OXYCODONE UR QL SCN: NEGATIVE
PCP UR QL SCN: NEGATIVE
PHENCYCLIDINE INTERNAL CONTROL: ABNORMAL
THC INTERNAL CONTROL: ABNORMAL

## 2023-01-09 PROCEDURE — 86803 HEPATITIS C AB TEST: CPT

## 2023-01-09 PROCEDURE — 80053 COMPREHEN METABOLIC PANEL: CPT

## 2023-01-09 PROCEDURE — 83036 HEMOGLOBIN GLYCOSYLATED A1C: CPT

## 2023-01-09 PROCEDURE — 80061 LIPID PANEL: CPT

## 2023-01-09 PROCEDURE — 84443 ASSAY THYROID STIM HORMONE: CPT

## 2023-01-09 PROCEDURE — 36415 COLL VENOUS BLD VENIPUNCTURE: CPT

## 2023-01-09 PROCEDURE — 99214 OFFICE O/P EST MOD 30 MIN: CPT | Performed by: NURSE PRACTITIONER

## 2023-01-09 PROCEDURE — 80305 DRUG TEST PRSMV DIR OPT OBS: CPT | Performed by: NURSE PRACTITIONER

## 2023-01-09 RX ORDER — SEMAGLUTIDE 1.34 MG/ML
1 INJECTION, SOLUTION SUBCUTANEOUS WEEKLY
COMMUNITY

## 2023-01-09 RX ORDER — DOXYCYCLINE HYCLATE 100 MG
100 TABLET ORAL 2 TIMES DAILY
Qty: 20 TABLET | Refills: 0 | Status: SHIPPED | OUTPATIENT
Start: 2023-01-09 | End: 2023-01-19

## 2023-01-09 RX ORDER — CARVEDILOL 25 MG/1
12.5 TABLET ORAL 2 TIMES DAILY WITH MEALS
COMMUNITY
End: 2023-01-09 | Stop reason: SDUPTHER

## 2023-01-09 RX ORDER — CARVEDILOL 25 MG/1
12.5 TABLET ORAL 2 TIMES DAILY WITH MEALS
Qty: 90 TABLET | Refills: 1 | Status: SHIPPED | OUTPATIENT
Start: 2023-01-09

## 2023-01-09 RX ORDER — ALPRAZOLAM 1 MG/1
1 TABLET ORAL NIGHTLY PRN
Qty: 30 TABLET | Refills: 2 | Status: SHIPPED | OUTPATIENT
Start: 2023-01-09

## 2023-01-09 RX ORDER — LEVOTHYROXINE SODIUM 0.15 MG/1
150 TABLET ORAL
Qty: 90 TABLET | Refills: 1 | Status: SHIPPED | OUTPATIENT
Start: 2023-01-09

## 2023-01-09 RX ORDER — PANTOPRAZOLE SODIUM 40 MG/1
40 TABLET, DELAYED RELEASE ORAL DAILY
Qty: 90 TABLET | Refills: 1 | Status: SHIPPED | OUTPATIENT
Start: 2023-01-09

## 2023-01-09 RX ORDER — IMIPRAMINE HCL 50 MG
TABLET ORAL
Qty: 270 TABLET | Refills: 1 | Status: SHIPPED | OUTPATIENT
Start: 2023-01-09

## 2023-01-09 RX ORDER — ISOSORBIDE MONONITRATE 60 MG/1
60 TABLET, EXTENDED RELEASE ORAL DAILY
Qty: 90 TABLET | Refills: 1 | Status: SHIPPED | OUTPATIENT
Start: 2023-01-09

## 2023-01-09 RX ORDER — ALLOPURINOL 300 MG/1
300 TABLET ORAL DAILY
Qty: 90 TABLET | Refills: 1 | Status: SHIPPED | OUTPATIENT
Start: 2023-01-09

## 2023-01-09 NOTE — ASSESSMENT & PLAN NOTE
Urine drug screen and controlled substance agreement today.  Patient only taking once every night as needed.  Will adjust prescription accordingly.  Patient tolerates medication well.  Potential side effects medication discussed.  Low risk of abuse.

## 2023-01-09 NOTE — ASSESSMENT & PLAN NOTE
Blood pressure is low today and was low at home last night.  He is to contact nephrology with more blood pressure readings this week to get their recommendations on which medication to decrease if needed.

## 2023-01-09 NOTE — PROGRESS NOTES
Chief Complaint  Establish Care and Med Refill    Subjective          Kyler Staley presents to [facility] wishing to establish care.  Patient was previously seeing Dr. Will who is going to retire this summer.  Patient has multiple medical problems.  He sees Baltazar Hardy for cardiology, CASSIDY Greer for urology (BPH), Dr. Kruger for Derm (every 6 months for precancerous skin lesions), CASSIDY Neumann at [facility] for his chronic kidney disease.  [facility].  He has an appointment with them on the 17th of this month and will get his second shingles vaccination.    Nephrology and cardiology manages hypertension.  Blood pressure is low today at 95/61.  Patient denies dizziness.  States that he checked his blood pressure last night and it was in the 90s over 50s.  He is currently taking amlodipine 10 mg daily, carvedilol 12.5 mg twice daily and losartan 100 mg daily.  Patient has history of A. fib, pacemaker, multiple stents and CABG x4.    Patient has chronic back pain and has had multiple surgeries.  He uses cane for ambulation of longer distances.    Patient does have sleep apnea and has an older, recalled BiPAP.  Patient states it has been approximately 10 to 15 years since last sleep study.  Previous PCP was writing prescription for settings and equipment.    Pt has [employer] hearing aids.     Last prescription of Xanax from Dr. Will was Xanax 1 mg 3 times daily as needed.  Patient states he is only taking once every night.  He has been on this medication for 40 to 50 years.  Patient has Ativan listed as an allergy.  Patient states that when he was intubated status post surgery he became very agitated after he was given Ativan.    Patient states that his left breast/nipple have been sore and swollen for a week and a half.  Denies family history of breast cancer.    Patient also complaining of infected sebaceous  cyst to upper back.  Wife is here at visit today and states that she was able to get some drainage out last night.  He states it is more tender than normal.    I will be in charge of his levothyroxine, Xanax, allopurinol, Protonix, isosorbide, carvedilol and Imipramine prescriptions.          Objective   Vital Signs:   Vitals:    01/09/23 1335   BP: 95/61   BP Location: Left arm   Patient Position: Sitting   Cuff Size: Adult   Pulse: 79   Temp: 98.7 °F (37.1 °C)   TempSrc: Oral   Weight: 111 kg (244 lb)   Height: 185.4 cm (73\")       Physical Exam  Vitals reviewed.   Constitutional:       General: He is not in acute distress.     Appearance: Normal appearance. He is well-developed.   HENT:      Head: Normocephalic and atraumatic.   Eyes:      Conjunctiva/sclera: Conjunctivae normal.      Pupils: Pupils are equal, round, and reactive to light.   Cardiovascular:      Rate and Rhythm: Normal rate and regular rhythm.      Heart sounds: Normal heart sounds. No murmur heard.  Pulmonary:      Effort: Pulmonary effort is normal. No respiratory distress.      Breath sounds: Normal breath sounds.   Chest:          Comments: Area noted on exam was slightly firm and swollen.  No erythema noted.  No nipple discharge noted.  Mildly tender on exam.  Skin:     General: Skin is warm and dry.          Neurological:      Mental Status: He is alert and oriented to person, place, and time.   Psychiatric:         Mood and Affect: Mood and affect normal.         Behavior: Behavior normal.         Thought Content: Thought content normal.         Judgment: Judgment normal.          Result Review :              Assessment and Plan    Diagnoses and all orders for this visit:    1. Other specified hypothyroidism (Primary)  Assessment & Plan:  Refill provided.  Will notify patient of results and treat accordingly.    Orders:  -     levothyroxine (SYNTHROID, LEVOTHROID) 150 MCG tablet; Take 1 tablet by mouth Every Morning.  Dispense: 90 tablet;  Refill: 1    2. Paroxysmal atrial fibrillation (HCC)  Assessment & Plan:  Follow-up with Baltazar Hardy as scheduled.    Orders:  -     carvedilol (COREG) 25 MG tablet; Take 0.5 tablets by mouth 2 (Two) Times a Day With Meals. Half of a 25mg twice daily  Dispense: 90 tablet; Refill: 1  -     isosorbide mononitrate (IMDUR) 60 MG 24 hr tablet; Take 1 tablet by mouth Daily.  Dispense: 90 tablet; Refill: 1    3. Hypercholesterolemia  Assessment & Plan:  Will notify patient of results and treat accordingly.        4. Primary hypertension  Assessment & Plan:  Blood pressure is low today and was low at home last night.  He is to contact nephrology with more blood pressure readings this week to get their recommendations on which medication to decrease if needed.      Orders:  -     TSH Rfx On Abnormal To Free T4; Future  -     Comprehensive Metabolic Panel; Future  -     Lipid Panel; Future    5. Type 2 diabetes mellitus with stage 3 chronic kidney disease, with long-term current use of insulin, unspecified whether stage 3a or 3b CKD (HCC)  Assessment & Plan:  Labs drawn today.  [facility]      Orders:  -     Hemoglobin A1c; Future  -     Microalbumin / Creatinine Urine Ratio - Urine, Clean Catch; Future    6. Encounter for screening for other viral diseases  Comments:  Will notify patient of results and treat accordingly.  Orders:  -     Hepatitis C Antibody; Future    7. Anxiety  Assessment & Plan:  Urine drug screen and controlled substance agreement today.  Patient only taking once every night as needed.  Will adjust prescription accordingly.  Patient tolerates medication well.  Potential side effects medication discussed.  Low risk of abuse.    Orders:  -     POC Urine Drug Screen [facility]  -     ALPRAZolam (XANAX) 1 MG tablet; Take 1 tablet by mouth At Night As Needed for Anxiety.  Dispense: 30 tablet; Refill: 2    8. Sleep apnea, unspecified type  Assessment &  Plan:  Referral initiated.  Continue to use current BiPAP at current settings.    Orders:  -     Ambulatory Referral to Sleep Medicine    9. Breast pain, left  Comments:  Will notify patient of results and treat accordingly.  Orders:  -     Mammo Diagnostic Digital Tomosynthesis Bilateral With CAD; Future  -     US Breast Left Limited; Future    10. High risk medication use  -     POC Urine Drug Screen [facility]    11. Infected sebaceous cyst  Comments:  Doxycycline as prescribed.  Patient wants to discuss with Dr. Kruger at her dermatology office.    12. Chronic gout without tophus, unspecified cause, unspecified site  -     allopurinol (ZYLOPRIM) 300 MG tablet; Take 1 tablet by mouth Daily.  Dispense: 90 tablet; Refill: 1    13. Gastroesophageal reflux disease, unspecified whether esophagitis present  -     pantoprazole (PROTONIX) 40 MG EC tablet; Take 1 tablet by mouth Daily.  Dispense: 90 tablet; Refill: 1    Other orders  -     imipramine (TOFRANIL) 50 MG tablet; Take 1 tab po in the am and 2 tabs po in the evening  Dispense: 270 tablet; Refill: 1  -     doxycycline (VIBRAMYICN) 100 MG tablet; Take 1 tablet by mouth 2 (Two) Times a Day for 10 days.  Dispense: 20 tablet; Refill: 0  Patient to notify office with any acute concerns or issues.  Patient verbalizes understanding, agrees with plan of care and has no further questions upon discharge.    Please note that portions of this note were completed with a voice recognition program.    Follow Up    Return in about 3 months (around 4/9/2023) for Medicare Wellness.  Patient was given instructions and counseling regarding his condition or for health maintenance advice. Please see specific information pulled into the AVS if appropriate.

## 2023-01-10 LAB
ALBUMIN SERPL-MCNC: 3.8 G/DL (ref 3.5–5.2)
ALBUMIN/GLOB SERPL: 1.3 G/DL
ALP SERPL-CCNC: 135 U/L (ref 39–117)
ALT SERPL W P-5'-P-CCNC: 14 U/L (ref 1–41)
ANION GAP SERPL CALCULATED.3IONS-SCNC: 12.2 MMOL/L (ref 5–15)
AST SERPL-CCNC: 17 U/L (ref 1–40)
BILIRUB SERPL-MCNC: 0.4 MG/DL (ref 0–1.2)
BUN SERPL-MCNC: 18 MG/DL (ref 8–23)
BUN/CREAT SERPL: 9.6 (ref 7–25)
CALCIUM SPEC-SCNC: 9.3 MG/DL (ref 8.6–10.5)
CHLORIDE SERPL-SCNC: 100 MMOL/L (ref 98–107)
CHOLEST SERPL-MCNC: 154 MG/DL (ref 0–200)
CO2 SERPL-SCNC: 24.8 MMOL/L (ref 22–29)
CREAT SERPL-MCNC: 1.87 MG/DL (ref 0.76–1.27)
EGFRCR SERPLBLD CKD-EPI 2021: 37.7 ML/MIN/1.73
GLOBULIN UR ELPH-MCNC: 2.9 GM/DL
GLUCOSE SERPL-MCNC: 181 MG/DL (ref 65–99)
HBA1C MFR BLD: 8.3 % (ref 4.8–5.6)
HCV AB SER DONR QL: NORMAL
HDLC SERPL-MCNC: 27 MG/DL (ref 40–60)
LDLC SERPL CALC-MCNC: 60 MG/DL (ref 0–100)
LDLC/HDLC SERPL: 1.47 {RATIO}
POTASSIUM SERPL-SCNC: 4.7 MMOL/L (ref 3.5–5.2)
PROT SERPL-MCNC: 6.7 G/DL (ref 6–8.5)
SODIUM SERPL-SCNC: 137 MMOL/L (ref 136–145)
TRIGL SERPL-MCNC: 436 MG/DL (ref 0–150)
TSH SERPL DL<=0.05 MIU/L-ACNC: 1.47 UIU/ML (ref 0.27–4.2)
VLDLC SERPL-MCNC: 67 MG/DL (ref 5–40)

## 2023-01-14 DIAGNOSIS — N40.0 BENIGN PROSTATIC HYPERPLASIA, UNSPECIFIED WHETHER LOWER URINARY TRACT SYMPTOMS PRESENT: ICD-10-CM

## 2023-01-16 ENCOUNTER — HOSPITAL ENCOUNTER (OUTPATIENT)
Dept: MAMMOGRAPHY | Facility: HOSPITAL | Age: 73
Discharge: HOME | End: 2023-01-16
Payer: MEDICARE

## 2023-01-16 ENCOUNTER — HOSPITAL ENCOUNTER (OUTPATIENT)
Dept: ULTRASOUND IMAGING | Facility: HOSPITAL | Age: 73
Discharge: HOME | End: 2023-01-16
Payer: MEDICARE

## 2023-01-16 DIAGNOSIS — N64.4 BREAST PAIN, LEFT: ICD-10-CM

## 2023-01-16 PROCEDURE — G0279 TOMOSYNTHESIS, MAMMO: HCPCS

## 2023-01-16 PROCEDURE — 77066 DX MAMMO INCL CAD BI: CPT

## 2023-01-16 RX ORDER — TAMSULOSIN HYDROCHLORIDE 0.4 MG/1
CAPSULE ORAL
Qty: 60 CAPSULE | Refills: 0 | Status: SHIPPED | OUTPATIENT
Start: 2023-01-16 | End: 2023-02-13

## 2023-01-30 ENCOUNTER — TELEPHONE (OUTPATIENT)
Dept: UROLOGY | Facility: CLINIC | Age: 73
End: 2023-01-30
Payer: MEDICARE

## 2023-01-30 ENCOUNTER — OFFICE VISIT (OUTPATIENT)
Dept: UROLOGY | Facility: CLINIC | Age: 73
End: 2023-01-30
Payer: MEDICARE

## 2023-01-30 ENCOUNTER — LAB (OUTPATIENT)
Dept: LAB | Facility: HOSPITAL | Age: 73
End: 2023-01-30
Payer: MEDICARE

## 2023-01-30 VITALS — BODY MASS INDEX: 32.34 KG/M2 | WEIGHT: 244 LBS | HEIGHT: 73 IN

## 2023-01-30 DIAGNOSIS — Z12.5 PROSTATE CANCER SCREENING: ICD-10-CM

## 2023-01-30 DIAGNOSIS — N40.0 BENIGN PROSTATIC HYPERPLASIA, UNSPECIFIED WHETHER LOWER URINARY TRACT SYMPTOMS PRESENT: Primary | ICD-10-CM

## 2023-01-30 DIAGNOSIS — I10 PRIMARY HYPERTENSION: ICD-10-CM

## 2023-01-30 DIAGNOSIS — N18.30 TYPE 2 DIABETES MELLITUS WITH STAGE 3 CHRONIC KIDNEY DISEASE, WITH LONG-TERM CURRENT USE OF INSULIN, UNSPECIFIED WHETHER STAGE 3A OR 3B CKD: ICD-10-CM

## 2023-01-30 DIAGNOSIS — Z11.59 ENCOUNTER FOR SCREENING FOR OTHER VIRAL DISEASES: ICD-10-CM

## 2023-01-30 DIAGNOSIS — Z79.4 TYPE 2 DIABETES MELLITUS WITH STAGE 3 CHRONIC KIDNEY DISEASE, WITH LONG-TERM CURRENT USE OF INSULIN, UNSPECIFIED WHETHER STAGE 3A OR 3B CKD: ICD-10-CM

## 2023-01-30 DIAGNOSIS — E11.22 TYPE 2 DIABETES MELLITUS WITH STAGE 3 CHRONIC KIDNEY DISEASE, WITH LONG-TERM CURRENT USE OF INSULIN, UNSPECIFIED WHETHER STAGE 3A OR 3B CKD: ICD-10-CM

## 2023-01-30 PROBLEM — Z86.19 HISTORY OF COLD SORES: Status: ACTIVE | Noted: 2023-01-30

## 2023-01-30 PROBLEM — E78.5 DYSLIPIDEMIA: Status: ACTIVE | Noted: 2020-02-03

## 2023-01-30 PROBLEM — N18.4 STAGE 4 CHRONIC KIDNEY DISEASE: Status: ACTIVE | Noted: 2020-11-05

## 2023-01-30 PROBLEM — R06.02 SHORTNESS OF BREATH: Status: ACTIVE | Noted: 2020-11-05

## 2023-01-30 PROBLEM — K46.9 ABDOMINAL HERNIA: Status: ACTIVE | Noted: 2023-01-30

## 2023-01-30 PROBLEM — Z98.890 OTHER SPECIFIED POSTPROCEDURAL STATES: Status: ACTIVE | Noted: 2017-05-01

## 2023-01-30 LAB
BACTERIA UR QL AUTO: NORMAL /HPF
BILIRUB BLD-MCNC: NEGATIVE MG/DL
CLARITY, POC: CLEAR
COLOR UR: YELLOW
EXPIRATION DATE: 124
GLUCOSE UR STRIP-MCNC: ABNORMAL MG/DL
KETONES UR QL: NEGATIVE
LEUKOCYTE EST, POC: NEGATIVE
Lab: ABNORMAL
NITRITE UR-MCNC: NEGATIVE MG/ML
PH UR: 5.5 [PH] (ref 5–8)
PROT UR STRIP-MCNC: ABNORMAL MG/DL
RBC # UR STRIP: NEGATIVE /UL
RBC # UR STRIP: NORMAL /HPF
REF LAB TEST METHOD: NORMAL
SP GR UR: 1.01 (ref 1–1.03)
SQUAMOUS #/AREA URNS HPF: NORMAL /HPF
URINE VOLUME: 59
UROBILINOGEN UR QL: ABNORMAL
WBC # UR STRIP: NORMAL /HPF

## 2023-01-30 PROCEDURE — 81003 URINALYSIS AUTO W/O SCOPE: CPT | Performed by: NURSE PRACTITIONER

## 2023-01-30 PROCEDURE — 81015 MICROSCOPIC EXAM OF URINE: CPT

## 2023-01-30 PROCEDURE — 36415 COLL VENOUS BLD VENIPUNCTURE: CPT

## 2023-01-30 PROCEDURE — 51798 US URINE CAPACITY MEASURE: CPT | Performed by: NURSE PRACTITIONER

## 2023-01-30 PROCEDURE — G0103 PSA SCREENING: HCPCS

## 2023-01-30 PROCEDURE — 99213 OFFICE O/P EST LOW 20 MIN: CPT | Performed by: NURSE PRACTITIONER

## 2023-01-30 RX ORDER — MELATONIN 5 MG
5 TABLET,CHEWABLE ORAL
COMMUNITY

## 2023-01-30 RX ORDER — FINASTERIDE 5 MG/1
5 TABLET, FILM COATED ORAL DAILY
Qty: 90 TABLET | Refills: 3 | Status: SHIPPED | OUTPATIENT
Start: 2023-01-30

## 2023-01-30 RX ORDER — FEXOFENADINE HCL 180 MG/1
180 TABLET ORAL DAILY
COMMUNITY
End: 2023-01-30

## 2023-01-30 NOTE — TELEPHONE ENCOUNTER
Pt was seen today. He is calling about his AVS, he noticed that it said to stop his Allegra. He said he wasn't told that at the office and he needs it. He just wants to clarify.

## 2023-01-30 NOTE — PROGRESS NOTES
Chief Complaint: Benign Prostatic Hypertrophy    Subjective         History of Present Illness  Kyler Staley is a 72 y.o. male presents to [facility] to be seen for f/u on BPH and nocturia.    Nocturia x 2-3.    He states he will have the urge to void but when he gets up not much comes out.    He is only voiding 1-2 x during the day.     Some hesitancy.     Some straining to void.     He drinks 2-3,20 oz tumblers of water a day.     He does have sleep apnea and wears his CPAP religiously.     Previous:  Denies straining to void     Denies hesitancy    No intermittent stream    Good stream    He is with no other bothersome voiding symptoms at this point in time.    Objective     Past Medical History:   Diagnosis Date   • Acne    • Alcoholism (HCC)    • Anxiety    • Arthritis    • Arthritis of back    • Arthritis of neck    • Atrial fibrillation (HCC)    • Back pain    • Benign prostatic hyperplasia    • Cardiac disease    • Cataracts, bilateral    • Cervical disc disorder    • Cholelithiasis    • Chronic kidney disease, stage 3 ([facility])     moderate   • CKD (chronic kidney disease)    • Clotting disorder (HCC)    • Condition not found     hernia   • Coronary artery disease    • Depression    • Diabetes (HCC)    • Enlarged prostate    • Erectile dysfunction    • Fatigue    • GERD (gastroesophageal reflux disease)    • Gout    • Hearing loss    • Hematospermia 12/09/2015   • High blood pressure    • High cholesterol    • Hip arthrosis    • History of cold sores    • History of gastritis    • [place] (hard of hearing)    • Hyperlipidemia    • Hypertension    • Hypothyroidism    • Irregular heart rhythm    • Joint pain    • Kidney disease    • Knee swelling    • Low back pain    • Lumbosacral disc disease    • Narrowing of airway 2012    PER ENT   • Neuropathy    • Obesity    • 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    • Pneumonia    • PONV (postoperative nausea and vomiting)    •  Psychiatric diagnosis    • Renal insufficiency    • Rotator cuff syndrome     REPAIRED BILATERAL   • Shortness of breath    • Sinus trouble    • Sleep apnea     BIPAP   • 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) ([facility])    • Swallowing difficulty    • Thin skin    • Thoracic disc disorder    • Thyroid condition    • Visual impairment    • Vocal cord dysfunction      HISTORY OF DISORDER ON ONE SIDE AFTER TRACHEA       Past Surgical History:   Procedure Laterality Date   • BACK SURGERY  2018    X2   • BACK SURGERY  2011   • CARDIAC CATHETERIZATION     • CARDIAC SURGERY     • CHOLECYSTECTOMY     • COLONOSCOPY     • CORONARY ARTERY BYPASS GRAFT  2012   • CORONARY STENT PLACEMENT     • EYE SURGERY     • HERNIA REPAIR     • INGUINAL HERNIA REPAIR     • JOINT REPLACEMENT     • KNEE SURGERY      right and left   • LUMBAR DISCECTOMY FUSION INSTRUMENTATION N/A 02/11/2021    Procedure: EXPLORATION OF FUSION T-12 S-1 REMOVAL OF HARDWARE REVISION OF FUSION AND INSTRUMENTATION T12-S1 WITH APPLICATION OF BONE MORPHOGENIC PROTEIN;  Surgeon: Baltazar Blankenship MD;  Location: [facility];  Service: Orthopedic Spine;  Laterality: N/A;   • NOSE SURGERY     • PACEMAKER IMPLANTATION     • REPLACEMENT TOTAL KNEE Bilateral    • SHOULDER SURGERY Bilateral     rotator cuff repair   • SPINE SURGERY           Current Outpatient Medications:   •  allopurinol (ZYLOPRIM) 300 MG tablet, Take 1 tablet by mouth Daily., Disp: 90 tablet, Rfl: 1  •  ALPRAZolam (XANAX) 1 MG tablet, Take 1 tablet by mouth At Night As Needed for Anxiety., Disp: 30 tablet, Rfl: 2  •  amLODIPine (NORVASC) 10 MG tablet, Take 10 mg by mouth Daily., Disp: , Rfl:   •  apixaban (ELIQUIS) 5 MG tablet tablet, Take 5 mg by mouth 2 (Two) Times a Day., Disp: , Rfl:   •  aspirin 325 MG tablet, Take 325 mg by mouth Daily. TO FOLLOW MD ORDERS WHEN TO STOP, Disp: , Rfl:   •  atorvastatin (LIPITOR) 20 MG tablet, Take 20 mg by mouth Every Evening., Disp: , Rfl:    •  carvedilol (COREG) 25 MG tablet, Take 0.5 tablets by mouth 2 (Two) Times a Day With Meals. Half of a 25mg twice daily, Disp: 90 tablet, Rfl: 1  •  empagliflozin (JARDIANCE) 10 MG tablet tablet, Take 10 mg by mouth Daily., Disp: , Rfl:   •  finasteride (PROSCAR) 5 MG tablet, Take 1 tablet by mouth Daily., Disp: 90 tablet, Rfl: 3  •  Flaxseed, Linseed, 1000 MG capsule, Take 1,200 mg by mouth 2 (Two) Times a Day., Disp: , Rfl:   •  glucose blood test strip, , Disp: , Rfl:   •  imipramine (TOFRANIL) 50 MG tablet, Take 1 tab po in the am and 2 tabs po in the evening, Disp: 270 tablet, Rfl: 1  •  isosorbide mononitrate (IMDUR) 60 MG 24 hr tablet, Take 1 tablet by mouth Daily., Disp: 90 tablet, Rfl: 1  •  Lantus SoloStar 100 UNIT/ML injection pen, Inject 26 Units under the skin into the appropriate area as directed Daily., Disp: , Rfl:   •  levothyroxine (SYNTHROID, LEVOTHROID) 150 MCG tablet, Take 1 tablet by mouth Every Morning., Disp: 90 tablet, Rfl: 1  •  losartan (COZAAR) 100 MG tablet, Take 100 mg by mouth Daily., Disp: , Rfl:   •  Melatonin 5 MG chewable tablet, Chew 5 mg., Disp: , Rfl:   •  pantoprazole (PROTONIX) 40 MG EC tablet, Take 1 tablet by mouth Daily., Disp: 90 tablet, Rfl: 1  •  Semaglutide,0.25 or 0.5MG/DOS, (Ozempic, 0.25 or 0.5 MG/DOSE,) 2 MG/1.5ML solution pen-injector, Inject 1 mg under the skin into the appropriate area as directed 1 (One) Time Per Week., Disp: , Rfl:   •  spironolactone (ALDACTONE) 25 MG tablet, Take 25 mg by mouth Daily., Disp: , Rfl:   •  tamsulosin (FLOMAX) 0.4 MG capsule 24 hr capsule, Take 2 capsules by mouth once daily, Disp: 60 capsule, Rfl: 0    Allergies   Allergen Reactions   • Bupropion Other (See Comments) and Unknown - Low Severity     MAKES PATIENT VERY AGGRESSIVE     • Iodinated Contrast Media Other (See Comments)     Other reaction(s): Other (See Comments)  Chronic kidney disease - stage 3  Chronic kidney disease - stage 3   • Lorazepam Other (See Comments) and  "Unknown - Low Severity     MADE PATIENT VERY AGGRESSIVE     • Propoxyphene Nausea And Vomiting and Unknown - Low Severity   • Adhesive Tape Rash        Family History   Problem Relation Age of Onset   • Heart attack Mother    • Arthritis Mother    • Cancer Mother    • Heart disease Mother    • Hypertension Mother    • Cancer Father    • Hypertension Sister    • Hypertension Brother    • Malig Hyperthermia Neg Hx        Social History     Socioeconomic History   • Marital status:    Tobacco Use   • Smoking status: Former     Packs/day: 0.50     Years: 15.00     Pack years: 7.50     Types: Cigarettes     Start date:      Quit date: 1975     Years since quittin.1     Passive exposure: Past   • Smokeless tobacco: Never   Vaping Use   • Vaping Use: Never used   Substance and Sexual Activity   • Alcohol use: No   • Drug use: Not Currently   • Sexual activity: Not Currently     Partners: Female       Vital Signs:   Ht 185.4 cm (73\")   Wt 111 kg (244 lb)   BMI 32.19 kg/m²      Physical Exam     Result Review :   The following data was reviewed by: CASSIDY Wilhelm on 2023:  Results for orders placed or performed in visit on 23   Bladder Scan   Result Value Ref Range    Urine Volume 59    POC Urinalysis Dipstick, Automated    Specimen: Urine   Result Value Ref Range    Color Yellow Yellow, Straw, Dark Yellow, Laura    Clarity, UA Clear Clear    Specific Gravity  1.010 1.005 - 1.030    pH, Urine 5.5 5.0 - 8.0    Leukocytes Negative Negative    Nitrite, UA Negative Negative    Protein, POC 30 mg/dL (A) Negative mg/dL    Glucose, UA >=1000 mg/dL (3+) (A) Negative mg/dL    Ketones, UA Negative Negative    Urobilinogen, UA 0.2 E.U./dL Normal, 0.2 E.U./dL    Bilirubin Negative Negative    Blood, UA Negative Negative    Lot Number [id number]     Expiration Date 124        Bladder Scan interpretation 2023    Estimation of residual urine via [employer] 3000 [employer]athon Bladder Scan  Residual Urine: 59 " ml  Indication: Benign prostatic hyperplasia, unspecified whether lower urinary tract symptoms present    Prostate cancer screening   Position: Supine  Examination: Incremental scanning of the suprapubic area using 2.0 MHz transducer using copious amounts of acoustic gel.   Findings: An anechoic area was demonstrated which represented the bladder, with measurement of residual urine as noted. I inspected this myself. In that the residual urine was insignificant, refer to plan for treatment and plan necessary at this time.         Procedures        Assessment and Plan    Diagnoses and all orders for this visit:    1. Benign prostatic hyperplasia, unspecified whether lower urinary tract symptoms present (Primary)  -     Bladder Scan  -     POC Urinalysis Dipstick, Automated  -     finasteride (PROSCAR) 5 MG tablet; Take 1 tablet by mouth Daily.  Dispense: 90 tablet; Refill: 3    2. Prostate cancer screening  -     PSA Screen; Future      As patient is more bothered by his urinary symptoms we will start him on finasteride 5 mg daily and follow-up with him in 4 months or sooner if needed.  Patient unsure what his PSA results have been and I do not have access to these therefore patient will get his PSA level drawn today and we will follow-up with him in office with those results.    I spent 10 minutes caring for Kyler on this date of service. This time includes time spent by me in the following activities:reviewing tests, obtaining and/or reviewing a separately obtained history, performing a medically appropriate examination and/or evaluation , counseling and educating the patient/family/caregiver, ordering medications, tests, or procedures, and documenting information in the medical record  Follow Up   Return in about 4 months (around 5/30/2023) for f/u finasteride .  Patient was given instructions and counseling regarding his condition or for health maintenance advice. Please see specific information pulled into the  AVS if appropriate.

## 2023-01-31 LAB — PSA SERPL-MCNC: 1.71 NG/ML (ref 0–4)

## 2023-02-08 RX ORDER — ATORVASTATIN CALCIUM 40 MG/1
40 TABLET, FILM COATED ORAL NIGHTLY
Qty: 90 TABLET | Refills: 1 | Status: SHIPPED | OUTPATIENT
Start: 2023-02-08 | End: 2023-08-07

## 2023-02-11 DIAGNOSIS — N40.0 BENIGN PROSTATIC HYPERPLASIA, UNSPECIFIED WHETHER LOWER URINARY TRACT SYMPTOMS PRESENT: ICD-10-CM

## 2023-02-13 RX ORDER — TAMSULOSIN HYDROCHLORIDE 0.4 MG/1
CAPSULE ORAL
Qty: 60 CAPSULE | Refills: 0 | Status: SHIPPED | OUTPATIENT
Start: 2023-02-13

## 2023-03-13 DIAGNOSIS — N40.0 BENIGN PROSTATIC HYPERPLASIA, UNSPECIFIED WHETHER LOWER URINARY TRACT SYMPTOMS PRESENT: Primary | ICD-10-CM

## 2023-03-13 RX ORDER — TAMSULOSIN HYDROCHLORIDE 0.4 MG/1
2 CAPSULE ORAL DAILY
Qty: 60 CAPSULE | Refills: 0 | Status: SHIPPED | OUTPATIENT
Start: 2023-03-13

## 2023-04-10 DIAGNOSIS — N40.0 BENIGN PROSTATIC HYPERPLASIA, UNSPECIFIED WHETHER LOWER URINARY TRACT SYMPTOMS PRESENT: ICD-10-CM

## 2023-04-10 RX ORDER — TAMSULOSIN HYDROCHLORIDE 0.4 MG/1
2 CAPSULE ORAL DAILY
Qty: 60 CAPSULE | Refills: 0 | Status: SHIPPED | OUTPATIENT
Start: 2023-04-10 | End: 2023-04-11 | Stop reason: SDUPTHER

## 2023-04-11 ENCOUNTER — OFFICE VISIT (OUTPATIENT)
Dept: FAMILY MEDICINE CLINIC | Age: 73
End: 2023-04-11
Payer: MEDICARE

## 2023-04-11 VITALS
SYSTOLIC BLOOD PRESSURE: 111 MMHG | OXYGEN SATURATION: 97 % | HEIGHT: 73 IN | TEMPERATURE: 98.1 F | WEIGHT: 247.8 LBS | HEART RATE: 74 BPM | BODY MASS INDEX: 32.84 KG/M2 | DIASTOLIC BLOOD PRESSURE: 57 MMHG

## 2023-04-11 DIAGNOSIS — Z79.899 LONG-TERM USE OF HIGH-RISK MEDICATION: Primary | ICD-10-CM

## 2023-04-11 DIAGNOSIS — R41.3 MEMORY LOSS: ICD-10-CM

## 2023-04-11 DIAGNOSIS — J06.9 ACUTE URI: ICD-10-CM

## 2023-04-11 DIAGNOSIS — J34.89 SORE IN NOSTRIL: ICD-10-CM

## 2023-04-11 LAB
AMPHET+METHAMPHET UR QL: NEGATIVE
AMPHETAMINES UR QL: NEGATIVE
BARBITURATES UR QL SCN: NEGATIVE
BENZODIAZ UR QL SCN: POSITIVE
BUPRENORPHINE SERPL-MCNC: NEGATIVE NG/ML
CANNABINOIDS SERPL QL: NEGATIVE
COCAINE UR QL: NEGATIVE
EXPIRATION DATE: ABNORMAL
Lab: ABNORMAL
MDMA UR QL SCN: NEGATIVE
METHADONE UR QL SCN: NEGATIVE
OPIATES UR QL: NEGATIVE
OXYCODONE UR QL SCN: NEGATIVE
PCP UR QL SCN: NEGATIVE

## 2023-04-11 RX ORDER — EPLERENONE 25 MG/1
25 TABLET, FILM COATED ORAL DAILY
COMMUNITY

## 2023-04-11 RX ORDER — FEXOFENADINE HYDROCHLORIDE 60 MG/1
60 TABLET, FILM COATED ORAL DAILY
COMMUNITY

## 2023-04-11 RX ORDER — CEPHALEXIN 500 MG/1
500 CAPSULE ORAL 2 TIMES DAILY
Qty: 20 CAPSULE | Refills: 0 | Status: SHIPPED | OUTPATIENT
Start: 2023-04-11 | End: 2023-04-21

## 2023-04-11 NOTE — PROGRESS NOTES
The ABCs of the Annual Wellness Visit  Subsequent Medicare Wellness Visit    Subjective    Kyler Staley is a 72 y.o. male who presents for a Subsequent Medicare Wellness Visit.    The following portions of the patient's history were reviewed and   updated as appropriate: allergies, current medications, past family history, past medical history, past social history, past surgical history and problem list.    Compared to one year ago, the patient feels his physical   health is worse. Feeling that he moves a little slower.     Compared to one year ago, the patient feels his mental   health is worse. Memory and comprending what he reads.     Recent Hospitalizations:  He was not admitted to the hospital during the last year.       Current Medical Providers:  Patient Care Team:  Marisol Sellers APRN as PCP - General (Nurse Practitioner)  Baltazar Hardy APRN (Family Medicine)    Outpatient Medications Prior to Visit   Medication Sig Dispense Refill   • allopurinol (ZYLOPRIM) 300 MG tablet Take 1 tablet by mouth Daily. 90 tablet 1   • ALPRAZolam (XANAX) 1 MG tablet Take 1 tablet by mouth At Night As Needed for Anxiety. 30 tablet 2   • amLODIPine (NORVASC) 10 MG tablet Take 1 tablet by mouth Daily.     • apixaban (ELIQUIS) 5 MG tablet tablet Take 1 tablet by mouth 2 (Two) Times a Day.     • aspirin 325 MG tablet Take 1 tablet by mouth Daily. TO FOLLOW MD ORDERS WHEN TO STOP     • atorvastatin (LIPITOR) 40 MG tablet Take 1 tablet by mouth Every Night for 180 days. (Patient taking differently: Take 20 mg by mouth Every Night.) 90 tablet 1   • carvedilol (COREG) 25 MG tablet Take 0.5 tablets by mouth 2 (Two) Times a Day With Meals. Half of a 25mg twice daily 90 tablet 1   • empagliflozin (JARDIANCE) 10 MG tablet tablet Take 1 tablet by mouth Daily.     • eplerenone (INSPRA) 25 MG tablet Take 1 tablet by mouth Daily.     • fexofenadine (ALLEGRA) 60 MG tablet Take 1 tablet by mouth Daily.     • finasteride (PROSCAR) 5 MG  tablet Take 1 tablet by mouth Daily. 90 tablet 3   • Flaxseed, Linseed, 1000 MG capsule Take 1,200 mg by mouth 2 (Two) Times a Day.     • glucose blood test strip      • imipramine (TOFRANIL) 50 MG tablet Take 1 tab po in the am and 2 tabs po in the evening 270 tablet 1   • isosorbide mononitrate (IMDUR) 60 MG 24 hr tablet Take 1 tablet by mouth Daily. 90 tablet 1   • levothyroxine (SYNTHROID, LEVOTHROID) 150 MCG tablet Take 1 tablet by mouth Every Morning. 90 tablet 1   • losartan (COZAAR) 100 MG tablet Take 1 tablet by mouth Daily.     • Melatonin 5 MG chewable tablet Chew 1 tablet.     • pantoprazole (PROTONIX) 40 MG EC tablet Take 1 tablet by mouth Daily. 90 tablet 1   • Semaglutide,0.25 or 0.5MG/DOS, (Ozempic, 0.25 or 0.5 MG/DOSE,) 2 MG/1.5ML solution pen-injector Inject 1 mg under the skin into the appropriate area as directed 1 (One) Time Per Week.     • tamsulosin (FLOMAX) 0.4 MG capsule 24 hr capsule Take 2 capsules by mouth once daily 60 capsule 0   • Lantus SoloStar 100 UNIT/ML injection pen Inject 26 Units under the skin into the appropriate area as directed Daily.     • spironolactone (ALDACTONE) 25 MG tablet Take 25 mg by mouth Daily.     • tamsulosin (FLOMAX) 0.4 MG capsule 24 hr capsule Take 2 capsules by mouth Daily. 60 capsule 0     No facility-administered medications prior to visit.       No opioid medication identified on active medication list. I have reviewed chart for other potential  high risk medication/s and harmful drug interactions in the elderly.          Aspirin is on active medication list. Aspirin use is indicated based on review of current medical condition/s. Pros and cons of this therapy have been discussed today. Benefits of this medication outweigh potential harm.  Patient has been encouraged to continue taking this medication.  .      Patient Active Problem List   Diagnosis   • Primary osteoarthritis of right shoulder   • Status post complete repair of rotator cuff   • Pain of  "right hip joint   • Knee pain   • Left hip pain   • History of fusion of lumbar spine   • Sleep apnea   • Type 2 diabetes mellitus with kidney complication, with long-term current use of insulin (HCC)   • Paroxysmal atrial fibrillation (HCC)   • Primary hypertension   • Stage 3b chronic kidney disease (HCC)   • BPH with urinary obstruction   • Other specified hypothyroidism   • Presence of aortocoronary bypass graft   • Left ventricular hypertrophy   • Hypercholesterolemia   • Gastroesophageal reflux disease   • Depressive disorder   • Degeneration of intervertebral disc of lumbar region   • CHF (congestive heart failure)   • Chronic ischemic heart disease   • Cataracts, bilateral   • Atypical angina   • Arteriosclerosis of coronary artery   • S/P TKR (total knee replacement), left   • Anxiety   • History of cold sores   • Abdominal hernia   • Arthritis   • Dyslipidemia   • Other specified postprocedural states   • Shortness of breath   • Stage 4 chronic kidney disease     Advance Care Planning   Advance Care Planning     Advance Directive is not on file.  ACP discussion was held with the patient during this visit. Patient does not have an advance directive, declines further assistance.     Objective    Vitals:    04/11/23 1421   BP: 111/57   BP Location: Right arm   Patient Position: Sitting   Cuff Size: Large Adult   Pulse: 74   Temp: 98.1 °F (36.7 °C)   TempSrc: Oral   SpO2: 97%   Weight: 112 kg (247 lb 12.8 oz)   Height: 185.4 cm (72.99\")     Estimated body mass index is 32.7 kg/m² as calculated from the following:    Height as of this encounter: 185.4 cm (72.99\").    Weight as of this encounter: 112 kg (247 lb 12.8 oz).    BMI is >= 30 and <35. (Class 1 Obesity). The following options were offered after discussion;: exercise counseling/recommendations      Does the patient have evidence of cognitive impairment? Yes  Significant other is here with pt.         HEALTH RISK ASSESSMENT    Smoking Status:  Social " History     Tobacco Use   Smoking Status Former   • Packs/day: 0.00   • Years: 0.00   • Pack years: 0.00   • Types: Cigarettes   • Start date:    • Quit date: 1975   • Years since quittin.3   • Passive exposure: Past   Smokeless Tobacco Never     Alcohol Consumption:  Social History     Substance and Sexual Activity   Alcohol Use No     Fall Risk Screen:    TYREETAMMY Fall Risk Assessment was completed, and patient is at MODERATE risk for falls. Assessment completed on:2023    Depression Screenin/11/2023     2:22 PM   PHQ-2/PHQ-9 Depression Screening   Little Interest or Pleasure in Doing Things 0-->not at all   Feeling Down, Depressed or Hopeless 0-->not at all   PHQ-9: Brief Depression Severity Measure Score 0       Health Habits and Functional and Cognitive Screenin/11/2023     2:22 PM   Functional & Cognitive Status   Do you have difficulty preparing food and eating? No   Do you have difficulty bathing yourself, getting dressed or grooming yourself? No   Do you have difficulty using the toilet? No   Do you have difficulty moving around from place to place? No   Do you have trouble with steps or getting out of a bed or a chair? No   Current Diet Well Balanced Diet   Dental Exam Up to date   Eye Exam Up to date   Exercise (times per week) 0 times per week   Current Exercises Include No Regular Exercise   Do you need help using the phone?  No   Are you deaf or do you have serious difficulty hearing?  Yes   Do you need help with transportation? No   Do you need help shopping? No   Do you need help preparing meals?  No   Do you need help with housework?  No   Do you need help with laundry? No   Do you need help taking your medications? No   Do you need help managing money? No   Do you ever drive or ride in a car without wearing a seat belt? No   Have you felt unusual stress, anger or loneliness in the last month? No   Who do you live with? Spouse   If you need help, do you have trouble  finding someone available to you? No   Do you have difficulty concentrating, remembering or making decisions? Yes       Age-appropriate Screening Schedule:  Refer to the list below for future screening recommendations based on patient's age, sex and/or medical conditions. Orders for these recommended tests are listed in the plan section. The patient has been provided with a written plan.    Health Maintenance   Topic Date Due   • DIABETIC FOOT EXAM  Never done   • DIABETIC EYE EXAM  Never done   • DXA SCAN  01/28/2023   • HEMOGLOBIN A1C  07/09/2023   • INFLUENZA VACCINE  08/01/2023   • LIPID PANEL  01/09/2024   • ANNUAL WELLNESS VISIT  04/11/2024   • URINE MICROALBUMIN  04/28/2024   • COLORECTAL CANCER SCREENING  09/14/2031   • TDAP/TD VACCINES (2 - Td or Tdap) 01/19/2032   • HEPATITIS C SCREENING  Completed   • COVID-19 Vaccine  Completed   • Pneumococcal Vaccine 65+  Completed   • AAA SCREEN (ONE-TIME)  Completed   • ZOSTER VACCINE  Completed                  CMS Preventative Services Quick Reference  Risk Factors Identified During Encounter  Chronic Pain: Neurosurgery Referral Ordered (not ordered, discussed)  Fall Risk-High or Moderate: Discussed Fall Prevention in the home  Hearing Problem: Pt has hearing aids  Polypharmacy: Medication List reviewed and Medications are appropriate for patient  Dental Screening Recommended  Vision Screening Recommended  The above risks/problems have been discussed with the patient.  Pertinent information has been shared with the patient in the After Visit Summary.  An After Visit Summary and PPPS were made available to the patient.    Follow Up:   Next Medicare Wellness visit to be scheduled in 1 year.       Additional E&M Note during same encounter follows:  Patient has multiple medical problems which are significant and separately identifiable that require additional work above and beyond the Medicare Wellness Visit.      Chief Complaint  Medicare  "Wellness-subsequent    Subjective        HPI  Kyler Staley is also being seen today for sores in nostrils and uri symptoms. Some sinus/chest congestion that has persisted for >1 week. No known ill contacts. Denies fever, chills, nausea, vomiting, diarrhea, soa or chest pains.          Objective   Vital Signs:  /57 (BP Location: Right arm, Patient Position: Sitting, Cuff Size: Large Adult)   Pulse 74   Temp 98.1 °F (36.7 °C) (Oral)   Ht 185.4 cm (72.99\")   Wt 112 kg (247 lb 12.8 oz)   SpO2 97%   BMI 32.70 kg/m²     Physical Exam  Vitals reviewed.   Constitutional:       General: He is not in acute distress.     Appearance: Normal appearance. He is well-developed.   HENT:      Head: Normocephalic and atraumatic.      Nose:      Comments: Small scab noted to right nare  Eyes:      Conjunctiva/sclera: Conjunctivae normal.      Pupils: Pupils are equal, round, and reactive to light.   Cardiovascular:      Rate and Rhythm: Normal rate and regular rhythm.      Heart sounds: Normal heart sounds. No murmur heard.  Pulmonary:      Effort: Pulmonary effort is normal. No respiratory distress.      Breath sounds: Normal breath sounds.   Skin:     General: Skin is warm and dry.   Neurological:      Mental Status: He is alert and oriented to person, place, and time.   Psychiatric:         Mood and Affect: Mood and affect normal.         Behavior: Behavior normal.         Thought Content: Thought content normal.         Judgment: Judgment normal.                         Assessment and Plan   Diagnoses and all orders for this visit:    1. Long-term use of high-risk medication (Primary)  -     POC Urine Drug Screen [facility]    2. Sore in nostril  -     mupirocin (BACTROBAN) 2 % nasal ointment; 1 application into the nostril(s) as directed by provider 2 (Two) Times a Day for 7 days.  Dispense: 14 g; Refill: 0    3. Acute URI  Comments:  Increase fluid intake and rest. Complete rx abx. Claritin and flonase daily. "     4. Memory loss  Comments:  Deferred discuss today d/t acute illness. Will discuss at next visit. May send to neuro for further work/consider Namenda. Will also discuss cutting xanax dose.    Other orders  -     cephalexin (Keflex) 500 MG capsule; Take 1 capsule by mouth 2 (Two) Times a Day for 10 days.  Dispense: 20 capsule; Refill: 0    Pt v/u, agrees with plan of care and has not further questions upon d/c.          Follow Up   Return in about 3 months (around 7/11/2023) for Recheck.  Patient was given instructions and counseling regarding his condition or for health maintenance advice. Please see specific information pulled into the AVS if appropriate.

## 2023-04-18 DIAGNOSIS — N40.0 BENIGN PROSTATIC HYPERPLASIA, UNSPECIFIED WHETHER LOWER URINARY TRACT SYMPTOMS PRESENT: Primary | ICD-10-CM

## 2023-04-18 RX ORDER — TAMSULOSIN HYDROCHLORIDE 0.4 MG/1
1 CAPSULE ORAL DAILY
Qty: 60 CAPSULE | Refills: 1 | Status: SHIPPED | OUTPATIENT
Start: 2023-04-18

## 2023-04-21 ENCOUNTER — OFFICE VISIT (OUTPATIENT)
Dept: SLEEP MEDICINE | Facility: HOSPITAL | Age: 73
End: 2023-04-21
Payer: MEDICARE

## 2023-04-21 VITALS
SYSTOLIC BLOOD PRESSURE: 136 MMHG | BODY MASS INDEX: 32.56 KG/M2 | HEART RATE: 77 BPM | WEIGHT: 245.7 LBS | DIASTOLIC BLOOD PRESSURE: 75 MMHG | HEIGHT: 73 IN | OXYGEN SATURATION: 94 %

## 2023-04-21 DIAGNOSIS — G47.33 OSA (OBSTRUCTIVE SLEEP APNEA): Primary | ICD-10-CM

## 2023-04-21 PROCEDURE — 3078F DIAST BP <80 MM HG: CPT | Performed by: INTERNAL MEDICINE

## 2023-04-21 PROCEDURE — 3075F SYST BP GE 130 - 139MM HG: CPT | Performed by: INTERNAL MEDICINE

## 2023-04-21 PROCEDURE — G0463 HOSPITAL OUTPT CLINIC VISIT: HCPCS

## 2023-04-21 RX ORDER — SUCRALFATE 1 G/1
TABLET ORAL
COMMUNITY
Start: 2023-02-11

## 2023-04-21 RX ORDER — INSULIN GLARGINE-YFGN 100 [IU]/ML
INJECTION, SOLUTION SUBCUTANEOUS
COMMUNITY
Start: 2023-03-16

## 2023-04-21 RX ORDER — TERBINAFINE HYDROCHLORIDE 250 MG/1
TABLET ORAL
COMMUNITY
Start: 2023-04-20

## 2023-04-21 NOTE — PROGRESS NOTES
[facility]      Patient Care Team:  Marisol Sellers APRN as PCP - General (Nurse Practitioner)  Baltazar Hardy APRN (Family Medicine)    Referring Provider: Marisol Sellers APRN    Chief complaint:   Establish care for sleep apnea    History of present illness:    Subjective   This is a 72 year old male patient, with HTN and A fib/pacemaker.     He reported a diagnosis of sleep apnea 15-20 years ago at [facility]. He reported severe sleep apnea. His baseline study is not available for us.     His current machine was issued in 2017. He reported using it regularly but his machine is on recall. He has registered 1.5 years ago and has not gotten a replacement.    Baseline symptoms consist of loud snoring, apnea, gasping for breath, restless sleep and frequent awakening.  Most of his symptoms improved with PAP therapy.    Sleep schedule:  -Bedtime: 10-11 PM  -Sleep latency: Long  -Wake up time: 9-10 AM, does last feel refreshed  -Nocturnal awakening: 3 times because of nocturia.  No difficulties going back to sleep.  -Perceived sleep hours: 8-9    Mask: FFM which does fit well.  No significant air leak or dry mouth  DME: [employer] in [place]    ESS: Total score: 5     Review of Systems  Constitutional: No fever or chills. No changes in appetite.   ENMT: Postnasal drip and recurrent nasal bleeding.  Cardiovascular: No chest pain, palpitation or legs swelling.    Respiratory: Shortness of breath.  No cough or wheezing.  Gastrointestinal: No constipation, diarrhea, abdominal pain or acid reflux.  Abdominal bloating.  Neurology: Dizziness, no weakness or numbness.  Musculoskeletal: No joints pain, stiffness or swelling.   Psychiatry: Anxiety and depression.  Hem/Lymphatic: No swollen glands or easy bruising.  Integumentary: No rash.  Endocrinology: No excessive thirst, cold or warm intolerance.   Urinary: No dysuria, bloody urine but frequent urination.      History  Past Medical History:   Diagnosis Date   • Acne    • Alcoholism    • Allergic    • Anxiety    • Arthritis    • Arthritis of back    • Arthritis of neck    • Atrial fibrillation    • Back pain    • Benign prostatic hyperplasia    • Cardiac disease    • Cataracts, bilateral    • Cervical disc disorder    • Cholelithiasis    • Chronic kidney disease, stage 3     moderate   • CKD (chronic kidney disease)    • Clotting disorder    • Condition not found     hernia   • Coronary artery disease    • Depression    • Diabetes    • Enlarged prostate    • Erectile dysfunction    • Fatigue    • GERD (gastroesophageal reflux disease)    • Gout    • Hearing loss    • Hematospermia 12/09/2015   • High blood pressure    • High cholesterol    • Hip arthrosis    • History of cold sores    • History of gastritis    • [place] (hard of hearing)    • Hyperlipidemia    • Hypertension    • Hypothyroidism    • Irregular heart rhythm    • Joint pain    • Kidney disease    • Knee swelling    • Low back pain    • Lumbosacral disc disease    • Narrowing of airway 2012    PER ENT   • Neuropathy    • Obesity    • Osteopenia    • 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    • Pneumonia    • PONV (postoperative nausea and vomiting)    • Psychiatric diagnosis    • Renal insufficiency    • Rotator cuff syndrome     REPAIRED BILATERAL   • Shortness of breath    • Sinus trouble    • Sleep apnea     BIPAP   • 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)    • Swallowing difficulty    • Thin skin    • Thoracic disc disorder    • Thyroid condition    • Visual impairment    • Vocal cord dysfunction      HISTORY OF DISORDER ON ONE SIDE AFTER TRACHEA   ,   Past Surgical History:   Procedure Laterality Date   • BACK SURGERY  2018    X2   • BACK SURGERY  2011   • CARDIAC CATHETERIZATION     • CARDIAC SURGERY     • CHOLECYSTECTOMY     • COLONOSCOPY     • CORONARY ARTERY BYPASS GRAFT  2012   • CORONARY STENT  PLACEMENT     • EYE SURGERY     • HERNIA REPAIR     • INGUINAL HERNIA REPAIR     • JOINT REPLACEMENT     • KNEE SURGERY      right and left   • LUMBAR DISCECTOMY FUSION INSTRUMENTATION N/A 2021    Procedure: EXPLORATION OF FUSION T-12 S-1 REMOVAL OF HARDWARE REVISION OF FUSION AND INSTRUMENTATION T12-S1 WITH APPLICATION OF BONE MORPHOGENIC PROTEIN;  Surgeon: Baltazar Blankenship MD;  Location: [facility] OR;  Service: Orthopedic Spine;  Laterality: N/A;   • NOSE SURGERY     • PACEMAKER IMPLANTATION     • REPLACEMENT TOTAL KNEE Bilateral    • SHOULDER SURGERY Bilateral     rotator cuff repair   • SPINE SURGERY     ,   Family History   Problem Relation Age of Onset   • Heart attack Mother    • Arthritis Mother    • Cancer Mother    • Heart disease Mother    • Hypertension Mother    • Cancer Father    • Hypertension Sister    • Hypertension Brother    • Malig Hyperthermia Neg Hx    ,   Social History     Socioeconomic History   • Marital status:    Tobacco Use   • Smoking status: Former     Packs/day: 0.00     Years: 0.00     Pack years: 0.00     Types: Cigarettes     Start date:      Quit date: 1975     Years since quittin.3     Passive exposure: Past   • Smokeless tobacco: Never   Vaping Use   • Vaping Use: Never used   Substance and Sexual Activity   • Alcohol use: No   • Drug use: Yes     Types: Hydrocodone   • Sexual activity: Not Currently     Partners: Female     E-cigarette/Vaping   • E-cigarette/Vaping Use Never User      E-cigarette/Vaping Substances   • Nicotine No    • THC No    • CBD No    • Flavoring No      E-cigarette/Vaping Devices   • Disposable No    • Pre-filled or Refillable Cartridge No    • Refillable Tank No    • Pre-filled Pod No        , (Not in a hospital admission)  , Scheduled Meds:   and Allergies:  Bupropion, Iodinated contrast media, Lorazepam, Propoxyphene, Spironolactone, and Adhesive tape    Medications:    Current Outpatient Medications:   •  allopurinol  (ZYLOPRIM) 300 MG tablet, Take 1 tablet by mouth Daily., Disp: 90 tablet, Rfl: 1  •  ALPRAZolam (XANAX) 1 MG tablet, Take 1 tablet by mouth At Night As Needed for Anxiety., Disp: 30 tablet, Rfl: 2  •  amLODIPine (NORVASC) 10 MG tablet, Take 1 tablet by mouth Daily., Disp: , Rfl:   •  apixaban (ELIQUIS) 5 MG tablet tablet, Take 1 tablet by mouth 2 (Two) Times a Day., Disp: , Rfl:   •  aspirin 325 MG tablet, Take 1 tablet by mouth Daily. TO FOLLOW MD ORDERS WHEN TO STOP, Disp: , Rfl:   •  atorvastatin (LIPITOR) 40 MG tablet, Take 1 tablet by mouth Every Night for 180 days. (Patient taking differently: Take 20 mg by mouth Every Night.), Disp: 90 tablet, Rfl: 1  •  carvedilol (COREG) 25 MG tablet, Take 0.5 tablets by mouth 2 (Two) Times a Day With Meals. Half of a 25mg twice daily, Disp: 90 tablet, Rfl: 1  •  empagliflozin (JARDIANCE) 10 MG tablet tablet, Take 1 tablet by mouth Daily., Disp: , Rfl:   •  eplerenone (INSPRA) 25 MG tablet, Take 1 tablet by mouth Daily., Disp: , Rfl:   •  fexofenadine (ALLEGRA) 60 MG tablet, Take 1 tablet by mouth Daily., Disp: , Rfl:   •  finasteride (PROSCAR) 5 MG tablet, Take 1 tablet by mouth Daily., Disp: 90 tablet, Rfl: 3  •  Flaxseed, Linseed, 1000 MG capsule, Take 1,200 mg by mouth 2 (Two) Times a Day., Disp: , Rfl:   •  glucose blood test strip, , Disp: , Rfl:   •  imipramine (TOFRANIL) 50 MG tablet, Take 1 tab po in the am and 2 tabs po in the evening, Disp: 270 tablet, Rfl: 1  •  Insulin Glargine-yfgn 100 UNIT/ML solution pen-injector, INJECT 26 UNITS UNDER THE SKIN DAILY FOR BLOOD SUGAR -DISCARD PEN AFTER 28 DAYS OF USE, Disp: , Rfl:   •  isosorbide mononitrate (IMDUR) 60 MG 24 hr tablet, Take 1 tablet by mouth Daily., Disp: 90 tablet, Rfl: 1  •  levothyroxine (SYNTHROID, LEVOTHROID) 150 MCG tablet, Take 1 tablet by mouth Every Morning., Disp: 90 tablet, Rfl: 1  •  losartan (COZAAR) 100 MG tablet, Take 1 tablet by mouth Daily., Disp: , Rfl:   •  Melatonin 5 MG chewable tablet,  "Chew 1 tablet., Disp: , Rfl:   •  pantoprazole (PROTONIX) 40 MG EC tablet, Take 1 tablet by mouth Daily., Disp: 90 tablet, Rfl: 1  •  Semaglutide,0.25 or 0.5MG/DOS, (Ozempic, 0.25 or 0.5 MG/DOSE,) 2 MG/1.5ML solution pen-injector, Inject 1 mg under the skin into the appropriate area as directed 1 (One) Time Per Week., Disp: , Rfl:   •  sucralfate (CARAFATE) 1 g tablet, TAKE 1 TABLET BY MOUTH UP TO THREE TIMES DAILY AS NEEDED, Disp: , Rfl:   •  tamsulosin (FLOMAX) 0.4 MG capsule 24 hr capsule, Take 1 capsule by mouth Daily., Disp: 60 capsule, Rfl: 1  •  terbinafine (lamiSIL) 250 MG tablet, , Disp: , Rfl:   •  cephalexin (Keflex) 500 MG capsule, Take 1 capsule by mouth 2 (Two) Times a Day for 10 days., Disp: 20 capsule, Rfl: 0  •  mupirocin (BACTROBAN) 2 % ointment, APPLY 1 OINTMENT TOPICALLY INTO NOSTRIL TWICE DAILY FOR 7 DAYS, Disp: , Rfl:   •  tamsulosin (FLOMAX) 0.4 MG capsule 24 hr capsule, Take 2 capsules by mouth once daily, Disp: 60 capsule, Rfl: 0      Objective   Vital Signs:  Vitals:    04/21/23 0900   BP: 136/75   Pulse: 77   SpO2: 94%   Weight: 111 kg (245 lb 11.2 oz)   Height: 185.4 cm (72.99\")     Body mass index is 32.43 kg/m².  Neck Circumference: 18 inches     Physical Exam:  Neck Circumference: 18 inches     Constitutional: Not in acute distress.  Eyes: Injected conjunctiva, EOMI. pupils equal reactive to light.  ENMT: Merino score 4. Mallampati score 4. No oral thrush. Tonsils grade 1. Narrow distance in between the posterior pharyngeal pillars.  Neck: Large. No thyromegaly.  Trachea midline.  Heart: Regular rhythm and rate, no murmur  Lungs: Good and equal air entry bilaterally.  Bilateral basal crackles but no wheezing.  Nonlabored breathing.       Abdomen: Obese.  Soft.  No tenderness.  Positive bowel sounds.  Extremities: No cyanosis, clubbing or pitting edema.  Warm extremities and well-perfused.  Neuro: Conscious, alert, oriented x3.  Gait is normal.  Strength 5/5 in arms and legs.  Psych: " Appropriate mood and affect.    Integumentary: No rash.  Normal skin turgor.  Lymphatic: No palpable cervical or supraclavicular lymph nodes.    Diagnostic data:  Lab Results   Component Value Date    HGBA1C 8.30 (H) 01/09/2023     Total Cholesterol   Date Value Ref Range Status   01/09/2023 154 0 - 200 mg/dL Final     Triglycerides   Date Value Ref Range Status   01/09/2023 436 (H) 0 - 150 mg/dL Final     HDL Cholesterol   Date Value Ref Range Status   01/09/2023 27 (L) 40 - 60 mg/dL Final     Hemoglobin   Date Value Ref Range Status   11/07/2022 13.3 13.0 - 17.7 g/dL Final   03/09/2018 10.7 (L) 13.5 - 17.5 g/dL Final     CO2   Date Value Ref Range Status   01/09/2023 24.8 22.0 - 29.0 mmol/L Final     Total CO2   Date Value Ref Range Status   03/09/2018 25 22 - 30 mmol/L Final     PAP download for the last 50 days:  Usage >4 H = 100%  AHI = 5.3  P 90% = 17/15  Air leak: 2 minutes  Average use 12 hours 34 minutes    Assessment   1. JACI  2. Obesity, BMI 32  3. HTN  4. A-fib      Plan:  Check split-night PSG to requalify for therapy.  Patient's Pap is too old and on recall.  He requires a new machine.  Optimal pressure is 17/15.  Patient is compliant with therapy and clinically benefit from treatment.  Refill supplies.    Counseled for weight loss.  Encouraged to exercise regularly and cut down on carbohydrates.  Discussed that losing weight may decrease the severity of sleep apnea and obviate the need of CPAP therapy.    Discussed the association between obstructive sleep apnea and cardiovascular disease including HTN and A-fib and the beneficial effect of Pap therapy in reducing the risk of major cardiovascular events.          Keyonna Mcdowell MD  04/21/23  09:57 EDT    This note was dictated utilizing Dragon dictation

## 2023-04-28 ENCOUNTER — TRANSCRIBE ORDERS (OUTPATIENT)
Dept: ADMINISTRATIVE | Facility: HOSPITAL | Age: 73
End: 2023-04-28
Payer: MEDICARE

## 2023-04-28 ENCOUNTER — LAB (OUTPATIENT)
Dept: LAB | Facility: HOSPITAL | Age: 73
End: 2023-04-28
Payer: MEDICARE

## 2023-04-28 DIAGNOSIS — R80.9 PROTEINURIA, UNSPECIFIED TYPE: ICD-10-CM

## 2023-04-28 DIAGNOSIS — E55.9 VITAMIN D DEFICIENCY: ICD-10-CM

## 2023-04-28 DIAGNOSIS — N18.30 STAGE 3 CHRONIC KIDNEY DISEASE, UNSPECIFIED WHETHER STAGE 3A OR 3B CKD: ICD-10-CM

## 2023-04-28 DIAGNOSIS — E13.9 MATURITY ONSET DIABETES MELLITUS IN YOUNG: ICD-10-CM

## 2023-04-28 DIAGNOSIS — N18.30 STAGE 3 CHRONIC KIDNEY DISEASE, UNSPECIFIED WHETHER STAGE 3A OR 3B CKD: Primary | ICD-10-CM

## 2023-04-28 LAB
BACTERIA UR QL AUTO: NORMAL /HPF
BASOPHILS # BLD AUTO: 0.03 10*3/MM3 (ref 0–0.2)
BASOPHILS NFR BLD AUTO: 0.4 % (ref 0–1.5)
BILIRUB UR QL STRIP: NEGATIVE
CLARITY UR: CLEAR
COLOR UR: YELLOW
CREAT UR-MCNC: 89.4 MG/DL
DEPRECATED RDW RBC AUTO: 53.8 FL (ref 37–54)
EOSINOPHIL # BLD AUTO: 0.22 10*3/MM3 (ref 0–0.4)
EOSINOPHIL NFR BLD AUTO: 2.9 % (ref 0.3–6.2)
ERYTHROCYTE [DISTWIDTH] IN BLOOD BY AUTOMATED COUNT: 16.9 % (ref 12.3–15.4)
GLUCOSE UR STRIP-MCNC: ABNORMAL MG/DL
HCT VFR BLD AUTO: 43.8 % (ref 37.5–51)
HGB BLD-MCNC: 13.6 G/DL (ref 13–17.7)
HGB UR QL STRIP.AUTO: NEGATIVE
IMM GRANULOCYTES # BLD AUTO: 0.02 10*3/MM3 (ref 0–0.05)
IMM GRANULOCYTES NFR BLD AUTO: 0.3 % (ref 0–0.5)
KETONES UR QL STRIP: NEGATIVE
LEUKOCYTE ESTERASE UR QL STRIP.AUTO: NEGATIVE
LYMPHOCYTES # BLD AUTO: 1.79 10*3/MM3 (ref 0.7–3.1)
LYMPHOCYTES NFR BLD AUTO: 23.3 % (ref 19.6–45.3)
MCH RBC QN AUTO: 27.1 PG (ref 26.6–33)
MCHC RBC AUTO-ENTMCNC: 31.1 G/DL (ref 31.5–35.7)
MCV RBC AUTO: 87.4 FL (ref 79–97)
MONOCYTES # BLD AUTO: 0.62 10*3/MM3 (ref 0.1–0.9)
MONOCYTES NFR BLD AUTO: 8.1 % (ref 5–12)
NEUTROPHILS NFR BLD AUTO: 4.99 10*3/MM3 (ref 1.7–7)
NEUTROPHILS NFR BLD AUTO: 65 % (ref 42.7–76)
NITRITE UR QL STRIP: NEGATIVE
PH UR STRIP.AUTO: 5.5 [PH] (ref 5–8)
PLATELET # BLD AUTO: 291 10*3/MM3 (ref 140–450)
PMV BLD AUTO: 9.2 FL (ref 6–12)
PROT ?TM UR-MCNC: 47.1 MG/DL
PROT UR QL STRIP: ABNORMAL
PROT/CREAT UR: 0.53 MG/G{CREAT}
RBC # BLD AUTO: 5.01 10*6/MM3 (ref 4.14–5.8)
RBC # UR STRIP: NORMAL /HPF
REF LAB TEST METHOD: NORMAL
SP GR UR STRIP: 1.02 (ref 1–1.03)
SQUAMOUS #/AREA URNS HPF: NORMAL /HPF
UROBILINOGEN UR QL STRIP: ABNORMAL
WBC # UR STRIP: NORMAL /HPF
WBC NRBC COR # BLD: 7.67 10*3/MM3 (ref 3.4–10.8)

## 2023-04-28 PROCEDURE — 83970 ASSAY OF PARATHORMONE: CPT

## 2023-04-28 PROCEDURE — 82306 VITAMIN D 25 HYDROXY: CPT

## 2023-04-28 PROCEDURE — 85025 COMPLETE CBC W/AUTO DIFF WBC: CPT

## 2023-04-28 PROCEDURE — 84156 ASSAY OF PROTEIN URINE: CPT

## 2023-04-28 PROCEDURE — 84100 ASSAY OF PHOSPHORUS: CPT

## 2023-04-28 PROCEDURE — 81001 URINALYSIS AUTO W/SCOPE: CPT

## 2023-04-28 PROCEDURE — 82570 ASSAY OF URINE CREATININE: CPT

## 2023-04-28 PROCEDURE — 80048 BASIC METABOLIC PNL TOTAL CA: CPT

## 2023-04-28 PROCEDURE — 36415 COLL VENOUS BLD VENIPUNCTURE: CPT

## 2023-04-29 LAB
25(OH)D3 SERPL-MCNC: 33.8 NG/ML (ref 30–100)
ANION GAP SERPL CALCULATED.3IONS-SCNC: 8 MMOL/L (ref 5–15)
BUN SERPL-MCNC: 28 MG/DL (ref 8–23)
BUN/CREAT SERPL: 14.1 (ref 7–25)
CALCIUM SPEC-SCNC: 9.4 MG/DL (ref 8.6–10.5)
CHLORIDE SERPL-SCNC: 104 MMOL/L (ref 98–107)
CO2 SERPL-SCNC: 24 MMOL/L (ref 22–29)
CREAT SERPL-MCNC: 1.99 MG/DL (ref 0.76–1.27)
EGFRCR SERPLBLD CKD-EPI 2021: 35 ML/MIN/1.73
GLUCOSE SERPL-MCNC: 157 MG/DL (ref 65–99)
PHOSPHATE SERPL-MCNC: 4.1 MG/DL (ref 2.5–4.5)
POTASSIUM SERPL-SCNC: 4.7 MMOL/L (ref 3.5–5.2)
PTH-INTACT SERPL-MCNC: 41.8 PG/ML (ref 15–65)
SODIUM SERPL-SCNC: 136 MMOL/L (ref 136–145)

## 2023-05-10 ENCOUNTER — HOSPITAL ENCOUNTER (OUTPATIENT)
Dept: SLEEP MEDICINE | Facility: HOSPITAL | Age: 73
End: 2023-05-10
Payer: MEDICARE

## 2023-05-10 DIAGNOSIS — G47.33 OSA (OBSTRUCTIVE SLEEP APNEA): ICD-10-CM

## 2023-05-10 PROCEDURE — 95810 POLYSOM 6/> YRS 4/> PARAM: CPT

## 2023-05-15 ENCOUNTER — TELEPHONE (OUTPATIENT)
Dept: FAMILY MEDICINE CLINIC | Age: 73
End: 2023-05-15

## 2023-05-15 DIAGNOSIS — G47.33 OSA (OBSTRUCTIVE SLEEP APNEA): Primary | ICD-10-CM

## 2023-05-15 NOTE — TELEPHONE ENCOUNTER
Caller: Kyler Staley    Relationship: Self    Best call back number: [phone]    Caller requesting test results: YES    What test was performed: SLEEP APNEA    When was the test performed: 5/10/23    Where was the test performed: [facility]    Additional notes: PATIENT STATED WANTING TO DISCUSS RESULTS

## 2023-05-15 NOTE — TELEPHONE ENCOUNTER
DELETE AFTER REVIEWING: Telephone encounter to be sent to the clinical pool     Caller: Kyler Staley    Relationship: Self    Best call back number: [phone]     What orders are you requesting (i.e. lab or imaging): BIPAP      In what timeframe would the patient need to come in: ASAP     Additional notes: PATIENT STATED THAT HE HAS A RECALLED [facility] BIPAP MACHINE AND THEY NEED AN ORDER THAT HAS DIAGNOSIS OF SLEEP APNEA.  SERIAL NUMBER: [id number]  FAX:[phone]  EMAIL: [email or web address]

## 2023-05-15 NOTE — TELEPHONE ENCOUNTER
Caller: Kyler Staley    Relationship: Self    Best call back number: [phone]    What orders are you requesting (i.e. lab or imaging): BIPAP     In what timeframe would the patient need to come in: ASAP    Additional notes: PATIENT STATED THAT HE HAS A RECALLED [facility] BIPAP MACHINE AND THEY NEED AN ORDER THAT HAS DIAGNOSIS OF SLEEP APNEA  FAX:[phone]

## 2023-05-17 ENCOUNTER — TELEPHONE (OUTPATIENT)
Dept: SLEEP MEDICINE | Facility: HOSPITAL | Age: 73
End: 2023-05-17
Payer: MEDICARE

## 2023-06-03 DIAGNOSIS — F41.9 ANXIETY: ICD-10-CM

## 2023-06-05 RX ORDER — ALPRAZOLAM 1 MG/1
1 TABLET ORAL NIGHTLY PRN
Qty: 30 TABLET | Refills: 0 | Status: SHIPPED | OUTPATIENT
Start: 2023-06-05

## 2023-06-06 DIAGNOSIS — G47.33 OSA (OBSTRUCTIVE SLEEP APNEA): Primary | ICD-10-CM

## 2023-07-10 PROBLEM — I49.5 SICK SINUS SYNDROME: Chronic | Status: ACTIVE | Noted: 2023-04-13

## 2023-07-10 PROBLEM — R29.3 ABNORMAL POSTURE: Status: ACTIVE | Noted: 2018-03-28

## 2023-07-10 PROBLEM — R10.11 ABDOMINAL PAIN, RIGHT UPPER QUADRANT: Status: ACTIVE | Noted: 2021-11-12

## 2023-07-10 PROBLEM — Z95.0 CARDIAC PACEMAKER IN SITU: Status: ACTIVE | Noted: 2023-04-13

## 2023-07-10 PROBLEM — S22.009A UNSPECIFIED FRACTURE OF UNSPECIFIED THORACIC VERTEBRA, INITIAL ENCOUNTER FOR CLOSED FRACTURE: Chronic | Status: ACTIVE | Noted: 2018-12-17

## 2023-07-10 PROBLEM — E78.01 ESSENTIAL FAMILIAL HYPERCHOLESTEROLEMIA: Status: ACTIVE | Noted: 2023-05-12

## 2023-07-10 PROBLEM — M47.816 SPONDYLOSIS OF LUMBAR SPINE: Status: ACTIVE | Noted: 2018-03-06

## 2023-07-10 PROBLEM — M54.2 NECK PAIN: Status: ACTIVE | Noted: 2022-02-11

## 2023-07-10 PROBLEM — M46.1 SACROILIITIS, NOT ELSEWHERE CLASSIFIED: Chronic | Status: ACTIVE | Noted: 2020-03-03

## 2023-07-10 PROBLEM — Z00.00 ENCOUNTER FOR GENERAL ADULT MEDICAL EXAMINATION WITHOUT ABNORMAL FINDINGS: Status: ACTIVE | Noted: 2023-05-12

## 2023-07-10 PROBLEM — M54.10 RADICULOPATHY WITH LOWER EXTREMITY SYMPTOMS: Status: ACTIVE | Noted: 2020-11-09

## 2023-07-10 PROBLEM — I65.23 BILATERAL CAROTID ARTERY STENOSIS: Status: ACTIVE | Noted: 2019-03-20

## 2023-07-10 PROBLEM — J44.1 ACUTE EXACERBATION OF CHRONIC OBSTRUCTIVE PULMONARY DISEASE: Chronic | Status: ACTIVE | Noted: 2020-11-05

## 2023-07-10 PROBLEM — I65.09 VERTEBRAL ARTERY OCCLUSION: Status: ACTIVE | Noted: 2019-03-20

## 2023-07-10 PROBLEM — M43.26 FUSION OF SPINE, LUMBAR REGION: Status: ACTIVE | Noted: 2018-01-17

## 2023-07-10 PROBLEM — M47.812 CERVICAL SPONDYLOSIS: Status: ACTIVE | Noted: 2019-03-20

## 2023-07-18 PROBLEM — I95.1 ORTHOSTATIC HYPOTENSION: Status: ACTIVE | Noted: 2023-07-18

## 2023-07-24 ENCOUNTER — PATIENT OUTREACH (OUTPATIENT)
Dept: CASE MANAGEMENT | Facility: OTHER | Age: 73
End: 2023-07-24
Payer: MEDICARE

## 2023-07-24 DIAGNOSIS — N18.4 STAGE 4 CHRONIC KIDNEY DISEASE: Primary | ICD-10-CM

## 2023-07-24 NOTE — OUTREACH NOTE
"AMBULATORY CASE MANAGEMENT NOTE    Name and Relationship of Patient/Support Person: Kyler Staley \"Peter\" - Self    Called Mr. Staley, following up on referral for diabetes specialists.  Schedulers reached out to schedule him, but he has not returned call at this time.  He will and I will follow up.   He was inquiring if I had reached out to any of his specialists, however we had discussed last week that we were going to change his medication regimen around a bit to see if that made any difference.  He is most likely bottoming his blood pressure out by taking all at one time.  He reports that his blood pressure is high in am, eats breakfast, takes meds and then he is having dizziness.  Suggested that he take the losartan later on in the day like at dinner time or bedtime.  He will try that and see if his symptoms improve.  Mailed AVS with medications       Education Documentation  No documentation found.        Abigail OCHOA  Ambulatory Case Management    7/24/2023, 12:57 EDT  "

## 2023-07-27 DIAGNOSIS — N40.0 BENIGN PROSTATIC HYPERPLASIA, UNSPECIFIED WHETHER LOWER URINARY TRACT SYMPTOMS PRESENT: Primary | ICD-10-CM

## 2023-07-27 RX ORDER — TAMSULOSIN HYDROCHLORIDE 0.4 MG/1
2 CAPSULE ORAL DAILY
Qty: 180 CAPSULE | Refills: 3 | Status: SHIPPED | OUTPATIENT
Start: 2023-07-27

## 2023-07-31 ENCOUNTER — TRANSCRIBE ORDERS (OUTPATIENT)
Dept: ADMINISTRATIVE | Facility: HOSPITAL | Age: 73
End: 2023-07-31
Payer: MEDICARE

## 2023-07-31 ENCOUNTER — LAB (OUTPATIENT)
Dept: LAB | Facility: HOSPITAL | Age: 73
End: 2023-07-31
Payer: MEDICARE

## 2023-07-31 ENCOUNTER — PATIENT OUTREACH (OUTPATIENT)
Dept: CASE MANAGEMENT | Facility: OTHER | Age: 73
End: 2023-07-31
Payer: MEDICARE

## 2023-07-31 DIAGNOSIS — N18.30 TYPE 2 DIABETES MELLITUS WITH STAGE 3 CHRONIC KIDNEY DISEASE, WITH LONG-TERM CURRENT USE OF INSULIN, UNSPECIFIED WHETHER STAGE 3A OR 3B CKD: ICD-10-CM

## 2023-07-31 DIAGNOSIS — E13.9 DM (DIABETES MELLITUS), SECONDARY: ICD-10-CM

## 2023-07-31 DIAGNOSIS — N18.30 STAGE 3 CHRONIC KIDNEY DISEASE, UNSPECIFIED WHETHER STAGE 3A OR 3B CKD: Primary | ICD-10-CM

## 2023-07-31 DIAGNOSIS — I10 ESSENTIAL HYPERTENSION, MALIGNANT: ICD-10-CM

## 2023-07-31 DIAGNOSIS — E11.22 TYPE 2 DIABETES MELLITUS WITH STAGE 3 CHRONIC KIDNEY DISEASE, WITH LONG-TERM CURRENT USE OF INSULIN, UNSPECIFIED WHETHER STAGE 3A OR 3B CKD: ICD-10-CM

## 2023-07-31 DIAGNOSIS — N18.30 TYPE 2 DIABETES MELLITUS WITH STAGE 3 CHRONIC KIDNEY DISEASE, WITH LONG-TERM CURRENT USE OF INSULIN, UNSPECIFIED WHETHER STAGE 3A OR 3B CKD: Primary | ICD-10-CM

## 2023-07-31 DIAGNOSIS — Z79.4 TYPE 2 DIABETES MELLITUS WITH STAGE 3 CHRONIC KIDNEY DISEASE, WITH LONG-TERM CURRENT USE OF INSULIN, UNSPECIFIED WHETHER STAGE 3A OR 3B CKD: Primary | ICD-10-CM

## 2023-07-31 DIAGNOSIS — N18.4 STAGE 4 CHRONIC KIDNEY DISEASE: Primary | ICD-10-CM

## 2023-07-31 DIAGNOSIS — Z79.4 TYPE 2 DIABETES MELLITUS WITH STAGE 3 CHRONIC KIDNEY DISEASE, WITH LONG-TERM CURRENT USE OF INSULIN, UNSPECIFIED WHETHER STAGE 3A OR 3B CKD: ICD-10-CM

## 2023-07-31 DIAGNOSIS — N18.30 STAGE 3 CHRONIC KIDNEY DISEASE, UNSPECIFIED WHETHER STAGE 3A OR 3B CKD: ICD-10-CM

## 2023-07-31 DIAGNOSIS — E11.22 TYPE 2 DIABETES MELLITUS WITH STAGE 3 CHRONIC KIDNEY DISEASE, WITH LONG-TERM CURRENT USE OF INSULIN, UNSPECIFIED WHETHER STAGE 3A OR 3B CKD: Primary | ICD-10-CM

## 2023-07-31 DIAGNOSIS — M19.011 PRIMARY OSTEOARTHRITIS OF RIGHT SHOULDER: ICD-10-CM

## 2023-07-31 DIAGNOSIS — I95.1 ORTHOSTATIC HYPOTENSION: ICD-10-CM

## 2023-07-31 LAB
ANION GAP SERPL CALCULATED.3IONS-SCNC: 13.4 MMOL/L (ref 5–15)
BACTERIA UR QL AUTO: ABNORMAL /HPF
BASOPHILS # BLD AUTO: 0.03 10*3/MM3 (ref 0–0.2)
BASOPHILS NFR BLD AUTO: 0.4 % (ref 0–1.5)
BILIRUB UR QL STRIP: NEGATIVE
BUN SERPL-MCNC: 22 MG/DL (ref 8–23)
BUN/CREAT SERPL: 11.9 (ref 7–25)
CALCIUM SPEC-SCNC: 9.3 MG/DL (ref 8.6–10.5)
CHLORIDE SERPL-SCNC: 101 MMOL/L (ref 98–107)
CLARITY UR: CLEAR
CO2 SERPL-SCNC: 24.6 MMOL/L (ref 22–29)
COLOR UR: YELLOW
CREAT SERPL-MCNC: 1.85 MG/DL (ref 0.76–1.27)
CREAT UR-MCNC: 86 MG/DL
DEPRECATED RDW RBC AUTO: 58.3 FL (ref 37–54)
EGFRCR SERPLBLD CKD-EPI 2021: 38 ML/MIN/1.73
EOSINOPHIL # BLD AUTO: 0.2 10*3/MM3 (ref 0–0.4)
EOSINOPHIL NFR BLD AUTO: 2.8 % (ref 0.3–6.2)
ERYTHROCYTE [DISTWIDTH] IN BLOOD BY AUTOMATED COUNT: 18.3 % (ref 12.3–15.4)
GLUCOSE SERPL-MCNC: 117 MG/DL (ref 65–99)
GLUCOSE UR STRIP-MCNC: ABNORMAL MG/DL
HBA1C MFR BLD: 7.8 % (ref 4.8–5.6)
HCT VFR BLD AUTO: 45.1 % (ref 37.5–51)
HGB BLD-MCNC: 14 G/DL (ref 13–17.7)
HGB UR QL STRIP.AUTO: ABNORMAL
IMM GRANULOCYTES # BLD AUTO: 0.02 10*3/MM3 (ref 0–0.05)
IMM GRANULOCYTES NFR BLD AUTO: 0.3 % (ref 0–0.5)
KETONES UR QL STRIP: NEGATIVE
LEUKOCYTE ESTERASE UR QL STRIP.AUTO: NEGATIVE
LYMPHOCYTES # BLD AUTO: 1.77 10*3/MM3 (ref 0.7–3.1)
LYMPHOCYTES NFR BLD AUTO: 24.8 % (ref 19.6–45.3)
MCH RBC QN AUTO: 27.8 PG (ref 26.6–33)
MCHC RBC AUTO-ENTMCNC: 31 G/DL (ref 31.5–35.7)
MCV RBC AUTO: 89.7 FL (ref 79–97)
MONOCYTES # BLD AUTO: 0.56 10*3/MM3 (ref 0.1–0.9)
MONOCYTES NFR BLD AUTO: 7.9 % (ref 5–12)
NEUTROPHILS NFR BLD AUTO: 4.55 10*3/MM3 (ref 1.7–7)
NEUTROPHILS NFR BLD AUTO: 63.8 % (ref 42.7–76)
NITRITE UR QL STRIP: NEGATIVE
PH UR STRIP.AUTO: 6 [PH] (ref 5–8)
PHOSPHATE SERPL-MCNC: 3.4 MG/DL (ref 2.5–4.5)
PLATELET # BLD AUTO: 218 10*3/MM3 (ref 140–450)
PMV BLD AUTO: 9.4 FL (ref 6–12)
POTASSIUM SERPL-SCNC: 5.2 MMOL/L (ref 3.5–5.2)
PROT ?TM UR-MCNC: 55.7 MG/DL
PROT UR QL STRIP: ABNORMAL
PROT/CREAT UR: 0.65 MG/G{CREAT}
PTH-INTACT SERPL-MCNC: 58.1 PG/ML (ref 15–65)
RBC # BLD AUTO: 5.03 10*6/MM3 (ref 4.14–5.8)
RBC # UR STRIP: ABNORMAL /HPF
REF LAB TEST METHOD: ABNORMAL
SODIUM SERPL-SCNC: 139 MMOL/L (ref 136–145)
SP GR UR STRIP: 1.02 (ref 1–1.03)
SQUAMOUS #/AREA URNS HPF: ABNORMAL /HPF
UROBILINOGEN UR QL STRIP: ABNORMAL
WBC # UR STRIP: ABNORMAL /HPF
WBC NRBC COR # BLD: 7.13 10*3/MM3 (ref 3.4–10.8)

## 2023-07-31 PROCEDURE — 84100 ASSAY OF PHOSPHORUS: CPT

## 2023-07-31 PROCEDURE — 80048 BASIC METABOLIC PNL TOTAL CA: CPT

## 2023-07-31 PROCEDURE — 36415 COLL VENOUS BLD VENIPUNCTURE: CPT

## 2023-07-31 PROCEDURE — 83970 ASSAY OF PARATHORMONE: CPT

## 2023-07-31 PROCEDURE — 85025 COMPLETE CBC W/AUTO DIFF WBC: CPT

## 2023-07-31 PROCEDURE — 82570 ASSAY OF URINE CREATININE: CPT

## 2023-07-31 PROCEDURE — 83036 HEMOGLOBIN GLYCOSYLATED A1C: CPT

## 2023-07-31 PROCEDURE — 84156 ASSAY OF PROTEIN URINE: CPT

## 2023-07-31 PROCEDURE — 81001 URINALYSIS AUTO W/SCOPE: CPT

## 2023-08-01 ENCOUNTER — PATIENT OUTREACH (OUTPATIENT)
Dept: CASE MANAGEMENT | Facility: OTHER | Age: 73
End: 2023-08-01
Payer: MEDICARE

## 2023-08-01 DIAGNOSIS — Z79.4 TYPE 2 DIABETES MELLITUS WITH STAGE 3B CHRONIC KIDNEY DISEASE, WITH LONG-TERM CURRENT USE OF INSULIN: Primary | ICD-10-CM

## 2023-08-01 DIAGNOSIS — N18.32 TYPE 2 DIABETES MELLITUS WITH STAGE 3B CHRONIC KIDNEY DISEASE, WITH LONG-TERM CURRENT USE OF INSULIN: Primary | ICD-10-CM

## 2023-08-01 DIAGNOSIS — E11.22 TYPE 2 DIABETES MELLITUS WITH STAGE 3B CHRONIC KIDNEY DISEASE, WITH LONG-TERM CURRENT USE OF INSULIN: Primary | ICD-10-CM

## 2023-08-05 DIAGNOSIS — F41.9 ANXIETY: ICD-10-CM

## 2023-08-07 RX ORDER — ALPRAZOLAM 1 MG/1
1 TABLET ORAL NIGHTLY PRN
Qty: 30 TABLET | Refills: 0 | OUTPATIENT
Start: 2023-08-07

## 2023-08-09 ENCOUNTER — PATIENT OUTREACH (OUTPATIENT)
Dept: CASE MANAGEMENT | Facility: OTHER | Age: 73
End: 2023-08-09
Payer: MEDICARE

## 2023-08-09 DIAGNOSIS — Z79.4 TYPE 2 DIABETES MELLITUS WITH STAGE 3B CHRONIC KIDNEY DISEASE, WITH LONG-TERM CURRENT USE OF INSULIN: Primary | ICD-10-CM

## 2023-08-09 DIAGNOSIS — E11.22 TYPE 2 DIABETES MELLITUS WITH STAGE 3B CHRONIC KIDNEY DISEASE, WITH LONG-TERM CURRENT USE OF INSULIN: Primary | ICD-10-CM

## 2023-08-09 DIAGNOSIS — N18.32 TYPE 2 DIABETES MELLITUS WITH STAGE 3B CHRONIC KIDNEY DISEASE, WITH LONG-TERM CURRENT USE OF INSULIN: Primary | ICD-10-CM

## 2023-08-09 NOTE — OUTREACH NOTE
"AMBULATORY CASE MANAGEMENT NOTE    Name and Relationship of Patient/Support Person: Kyler Staley \"Peter\" - Self    Called Mr. Staley to follow up on pain management.  He did get the pain stimulator and sore at the insertion site, but not using cane at the moment.      He had to cancel his nephrology appointment due to death in family and another member with MI.  Labs improved.     Called pharmacy to deactivate Nicolette's prescriptions.  PCP sent in refills in Jan that pharmacy will use.   Will continue to follow.      Education Documentation  No documentation found.        Abigail OCHOA  Ambulatory Case Management    8/9/2023, 11:26 EDT  "

## 2023-08-10 ENCOUNTER — TELEPHONE (OUTPATIENT)
Dept: FAMILY MEDICINE CLINIC | Age: 73
End: 2023-08-10
Payer: MEDICARE

## 2023-08-10 DIAGNOSIS — I10 PRIMARY HYPERTENSION: Primary | ICD-10-CM

## 2023-08-10 NOTE — TELEPHONE ENCOUNTER
Called patient, he reports that his blood pressures have been trending very high in late afternoons.  No more dizzy spells.  He is taking Coreg in am along with isosorbide at night taking Losartan ( was instructed to hold nighttime dose of Coreg by PCP).

## 2023-08-10 NOTE — TELEPHONE ENCOUNTER
Pt states this week his BP has been really high.  190/100 everyday.  You changed his carvedilol 12.5mg from BID to daily back on 7/18/23.  Please advise.

## 2023-08-11 NOTE — TELEPHONE ENCOUNTER
Patient called back to report that his blood pressure again last night was 190's/100's. He went ahead and took the carvidilol and it came down.  Instructed him to resume the bid dosing again.  He is to keep a log and report and high or low readings.  We did switch his Losartan to nighttime dosing, so that may be all he needed to prevent the dizziness during the day.      He is also reporting that his grandchildren/children have noticed his lips looking bluish.  His oxygen saturation is 99%.  He did recently move to  full face mask with BiPap with oxygen bled into it.  I will reach out to pulm/sleep medicine to reach out to patient for guidance.

## 2023-08-11 NOTE — TELEPHONE ENCOUNTER
Called and left a message with [facility] to please reach out to Mr. Staley regarding his bipap.  He may need to come in for ABG for eval.

## 2023-08-23 ENCOUNTER — PATIENT OUTREACH (OUTPATIENT)
Dept: CASE MANAGEMENT | Facility: OTHER | Age: 73
End: 2023-08-23
Payer: MEDICARE

## 2023-08-23 DIAGNOSIS — I10 HYPERTENSION, ESSENTIAL: Primary | ICD-10-CM

## 2023-08-23 NOTE — OUTREACH NOTE
"AMBULATORY CASE MANAGEMENT NOTE    Name and Relationship of Patient/Support Person: Kyler Staley \"Peter\" - Self    Called to check on Mr. Staley blood pressure.  He gave me several days readings and they wax and wane all throughout the day and no rhyme or reason of the explanantion.  He has had some recent stress this month with the death of a relative.  He does admit that he has scheduled an appointment with cardiology on 8/31/23 for chest pains.  He will bring meds and blood pressure readings with him to review.      Education Documentation  No documentation found.        Abigail OCHOA  Ambulatory Case Management    8/23/2023, 15:46 EDT  "

## 2023-08-29 ENCOUNTER — PATIENT OUTREACH (OUTPATIENT)
Dept: CASE MANAGEMENT | Facility: OTHER | Age: 73
End: 2023-08-29
Payer: MEDICARE

## 2023-08-29 DIAGNOSIS — N40.1 BPH WITH URINARY OBSTRUCTION: Primary | ICD-10-CM

## 2023-08-29 DIAGNOSIS — M19.011 PRIMARY OSTEOARTHRITIS OF RIGHT SHOULDER: ICD-10-CM

## 2023-08-29 DIAGNOSIS — N13.8 BPH WITH URINARY OBSTRUCTION: Primary | ICD-10-CM

## 2023-08-29 DIAGNOSIS — N18.4 STAGE 4 CHRONIC KIDNEY DISEASE: ICD-10-CM

## 2023-08-29 DIAGNOSIS — E78.00 HYPERCHOLESTEROLEMIA: ICD-10-CM

## 2023-08-29 DIAGNOSIS — E11.22 TYPE 2 DIABETES MELLITUS WITH STAGE 3B CHRONIC KIDNEY DISEASE, WITH LONG-TERM CURRENT USE OF INSULIN: ICD-10-CM

## 2023-08-29 DIAGNOSIS — I95.1 ORTHOSTATIC HYPOTENSION: ICD-10-CM

## 2023-08-29 DIAGNOSIS — I10 HYPERTENSION, ESSENTIAL: Primary | ICD-10-CM

## 2023-08-29 DIAGNOSIS — N18.32 TYPE 2 DIABETES MELLITUS WITH STAGE 3B CHRONIC KIDNEY DISEASE, WITH LONG-TERM CURRENT USE OF INSULIN: ICD-10-CM

## 2023-08-29 DIAGNOSIS — Z79.4 TYPE 2 DIABETES MELLITUS WITH STAGE 3B CHRONIC KIDNEY DISEASE, WITH LONG-TERM CURRENT USE OF INSULIN: ICD-10-CM

## 2023-08-29 RX ORDER — ATORVASTATIN CALCIUM 40 MG/1
40 TABLET, FILM COATED ORAL DAILY
Qty: 90 TABLET | Refills: 1 | Status: SHIPPED | OUTPATIENT
Start: 2023-08-29

## 2023-08-29 NOTE — OUTREACH NOTE
CCM End of Month Documentation    This Chronic Medical Management Care Plan for Kyler Staley, 73 y.o. male, has been monitored and managed; reviewed and a new plan of care implemented for the month of August.  A cumulative time of 44  minutes was spent on this patient record this month, including phone call with patient; electronic communication with primary care provider; chart review.    Regarding the patient's problems: has Primary osteoarthritis of right shoulder; Status post complete repair of rotator cuff; Pain of right hip joint; Knee pain; Left hip pain; History of fusion of lumbar spine; Obstructive sleep apnea treated with BiPAP; Type 2 diabetes mellitus with kidney complication, with long-term current use of insulin; Paroxysmal atrial fibrillation; Primary hypertension; Stage 3b chronic kidney disease; BPH with urinary obstruction; Hypothyroidism; Presence of aortocoronary bypass graft; Left ventricular hypertrophy; Hypercholesterolemia; Gastroesophageal reflux disease; Depressive disorder; Degeneration of intervertebral disc of lumbar region; CHF (congestive heart failure); Chronic ischemic heart disease; Cataracts, bilateral; Atypical angina; Arteriosclerosis of coronary artery; Acute renal failure syndrome; S/P TKR (total knee replacement), left; Anxiety; History of cold sores; Abdominal hernia; Arthritis; Dyslipidemia; Other specified postprocedural states; Shortness of breath; Stage 4 chronic kidney disease; Abdominal pain, right upper quadrant; Abnormal posture; Acute exacerbation of chronic obstructive pulmonary disease; Bilateral carotid artery stenosis; Cardiac pacemaker in situ; Cervical spondylosis; Spondylosis of lumbar spine; Encounter for general adult medical examination without abnormal findings; Essential familial hypercholesterolemia; Fusion of spine, lumbar region; Neck pain; Peripheral vascular disease; Radiculopathy with lower extremity symptoms; Sacroiliitis, not elsewhere  classified; Sick sinus syndrome; Unspecified fracture of unspecified thoracic vertebra, initial encounter for closed fracture; Vertebral artery occlusion; and Orthostatic hypotension on their problem list., the following items were addressed: medical records; referrals to community service providers; transitions to medical care; changes to medical care and any changes can be found within the plan section of the note.  A detailed listing of time spent for chronic care management is tracked within each outreach encounter.  Current medications include:  has a current medication list which includes the following prescription(s): allopurinol, alprazolam, apixaban, aspirin, atorvastatin, carvedilol, empagliflozin, eplerenone, fexofenadine, finasteride, flaxseed (linseed), glucose blood, imipramine, insulin glargine-yfgn, isosorbide mononitrate, levothyroxine, losartan, melatonin, nitroglycerin, pantoprazole, ozempic (0.25 or 0.5 mg/dose), tamsulosin, and terbinafine. and the patient is reported to be patient is compliant with medication protocol,  Medications are reported to be effective.  Regarding these diagnoses, referrals were made to the following provider(s):  specialists.  All notes on chart for PCP to review.    The patient was monitored remotely for mood & behavior; pain; medications; blood glucose; blood pressure.    The patient's physical needs include:  physical healthcare; physician referral.     The patient's mental support needs include:  not applicable    The patient's cognitive support needs include:  coordination of community providers; continued support; emotional care    The patient's psychosocial support needs include:  coordination of community providers    The patient's functional needs include: physician referral; resources for disability needs    The patient's environmental needs include:  not applicable    Care Plan overall comments:  New referral from PCP.  [facility] and needs  coordination of care.  Will assist as directed.    Refer to previous outreach notes for more information on the areas listed above.    Monthly Billing Diagnoses  (I10) Hypertension, essential    (E11.22,  N18.32,  Z79.4) Type 2 diabetes mellitus with stage 3b chronic kidney disease, with long-term current use of insulin    (N18.4) Stage 4 chronic kidney disease    (I95.1) Orthostatic hypotension    (M19.011) Primary osteoarthritis of right shoulder    Medications   Medications have been reconciled    Care Plan progress this month:      Recently Modified Care Plans Updates made since 7/29/2023 12:00 AM      No recently modified care plans.            Current Specialty Plan of Care Status signed by both patient and provider    Instructions   Patient was provided an electronic copy of care plan  CCM services were explained and offered and patient has accepted these services.  Patient has given their written consent to receive CCM services and understands that this includes the authorization of electronic communication of medical information with the other treating providers.  Patient understands that they may stop CCM services at any time and these changes will be effective at the end of the calendar month and will effectively revocate the agreement of CCM services.  Patient understands that only one practitioner can furnish and be paid for CCM services during one calendar month.  Patient also understands that there may be co-payment or deductible fees in association with CCM services.  Patient will continue with at least monthly follow-up calls with the Ambulatory .    Abigail OCHOA  Ambulatory Case Management    8/29/2023, 16:11 EDT

## 2023-09-01 ENCOUNTER — PATIENT OUTREACH (OUTPATIENT)
Dept: CASE MANAGEMENT | Facility: OTHER | Age: 73
End: 2023-09-01
Payer: MEDICARE

## 2023-09-01 DIAGNOSIS — I10 HYPERTENSION, ESSENTIAL: Primary | ICD-10-CM

## 2023-09-01 DIAGNOSIS — F41.9 ANXIETY: ICD-10-CM

## 2023-09-01 NOTE — OUTREACH NOTE
"AMBULATORY CASE MANAGEMENT NOTE    Name and Relationship of Patient/Support Person: Kyler Staley \"Peter\" - Self    Reached out to Mr. Staley, he had a cardiology appointment yesterday and wanted to follow up.  Discontinued eplerenone and cardiology note states to take 1/2 Losartan if systolic bp less than 110.  When I spoke with Mr. Staley, he reports that he held his carvedilol this am not losartan.  He is going to verify with his wife to be sure he has the correct medication.  He can call cardiology for verification.   Updated medication list to reflect change   He is scheduled for an echo next week. Still having dizzy spells.    Education Documentation  No documentation found.        Abigail OCHOA  Ambulatory Case Management    9/1/2023, 08:50 EDT  "

## 2023-09-02 RX ORDER — ALPRAZOLAM 0.5 MG/1
0.5 TABLET ORAL NIGHTLY PRN
Qty: 5 TABLET | Refills: 0 | Status: SHIPPED | OUTPATIENT
Start: 2023-09-02

## 2023-09-02 NOTE — TELEPHONE ENCOUNTER
According to his last refill on the [facility] report he should have enough medication to last until May and he is back in the office.  However he did just have a recent dosage reduction may not of been totally compliant.  Given the long weekend I will send in few tablets to hold him over until Marisol gets into the office.    lisbet

## 2023-09-06 DIAGNOSIS — F41.9 ANXIETY: ICD-10-CM

## 2023-09-12 RX ORDER — ALPRAZOLAM 0.5 MG/1
0.5 TABLET ORAL NIGHTLY PRN
Qty: 30 TABLET | Refills: 0 | Status: SHIPPED | OUTPATIENT
Start: 2023-09-12

## 2023-09-16 PROBLEM — N40.1 BENIGN PROSTATIC HYPERPLASIA WITH LOWER URINARY TRACT SYMPTOMS: Status: ACTIVE | Noted: 2023-09-16

## 2023-09-16 NOTE — PROGRESS NOTES
Procedures       Urinalysis was checked today and was negative for signs of infection      Cytoscopy Procedure:     Procedure: Flexible cytoscope was passed per urethra into the bladder without difficulty after proper consent. The bladder was inspected in a systematic meridian fashion.       3 cm prostate.  No median lobe    Moderate to moderate trabeculation.  No pathology    There were no tumors, lesions, stones, or other abnormalities noted within the bladder. Of note, there was no increased vascularity as well. Both ureteral orifices were identified and were normal in appearance. The flexible cytoscope was removed. The patient tolerated the procedure well.         This document has been electronically signed by Ede Nelson MD  September 16, 2023 06:23 EDT          Chief Complaint: Urologic complaint    Subjective         History of Present Illness  Kyler Staley is a 73 y.o. male       BPH      Chronic renal insufficiency baseline GFR 35    Nocturia 3-4, urgency.  Only voids 2-3 times during the day.  Trouble with initiation of stream.    Tamsulosin 0.4 mg p.o. twice daily and finasteride for the last 9 months  Slow stream in the morning.  No straining.  No hesitancy no intermittency    Not worried about erections.    7/23 1.8, GFR 38        CAD, CABG in 2012.  History of multiple coronary stents.  Pacemaker.  Antony    On Eliquis and aspirin 325    DM2     PVR    1/23 59 - UA negative        PSA    1/23    1.71        Objective     Past Medical History:   Diagnosis Date    Acne     Alcoholism     Allergic     Anxiety     Arthritis     Arthritis of back     Arthritis of neck     Atrial fibrillation     Back pain     Benign prostatic hyperplasia     Cardiac disease     Cataracts, bilateral     Cervical disc disorder     Cholelithiasis     Chronic kidney disease, stage 3     moderate    CKD (chronic kidney disease)     Clotting disorder     Condition not found     hernia    Coronary artery disease      Depression     Diabetes     Enlarged prostate     Erectile dysfunction     Fatigue     GERD (gastroesophageal reflux disease)     Gout     Hearing loss     Hematospermia 12/09/2015    High blood pressure     High cholesterol     Hip arthrosis     History of cold sores     History of gastritis     [place] (hard of hearing)     Hyperlipidemia     Hypertension     Hypothyroidism     Irregular heart rhythm     Joint pain     Kidney disease     Knee swelling     Low back pain     Lumbosacral disc disease     Narrowing of airway 2012    PER ENT    Neuropathy     Obesity     Osteopenia     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     Pneumonia     PONV (postoperative nausea and vomiting)     Psychiatric diagnosis     Renal insufficiency     Rotator cuff syndrome     REPAIRED BILATERAL    Shortness of breath     Sinus trouble     Sleep apnea     BIPAP    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)     Swallowing difficulty     Thin skin     Thoracic disc disorder     Thyroid condition     Visual impairment     Vocal cord dysfunction      HISTORY OF DISORDER ON ONE SIDE AFTER TRACHEA       Past Surgical History:   Procedure Laterality Date    BACK SURGERY  2018    X2    BACK SURGERY  2011    CARDIAC CATHETERIZATION      CARDIAC SURGERY      CHOLECYSTECTOMY      COLONOSCOPY      CORONARY ARTERY BYPASS GRAFT  2012    CORONARY STENT PLACEMENT      EYE SURGERY      HERNIA REPAIR      INGUINAL HERNIA REPAIR      JOINT REPLACEMENT      KNEE SURGERY      right and left    LUMBAR DISCECTOMY FUSION INSTRUMENTATION N/A 02/11/2021    Procedure: EXPLORATION OF FUSION T-12 S-1 REMOVAL OF HARDWARE REVISION OF FUSION AND INSTRUMENTATION T12-S1 WITH APPLICATION OF BONE MORPHOGENIC PROTEIN;  Surgeon: Baltazar Blankenship MD;  Location[facility];  Service: Orthopedic Spine;  Laterality: N/A;    NOSE SURGERY      PACEMAKER IMPLANTATION      REPLACEMENT TOTAL KNEE Bilateral     SHOULDER SURGERY  Bilateral     rotator cuff repair    SPINE SURGERY           Current Outpatient Medications:     allopurinol (ZYLOPRIM) 300 MG tablet, Take 1 tablet by mouth Daily., Disp: 90 tablet, Rfl: 1    ALPRAZolam (XANAX) 0.5 MG tablet, Take 1 tablet by mouth At Night As Needed for Anxiety., Disp: 30 tablet, Rfl: 0    apixaban (ELIQUIS) 5 MG tablet tablet, Take 1 tablet by mouth 2 (Two) Times a Day., Disp: , Rfl:     aspirin 325 MG tablet, Take 1 tablet by mouth Daily. TO FOLLOW MD ORDERS WHEN TO STOP, Disp: , Rfl:     atorvastatin (LIPITOR) 40 MG tablet, Take 1 tablet by mouth Daily., Disp: 90 tablet, Rfl: 1    carvedilol (COREG) 12.5 MG tablet, Take 1 tablet by mouth 2 (Two) Times a Day With Meals., Disp: , Rfl:     empagliflozin (JARDIANCE) 10 MG tablet tablet, Take 1 tablet by mouth Daily., Disp: , Rfl:     fexofenadine (ALLEGRA) 60 MG tablet, Take 3 tablets by mouth Daily., Disp: , Rfl:     finasteride (PROSCAR) 5 MG tablet, Take 1 tablet by mouth Daily., Disp: 90 tablet, Rfl: 3    Flaxseed, Linseed, 1000 MG capsule, Take 1,200 mg by mouth 2 (Two) Times a Day., Disp: , Rfl:     glucose blood test strip, , Disp: , Rfl:     imipramine (TOFRANIL) 50 MG tablet, Take 1 tab po in the am and 2 tabs po in the evening, Disp: 270 tablet, Rfl: 1    Insulin Glargine-yfgn 100 UNIT/ML solution pen-injector, Inject 26 Units under the skin into the appropriate area as directed Daily., Disp: , Rfl:     isosorbide mononitrate (IMDUR) 60 MG 24 hr tablet, Take 1 tablet by mouth Daily., Disp: 90 tablet, Rfl: 1    levothyroxine (SYNTHROID, LEVOTHROID) 150 MCG tablet, Take 1 tablet by mouth Every Morning., Disp: 90 tablet, Rfl: 1    losartan (COZAAR) 100 MG tablet, Take 1 tablet by mouth Daily., Disp: , Rfl:     Melatonin 5 MG chewable tablet, Chew 1 tablet., Disp: , Rfl:     nitroglycerin (NITROSTAT) 0.4 MG SL tablet, Place 1 tablet under the tongue Every 5 (Five) Minutes As Needed for Chest Pain. do not exceed a total of 3 doses in 15  minutes, Disp: , Rfl:     pantoprazole (PROTONIX) 40 MG EC tablet, Take 1 tablet by mouth Daily., Disp: 90 tablet, Rfl: 1    Semaglutide,0.25 or 0.5MG/DOS, (Ozempic, 0.25 or 0.5 MG/DOSE,) 2 MG/1.5ML solution pen-injector, Inject 1 mg under the skin into the appropriate area as directed 1 (One) Time Per Week., Disp: , Rfl:     tamsulosin (FLOMAX) 0.4 MG capsule 24 hr capsule, Take 2 capsules by mouth Daily., Disp: 180 capsule, Rfl: 3    terbinafine (lamiSIL) 250 MG tablet, Take 1 tablet by mouth Every 30 (Thirty) Days., Disp: , Rfl:     Allergies   Allergen Reactions    Bupropion Other (See Comments) and Unknown - Low Severity     MAKES PATIENT VERY AGGRESSIVE      Iodinated Contrast Media Other (See Comments)     Other reaction(s): Other (See Comments)  Chronic kidney disease - stage 3  Chronic kidney disease - stage 3    Lorazepam Other (See Comments) and Mental Status Change     MADE PATIENT VERY AGGRESSIVE      Propoxyphene Nausea And Vomiting and Unknown - Low Severity    Adhesive Tape Rash        Family History   Problem Relation Age of Onset    Heart attack Mother     Arthritis Mother     Cancer Mother     Heart disease Mother     Hypertension Mother     Cancer Father     Hypertension Sister     Hypertension Brother     Malig Hyperthermia Neg Hx        Social History     Socioeconomic History    Marital status:    Tobacco Use    Smoking status: Former     Packs/day: 0.50     Years: 15.00     Pack years: 7.50     Types: Cigarettes     Start date: 1960     Quit date: 1975     Years since quittin.7     Passive exposure: Past    Smokeless tobacco: Never   Vaping Use    Vaping Use: Never used   Substance and Sexual Activity    Alcohol use: No    Drug use: Yes     Types: Hydrocodone    Sexual activity: Not Currently     Partners: Female       Vital Signs:   There were no vitals taken for this visit.     Physical exam    Alert and orient x3  Well appearing, well developed, in no acute distress    Unlabored respirations  Nontender/nondistended      Grossly oriented to person, place and time, judgment is intact, normal mood and affect              Assessment and Plan    Diagnoses and all orders for this visit:    1. Benign prostatic hyperplasia with lower urinary tract symptoms, symptom details unspecified (Primary)      Records reviewed and summarized in chart.    Continue Flomax 0.8 mg daily and finasteride 5 mg daily    Discussed with patient we could consider transurethral resection of the prostate.  Prostate too small for Aquablation.  Risks and benefits were discussed including bleeding, infection and damage to the urinary system.  We also discussed the risk of anesthesia up to and including death.  Patient voiced understanding.    Discussed the 1% risk of incontinence    If we decide to move forward surgery he would need cardiac clearance    At this time after discussion patient would like to continue conservative management with maximal medical therapy    He will let me know if things get worse      Follow-up with NP in [place] in 1 year      PVR follow-up

## 2023-09-18 ENCOUNTER — PROCEDURE VISIT (OUTPATIENT)
Dept: UROLOGY | Facility: CLINIC | Age: 73
End: 2023-09-18
Payer: MEDICARE

## 2023-09-18 DIAGNOSIS — N40.0 BENIGN PROSTATIC HYPERPLASIA, UNSPECIFIED WHETHER LOWER URINARY TRACT SYMPTOMS PRESENT: ICD-10-CM

## 2023-09-18 DIAGNOSIS — N40.1 BENIGN PROSTATIC HYPERPLASIA WITH LOWER URINARY TRACT SYMPTOMS, SYMPTOM DETAILS UNSPECIFIED: Primary | ICD-10-CM

## 2023-09-18 PROCEDURE — 52000 CYSTOURETHROSCOPY: CPT | Performed by: UROLOGY

## 2023-09-20 ENCOUNTER — PATIENT OUTREACH (OUTPATIENT)
Dept: CASE MANAGEMENT | Facility: OTHER | Age: 73
End: 2023-09-20
Payer: MEDICARE

## 2023-09-20 DIAGNOSIS — I10 HYPERTENSION, ESSENTIAL: Primary | ICD-10-CM

## 2023-09-20 NOTE — OUTREACH NOTE
AMBULATORY CASE MANAGEMENT NOTE    Name and Relationship of Patient/Support Person: Eduardo CAM -     Called [facility] to get copy of echo from 9/5/23.  Reached out to Mr. Staley to check on him.  He reports that his blood pressure is still very erratic 200/100 to normal.  He has not taken the epleronone in 2 weeks, but can take if b/p elevated.  He also can take 1/2 losartan is diastolic is below 160 and whole tab if above.  He is still having dizziness.  He reports that his echo EF was lower but still in normal range.  He just feels run down, fatigued all the time.     He had a scope of his prostate and was ok, repeat in 1 year.     Education Documentation  No documentation found.        Abigail OCHOA  Ambulatory Case Management    9/20/2023, 14:19 EDT

## 2023-09-27 RX ORDER — IMIPRAMINE HCL 50 MG
TABLET ORAL
Qty: 270 TABLET | Refills: 0 | Status: SHIPPED | OUTPATIENT
Start: 2023-09-27

## 2023-09-28 ENCOUNTER — PATIENT OUTREACH (OUTPATIENT)
Dept: CASE MANAGEMENT | Facility: OTHER | Age: 73
End: 2023-09-28
Payer: MEDICARE

## 2023-09-28 DIAGNOSIS — N18.4 STAGE 4 CHRONIC KIDNEY DISEASE: ICD-10-CM

## 2023-09-28 DIAGNOSIS — E11.22 TYPE 2 DIABETES MELLITUS WITH STAGE 3B CHRONIC KIDNEY DISEASE, WITH LONG-TERM CURRENT USE OF INSULIN: ICD-10-CM

## 2023-09-28 DIAGNOSIS — Z79.4 TYPE 2 DIABETES MELLITUS WITH STAGE 3B CHRONIC KIDNEY DISEASE, WITH LONG-TERM CURRENT USE OF INSULIN: ICD-10-CM

## 2023-09-28 DIAGNOSIS — I10 HYPERTENSION, ESSENTIAL: Primary | ICD-10-CM

## 2023-09-28 DIAGNOSIS — M19.011 PRIMARY OSTEOARTHRITIS OF RIGHT SHOULDER: ICD-10-CM

## 2023-09-28 DIAGNOSIS — I95.1 ORTHOSTATIC HYPOTENSION: ICD-10-CM

## 2023-09-28 DIAGNOSIS — N18.32 TYPE 2 DIABETES MELLITUS WITH STAGE 3B CHRONIC KIDNEY DISEASE, WITH LONG-TERM CURRENT USE OF INSULIN: ICD-10-CM

## 2023-09-28 NOTE — OUTREACH NOTE
CCM End of Month Documentation    This Chronic Medical Management Care Plan for Kyler Staley, 73 y.o. male, has been monitored and managed; reviewed and a new plan of care implemented for the month of September.  A cumulative time of 20  minutes was spent on this patient record this month, including phone call with patient; electronic communication with primary care provider; chart review.    Regarding the patient's problems: has Primary osteoarthritis of right shoulder; Status post complete repair of rotator cuff; Pain of right hip joint; Knee pain; Left hip pain; History of fusion of lumbar spine; Obstructive sleep apnea treated with BiPAP; Type 2 diabetes mellitus with kidney complication, with long-term current use of insulin; Paroxysmal atrial fibrillation; Primary hypertension; Stage 3b chronic kidney disease; BPH with urinary obstruction; Hypothyroidism; Presence of aortocoronary bypass graft; Left ventricular hypertrophy; Hypercholesterolemia; Gastroesophageal reflux disease; Depressive disorder; Degeneration of intervertebral disc of lumbar region; CHF (congestive heart failure); Chronic ischemic heart disease; Cataracts, bilateral; Atypical angina; Arteriosclerosis of coronary artery; Acute renal failure syndrome; S/P TKR (total knee replacement), left; Anxiety; History of cold sores; Abdominal hernia; Arthritis; Dyslipidemia; Other specified postprocedural states; Shortness of breath; Stage 4 chronic kidney disease; Abdominal pain, right upper quadrant; Abnormal posture; Acute exacerbation of chronic obstructive pulmonary disease; Bilateral carotid artery stenosis; Cardiac pacemaker in situ; Cervical spondylosis; Spondylosis of lumbar spine; Encounter for general adult medical examination without abnormal findings; Essential familial hypercholesterolemia; Fusion of spine, lumbar region; Neck pain; Peripheral vascular disease; Radiculopathy with lower extremity symptoms; Sacroiliitis, not elsewhere  classified; Sick sinus syndrome; Unspecified fracture of unspecified thoracic vertebra, initial encounter for closed fracture; Vertebral artery occlusion; Orthostatic hypotension; and Benign prostatic hyperplasia with lower urinary tract symptoms on their problem list., the following items were addressed: medical records; referrals to community service providers; transitions to medical care; changes to medical care and any changes can be found within the plan section of the note.  A detailed listing of time spent for chronic care management is tracked within each outreach encounter.  Current medications include:  has a current medication list which includes the following prescription(s): allopurinol, alprazolam, apixaban, aspirin, atorvastatin, carvedilol, empagliflozin, fexofenadine, finasteride, flaxseed (linseed), glucose blood, imipramine, insulin glargine-yfgn, isosorbide mononitrate, levothyroxine, losartan, melatonin, nitroglycerin, pantoprazole, ozempic (0.25 or 0.5 mg/dose), tamsulosin, and terbinafine. and the patient is reported to be patient is compliant with medication protocol,  Medications are reported to be effective.  Regarding these diagnoses, referrals were made to the following provider(s):  specialists.  All notes on chart for PCP to review.    The patient was monitored remotely for mood & behavior; pain; medications; blood glucose; blood pressure.    The patient's physical needs include:  physical healthcare; physician referral.     The patient's mental support needs include:  not applicable    The patient's cognitive support needs include:  coordination of community providers; continued support; emotional care    The patient's psychosocial support needs include:  coordination of community providers    The patient's functional needs include: physician referral; resources for disability needs    The patient's environmental needs include:  not applicable    Care Plan overall comments:  [facility] and needs [facility] care.  Will assist as directed.    Refer to previous outreach notes for more information on the areas listed above.    Monthly Billing Diagnoses  (I10) Hypertension, essential    (I95.1) Orthostatic hypotension    (M19.011) Primary osteoarthritis of right shoulder    (E11.22,  N18.32,  Z79.4) Type 2 diabetes mellitus with stage 3b chronic kidney disease, with long-term current use of insulin    (N18.4) Stage 4 chronic kidney disease    Medications   Medications have been reconciled    Care Plan progress this month:      Recently Modified Care Plans Updates made since 8/28/2023 12:00 AM      No recently modified care plans.            Current Specialty Plan of Care Status signed by both patient and provider    Instructions   Patient was provided an electronic copy of care plan  CCM services were explained and offered and patient has accepted these services.  Patient has given their written consent to receive CCM services and understands that this includes the authorization of electronic communication of medical information with the other treating providers.  Patient understands that they may stop CCM services at any time and these changes will be effective at the end of the calendar month and will effectively revocate the agreement of CCM services.  Patient understands that only one practitioner can furnish and be paid for CCM services during one calendar month.  Patient also understands that there may be co-payment or deductible fees in association with CCM services.  Patient will continue with at least monthly follow-up calls with the Ambulatory .    Abigail OCHOA  Ambulatory Case Management    9/28/2023, 16:43 EDT

## 2023-10-09 ENCOUNTER — TRANSCRIBE ORDERS (OUTPATIENT)
Dept: ADMINISTRATIVE | Facility: HOSPITAL | Age: 73
End: 2023-10-09
Payer: MEDICARE

## 2023-10-09 ENCOUNTER — LAB (OUTPATIENT)
Dept: LAB | Facility: HOSPITAL | Age: 73
End: 2023-10-09
Payer: MEDICARE

## 2023-10-09 DIAGNOSIS — E13.9 DIABETES MELLITUS DUE TO ABNORMAL INSULIN: ICD-10-CM

## 2023-10-09 DIAGNOSIS — I10 HYPERTENSION, ESSENTIAL: ICD-10-CM

## 2023-10-09 DIAGNOSIS — N18.30 STAGE 3 CHRONIC KIDNEY DISEASE, UNSPECIFIED WHETHER STAGE 3A OR 3B CKD: Primary | ICD-10-CM

## 2023-10-09 DIAGNOSIS — N18.30 STAGE 3 CHRONIC KIDNEY DISEASE, UNSPECIFIED WHETHER STAGE 3A OR 3B CKD: ICD-10-CM

## 2023-10-09 LAB
ANION GAP SERPL CALCULATED.3IONS-SCNC: 8.8 MMOL/L (ref 5–15)
BACTERIA UR QL AUTO: ABNORMAL /HPF
BILIRUB UR QL STRIP: NEGATIVE
BUN SERPL-MCNC: 21 MG/DL (ref 8–23)
BUN/CREAT SERPL: 11.8 (ref 7–25)
CALCIUM SPEC-SCNC: 9.2 MG/DL (ref 8.6–10.5)
CHLORIDE SERPL-SCNC: 103 MMOL/L (ref 98–107)
CLARITY UR: CLEAR
CO2 SERPL-SCNC: 25.2 MMOL/L (ref 22–29)
COLOR UR: YELLOW
CREAT SERPL-MCNC: 1.78 MG/DL (ref 0.76–1.27)
CREAT UR-MCNC: 126.1 MG/DL
DEPRECATED RDW RBC AUTO: 55.9 FL (ref 37–54)
EGFRCR SERPLBLD CKD-EPI 2021: 39.8 ML/MIN/1.73
ERYTHROCYTE [DISTWIDTH] IN BLOOD BY AUTOMATED COUNT: 16.8 % (ref 12.3–15.4)
GLUCOSE SERPL-MCNC: 154 MG/DL (ref 65–99)
GLUCOSE UR STRIP-MCNC: ABNORMAL MG/DL
HCT VFR BLD AUTO: 46.1 % (ref 37.5–51)
HGB BLD-MCNC: 14.3 G/DL (ref 13–17.7)
HGB UR QL STRIP.AUTO: ABNORMAL
KETONES UR QL STRIP: NEGATIVE
LEUKOCYTE ESTERASE UR QL STRIP.AUTO: NEGATIVE
MCH RBC QN AUTO: 27.8 PG (ref 26.6–33)
MCHC RBC AUTO-ENTMCNC: 31 G/DL (ref 31.5–35.7)
MCV RBC AUTO: 89.7 FL (ref 79–97)
NITRITE UR QL STRIP: NEGATIVE
PH UR STRIP.AUTO: 5.5 [PH] (ref 5–8)
PHOSPHATE SERPL-MCNC: 3 MG/DL (ref 2.5–4.5)
PLATELET # BLD AUTO: 283 10*3/MM3 (ref 140–450)
PMV BLD AUTO: 9.8 FL (ref 6–12)
POTASSIUM SERPL-SCNC: 4.7 MMOL/L (ref 3.5–5.2)
PROT ?TM UR-MCNC: 65.4 MG/DL
PROT UR QL STRIP: ABNORMAL
PROT/CREAT UR: 0.52 MG/G{CREAT}
PTH-INTACT SERPL-MCNC: 83.3 PG/ML (ref 15–65)
RBC # BLD AUTO: 5.14 10*6/MM3 (ref 4.14–5.8)
RBC # UR STRIP: ABNORMAL /HPF
REF LAB TEST METHOD: ABNORMAL
SODIUM SERPL-SCNC: 137 MMOL/L (ref 136–145)
SP GR UR STRIP: 1.02 (ref 1–1.03)
SQUAMOUS #/AREA URNS HPF: ABNORMAL /HPF
UROBILINOGEN UR QL STRIP: ABNORMAL
WBC # UR STRIP: ABNORMAL /HPF
WBC NRBC COR # BLD: 7.11 10*3/MM3 (ref 3.4–10.8)
YEAST URNS QL MICRO: ABNORMAL /HPF

## 2023-10-09 PROCEDURE — 84100 ASSAY OF PHOSPHORUS: CPT

## 2023-10-09 PROCEDURE — 84156 ASSAY OF PROTEIN URINE: CPT

## 2023-10-09 PROCEDURE — 80048 BASIC METABOLIC PNL TOTAL CA: CPT

## 2023-10-09 PROCEDURE — 81001 URINALYSIS AUTO W/SCOPE: CPT

## 2023-10-09 PROCEDURE — 85027 COMPLETE CBC AUTOMATED: CPT

## 2023-10-09 PROCEDURE — 36415 COLL VENOUS BLD VENIPUNCTURE: CPT

## 2023-10-09 PROCEDURE — 83970 ASSAY OF PARATHORMONE: CPT

## 2023-10-09 PROCEDURE — 82570 ASSAY OF URINE CREATININE: CPT

## 2023-10-10 DIAGNOSIS — F41.9 ANXIETY: ICD-10-CM

## 2023-10-12 RX ORDER — ALPRAZOLAM 0.5 MG/1
0.5 TABLET ORAL NIGHTLY PRN
Qty: 30 TABLET | Refills: 0 | Status: SHIPPED | OUTPATIENT
Start: 2023-10-12

## 2023-10-16 NOTE — PROGRESS NOTES
Chief Complaint  Diabetes (New pt, est care, med mgt, A1c eval )    Referred By: CASSIDY Kiser presents to [facility] for diabetes medication management    History of Present Illness    Visit type:  to establish care  Diabetes type:  Type 2  Age at time of dx/Year of dx/Number of years: he was diagnosed with type 2 diabetes about 3 years ago  Family History of Diabetes: None  Current diabetes status/concerns/issues: He was referred to our clinic for by his primary care provider for management of his diabetes.  Other current health concerns: He has a history of chronic back pain.  He had a stimulator in his back that he had removed about 3 weeks ago.  He states he is having some abdominal pain that is thought to be coming from his back. He is getting ready to left eye surgery. States he has a wrinkle in the retina. They are going to remove the fluid and place an air bubble in place to heal.  Current Diabetes symptoms:    Polyuria: No   Polydipsia: No   Polyphagia: No   Blurred vision: Yes left eye r/t wrinkle in retina   Excessive fatigue: Yes    Known Diabetes complications:  Neuropathy: Numbness and Tingling; Location: Feet  Renal: Stage IIIb moderate (GFR = 30-44 mL/min)  Eyes: Other: wrinkle in left retina ; Location: Left; Last Eye Exam:  last wk ; Location:   Amputation/Wounds:  slow to heal  GI: Constipation, Reflux, and Indigestion  Cardiovascular: Hypertension, Hyperlipidemia, and Other: quadruple bypass, 9 stents  ED: Patient Reported  Other: None  Hospitalizations/ED/911 secondary to DM?  No  Hypoglycemia:  None reported at this time  Hypoglycemia Symptoms:  No hypoglycemia at this time  Current Diabetes treatment:  Ozempic 1mg  weekly, Insulin Glargine-yfgn 26 units qd, Jardiance 10mg qd  Prior diabetes treatments:  metformin, glipizide  Using ACEI or ARB: Yes, Losartan 100mg qd, Managed by other provider  Using  Statin: Yes, Lipitor 40mg qd, Managed by other provider  Blood glucose device:  Meter  Blood glucose monitoring frequency:  2  Blood glucose range/average:   115-122  Glucose Source: Patient Reported  Dietary behavior:  Avoids sugary drinks, Number of meals each day - 2; Number of snacks each day - 2  Activity/Exercise:   limited due to back pain  Last Foot Exam:   2 months ago. He was treated w/lamisil he takes it 1 wk per month  Diabetes Education Hx: None  Social Determinants of Health: None    Past Medical History:   Diagnosis Date    Acne     Alcoholism     Allergic     Anxiety     Arthritis     Arthritis of back     Arthritis of neck     Atrial fibrillation     Back pain     Benign prostatic hyperplasia     Cardiac disease     Cataracts, bilateral     Cervical disc disorder     Cholelithiasis     Chronic kidney disease, stage 3     moderate    CKD (chronic kidney disease)     Clotting disorder     Condition not found     hernia    Coronary artery disease     Depression     Diabetes     Enlarged prostate     Erectile dysfunction     Fatigue     GERD (gastroesophageal reflux disease)     Gout     Hearing loss     Hematospermia 12/09/2015    High blood pressure     High cholesterol     Hip arthrosis     History of cold sores     History of gastritis     [place] (hard of hearing)     Hyperlipidemia     Hypertension     Hypothyroidism     Irregular heart rhythm     Joint pain     Kidney disease     Knee swelling     Low back pain     Lumbosacral disc disease     Narrowing of airway 2012    PER ENT    Neuropathy     Obesity     Osteopenia     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     Pneumonia     PONV (postoperative nausea and vomiting)     Psychiatric diagnosis     Renal insufficiency     Rotator cuff syndrome     REPAIRED BILATERAL    Shortness of breath     Sinus trouble     Sleep apnea     BIPAP    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)     Swallowing  difficulty     Thin skin     Thoracic disc disorder     Thyroid condition     Visual impairment     Vocal cord dysfunction      HISTORY OF DISORDER ON ONE SIDE AFTER TRACHEA     Past Surgical History:   Procedure Laterality Date    BACK SURGERY  2018    X2    BACK SURGERY  2011    CARDIAC CATHETERIZATION      CARDIAC SURGERY      CHOLECYSTECTOMY      COLONOSCOPY      CORONARY ARTERY BYPASS GRAFT  2012    CORONARY STENT PLACEMENT      EYE SURGERY      HERNIA REPAIR      INGUINAL HERNIA REPAIR      JOINT REPLACEMENT      KNEE SURGERY      right and left    LUMBAR DISCECTOMY FUSION INSTRUMENTATION N/A 2021    Procedure: EXPLORATION OF FUSION T-12 S-1 REMOVAL OF HARDWARE REVISION OF FUSION AND INSTRUMENTATION T12-S1 WITH APPLICATION OF BONE MORPHOGENIC PROTEIN;  Surgeon: Baltazar Blankenship MD;  Location: [facility] OR;  Service: Orthopedic Spine;  Laterality: N/A;    NOSE SURGERY      PACEMAKER IMPLANTATION      REPLACEMENT TOTAL KNEE Bilateral     SHOULDER SURGERY Bilateral     rotator cuff repair    SPINE SURGERY       Family History   Problem Relation Age of Onset    Heart attack Mother     Arthritis Mother     Cancer Mother     Heart disease Mother     Hypertension Mother     Cancer Father     Hypertension Sister     Hypertension Brother     Malig Hyperthermia Neg Hx      Social History     Socioeconomic History    Marital status:    Tobacco Use    Smoking status: Former     Packs/day: 3.00     Years: 15.00     Additional pack years: 0.00     Total pack years: 45.00     Types: Cigarettes     Start date: 1960     Quit date: 1975     Years since quittin.8     Passive exposure: Past    Smokeless tobacco: Never   Vaping Use    Vaping Use: Never used   Substance and Sexual Activity    Alcohol use: No    Drug use: Yes     Types: Hydrocodone    Sexual activity: Not Currently     Partners: Female     Allergies   Allergen Reactions    Bupropion Other (See Comments) and Unknown - Low Severity     MAKES  PATIENT VERY AGGRESSIVE      Iodinated Contrast Media Other (See Comments)     Other reaction(s): Other (See Comments)  Chronic kidney disease - stage 3  Chronic kidney disease - stage 3    Lorazepam Other (See Comments) and Mental Status Change     MADE PATIENT VERY AGGRESSIVE      Propoxyphene Nausea And Vomiting and Unknown - Low Severity    Adhesive Tape Rash       Current Outpatient Medications:     allopurinol (ZYLOPRIM) 300 MG tablet, Take 1 tablet by mouth Daily., Disp: 90 tablet, Rfl: 1    ALPRAZolam (XANAX) 0.5 MG tablet, Take 1 tablet by mouth At Night As Needed for Anxiety., Disp: 30 tablet, Rfl: 0    apixaban (ELIQUIS) 5 MG tablet tablet, Take 1 tablet by mouth 2 (Two) Times a Day., Disp: , Rfl:     aspirin 325 MG tablet, Take 1 tablet by mouth Daily. TO FOLLOW MD ORDERS WHEN TO STOP, Disp: , Rfl:     atorvastatin (LIPITOR) 40 MG tablet, Take 1 tablet by mouth Daily., Disp: 90 tablet, Rfl: 1    carvedilol (COREG) 12.5 MG tablet, Take 1 tablet by mouth 2 (Two) Times a Day With Meals., Disp: , Rfl:     empagliflozin (JARDIANCE) 10 MG tablet tablet, Take 1 tablet by mouth Daily., Disp: , Rfl:     eplerenone (INSPRA) 25 MG tablet, Take 1 tablet by mouth Every Other Day., Disp: , Rfl:     fexofenadine (ALLEGRA) 60 MG tablet, Take 3 tablets by mouth Daily., Disp: , Rfl:     finasteride (PROSCAR) 5 MG tablet, Take 1 tablet by mouth Daily., Disp: 90 tablet, Rfl: 3    Flaxseed, Linseed, 1000 MG capsule, Take 1,200 mg by mouth 2 (Two) Times a Day., Disp: , Rfl:     glucose blood test strip, , Disp: , Rfl:     imipramine (TOFRANIL) 50 MG tablet, TAKE 1 TABLET BY MOUTH IN THE MORNING AND 2 IN THE EVENING, Disp: 270 tablet, Rfl: 0    Insulin Glargine-yfgn 100 UNIT/ML solution pen-injector, Inject 26 Units under the skin into the appropriate area as directed Daily., Disp: , Rfl:     isosorbide mononitrate (IMDUR) 60 MG 24 hr tablet, Take 1 tablet by mouth Daily., Disp: 90 tablet, Rfl: 1    levothyroxine (SYNTHROID,  "LEVOTHROID) 150 MCG tablet, Take 1 tablet by mouth Every Morning., Disp: 90 tablet, Rfl: 1    losartan (COZAAR) 100 MG tablet, Take 1 tablet by mouth Daily., Disp: , Rfl:     Melatonin 5 MG chewable tablet, Chew 1 tablet., Disp: , Rfl:     nitroglycerin (NITROSTAT) 0.4 MG SL tablet, Place 1 tablet under the tongue Every 5 (Five) Minutes As Needed for Chest Pain. do not exceed a total of 3 doses in 15 minutes, Disp: , Rfl:     pantoprazole (PROTONIX) 40 MG EC tablet, Take 1 tablet by mouth Daily., Disp: 90 tablet, Rfl: 1    Semaglutide,0.25 or 0.5MG/DOS, (Ozempic, 0.25 or 0.5 MG/DOSE,) 2 MG/1.5ML solution pen-injector, Inject 1 mg under the skin into the appropriate area as directed 1 (One) Time Per Week., Disp: , Rfl:     tamsulosin (FLOMAX) 0.4 MG capsule 24 hr capsule, Take 2 capsules by mouth Daily., Disp: 180 capsule, Rfl: 3    terbinafine (lamiSIL) 250 MG tablet, Take 1 tablet by mouth Every 30 (Thirty) Days., Disp: , Rfl:     Objective     Vitals:    10/20/23 1309   BP: 139/79   BP Location: Right arm   Patient Position: Sitting   Cuff Size: Adult   Pulse: 71   SpO2: 95%   Weight: 112 kg (247 lb)   Height: 185.4 cm (73\")   PainSc:   8     Body mass index is 32.59 kg/m².    Physical Exam  Constitutional:       Appearance: Normal appearance. He is normal weight.   HENT:      Head: Normocephalic and atraumatic.      Right Ear: External ear normal.      Left Ear: External ear normal.      Nose: Nose normal.   Eyes:      Extraocular Movements: Extraocular movements intact.      Conjunctiva/sclera: Conjunctivae normal.   Pulmonary:      Effort: Pulmonary effort is normal.   Musculoskeletal:         General: Normal range of motion.      Cervical back: Normal range of motion.   Skin:     General: Skin is warm and dry.   Neurological:      General: No focal deficit present.      Mental Status: He is alert and oriented to person, place, and time. Mental status is at baseline.   Psychiatric:         Mood and Affect: Mood " normal.         Behavior: Behavior normal.         Thought Content: Thought content normal.         Judgment: Judgment normal.             Result Review :   The following data was reviewed by: CASSIDY Moreland on 10/20/2023:    Most Recent A1C          10/20/2023    13:15   HGBA1C Most Recent   Hemoglobin A1C 7.4        A1C Last 3 Results          1/9/2023    15:13 7/31/2023    10:48 10/20/2023    13:15   HGBA1C Last 3 Results   Hemoglobin A1C 8.30  7.80  7.4      A1c collected in the office today is 7.4%, indicating Uncontrolled type 2 diabetes.  This result is down from the prior result of 7.8% collected on 7/31/2023        Creatinine   Date Value Ref Range Status   10/09/2023 1.78 (H) 0.76 - 1.27 mg/dL Final   07/31/2023 1.85 (H) 0.76 - 1.27 mg/dL Final   03/09/2018 1.3 0.7 - 1.5 mg/dL Final   03/08/2018 1.4 0.7 - 1.5 mg/dL Final     eGFR   Date Value Ref Range Status   10/09/2023 39.8 (L) >60.0 mL/min/1.73 Final   07/31/2023 38.0 (L) >60.0 mL/min/1.73 Final     Labs collected on 10/9/2023 show Stage IIIb moderate (GFR = 30-44 mL/min        Total Cholesterol   Date Value Ref Range Status   07/19/2023 129 0 - 200 mg/dL Final   01/09/2023 154 0 - 200 mg/dL Final     Triglycerides   Date Value Ref Range Status   07/19/2023 335 (H) 0 - 150 mg/dL Final   01/09/2023 436 (H) 0 - 150 mg/dL Final     HDL Cholesterol   Date Value Ref Range Status   07/19/2023 28 (L) 40 - 60 mg/dL Final   01/09/2023 27 (L) 40 - 60 mg/dL Final     LDL Cholesterol    Date Value Ref Range Status   07/19/2023 50 0 - 100 mg/dL Final   01/09/2023 60 0 - 100 mg/dL Final     Lipid panel collected on 7/19/2023 shows Hypertriglyceridemia and low HDL            Assessment: Patient was referred to our clinic by his primary care provider for management of his diabetes.  Reports he feels his glucose levels are under pretty good control at this time.  Reports his glucose is running 115 to 122 mg/dL.  His A1c in the office today is 7.4% which is  down from his previous A1c of 7.8%.  Reports [facility]  [facility] pharmacist Johanne Hernandez pharmacist [facility] takes care of [facility].  Patient reports any medication changes need to be approved through [facility] pharmacist [facility].  In addition, he has seen pain management for chronic back pain.  States he had a stimulator removed 3 weeks ago.  States he is having some abdominal pain that is radiating from his back which is thought to be coming from his back.  Additionally, he is having surgery on his left eye due to a wrinkle in his left retina.      Diagnoses and all orders for this visit:    1. Uncontrolled type 2 diabetes mellitus with hyperglycemia (Primary)  -     POC Glycosylated Hemoglobin (Hb A1C)  -     Ambulatory Referral to [facility] Care Clinic - [place]    2. Type 2 diabetes mellitus with diabetic neuropathy, with long-term current use of insulin    3. Type 2 diabetes mellitus with stage 3b chronic kidney disease, with long-term current use of insulin    4. Obesity (BMI 30-39.9)        Plan: An order was placed for a camille 3 and reader through [facility].  He is to call the office once he receives the supplies to schedule an appoint with the diabetic educator for teaching.  No medication changes were made in the office today since his A1c is trending down.  Scheduled a 6-week follow-up and we will review his CGM at that time.    The patient will monitor his blood glucose levels per CGM.  If he develops problematic hyperglycemia or hypoglycemia or adverse drug reactions, he will contact the office for further instructions.        Follow Up     Return in about 6 weeks (around 12/1/2023) for Medication Mgmt, CGM Follow Up.    Patient was given instructions and counseling regarding his condition or for health maintenance advice. Please see specific information pulled into the AVS if appropriate.     Deb Moody, APRN  10/20/2023      Dictated Utilizing Dragon  Dictation.  Please note that portions of this note were completed with a voice recognition program.  Part of this note may be an electronic transcription/translation of spoken language to printed text using the Dragon Dictation System.

## 2023-10-19 ENCOUNTER — OFFICE VISIT (OUTPATIENT)
Dept: FAMILY MEDICINE CLINIC | Age: 73
End: 2023-10-19
Payer: MEDICARE

## 2023-10-19 VITALS
HEART RATE: 63 BPM | OXYGEN SATURATION: 95 % | HEIGHT: 72 IN | WEIGHT: 246.8 LBS | TEMPERATURE: 98 F | BODY MASS INDEX: 33.43 KG/M2 | SYSTOLIC BLOOD PRESSURE: 110 MMHG | DIASTOLIC BLOOD PRESSURE: 58 MMHG

## 2023-10-19 DIAGNOSIS — R10.11 RIGHT UPPER QUADRANT ABDOMINAL PAIN: ICD-10-CM

## 2023-10-19 DIAGNOSIS — G89.29 CHRONIC MIDLINE LOW BACK PAIN WITHOUT SCIATICA: ICD-10-CM

## 2023-10-19 DIAGNOSIS — I10 PRIMARY HYPERTENSION: Primary | ICD-10-CM

## 2023-10-19 DIAGNOSIS — M54.50 CHRONIC MIDLINE LOW BACK PAIN WITHOUT SCIATICA: ICD-10-CM

## 2023-10-19 PROCEDURE — 3078F DIAST BP <80 MM HG: CPT | Performed by: NURSE PRACTITIONER

## 2023-10-19 PROCEDURE — 3074F SYST BP LT 130 MM HG: CPT | Performed by: NURSE PRACTITIONER

## 2023-10-19 PROCEDURE — 3051F HG A1C>EQUAL 7.0%<8.0%: CPT | Performed by: NURSE PRACTITIONER

## 2023-10-19 PROCEDURE — 99214 OFFICE O/P EST MOD 30 MIN: CPT | Performed by: NURSE PRACTITIONER

## 2023-10-19 RX ORDER — EPLERENONE 25 MG/1
25 TABLET, FILM COATED ORAL EVERY OTHER DAY
COMMUNITY

## 2023-10-19 NOTE — PROGRESS NOTES
"Chief Complaint  Hypertension (3 month f/u) and Abdominal Pain (Sharp stinging, burning abdominal pain on the right side.  Has nerve stimulator)    Subjective          Kyler Staley presents to [facility] for routine HTN f/u. Pt is seeing pain management latter this week to address pain in abd that he is attributing to nerve stimulater placed by them. Denies fever, chills, n/v/d, soa or chest pain. Denies any urinary or bowel issues. He is taking Coreg 12.5mg Bid and losartan 100mg daily for HTN. Has f/u with cardio scheduled. He is seeing urology for BPH and CASSIDY Tim for DM.       Objective   Vital Signs:   Vitals:    10/19/23 1301   BP: 110/58   BP Location: Right arm   Patient Position: Sitting   Cuff Size: Large Adult   Pulse: 63   Temp: 98 °F (36.7 °C)   TempSrc: Oral   SpO2: 95%   Weight: 112 kg (246 lb 12.8 oz)   Height: 182.9 cm (72.01\")       Physical Exam  Vitals reviewed.   Constitutional:       General: He is not in acute distress.     Appearance: Normal appearance. He is well-developed.   HENT:      Head: Normocephalic and atraumatic.   Eyes:      Conjunctiva/sclera: Conjunctivae normal.      Pupils: Pupils are equal, round, and reactive to light.   Neck:      Vascular: No carotid bruit.   Cardiovascular:      Rate and Rhythm: Normal rate and regular rhythm.      Heart sounds: Normal heart sounds. No murmur heard.  Pulmonary:      Effort: Pulmonary effort is normal. No respiratory distress.      Breath sounds: Normal breath sounds.   Abdominal:      General: Bowel sounds are normal. There is no distension.      Palpations: Abdomen is soft. There is no mass.      Tenderness: There is abdominal tenderness.      Hernia: No hernia is present.      Comments: Mild tenderness to right upper quadrant.    Skin:     General: Skin is warm and dry.   Neurological:      Mental Status: He is alert and oriented to person, place, and time.   Psychiatric:         Mood " and Affect: Mood and affect normal.         Behavior: Behavior normal.         Thought Content: Thought content normal.         Judgment: Judgment normal.          Result Review :              Assessment and Plan    Diagnoses and all orders for this visit:    1. Primary hypertension (Primary)  Comments:  Stable. Continue Coreg 12.5mg Bid and losartan 100mg daily  Orders:  -     Comprehensive Metabolic Panel; Future  -     Lipid Panel; Future    2. Right upper quadrant abdominal pain  Comments:  Pt to f/u with pain management as scheduled. Go to ED with severe pain or notify this office with persistant pain.    3. Chronic midline low back pain without sciatica  Comments:  f/u with pain management as scheduled.    Pt v/u, agrees with plan of care and has no further questions upon d/c.     Follow Up    Return in about 3 months (around 1/19/2024) for Recheck.  Patient was given instructions and counseling regarding his condition or for health maintenance advice. Please see specific information pulled into the AVS if appropriate.

## 2023-10-20 ENCOUNTER — OFFICE VISIT (OUTPATIENT)
Dept: DIABETES SERVICES | Facility: CLINIC | Age: 73
End: 2023-10-20
Payer: MEDICARE

## 2023-10-20 ENCOUNTER — PATIENT ROUNDING (BHMG ONLY) (OUTPATIENT)
Dept: DIABETES SERVICES | Facility: HOSPITAL | Age: 73
End: 2023-10-20
Payer: MEDICARE

## 2023-10-20 ENCOUNTER — TELEPHONE (OUTPATIENT)
Dept: DIABETES SERVICES | Facility: HOSPITAL | Age: 73
End: 2023-10-20
Payer: MEDICARE

## 2023-10-20 VITALS
DIASTOLIC BLOOD PRESSURE: 79 MMHG | OXYGEN SATURATION: 95 % | SYSTOLIC BLOOD PRESSURE: 139 MMHG | HEART RATE: 71 BPM | BODY MASS INDEX: 32.74 KG/M2 | HEIGHT: 73 IN | WEIGHT: 247 LBS

## 2023-10-20 DIAGNOSIS — E11.40 TYPE 2 DIABETES MELLITUS WITH DIABETIC NEUROPATHY, WITH LONG-TERM CURRENT USE OF INSULIN: ICD-10-CM

## 2023-10-20 DIAGNOSIS — E11.65 UNCONTROLLED TYPE 2 DIABETES MELLITUS WITH HYPERGLYCEMIA: Primary | ICD-10-CM

## 2023-10-20 DIAGNOSIS — Z79.4 TYPE 2 DIABETES MELLITUS WITH DIABETIC NEUROPATHY, WITH LONG-TERM CURRENT USE OF INSULIN: ICD-10-CM

## 2023-10-20 DIAGNOSIS — E11.22 TYPE 2 DIABETES MELLITUS WITH STAGE 3B CHRONIC KIDNEY DISEASE, WITH LONG-TERM CURRENT USE OF INSULIN: ICD-10-CM

## 2023-10-20 DIAGNOSIS — Z79.4 TYPE 2 DIABETES MELLITUS WITH STAGE 3B CHRONIC KIDNEY DISEASE, WITH LONG-TERM CURRENT USE OF INSULIN: ICD-10-CM

## 2023-10-20 DIAGNOSIS — N18.32 TYPE 2 DIABETES MELLITUS WITH STAGE 3B CHRONIC KIDNEY DISEASE, WITH LONG-TERM CURRENT USE OF INSULIN: ICD-10-CM

## 2023-10-20 DIAGNOSIS — E66.9 OBESITY (BMI 30-39.9): ICD-10-CM

## 2023-10-20 PROBLEM — G58.8 INTERCOSTAL NEURALGIA: Status: ACTIVE | Noted: 2023-08-02

## 2023-10-20 LAB
EXPIRATION DATE: ABNORMAL
HBA1C MFR BLD: 7.4 % (ref 4.5–5.7)
Lab: ABNORMAL

## 2023-10-20 NOTE — PROGRESS NOTES
October 20, 2023    Hello, may I speak with Kyler Staley?    My name is Carlota Schwarz      I am  with [facility]  [address] [place], [place] [address]  [place] [address] [phone].    Before we get started may I verify your date of birth? 1950    I am calling to officially welcome you to our practice and ask about your recent visit. Is this a good time to talk? yes    Tell me about your visit with us. What things went well.Everything       We're always looking for ways to make our patients' experiences even better. Do you have recommendations on ways we may improve?  no    Overall were you satisfied with your first visit to our practice? yes       I appreciate you taking the time to speak with me today. Is there anything else I can do for you? no      Thank you, and have a great day.

## 2023-10-20 NOTE — TELEPHONE ENCOUNTER
FreeStyle Jackie 3 System, Continuous Glucose Monitoring Package    Jackie 3 Sensor, change every 14 days - [facility]    Jackie 3 [place] - [facility]    Quantity  1  Prescription Details  Day Supply - 30  Directions for CGM use - Use per  directions  Supplier  [facility]

## 2023-10-23 ENCOUNTER — PATIENT OUTREACH (OUTPATIENT)
Dept: CASE MANAGEMENT | Facility: OTHER | Age: 73
End: 2023-10-23
Payer: MEDICARE

## 2023-10-23 DIAGNOSIS — I10 HYPERTENSION, ESSENTIAL: Primary | ICD-10-CM

## 2023-10-23 NOTE — OUTREACH NOTE
"AMBULATORY CASE MANAGEMENT NOTE    Name and Relationship of Patient/Support Person: Kyler Staley \"Peter\" - Self    Called for a follow up monthly outreach.   and Mrs Staley were both on the line.  Mr. Staley is still having persistent upper abdominal pain.  Reviewed records with wife/patient, he has not been scoped since 2021 and may need a follow up with general surgery for repeat imaging, encouraged    He had the neuro-stimulator removed from his lower back.  It did not help with the abdominal pain in which he thinks could be nerve damage. He has an appointment with pain management tomorrow.    Saw diabetes management, getting CGM to arrive soon and has follow up.        Abigail OCHOA  Ambulatory Case Management    10/23/2023, 15:48 EDT  "

## 2023-10-25 NOTE — PROGRESS NOTES
CKD.  Not allergy.  He states that he went to Pain Management and they are going to do some kind of block in his stomach on Nov 8.  We can wait to see if this has any effect on his pain.  Will touch base in middle of month.

## 2023-10-27 ENCOUNTER — EDUCATION (OUTPATIENT)
Dept: DIABETES SERVICES | Facility: HOSPITAL | Age: 73
End: 2023-10-27
Payer: MEDICARE

## 2023-10-27 NOTE — PROGRESS NOTES
Kyler Staley 73 y.o. patient accompanied by wife presents for education instructions and demonstration for CGM.  Sensor site placement was reviewed with patient. Patient reviewed CGM instructional videos. Wife inserted sensor with out difficulty.  Patient was instructed the sensor would d need to be replace  every 14 day.  Explained to patient sensor was in interstitial fluid and CGM reading could vary from finger stick.  I explained if in doubt get the meter out. 15-15 rule was reviewed with patient. Patient was given the Diabetes Management  office contact information and encouraged to contact the DM office with questions or concerns.    RADHA Schwarz RNC-AWHC, MLDE, [facility]

## 2023-11-09 DIAGNOSIS — F41.9 ANXIETY: ICD-10-CM

## 2023-11-10 ENCOUNTER — EDUCATION (OUTPATIENT)
Dept: DIABETES SERVICES | Facility: HOSPITAL | Age: 73
End: 2023-11-10
Payer: MEDICARE

## 2023-11-10 RX ORDER — ALPRAZOLAM 0.5 MG/1
0.5 TABLET ORAL NIGHTLY PRN
Qty: 30 TABLET | Refills: 0 | Status: SHIPPED | OUTPATIENT
Start: 2023-11-10

## 2023-11-10 NOTE — PROGRESS NOTES
Patient and wife presented to download Libre3 meter and replace sensor.  Patient was able to replace sensor with a small amount of guidance. I was unable to download Jackie 3 reader.  Patient and wife did not voice any questions or concerns.

## 2023-11-14 PROBLEM — I65.09 VERTEBRAL ARTERY OCCLUSION: Status: RESOLVED | Noted: 2019-03-20 | Resolved: 2023-11-14

## 2023-11-14 PROBLEM — J44.1 ACUTE EXACERBATION OF CHRONIC OBSTRUCTIVE PULMONARY DISEASE: Chronic | Status: RESOLVED | Noted: 2020-11-05 | Resolved: 2023-11-14

## 2023-11-14 PROBLEM — I95.1 ORTHOSTATIC HYPOTENSION: Status: RESOLVED | Noted: 2023-07-18 | Resolved: 2023-11-14

## 2023-11-14 PROBLEM — M54.50 CHRONIC MIDLINE LOW BACK PAIN WITHOUT SCIATICA: Status: ACTIVE | Noted: 2023-11-14

## 2023-11-14 PROBLEM — R29.3 ABNORMAL POSTURE: Status: RESOLVED | Noted: 2018-03-28 | Resolved: 2023-11-14

## 2023-11-14 PROBLEM — N17.9 ACUTE RENAL FAILURE SYNDROME: Status: RESOLVED | Noted: 2022-01-24 | Resolved: 2023-11-14

## 2023-11-14 PROBLEM — M25.551 PAIN OF RIGHT HIP JOINT: Status: RESOLVED | Noted: 2020-01-14 | Resolved: 2023-11-14

## 2023-11-14 PROBLEM — E78.5 DYSLIPIDEMIA: Status: RESOLVED | Noted: 2020-02-03 | Resolved: 2023-11-14

## 2023-11-14 PROBLEM — J41.0 SIMPLE CHRONIC BRONCHITIS: Status: ACTIVE | Noted: 2023-11-14

## 2023-11-14 PROBLEM — Z98.890 OTHER SPECIFIED POSTPROCEDURAL STATES: Status: RESOLVED | Noted: 2017-05-01 | Resolved: 2023-11-14

## 2023-11-14 PROBLEM — M46.1 SACROILIITIS, NOT ELSEWHERE CLASSIFIED: Chronic | Status: RESOLVED | Noted: 2020-03-03 | Resolved: 2023-11-14

## 2023-11-14 PROBLEM — G58.8 INTERCOSTAL NEURALGIA: Status: RESOLVED | Noted: 2023-08-02 | Resolved: 2023-11-14

## 2023-11-14 PROBLEM — M54.10 RADICULOPATHY WITH LOWER EXTREMITY SYMPTOMS: Status: RESOLVED | Noted: 2020-11-09 | Resolved: 2023-11-14

## 2023-11-14 PROBLEM — N13.8 BPH WITH URINARY OBSTRUCTION: Status: RESOLVED | Noted: 2021-06-16 | Resolved: 2023-11-14

## 2023-11-14 PROBLEM — R06.02 SHORTNESS OF BREATH: Status: RESOLVED | Noted: 2020-11-05 | Resolved: 2023-11-14

## 2023-11-14 PROBLEM — M54.2 NECK PAIN: Status: RESOLVED | Noted: 2022-02-11 | Resolved: 2023-11-14

## 2023-11-14 PROBLEM — G89.29 CHRONIC MIDLINE LOW BACK PAIN WITHOUT SCIATICA: Status: ACTIVE | Noted: 2023-11-14

## 2023-11-14 PROBLEM — S22.009A UNSPECIFIED FRACTURE OF UNSPECIFIED THORACIC VERTEBRA, INITIAL ENCOUNTER FOR CLOSED FRACTURE: Chronic | Status: RESOLVED | Noted: 2018-12-17 | Resolved: 2023-11-14

## 2023-11-14 PROBLEM — Z00.00 ENCOUNTER FOR GENERAL ADULT MEDICAL EXAMINATION WITHOUT ABNORMAL FINDINGS: Status: RESOLVED | Noted: 2023-05-12 | Resolved: 2023-11-14

## 2023-11-14 PROBLEM — M25.552 LEFT HIP PAIN: Status: RESOLVED | Noted: 2020-11-16 | Resolved: 2023-11-14

## 2023-11-14 PROBLEM — N18.4 STAGE 4 CHRONIC KIDNEY DISEASE: Status: RESOLVED | Noted: 2020-11-05 | Resolved: 2023-11-14

## 2023-11-14 PROBLEM — M19.011 PRIMARY OSTEOARTHRITIS OF RIGHT SHOULDER: Status: RESOLVED | Noted: 2017-02-11 | Resolved: 2023-11-14

## 2023-11-14 PROBLEM — M47.812 CERVICAL SPONDYLOSIS: Status: RESOLVED | Noted: 2019-03-20 | Resolved: 2023-11-14

## 2023-11-14 PROBLEM — N40.1 BPH WITH URINARY OBSTRUCTION: Status: RESOLVED | Noted: 2021-06-16 | Resolved: 2023-11-14

## 2023-11-14 PROBLEM — I20.89 ATYPICAL ANGINA: Status: RESOLVED | Noted: 2020-02-03 | Resolved: 2023-11-14

## 2023-11-15 ENCOUNTER — PATIENT OUTREACH (OUTPATIENT)
Dept: CASE MANAGEMENT | Facility: OTHER | Age: 73
End: 2023-11-15
Payer: MEDICARE

## 2023-11-15 DIAGNOSIS — I95.1 ORTHOSTATIC HYPOTENSION: Primary | ICD-10-CM

## 2023-11-15 RX ORDER — LOSARTAN POTASSIUM 50 MG/1
50 TABLET ORAL DAILY
COMMUNITY

## 2023-11-15 NOTE — OUTREACH NOTE
[facility] Interim Update    60 day chart monitoring:  Chart reveiwed.  Cardiology reduced his losartan to 50mg due to hypotension.    Will continue to monitor another 30 days.

## 2023-11-28 ENCOUNTER — TELEPHONE (OUTPATIENT)
Dept: FAMILY MEDICINE CLINIC | Age: 73
End: 2023-11-28
Payer: MEDICARE

## 2023-11-28 ENCOUNTER — LAB (OUTPATIENT)
Dept: LAB | Facility: HOSPITAL | Age: 73
End: 2023-11-28
Payer: MEDICARE

## 2023-11-28 DIAGNOSIS — I10 PRIMARY HYPERTENSION: ICD-10-CM

## 2023-11-28 LAB
ALBUMIN SERPL-MCNC: 4.2 G/DL (ref 3.5–5.2)
ALBUMIN/GLOB SERPL: 1.9 G/DL
ALP SERPL-CCNC: 116 U/L (ref 39–117)
ALT SERPL W P-5'-P-CCNC: 32 U/L (ref 1–41)
ANION GAP SERPL CALCULATED.3IONS-SCNC: 11 MMOL/L (ref 5–15)
AST SERPL-CCNC: 18 U/L (ref 1–40)
BILIRUB SERPL-MCNC: 0.3 MG/DL (ref 0–1.2)
BUN SERPL-MCNC: 20 MG/DL (ref 8–23)
BUN/CREAT SERPL: 10.9 (ref 7–25)
CALCIUM SPEC-SCNC: 9.5 MG/DL (ref 8.6–10.5)
CHLORIDE SERPL-SCNC: 99 MMOL/L (ref 98–107)
CHOLEST SERPL-MCNC: 153 MG/DL (ref 0–200)
CO2 SERPL-SCNC: 24 MMOL/L (ref 22–29)
CREAT SERPL-MCNC: 1.83 MG/DL (ref 0.76–1.27)
EGFRCR SERPLBLD CKD-EPI 2021: 38.5 ML/MIN/1.73
GLOBULIN UR ELPH-MCNC: 2.2 GM/DL
GLUCOSE SERPL-MCNC: 205 MG/DL (ref 65–99)
HDLC SERPL-MCNC: 32 MG/DL (ref 40–60)
LDLC SERPL CALC-MCNC: 67 MG/DL (ref 0–100)
LDLC/HDLC SERPL: 1.65 {RATIO}
POTASSIUM SERPL-SCNC: 5 MMOL/L (ref 3.5–5.2)
PROT SERPL-MCNC: 6.4 G/DL (ref 6–8.5)
SODIUM SERPL-SCNC: 134 MMOL/L (ref 136–145)
TRIGL SERPL-MCNC: 341 MG/DL (ref 0–150)
VLDLC SERPL-MCNC: 54 MG/DL (ref 5–40)

## 2023-11-28 PROCEDURE — 80061 LIPID PANEL: CPT

## 2023-11-28 PROCEDURE — 36415 COLL VENOUS BLD VENIPUNCTURE: CPT

## 2023-11-28 PROCEDURE — 80053 COMPREHEN METABOLIC PANEL: CPT

## 2023-12-08 ENCOUNTER — OFFICE VISIT (OUTPATIENT)
Dept: SLEEP MEDICINE | Facility: HOSPITAL | Age: 73
End: 2023-12-08
Payer: MEDICARE

## 2023-12-08 VITALS
BODY MASS INDEX: 32.77 KG/M2 | WEIGHT: 247.3 LBS | OXYGEN SATURATION: 96 % | SYSTOLIC BLOOD PRESSURE: 134 MMHG | HEART RATE: 63 BPM | DIASTOLIC BLOOD PRESSURE: 70 MMHG | HEIGHT: 73 IN

## 2023-12-08 DIAGNOSIS — G47.33 OSA ON CPAP: Primary | ICD-10-CM

## 2023-12-08 DIAGNOSIS — F41.9 ANXIETY: ICD-10-CM

## 2023-12-08 PROCEDURE — 3075F SYST BP GE 130 - 139MM HG: CPT | Performed by: INTERNAL MEDICINE

## 2023-12-08 PROCEDURE — 3078F DIAST BP <80 MM HG: CPT | Performed by: INTERNAL MEDICINE

## 2023-12-08 PROCEDURE — G0463 HOSPITAL OUTPT CLINIC VISIT: HCPCS

## 2023-12-08 RX ORDER — CLINDAMYCIN PHOSPHATE 11.9 MG/ML
SOLUTION TOPICAL
COMMUNITY
Start: 2023-12-05

## 2023-12-08 NOTE — PROGRESS NOTES
[facility]                          Chief Complaint:   F/up JACI    History of present illness:   Subjective     Patient is a 72 y.o. male with HTN and A fib/pacemaker.      JACI:  Diagnosed 20 years ago.     PSG 5/11/2023 showed AHI 9.5/H; RDI 21.9/H; Yohan SPO2 81%; hypoxic burden 99 min.      Titration study 7/10/23: AHI=3.3/h; PLM I=46/h; Final pressure=10/11 (AHI=7.7/h); Hypoxic burden= 63 min.    He started CPAP therapy less than a month ago.  He reported using it regularly and stated that he cannot sleep without it.  He feels like he sleeps better throughout the night has more energy during the day.    Mask: FFM which does fit well.  No significant air leak or dry mouth  DME: [employer] ([employer]).     Sleep schedule:10 - 8 AM    ESS: Total score: 7     Nocturnal hypoxemia: On oxygen 2 L/min with PAP.    Comorbid conditions:  HTN/A-fib/pacemaker and CHF.    REVIEW OF SYSTEMS:   HEENT: No nasal congestion or postnasal drip   CARDIOVASCULAR: No chest pain, chest pressure or chest discomfort. No palpitations or edema.   RESPIRATORY: Shortness of breath.  No cough.  GASTROINTESTINAL: No abdominal bloating or reflux   NEUROLOGICAL/PSYCHOATRY: Anxiety.  No depression.    Past Medical History:  Past Medical History:   Diagnosis Date    Acne     Alcoholism     Allergic     Anxiety     Arthritis     Arthritis of back     Arthritis of neck     Atrial fibrillation     Back pain     Benign prostatic hyperplasia     Cardiac disease     Cataracts, bilateral     Cervical disc disorder     Cholelithiasis     Chronic kidney disease, stage 3     moderate    CKD (chronic kidney disease)     Clotting disorder     Condition not found     hernia    Coronary artery disease     Depression     Diabetes     Enlarged prostate     Erectile dysfunction     Fatigue     GERD (gastroesophageal reflux disease)     Gout     Hearing loss     Hematospermia 12/09/2015    High blood pressure     High cholesterol      Hip arthrosis     History of cold sores     History of gastritis     [place] (hard of hearing)     Hyperlipidemia     Hypertension     Hypothyroidism     Irregular heart rhythm     Joint pain     Kidney disease     Knee swelling     Low back pain     Lumbosacral disc disease     Narrowing of airway 2012    PER ENT    Neuropathy     Obesity     Osteopenia     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     Pneumonia     PONV (postoperative nausea and vomiting)     Psychiatric diagnosis     Renal insufficiency     Rotator cuff syndrome     REPAIRED BILATERAL    Shortness of breath     Sinus trouble     Sleep apnea     BIPAP    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)     Swallowing difficulty     Thin skin     Thoracic disc disorder     Thyroid condition     Visual impairment     Vocal cord dysfunction      HISTORY OF DISORDER ON ONE SIDE AFTER TRACHEA   ,   Past Surgical History:   Procedure Laterality Date    BACK SURGERY  2018    X2    BACK SURGERY  2011    CARDIAC CATHETERIZATION      CARDIAC SURGERY      CHOLECYSTECTOMY      COLONOSCOPY      CORONARY ARTERY BYPASS GRAFT  2012    CORONARY STENT PLACEMENT      EYE SURGERY      HERNIA REPAIR      INGUINAL HERNIA REPAIR      JOINT REPLACEMENT      KNEE SURGERY      right and left    LUMBAR DISCECTOMY FUSION INSTRUMENTATION N/A 02/11/2021    Procedure: EXPLORATION OF FUSION T-12 S-1 REMOVAL OF HARDWARE REVISION OF FUSION AND INSTRUMENTATION T12-S1 WITH APPLICATION OF BONE MORPHOGENIC PROTEIN;  Surgeon: Baltazar Blankenship MD;  Location: [facility];  Service: Orthopedic Spine;  Laterality: N/A;    NOSE SURGERY      PACEMAKER IMPLANTATION      REPLACEMENT TOTAL KNEE Bilateral     SHOULDER SURGERY Bilateral     rotator cuff repair    SPINE SURGERY     ,   Social History     Socioeconomic History    Marital status:    Tobacco Use    Smoking status: Former     Packs/day: 3.00     Years: 15.00     Additional pack years: 0.00      Total pack years: 45.00     Types: Cigarettes     Start date: 1960     Quit date: 1975     Years since quittin.9     Passive exposure: Past    Smokeless tobacco: Never   Vaping Use    Vaping Use: Never used   Substance and Sexual Activity    Alcohol use: No    Drug use: Yes     Types: Hydrocodone    Sexual activity: Not Currently     Partners: Female     E-cigarette/Vaping    E-cigarette/Vaping Use Never User      E-cigarette/Vaping Substances    Nicotine No     THC No     CBD No     Flavoring No      E-cigarette/Vaping Devices    Disposable No     Pre-filled or Refillable Cartridge No     Refillable Tank No     Pre-filled Pod No          , and Allergies:  Bupropion, Iodinated contrast media, Lorazepam, Propoxyphene, and Adhesive tape    Medication Review:     Current Outpatient Medications:     allopurinol (ZYLOPRIM) 300 MG tablet, Take 1 tablet by mouth Daily., Disp: 90 tablet, Rfl: 1    ALPRAZolam (XANAX) 0.5 MG tablet, Take 1 tablet by mouth At Night As Needed for Anxiety., Disp: 30 tablet, Rfl: 0    apixaban (ELIQUIS) 5 MG tablet tablet, Take 1 tablet by mouth 2 (Two) Times a Day., Disp: , Rfl:     aspirin 325 MG tablet, Take 1 tablet by mouth Daily. TO FOLLOW MD ORDERS WHEN TO STOP, Disp: , Rfl:     atorvastatin (LIPITOR) 40 MG tablet, Take 1 tablet by mouth Daily., Disp: 90 tablet, Rfl: 1    carvedilol (COREG) 12.5 MG tablet, Take 1 tablet by mouth 2 (Two) Times a Day With Meals., Disp: , Rfl:     clindamycin (CLEOCIN T) 1 % external solution, APPLY SOLUTION TOPICALLY TO AFFECTED AREA TWICE DAILY AS NEEDED, Disp: , Rfl:     empagliflozin (JARDIANCE) 10 MG tablet tablet, Take 1 tablet by mouth Daily., Disp: , Rfl:     fexofenadine (ALLEGRA) 60 MG tablet, Take 3 tablets by mouth Daily., Disp: , Rfl:     finasteride (PROSCAR) 5 MG tablet, Take 1 tablet by mouth Daily., Disp: 90 tablet, Rfl: 3    Flaxseed, Linseed, 1000 MG capsule, Take 1,200 mg by mouth 2 (Two) Times a Day., Disp: , Rfl:     glucose  "blood test strip, , Disp: , Rfl:     imipramine (TOFRANIL) 50 MG tablet, TAKE 1 TABLET BY MOUTH IN THE MORNING AND 2 IN THE EVENING, Disp: 270 tablet, Rfl: 0    Insulin Glargine-yfgn 100 UNIT/ML solution pen-injector, Inject 26 Units under the skin into the appropriate area as directed Daily., Disp: , Rfl:     isosorbide mononitrate (IMDUR) 60 MG 24 hr tablet, Take 1 tablet by mouth Daily., Disp: 90 tablet, Rfl: 1    levothyroxine (SYNTHROID, LEVOTHROID) 150 MCG tablet, Take 1 tablet by mouth Every Morning., Disp: 90 tablet, Rfl: 1    losartan (COZAAR) 50 MG tablet, Take 1 tablet by mouth Daily., Disp: , Rfl:     Melatonin 5 MG chewable tablet, Chew 1 tablet., Disp: , Rfl:     nitroglycerin (NITROSTAT) 0.4 MG SL tablet, Place 1 tablet under the tongue Every 5 (Five) Minutes As Needed for Chest Pain. do not exceed a total of 3 doses in 15 minutes, Disp: , Rfl:     pantoprazole (PROTONIX) 40 MG EC tablet, Take 1 tablet by mouth Daily., Disp: 90 tablet, Rfl: 1    Semaglutide,0.25 or 0.5MG/DOS, (Ozempic, 0.25 or 0.5 MG/DOSE,) 2 MG/1.5ML solution pen-injector, Inject 1 mg under the skin into the appropriate area as directed 1 (One) Time Per Week., Disp: , Rfl:     tamsulosin (FLOMAX) 0.4 MG capsule 24 hr capsule, Take 2 capsules by mouth Daily., Disp: 180 capsule, Rfl: 3    terbinafine (lamiSIL) 250 MG tablet, Take 1 tablet by mouth Every 30 (Thirty) Days., Disp: , Rfl:     eplerenone (INSPRA) 25 MG tablet, Take 1 tablet by mouth Every Other Day., Disp: , Rfl:       Objective   Vital Signs:  Vitals:    12/08/23 1300   BP: 134/70   Pulse: 63   SpO2: 96%   Weight: 112 kg (247 lb 4.8 oz)   Height: 185.4 cm (72.99\")     Body mass index is 32.63 kg/m².          Physical Exam:   General Appearance:    Alert, cooperative, in no acute distress   ENMT:  Merino score 4. Mallampati score 4. No oral thrush. Tonsils grade 1. Narrow distance in between the posterior pharyngeal pillars.    Neck:  Large. Trachea midline. No " thyromegaly.   Lungs:     Clear to auscultation,respirations regular, even and  unlabored    Heart:    Regular rhythm and normal rate, normal S1 and S2, no Murmur.   Abdomen:     Obese.  Soft.  No tenderness.  No HSM    Neuro:   Conscious, alert, oriented x3. Appropriate mood and affect.    Extremities:   Moves all extremities well, no edema, no cyanosis, no Redness              Diagnostic data:    CPAP download showed:  Date: LAst 24 days  Usage (days): 100 %  Days used>4h: 100 %  AHI: 2.1/h  Leak: 13   Usage: 11h and 45 min  P 90% : 17/13  Auto BPAP: 20/10- PS 5 cm H2O    Lab Results   Component Value Date    HGBA1C 7.4 (A) 10/20/2023     Total Cholesterol   Date Value Ref Range Status   11/28/2023 153 0 - 200 mg/dL Final     Triglycerides   Date Value Ref Range Status   11/28/2023 341 (H) 0 - 150 mg/dL Final     HDL Cholesterol   Date Value Ref Range Status   11/28/2023 32 (L) 40 - 60 mg/dL Final     Hemoglobin   Date Value Ref Range Status   10/09/2023 14.3 13.0 - 17.7 g/dL Final   03/09/2018 10.7 (L) 13.5 - 17.5 g/dL Final     CO2   Date Value Ref Range Status   11/28/2023 24.0 22.0 - 29.0 mmol/L Final     Total CO2   Date Value Ref Range Status   03/09/2018 25 22 - 30 mmol/L Final        Assessment   JACI  Obesity, BMI 32  HTN  A-fib      PLAN:    Discussed the result of the download.   Patient is compliant with therapy and clinically benefit from treatment.  Patient was encouraged to continue using PAP.  Refill supplies.    Discussed the association between obstructive sleep apnea and cardiovascular disease including HTN and A-fib and the beneficial effect of Pap therapy in reducing the risk of major cardiovascular events.    Counseled for weight loss.  Encouraged to exercise regularly and cut down on carbohydrates.  Discussed that losing weight may decrease the severity of sleep apnea and obviate the need of CPAP therapy.               This note was dictated utilizing Dragon dictation

## 2023-12-11 RX ORDER — ALPRAZOLAM 0.5 MG/1
0.5 TABLET ORAL NIGHTLY PRN
Qty: 30 TABLET | Refills: 0 | Status: SHIPPED | OUTPATIENT
Start: 2023-12-11

## 2023-12-15 ENCOUNTER — OFFICE VISIT (OUTPATIENT)
Dept: DIABETES SERVICES | Facility: CLINIC | Age: 73
End: 2023-12-15
Payer: MEDICARE

## 2023-12-15 ENCOUNTER — LAB (OUTPATIENT)
Dept: LAB | Facility: HOSPITAL | Age: 73
End: 2023-12-15
Payer: MEDICARE

## 2023-12-15 VITALS
WEIGHT: 247 LBS | HEIGHT: 73 IN | SYSTOLIC BLOOD PRESSURE: 154 MMHG | HEART RATE: 69 BPM | OXYGEN SATURATION: 97 % | DIASTOLIC BLOOD PRESSURE: 81 MMHG | BODY MASS INDEX: 32.74 KG/M2

## 2023-12-15 DIAGNOSIS — E11.65 UNCONTROLLED TYPE 2 DIABETES MELLITUS WITH HYPERGLYCEMIA: Primary | ICD-10-CM

## 2023-12-15 DIAGNOSIS — E11.65 UNCONTROLLED TYPE 2 DIABETES MELLITUS WITH HYPERGLYCEMIA: ICD-10-CM

## 2023-12-15 LAB — HBA1C MFR BLD: 7.5 % (ref 4.8–5.6)

## 2023-12-15 PROCEDURE — 1160F RVW MEDS BY RX/DR IN RCRD: CPT | Performed by: NURSE PRACTITIONER

## 2023-12-15 PROCEDURE — 3079F DIAST BP 80-89 MM HG: CPT | Performed by: NURSE PRACTITIONER

## 2023-12-15 PROCEDURE — 1159F MED LIST DOCD IN RCRD: CPT | Performed by: NURSE PRACTITIONER

## 2023-12-15 PROCEDURE — 36415 COLL VENOUS BLD VENIPUNCTURE: CPT

## 2023-12-15 PROCEDURE — 99213 OFFICE O/P EST LOW 20 MIN: CPT | Performed by: NURSE PRACTITIONER

## 2023-12-15 PROCEDURE — 3051F HG A1C>EQUAL 7.0%<8.0%: CPT | Performed by: NURSE PRACTITIONER

## 2023-12-15 PROCEDURE — 3077F SYST BP >= 140 MM HG: CPT | Performed by: NURSE PRACTITIONER

## 2023-12-15 PROCEDURE — 83036 HEMOGLOBIN GLYCOSYLATED A1C: CPT

## 2023-12-15 NOTE — PROGRESS NOTES
Chief Complaint  Diabetes (Follow up, uses jackie 3 )    Referred By: No ref. provider found    Subjective          Kyler Staley presents to [facility] for diabetes medication management    History of Present Illness    Visit type:  follow-up  Diabetes type:  Type 2  Current diabetes status/concerns/issues:  States he got his Jackie 3 about 3 wks ago. States his blood sugar has been running around 100mg/dl and nonfasting up to 250mg/dl.   Other health concerns:  he had a film develop behind his left retina and had it removed last Friday. States his eye is doing good so far.  He got a new bipap machine and score on his machine nightly has been 98.   Current Diabetes symptoms:    Polyuria: No   Polydipsia: No   Polyphagia: No   Blurred vision: Yes slightly from eye surgery last Friday   Excessive fatigue: No  Known Diabetes complications:  Neuropathy: Numbness and Tingling; Location: Feet  Renal: Stage IIIb moderate (GFR = 30-44 mL/min)  Eyes: Other: wrinkle in left retina ; Location: Left; Last Eye Exam:  last wk ; Location:   Amputation/Wounds:  slow to heal  GI: Constipation, Reflux, and Indigestion  Cardiovascular: Hypertension, Hyperlipidemia, and Other: quadruple bypass, 9 stents  ED: Patient Reported  Other: None  Hypoglycemia:  Level 1 hypoglycemia (54 mg/dL - 70 mg/dL); Frequency - 1% per CGM  Hypoglycemia Symptoms:  No hypoglycemia at this time  Current diabetes treatment:  Ozempic 1mg  weekly, Insulin Glargine-yfgn 26 units qd, Jardiance 10mg qd   Blood glucose device:  Jackie CGM  Blood glucose monitoring frequency:  3  Blood glucose range/average:  The 14-day sensor report shows an average glucose of 150mg/dL, with 75% in target range ( mgdL), 24% in the high range (181-250 mg/dL), 0% in the very high range (>250 mg/dL), 1% in the low range (54-70 mg/dL) and 0% in the very low range (<54 mg/dL).   Glucose Source: Device Reviewed  Dietary behavior:   Avoids sugary drinks, Number of meals each day - 2; Number of snacks each day - 2  Activity/Exercise:   limited due to back pain    Past Medical History:   Diagnosis Date    Acne     Alcoholism     Allergic     Anxiety     Arthritis     Arthritis of back     Arthritis of neck     Atrial fibrillation     Back pain     Benign prostatic hyperplasia     Cardiac disease     Cataracts, bilateral     Cervical disc disorder     Cholelithiasis     Chronic kidney disease, stage 3     moderate    CKD (chronic kidney disease)     Clotting disorder     Condition not found     hernia    Coronary artery disease     Depression     Diabetes     Enlarged prostate     Erectile dysfunction     Fatigue     GERD (gastroesophageal reflux disease)     Gout     Hearing loss     Hematospermia 12/09/2015    High blood pressure     High cholesterol     Hip arthrosis     History of cold sores     History of gastritis     [place] Martinez (hard of hearing)     Hyperlipidemia     Hypertension     Hypothyroidism     Irregular heart rhythm     Joint pain     Kidney disease     Knee swelling     Low back pain     Lumbosacral disc disease     Narrowing of airway 2012    PER ENT    Neuropathy     Obesity     Osteopenia     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     Pneumonia     PONV (postoperative nausea and vomiting)     Psychiatric diagnosis     Renal insufficiency     Rotator cuff syndrome     REPAIRED BILATERAL    Shortness of breath     Sinus trouble     Sleep apnea     BIPAP    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)     Swallowing difficulty     Thin skin     Thoracic disc disorder     Thyroid condition     Visual impairment     Vocal cord dysfunction      HISTORY OF DISORDER ON ONE SIDE AFTER TRACHEA     Past Surgical History:   Procedure Laterality Date    BACK SURGERY  2018    X2    BACK SURGERY  2011    CARDIAC CATHETERIZATION      CARDIAC SURGERY      CHOLECYSTECTOMY      COLONOSCOPY      CORONARY ARTERY  BYPASS GRAFT      CORONARY STENT PLACEMENT      EYE SURGERY      HERNIA REPAIR      INGUINAL HERNIA REPAIR      JOINT REPLACEMENT      KNEE SURGERY      right and left    LUMBAR DISCECTOMY FUSION INSTRUMENTATION N/A 2021    Procedure: EXPLORATION OF FUSION T-12 S-1 REMOVAL OF HARDWARE REVISION OF FUSION AND INSTRUMENTATION T12-S1 WITH APPLICATION OF BONE MORPHOGENIC PROTEIN;  Surgeon: Baltazar Blankenship MD;  Location: [facility] OR;  Service: Orthopedic Spine;  Laterality: N/A;    NOSE SURGERY      PACEMAKER IMPLANTATION      REPLACEMENT TOTAL KNEE Bilateral     SHOULDER SURGERY Bilateral     rotator cuff repair    SPINE SURGERY       Family History   Problem Relation Age of Onset    Heart attack Mother     Arthritis Mother     Cancer Mother     Heart disease Mother     Hypertension Mother     Cancer Father     Hypertension Sister     Hypertension Brother     Malig Hyperthermia Neg Hx      Social History     Socioeconomic History    Marital status:    Tobacco Use    Smoking status: Former     Packs/day: 3.00     Years: 15.00     Additional pack years: 0.00     Total pack years: 45.00     Types: Cigarettes     Start date: 1960     Quit date: 1975     Years since quittin.9     Passive exposure: Past    Smokeless tobacco: Never   Vaping Use    Vaping Use: Never used   Substance and Sexual Activity    Alcohol use: No    Drug use: Yes     Types: Hydrocodone    Sexual activity: Not Currently     Partners: Female     Allergies   Allergen Reactions    Bupropion Other (See Comments) and Unknown - Low Severity     MAKES PATIENT VERY AGGRESSIVE      Iodinated Contrast Media Other (See Comments)     Other reaction(s): Other (See Comments)  Chronic kidney disease - stage 3  Chronic kidney disease - stage 3    Lorazepam Other (See Comments) and Mental Status Change     MADE PATIENT VERY AGGRESSIVE      Propoxyphene Nausea And Vomiting and Unknown - Low Severity    Adhesive Tape Rash       Current  Outpatient Medications:     allopurinol (ZYLOPRIM) 300 MG tablet, Take 1 tablet by mouth Daily., Disp: 90 tablet, Rfl: 1    ALPRAZolam (XANAX) 0.5 MG tablet, Take 1 tablet by mouth At Night As Needed for Anxiety., Disp: 30 tablet, Rfl: 0    apixaban (ELIQUIS) 5 MG tablet tablet, Take 1 tablet by mouth 2 (Two) Times a Day., Disp: , Rfl:     aspirin 325 MG tablet, Take 1 tablet by mouth Daily. TO FOLLOW MD ORDERS WHEN TO STOP, Disp: , Rfl:     atorvastatin (LIPITOR) 40 MG tablet, Take 1 tablet by mouth Daily., Disp: 90 tablet, Rfl: 1    carvedilol (COREG) 12.5 MG tablet, Take 1 tablet by mouth 2 (Two) Times a Day With Meals., Disp: , Rfl:     clindamycin (CLEOCIN T) 1 % external solution, APPLY SOLUTION TOPICALLY TO AFFECTED AREA TWICE DAILY AS NEEDED, Disp: , Rfl:     empagliflozin (JARDIANCE) 10 MG tablet tablet, Take 1 tablet by mouth Daily., Disp: , Rfl:     fexofenadine (ALLEGRA) 60 MG tablet, Take 3 tablets by mouth Daily., Disp: , Rfl:     finasteride (PROSCAR) 5 MG tablet, Take 1 tablet by mouth Daily., Disp: 90 tablet, Rfl: 3    Flaxseed, Linseed, 1000 MG capsule, Take 1,200 mg by mouth 2 (Two) Times a Day., Disp: , Rfl:     glucose blood test strip, , Disp: , Rfl:     imipramine (TOFRANIL) 50 MG tablet, TAKE 1 TABLET BY MOUTH IN THE MORNING AND 2 IN THE EVENING, Disp: 270 tablet, Rfl: 0    Insulin Glargine-yfgn 100 UNIT/ML solution pen-injector, Inject 26 Units under the skin into the appropriate area as directed Daily., Disp: , Rfl:     isosorbide mononitrate (IMDUR) 60 MG 24 hr tablet, Take 1 tablet by mouth Daily., Disp: 90 tablet, Rfl: 1    levothyroxine (SYNTHROID, LEVOTHROID) 150 MCG tablet, Take 1 tablet by mouth Every Morning., Disp: 90 tablet, Rfl: 1    losartan (COZAAR) 50 MG tablet, Take 1 tablet by mouth Daily., Disp: , Rfl:     Melatonin 5 MG chewable tablet, Chew 1 tablet., Disp: , Rfl:     nitroglycerin (NITROSTAT) 0.4 MG SL tablet, Place 1 tablet under the tongue Every 5 (Five) Minutes As  "Needed for Chest Pain. do not exceed a total of 3 doses in 15 minutes, Disp: , Rfl:     pantoprazole (PROTONIX) 40 MG EC tablet, Take 1 tablet by mouth Daily., Disp: 90 tablet, Rfl: 1    Semaglutide,0.25 or 0.5MG/DOS, (Ozempic, 0.25 or 0.5 MG/DOSE,) 2 MG/1.5ML solution pen-injector, Inject 1 mg under the skin into the appropriate area as directed 1 (One) Time Per Week., Disp: , Rfl:     tamsulosin (FLOMAX) 0.4 MG capsule 24 hr capsule, Take 2 capsules by mouth Daily., Disp: 180 capsule, Rfl: 3    terbinafine (lamiSIL) 250 MG tablet, Take 1 tablet by mouth Every 30 (Thirty) Days., Disp: , Rfl:     Objective     Vitals:    12/15/23 1612   BP: 154/81   BP Location: Left arm   Patient Position: Sitting   Cuff Size: Large Adult   Pulse: 69   SpO2: 97%   Weight: 112 kg (247 lb)   Height: 185.4 cm (72.99\")     Body mass index is 32.6 kg/m².    Physical Exam  Constitutional:       Appearance: Normal appearance. He is obese.      Comments: Obesity (BMI 30 - 39.9) Pt Current BMI = 32.60     HENT:      Head: Normocephalic and atraumatic.      Right Ear: External ear normal.      Left Ear: External ear normal.      Nose: Nose normal.   Eyes:      Extraocular Movements: Extraocular movements intact.      Conjunctiva/sclera: Conjunctivae normal.   Pulmonary:      Effort: Pulmonary effort is normal.   Musculoskeletal:         General: Normal range of motion.      Cervical back: Normal range of motion.   Skin:     General: Skin is warm and dry.   Neurological:      General: No focal deficit present.      Mental Status: He is alert and oriented to person, place, and time. Mental status is at baseline.   Psychiatric:         Mood and Affect: Mood normal.         Behavior: Behavior normal.         Thought Content: Thought content normal.         Judgment: Judgment normal.             Result Review :   The following data was reviewed by: CASSIDY Moreland on 12/15/2023:    Most Recent A1C          12/15/2023    16:54   HGBA1C " Most Recent   Hemoglobin A1C 7.50        A1C Last 3 Results          7/31/2023    10:48 10/20/2023    13:15 12/15/2023    16:54   HGBA1C Last 3 Results   Hemoglobin A1C 7.80  7.4  7.50      A1c collected on 10/20/2023  is 7.4%, indicating Uncontrolled Type II diabetes.  This result is down from the prior result of 7.8% collected on 7/31/2023        Creatinine   Date Value Ref Range Status   11/28/2023 1.83 (H) 0.76 - 1.27 mg/dL Final   10/09/2023 1.78 (H) 0.76 - 1.27 mg/dL Final   12/18/2019 1.8 (H) 0.6 - 1.3 mg/dL Final   03/09/2018 1.3 0.7 - 1.5 mg/dL Final     eGFR   Date Value Ref Range Status   11/28/2023 38.5 (L) >60.0 mL/min/1.73 Final   10/09/2023 39.8 (L) >60.0 mL/min/1.73 Final     Labs collected on 1/28/2023 show Stage IIIb moderate (GFR = 30-44 mL/min      Total Cholesterol   Date Value Ref Range Status   11/28/2023 153 0 - 200 mg/dL Final   07/19/2023 129 0 - 200 mg/dL Final     Triglycerides   Date Value Ref Range Status   11/28/2023 341 (H) 0 - 150 mg/dL Final   07/19/2023 335 (H) 0 - 150 mg/dL Final     HDL Cholesterol   Date Value Ref Range Status   11/28/2023 32 (L) 40 - 60 mg/dL Final   07/19/2023 28 (L) 40 - 60 mg/dL Final     LDL Cholesterol    Date Value Ref Range Status   11/28/2023 67 0 - 100 mg/dL Final   07/19/2023 50 0 - 100 mg/dL Final     Lipid panel collected on 11/28/2023 shows Hypertriglyceridemia and low HDL            Assessment: Patient is here today for 3-month follow-up on his diabetes.  Reports he just received his camille about 3 weeks ago and his fasting glucose is running around 100 mg/dL nonfasting around 250 mg/dL.  Unfortunately we were unable to download his camille in the office today however I was able to review CGM with patient in office today.The 14-day sensor report shows an average glucose of 150mg/dL, with 75% in target range ( mgdL), 24% in the high range (181-250 mg/dL), 0% in the very high range (>250 mg/dL), 1% in the low range (54-70 mg/dL) and 0% in the  very low range (<54 mg/dL).  His A1c on 10/20/2023 was 7.4% which is down from his previous A1c of 7.8% in July.      Diagnoses and all orders for this visit:    1. Uncontrolled type 2 diabetes mellitus with hyperglycemia (Primary)  -     Hemoglobin A1c; Future        Plan: No changes were made to his plan of care today since his glucose levels are trending down.  Scheduled a 3-month follow-up and we will recheck his A1c at that time.    The patient will monitor his blood glucose levels per CGM.  If he develops problematic hyperglycemia or hypoglycemia or adverse drug reactions, he will contact the office for further instructions.        Follow Up     No follow-ups on file.    Patient was given instructions and counseling regarding his condition or for health maintenance advice. Please see specific information pulled into the AVS if appropriate.     Deb Moody, APRN  12/15/2023      Dictated Utilizing Dragon Dictation.  Please note that portions of this note were completed with a voice recognition program.  Part of this note may be an electronic transcription/translation of spoken language to printed text using the Dragon Dictation System.

## 2023-12-22 RX ORDER — IMIPRAMINE HCL 50 MG
TABLET ORAL
Qty: 270 TABLET | Refills: 0 | Status: SHIPPED | OUTPATIENT
Start: 2023-12-22

## 2024-01-06 DIAGNOSIS — N40.0 BENIGN PROSTATIC HYPERPLASIA, UNSPECIFIED WHETHER LOWER URINARY TRACT SYMPTOMS PRESENT: ICD-10-CM

## 2024-01-08 DIAGNOSIS — F41.9 ANXIETY: ICD-10-CM

## 2024-01-08 RX ORDER — FINASTERIDE 5 MG/1
5 TABLET, FILM COATED ORAL DAILY
Qty: 90 TABLET | Refills: 0 | Status: SHIPPED | OUTPATIENT
Start: 2024-01-08

## 2024-01-09 RX ORDER — ALPRAZOLAM 0.5 MG/1
0.5 TABLET ORAL NIGHTLY PRN
Qty: 30 TABLET | Refills: 0 | Status: SHIPPED | OUTPATIENT
Start: 2024-01-09

## 2024-02-10 DIAGNOSIS — F41.9 ANXIETY: ICD-10-CM

## 2024-02-10 DIAGNOSIS — E78.00 HYPERCHOLESTEROLEMIA: ICD-10-CM

## 2024-02-12 ENCOUNTER — LAB (OUTPATIENT)
Dept: LAB | Facility: HOSPITAL | Age: 74
End: 2024-02-12
Payer: MEDICARE

## 2024-02-12 ENCOUNTER — TRANSCRIBE ORDERS (OUTPATIENT)
Dept: LAB | Facility: HOSPITAL | Age: 74
End: 2024-02-12
Payer: MEDICARE

## 2024-02-12 DIAGNOSIS — E55.9 VITAMIN D DEFICIENCY: ICD-10-CM

## 2024-02-12 DIAGNOSIS — N18.30 STAGE 3 CHRONIC KIDNEY DISEASE, UNSPECIFIED WHETHER STAGE 3A OR 3B CKD: ICD-10-CM

## 2024-02-12 DIAGNOSIS — I10 HYPERTENSION, ESSENTIAL: ICD-10-CM

## 2024-02-12 DIAGNOSIS — N18.30 STAGE 3 CHRONIC KIDNEY DISEASE, UNSPECIFIED WHETHER STAGE 3A OR 3B CKD: Primary | ICD-10-CM

## 2024-02-12 LAB
25(OH)D3 SERPL-MCNC: 27.3 NG/ML (ref 30–100)
ANION GAP SERPL CALCULATED.3IONS-SCNC: 10.6 MMOL/L (ref 5–15)
BACTERIA UR QL AUTO: NORMAL /HPF
BILIRUB UR QL STRIP: NEGATIVE
BUN SERPL-MCNC: 18 MG/DL (ref 8–23)
BUN/CREAT SERPL: 10.1 (ref 7–25)
CALCIUM SPEC-SCNC: 9.9 MG/DL (ref 8.6–10.5)
CHLORIDE SERPL-SCNC: 102 MMOL/L (ref 98–107)
CLARITY UR: CLEAR
CO2 SERPL-SCNC: 25.4 MMOL/L (ref 22–29)
COLOR UR: YELLOW
CREAT SERPL-MCNC: 1.79 MG/DL (ref 0.76–1.27)
CREAT UR-MCNC: 76.2 MG/DL
DEPRECATED RDW RBC AUTO: 54.8 FL (ref 37–54)
EGFRCR SERPLBLD CKD-EPI 2021: 39.5 ML/MIN/1.73
ERYTHROCYTE [DISTWIDTH] IN BLOOD BY AUTOMATED COUNT: 16.7 % (ref 12.3–15.4)
GLUCOSE SERPL-MCNC: 157 MG/DL (ref 65–99)
GLUCOSE UR STRIP-MCNC: ABNORMAL MG/DL
HCT VFR BLD AUTO: 47.5 % (ref 37.5–51)
HGB BLD-MCNC: 15.3 G/DL (ref 13–17.7)
HGB UR QL STRIP.AUTO: NEGATIVE
KETONES UR QL STRIP: NEGATIVE
LEUKOCYTE ESTERASE UR QL STRIP.AUTO: NEGATIVE
MCH RBC QN AUTO: 28.9 PG (ref 26.6–33)
MCHC RBC AUTO-ENTMCNC: 32.2 G/DL (ref 31.5–35.7)
MCV RBC AUTO: 89.6 FL (ref 79–97)
NITRITE UR QL STRIP: NEGATIVE
PH UR STRIP.AUTO: 5.5 [PH] (ref 5–8)
PLATELET # BLD AUTO: 245 10*3/MM3 (ref 140–450)
PMV BLD AUTO: 8.6 FL (ref 6–12)
POTASSIUM SERPL-SCNC: 4.8 MMOL/L (ref 3.5–5.2)
PROT ?TM UR-MCNC: 39.9 MG/DL
PROT UR QL STRIP: ABNORMAL
PROT/CREAT UR: 0.52 MG/G{CREAT}
RBC # BLD AUTO: 5.3 10*6/MM3 (ref 4.14–5.8)
RBC # UR STRIP: NORMAL /HPF
REF LAB TEST METHOD: NORMAL
SODIUM SERPL-SCNC: 138 MMOL/L (ref 136–145)
SP GR UR STRIP: 1.01 (ref 1–1.03)
SQUAMOUS #/AREA URNS HPF: NORMAL /HPF
UROBILINOGEN UR QL STRIP: ABNORMAL
WBC # UR STRIP: NORMAL /HPF
WBC NRBC COR # BLD AUTO: 6.89 10*3/MM3 (ref 3.4–10.8)

## 2024-02-12 PROCEDURE — 85027 COMPLETE CBC AUTOMATED: CPT

## 2024-02-12 PROCEDURE — 80048 BASIC METABOLIC PNL TOTAL CA: CPT

## 2024-02-12 PROCEDURE — 36415 COLL VENOUS BLD VENIPUNCTURE: CPT

## 2024-02-12 PROCEDURE — 81001 URINALYSIS AUTO W/SCOPE: CPT

## 2024-02-12 PROCEDURE — 82306 VITAMIN D 25 HYDROXY: CPT

## 2024-02-12 PROCEDURE — 84156 ASSAY OF PROTEIN URINE: CPT

## 2024-02-12 PROCEDURE — 82570 ASSAY OF URINE CREATININE: CPT

## 2024-02-12 RX ORDER — ALPRAZOLAM 0.5 MG/1
0.5 TABLET ORAL NIGHTLY PRN
Qty: 30 TABLET | Refills: 0 | Status: SHIPPED | OUTPATIENT
Start: 2024-02-12

## 2024-02-12 RX ORDER — ATORVASTATIN CALCIUM 40 MG/1
40 TABLET, FILM COATED ORAL DAILY
Qty: 90 TABLET | Refills: 0 | Status: SHIPPED | OUTPATIENT
Start: 2024-02-12

## 2024-02-13 ENCOUNTER — TELEPHONE (OUTPATIENT)
Dept: FAMILY MEDICINE CLINIC | Age: 74
End: 2024-02-13
Payer: MEDICARE

## 2024-02-17 DIAGNOSIS — K21.9 GASTROESOPHAGEAL REFLUX DISEASE, UNSPECIFIED WHETHER ESOPHAGITIS PRESENT: ICD-10-CM

## 2024-02-19 RX ORDER — PANTOPRAZOLE SODIUM 40 MG/1
40 TABLET, DELAYED RELEASE ORAL DAILY
Qty: 90 TABLET | Refills: 0 | Status: SHIPPED | OUTPATIENT
Start: 2024-02-19

## 2024-02-20 ENCOUNTER — OFFICE VISIT (OUTPATIENT)
Dept: FAMILY MEDICINE CLINIC | Age: 74
End: 2024-02-20
Payer: MEDICARE

## 2024-02-20 VITALS
WEIGHT: 250 LBS | BODY MASS INDEX: 33.13 KG/M2 | HEIGHT: 73 IN | DIASTOLIC BLOOD PRESSURE: 80 MMHG | OXYGEN SATURATION: 97 % | TEMPERATURE: 97.4 F | SYSTOLIC BLOOD PRESSURE: 167 MMHG | HEART RATE: 64 BPM

## 2024-02-20 DIAGNOSIS — R10.11 RUQ PAIN: ICD-10-CM

## 2024-02-20 DIAGNOSIS — I10 PRIMARY HYPERTENSION: ICD-10-CM

## 2024-02-20 DIAGNOSIS — Z79.899 LONG-TERM USE OF HIGH-RISK MEDICATION: Primary | ICD-10-CM

## 2024-02-20 DIAGNOSIS — E03.9 HYPOTHYROIDISM, UNSPECIFIED TYPE: ICD-10-CM

## 2024-02-20 DIAGNOSIS — F41.9 ANXIETY: ICD-10-CM

## 2024-02-20 DIAGNOSIS — I65.23 BILATERAL CAROTID ARTERY STENOSIS: ICD-10-CM

## 2024-02-20 LAB
AMPHET+METHAMPHET UR QL: NEGATIVE
AMPHETAMINES UR QL: NEGATIVE
BARBITURATES UR QL SCN: NEGATIVE
BENZODIAZ UR QL SCN: POSITIVE
BUPRENORPHINE SERPL-MCNC: NEGATIVE NG/ML
CANNABINOIDS SERPL QL: NEGATIVE
COCAINE UR QL: NEGATIVE
EXPIRATION DATE: ABNORMAL
Lab: ABNORMAL
MDMA UR QL SCN: NEGATIVE
METHADONE UR QL SCN: NEGATIVE
MORPHINE/OPIATES SCREEN, URINE: NEGATIVE
OXYCODONE UR QL SCN: NEGATIVE
PCP UR QL SCN: NEGATIVE

## 2024-02-20 RX ORDER — PREDNISOLONE ACETATE 10 MG/ML
SUSPENSION/ DROPS OPHTHALMIC
COMMUNITY
Start: 2024-01-03 | End: 2024-02-20

## 2024-02-20 RX ORDER — MOXIFLOXACIN 5 MG/ML
SOLUTION/ DROPS OPHTHALMIC
COMMUNITY
Start: 2024-01-03 | End: 2024-02-20

## 2024-02-20 NOTE — PROGRESS NOTES
"Chief Complaint  Hypertension and Diabetes (4 month follow up)    Subjective          Kyler Staley presents to [facility]  Has had yaneth eye surgery since last visit. Is seeing CASSIDY Schneider tomorrow for cardiology. His pacemaker will also be checked. His BP is elevated today. Has been to nephrology this past week. Is seeing CASSIDY Tim for DM. Pt has hx of yaneth carotid artery stenosis and it has been a few years since last scanned. Pt is also still c/o sharp, burning pain to RUQ. Has since a nerve stimulator was placed and removed.     Objective   Vital Signs:   Vitals:    02/20/24 1519   BP: 167/80   BP Location: Left arm   Patient Position: Sitting   Pulse: 64   Temp: 97.4 °F (36.3 °C)   TempSrc: Oral   SpO2: 97%   Weight: 113 kg (250 lb)   Height: 185.4 cm (72.99\")       Physical Exam  Vitals reviewed.   Constitutional:       General: He is not in acute distress.     Appearance: Normal appearance. He is well-developed.   HENT:      Head: Normocephalic and atraumatic.   Eyes:      Conjunctiva/sclera: Conjunctivae normal.      Pupils: Pupils are equal, round, and reactive to light.   Neck:      Vascular: No carotid bruit.   Cardiovascular:      Rate and Rhythm: Normal rate and regular rhythm.      Heart sounds: Normal heart sounds. No murmur heard.  Pulmonary:      Effort: Pulmonary effort is normal. No respiratory distress.      Breath sounds: Normal breath sounds.   Skin:     General: Skin is warm and dry.   Neurological:      Mental Status: He is alert and oriented to person, place, and time.   Psychiatric:         Mood and Affect: Mood and affect normal.         Behavior: Behavior normal.         Thought Content: Thought content normal.         Judgment: Judgment normal.          Result Review :              Assessment and Plan    Diagnoses and all orders for this visit:    1. Long-term use of high-risk medication (Primary)  -     POC Medline 12 Panel Urine " Drug Screen    2. Hypothyroidism, unspecified type  Assessment & Plan:  Will notify patient of results and treat accordingly. Continue levothyroxine 150mcg daily.       Orders:  -     TSH Rfx On Abnormal To Free T4; Future    3. Bilateral carotid artery stenosis  Assessment & Plan:  Will notify patient of results and treat accordingly.         Orders:  -     US Carotid Bilateral; Future    4. Primary hypertension  Assessment & Plan:  f/u with cardiology as scheduled.       5. Anxiety  Assessment & Plan:  Urine drug screen reviewed today and is appropriate. Oliver appropriate. Patient only taking once every night as needed. Will adjust prescription accordingly. Patient tolerates medication well. Potential side effects medication discussed. Low risk of abuse.       6. RUQ pain  -     CT Abdomen Pelvis Without Contrast; Future    Patient to notify office with any acute concerns or issues.  Patient verbalizes understanding, agrees with plan of care and has no further questions upon discharge.    Please note that portions of this note were completed with a voice recognition program.      Follow Up    Return in about 4 months (around 6/20/2024) for Recheck.  Patient was given instructions and counseling regarding his condition or for health maintenance advice. Please see specific information pulled into the AVS if appropriate.

## 2024-02-23 ENCOUNTER — TELEPHONE (OUTPATIENT)
Dept: FAMILY MEDICINE CLINIC | Age: 74
End: 2024-02-23
Payer: MEDICARE

## 2024-02-23 NOTE — TELEPHONE ENCOUNTER
Pt calling wondering when his CT and U/S was going to be scheduled.  I see no orders.  Please advise.

## 2024-02-23 NOTE — ASSESSMENT & PLAN NOTE
Urine drug screen reviewed today and is appropriate. Oliver appropriate. Patient only taking once every night as needed. Will adjust prescription accordingly. Patient tolerates medication well. Potential side effects medication discussed. Low risk of abuse.

## 2024-02-24 DIAGNOSIS — M1A.9XX0 CHRONIC GOUT WITHOUT TOPHUS, UNSPECIFIED CAUSE, UNSPECIFIED SITE: ICD-10-CM

## 2024-02-27 DIAGNOSIS — I65.23 BILATERAL CAROTID ARTERY STENOSIS: Primary | ICD-10-CM

## 2024-02-27 RX ORDER — ALLOPURINOL 300 MG/1
300 TABLET ORAL DAILY
Qty: 90 TABLET | Refills: 0 | Status: SHIPPED | OUTPATIENT
Start: 2024-02-27

## 2024-03-05 ENCOUNTER — HOSPITAL ENCOUNTER (OUTPATIENT)
Dept: CT IMAGING | Facility: HOSPITAL | Age: 74
Discharge: HOME | End: 2024-03-05
Admitting: NURSE PRACTITIONER
Payer: MEDICARE

## 2024-03-05 DIAGNOSIS — R10.11 RUQ PAIN: ICD-10-CM

## 2024-03-05 PROCEDURE — 74176 CT ABD & PELVIS W/O CONTRAST: CPT

## 2024-03-06 ENCOUNTER — PATIENT MESSAGE (OUTPATIENT)
Dept: FAMILY MEDICINE CLINIC | Age: 74
End: 2024-03-06
Payer: MEDICARE

## 2024-03-06 DIAGNOSIS — I48.0 PAROXYSMAL ATRIAL FIBRILLATION: ICD-10-CM

## 2024-03-07 RX ORDER — ISOSORBIDE MONONITRATE 60 MG/1
60 TABLET, EXTENDED RELEASE ORAL DAILY
Qty: 90 TABLET | Refills: 1 | Status: SHIPPED | OUTPATIENT
Start: 2024-03-07

## 2024-03-08 NOTE — TELEPHONE ENCOUNTER
From: Kyler Staley  To: Marisol Sellers  Sent: 3/6/2024 12:28 PM EST  Subject: Prescription     I need Isosorb mono er 60mg tab refilled. Kyler Staley 1950. Thanks.

## 2024-03-09 DIAGNOSIS — F41.9 ANXIETY: ICD-10-CM

## 2024-03-11 ENCOUNTER — HOSPITAL ENCOUNTER (OUTPATIENT)
Dept: CARDIOLOGY | Facility: HOSPITAL | Age: 74
Discharge: HOME | End: 2024-03-11
Admitting: NURSE PRACTITIONER
Payer: MEDICARE

## 2024-03-11 DIAGNOSIS — I65.23 BILATERAL CAROTID ARTERY STENOSIS: ICD-10-CM

## 2024-03-11 LAB
BH CV XLRA MEAS LEFT CAROTID BULB EDV: 15 CM/SEC
BH CV XLRA MEAS LEFT CAROTID BULB PSV: 41 CM/SEC
BH CV XLRA MEAS LEFT DIST CCA EDV: 13 CM/SEC
BH CV XLRA MEAS LEFT DIST CCA PSV: 74 CM/SEC
BH CV XLRA MEAS LEFT DIST ICA EDV: 15 CM/SEC
BH CV XLRA MEAS LEFT DIST ICA PSV: 43 CM/SEC
BH CV XLRA MEAS LEFT ICA/CCA RATIO: 0.61
BH CV XLRA MEAS LEFT MID ICA EDV: 14 CM/SEC
BH CV XLRA MEAS LEFT MID ICA PSV: 40 CM/SEC
BH CV XLRA MEAS LEFT PROX CCA EDV: 14 CM/SEC
BH CV XLRA MEAS LEFT PROX CCA PSV: 97 CM/SEC
BH CV XLRA MEAS LEFT PROX ECA EDV: 9 CM/SEC
BH CV XLRA MEAS LEFT PROX ECA PSV: 66 CM/SEC
BH CV XLRA MEAS LEFT PROX ICA EDV: 13 CM/SEC
BH CV XLRA MEAS LEFT PROX ICA PSV: 45 CM/SEC
BH CV XLRA MEAS LEFT VERTEBRAL A EDV: 0 CM/SEC
BH CV XLRA MEAS LEFT VERTEBRAL A PSV: 62 CM/SEC
BH CV XLRA MEAS RIGHT CAROTID BULB EDV: 8 CM/SEC
BH CV XLRA MEAS RIGHT CAROTID BULB PSV: 30 CM/SEC
BH CV XLRA MEAS RIGHT DIST CCA EDV: 15 CM/SEC
BH CV XLRA MEAS RIGHT DIST CCA PSV: 72 CM/SEC
BH CV XLRA MEAS RIGHT DIST ICA EDV: 27 CM/SEC
BH CV XLRA MEAS RIGHT DIST ICA PSV: 68 CM/SEC
BH CV XLRA MEAS RIGHT ICA/CCA RATIO: 0.7
BH CV XLRA MEAS RIGHT MID ICA EDV: 23 CM/SEC
BH CV XLRA MEAS RIGHT MID ICA PSV: 61 CM/SEC
BH CV XLRA MEAS RIGHT PROX CCA EDV: 14 CM/SEC
BH CV XLRA MEAS RIGHT PROX CCA PSV: 81 CM/SEC
BH CV XLRA MEAS RIGHT PROX ECA EDV: 7 CM/SEC
BH CV XLRA MEAS RIGHT PROX ECA PSV: 54 CM/SEC
BH CV XLRA MEAS RIGHT PROX ICA EDV: 17 CM/SEC
BH CV XLRA MEAS RIGHT PROX ICA PSV: 50 CM/SEC
BH CV XLRA MEAS RIGHT VERTEBRAL A EDV: 15 CM/SEC
BH CV XLRA MEAS RIGHT VERTEBRAL A PSV: 37 CM/SEC
LEFT ARM BP: NORMAL MMHG
RIGHT ARM BP: NORMAL MMHG

## 2024-03-11 PROCEDURE — 93880 EXTRACRANIAL BILAT STUDY: CPT

## 2024-03-11 PROCEDURE — 93880 EXTRACRANIAL BILAT STUDY: CPT | Performed by: SURGERY

## 2024-03-12 RX ORDER — ALPRAZOLAM 0.5 MG/1
0.5 TABLET ORAL NIGHTLY PRN
Qty: 30 TABLET | Refills: 0 | Status: SHIPPED | OUTPATIENT
Start: 2024-03-12

## 2024-03-14 ENCOUNTER — TELEPHONE (OUTPATIENT)
Dept: GASTROENTEROLOGY | Facility: CLINIC | Age: 74
End: 2024-03-14
Payer: MEDICARE

## 2024-03-14 NOTE — TELEPHONE ENCOUNTER
Attempted to contact patient in reference to an urgent  referral we received from Marisol Sellers for RUQ pain/Mobile cecum. Left a voice message for patient to return call to schedule an appointment.
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
## 2024-03-14 NOTE — TELEPHONE ENCOUNTER
Patient returned voicemail from [facility], scheduled in [place] on 04/22/2024 at 11:30 am

## 2024-03-15 ENCOUNTER — OFFICE VISIT (OUTPATIENT)
Dept: DIABETES SERVICES | Facility: CLINIC | Age: 74
End: 2024-03-15
Payer: MEDICARE

## 2024-03-15 VITALS
DIASTOLIC BLOOD PRESSURE: 69 MMHG | WEIGHT: 249.8 LBS | SYSTOLIC BLOOD PRESSURE: 144 MMHG | HEART RATE: 65 BPM | HEIGHT: 73 IN | OXYGEN SATURATION: 98 % | BODY MASS INDEX: 33.11 KG/M2

## 2024-03-15 DIAGNOSIS — E11.65 UNCONTROLLED TYPE 2 DIABETES MELLITUS WITH HYPERGLYCEMIA: Primary | ICD-10-CM

## 2024-03-15 LAB
EXPIRATION DATE: ABNORMAL
HBA1C MFR BLD: 8.5 % (ref 4.5–5.7)
Lab: ABNORMAL

## 2024-03-15 PROCEDURE — 1159F MED LIST DOCD IN RCRD: CPT | Performed by: NURSE PRACTITIONER

## 2024-03-15 PROCEDURE — 3077F SYST BP >= 140 MM HG: CPT | Performed by: NURSE PRACTITIONER

## 2024-03-15 PROCEDURE — 1160F RVW MEDS BY RX/DR IN RCRD: CPT | Performed by: NURSE PRACTITIONER

## 2024-03-15 PROCEDURE — 3078F DIAST BP <80 MM HG: CPT | Performed by: NURSE PRACTITIONER

## 2024-03-15 PROCEDURE — 99214 OFFICE O/P EST MOD 30 MIN: CPT | Performed by: NURSE PRACTITIONER

## 2024-03-15 PROCEDURE — 3052F HG A1C>EQUAL 8.0%<EQUAL 9.0%: CPT | Performed by: NURSE PRACTITIONER

## 2024-03-15 RX ORDER — IMIPRAMINE HCL 50 MG
TABLET ORAL
Qty: 270 TABLET | Refills: 1 | Status: SHIPPED | OUTPATIENT
Start: 2024-03-15

## 2024-03-15 RX ORDER — EPLERENONE 25 MG/1
25 TABLET, FILM COATED ORAL DAILY
COMMUNITY

## 2024-03-15 NOTE — PATIENT INSTRUCTIONS
Increase your Jardiance from 10 mg once daily to 25 mg once daily.  You can finish your current 10 mg dose by taking 2 tabs to equal 20 mg until you get your new prescription for the 25 mg dose.      Increase your insulin to 29 units once daily.

## 2024-03-19 DIAGNOSIS — E11.65 UNCONTROLLED TYPE 2 DIABETES MELLITUS WITH HYPERGLYCEMIA: ICD-10-CM

## 2024-03-19 RX ORDER — INSULIN GLARGINE-YFGN 100 [IU]/ML
29 INJECTION, SOLUTION SUBCUTANEOUS DAILY
Qty: 27 ML | Refills: 1 | Status: SHIPPED | OUTPATIENT
Start: 2024-03-19 | End: 2024-06-17

## 2024-03-19 RX ORDER — INSULIN GLARGINE-YFGN 100 [IU]/ML
29 INJECTION, SOLUTION SUBCUTANEOUS DAILY
Qty: 27 ML | Refills: 1 | Status: SHIPPED | OUTPATIENT
Start: 2024-03-19 | End: 2024-03-19 | Stop reason: SDUPTHER

## 2024-03-23 DIAGNOSIS — E03.8 OTHER SPECIFIED HYPOTHYROIDISM: ICD-10-CM

## 2024-03-25 RX ORDER — LEVOTHYROXINE SODIUM 0.15 MG/1
150 TABLET ORAL EVERY MORNING
Qty: 30 TABLET | Refills: 0 | Status: SHIPPED | OUTPATIENT
Start: 2024-03-25

## 2024-03-28 ENCOUNTER — LAB (OUTPATIENT)
Dept: LAB | Facility: HOSPITAL | Age: 74
End: 2024-03-28
Payer: MEDICARE

## 2024-03-28 DIAGNOSIS — E03.9 HYPOTHYROIDISM, UNSPECIFIED TYPE: ICD-10-CM

## 2024-03-28 DIAGNOSIS — Z12.5 PROSTATE CANCER SCREENING: ICD-10-CM

## 2024-03-28 LAB
PSA SERPL-MCNC: 0.82 NG/ML (ref 0–4)
T4 FREE SERPL-MCNC: 1.55 NG/DL (ref 0.93–1.7)
TSH SERPL DL<=0.05 MIU/L-ACNC: 0.22 UIU/ML (ref 0.27–4.2)

## 2024-03-28 PROCEDURE — 36415 COLL VENOUS BLD VENIPUNCTURE: CPT

## 2024-03-28 PROCEDURE — 84439 ASSAY OF FREE THYROXINE: CPT

## 2024-03-28 PROCEDURE — G0103 PSA SCREENING: HCPCS

## 2024-03-28 PROCEDURE — 84443 ASSAY THYROID STIM HORMONE: CPT

## 2024-03-29 DIAGNOSIS — E03.9 HYPOTHYROIDISM, UNSPECIFIED TYPE: Primary | ICD-10-CM

## 2024-04-02 DIAGNOSIS — N40.0 BENIGN PROSTATIC HYPERPLASIA, UNSPECIFIED WHETHER LOWER URINARY TRACT SYMPTOMS PRESENT: ICD-10-CM

## 2024-04-08 DIAGNOSIS — F41.9 ANXIETY: ICD-10-CM

## 2024-04-09 RX ORDER — ALPRAZOLAM 0.5 MG/1
0.5 TABLET ORAL NIGHTLY PRN
Qty: 30 TABLET | Refills: 0 | Status: SHIPPED | OUTPATIENT
Start: 2024-04-09

## 2024-04-09 RX ORDER — FINASTERIDE 5 MG/1
5 TABLET, FILM COATED ORAL DAILY
Qty: 90 TABLET | Refills: 0 | Status: SHIPPED | OUTPATIENT
Start: 2024-04-09

## 2024-04-09 NOTE — TELEPHONE ENCOUNTER
Caller: DELBERT BUCK    Relationship: SELF    Best call back number:[phone] (home)     What is the best time to reach you: ANY    Who are you requesting to speak with (clinical staff, provider,  specific staff member): MILENA OR STAFF    Do you know the name of the person who called: PT CALLED OFFICE    What was the call regarding: PT CONTACTED OFFICE ON 4/2/24 REGARDING THIS MED AND GETTING IT REFILLED. PLEASE SEND IN SCRIPT AND CALL PT WHEN DONE SO HE CAN PICK IT UP. THANK YOU.    SEND TO:    [facility] [address] [place] [address], [place] [address] [facility] [phone] [facility] [phone] FX

## 2024-04-15 ENCOUNTER — LAB (OUTPATIENT)
Dept: LAB | Facility: HOSPITAL | Age: 74
End: 2024-04-15
Payer: MEDICARE

## 2024-04-15 DIAGNOSIS — E03.9 HYPOTHYROIDISM, UNSPECIFIED TYPE: ICD-10-CM

## 2024-04-15 LAB
T4 FREE SERPL-MCNC: 1.47 NG/DL (ref 0.92–1.68)
TSH SERPL DL<=0.05 MIU/L-ACNC: 0.15 UIU/ML (ref 0.27–4.2)

## 2024-04-15 PROCEDURE — 84443 ASSAY THYROID STIM HORMONE: CPT

## 2024-04-15 PROCEDURE — 36415 COLL VENOUS BLD VENIPUNCTURE: CPT

## 2024-04-15 PROCEDURE — 84439 ASSAY OF FREE THYROXINE: CPT

## 2024-04-22 ENCOUNTER — OFFICE VISIT (OUTPATIENT)
Dept: GASTROENTEROLOGY | Facility: CLINIC | Age: 74
End: 2024-04-22
Payer: MEDICARE

## 2024-04-22 VITALS
WEIGHT: 251.8 LBS | HEART RATE: 67 BPM | BODY MASS INDEX: 33.37 KG/M2 | DIASTOLIC BLOOD PRESSURE: 64 MMHG | SYSTOLIC BLOOD PRESSURE: 136 MMHG | HEIGHT: 73 IN

## 2024-04-22 DIAGNOSIS — R10.11 RIGHT UPPER QUADRANT ABDOMINAL PAIN: Primary | ICD-10-CM

## 2024-04-22 DIAGNOSIS — Z86.010 HISTORY OF COLON POLYPS: ICD-10-CM

## 2024-04-22 DIAGNOSIS — K59.04 CHRONIC IDIOPATHIC CONSTIPATION: ICD-10-CM

## 2024-04-22 DIAGNOSIS — R93.89 ABNORMAL FINDING ON IMAGING: ICD-10-CM

## 2024-04-22 DIAGNOSIS — Z80.0 FH: THROAT CANCER: ICD-10-CM

## 2024-04-22 DIAGNOSIS — R14.0 ABDOMINAL BLOATING: ICD-10-CM

## 2024-04-22 PROCEDURE — 1160F RVW MEDS BY RX/DR IN RCRD: CPT | Performed by: NURSE PRACTITIONER

## 2024-04-22 PROCEDURE — 3078F DIAST BP <80 MM HG: CPT | Performed by: NURSE PRACTITIONER

## 2024-04-22 PROCEDURE — 3075F SYST BP GE 130 - 139MM HG: CPT | Performed by: NURSE PRACTITIONER

## 2024-04-22 PROCEDURE — 99204 OFFICE O/P NEW MOD 45 MIN: CPT | Performed by: NURSE PRACTITIONER

## 2024-04-22 PROCEDURE — 1159F MED LIST DOCD IN RCRD: CPT | Performed by: NURSE PRACTITIONER

## 2024-04-22 NOTE — Clinical Note
Please call the patient and let him know I did talk to Dr. Goodman.  She recommended referral to general surgery.  If the patient is okay with that I will am happy to place the referral to Dr. Mckenzie or Dr. Hansen.  Unless they have a preference of someone else.

## 2024-04-22 NOTE — PROGRESS NOTES
Chief Complaint        Abdominal Pain (RUQ)    History of Present Illness      Kyler Staley is a 73 y.o. male who presents today accompanied by his wife to [facility] as a new patient For right upper quadrant pain.      He had a CT scan of the abdomen and pelvis done on 3/5/2024 for right upper quadrant pain, swelling and bloating.  CT scan showed no acute CT findings.  Multiple loops of proximal jejunum as well as the cecum located in the right hemiabdomen and upper quadrant respectively which is new from 2021.  No associated edema or bowel obstruction.  If there has been prior surgery this could represent a type of internal hernia or possible congenital variant.  Gynecomastia.  Benign subcutaneous sebaceous cyst left posterior lateral chest wall noted.  Small fat-containing left inguinal hernia.    He admits he has been having RUQ abd pain for the past 2 years. He admits he has constipation is getting worse. He has a BM every 3-4 days. He is on OZEMPIC> GES showed hypermotility. He has hx anemia and is on iron. Denies any rectal bleeding or melena. He does have hx dysphagia from having trach placed in the past after getting pneumonia after open heart surgery. He admits he does well with protonix 40 mg daily. He is a reformed smoker.     Last EGD/colonoscopy done by Dr. Plata at [facility] in 2021. History benign colon polyps.  Recall colonoscopy in 10 years.    GI family history-----Father with esophageal cancer (smoker).     Results       Result Review :   The following data was reviewed by: CASSIDY Gil on 04/22/2024     CMP          10/9/2023    12:05 11/28/2023    12:10 2/12/2024    10:27   CMP   Glucose 154  205  157    BUN 21  20  18    Creatinine 1.78  1.83  1.79    EGFR 39.8  38.5  39.5    Sodium 137  134  138    Potassium 4.7  5.0  4.8    Chloride 103  99  102    Calcium 9.2  9.5  9.9    Total Protein  6.4     Albumin  4.2     Globulin  2.2     Total  "Bilirubin  0.3     Alkaline Phosphatase  116     AST (SGOT)  18     ALT (SGPT)  32     Albumin/Globulin Ratio  1.9     BUN/Creatinine Ratio 11.8  10.9  10.1    Anion Gap 8.8  11.0  10.6      CBC          7/31/2023    10:48 10/9/2023    12:05 2/12/2024    10:27   CBC   WBC 7.13  7.11  6.89    RBC 5.03  5.14  5.30    Hemoglobin 14.0  14.3  15.3    Hematocrit 45.1  46.1  47.5    MCV 89.7  89.7  89.6    MCH 27.8  27.8  28.9    MCHC 31.0  31.0  32.2    RDW 18.3  16.8  16.7    Platelets 218  283  245      Lipid Panel          7/19/2023    10:30 11/28/2023    12:10   Lipid Panel   Total Cholesterol 129  153    Triglycerides 335  341    HDL Cholesterol 28  32    VLDL Cholesterol 51  54    LDL Cholesterol  50  67    LDL/HDL Ratio 1.21  1.65      TSH          7/19/2023    10:30 3/28/2024    13:26 4/15/2024    12:59   TSH   TSH 1.290  0.216  0.153        Lipase No results found for: \"LIPASE\"  Amylase No results found for: \"AMYLASE\"  Iron Profile No results found for: \"IRON\", \"TIBC\", \"LABIRON\", \"TRANSFERRIN\"  Ferritin   Ferritin   Date Value Ref Range Status   08/03/2021 25.70 (L) 30.00 - 400.00 ng/mL Final            Past Medical History       Past Medical History:   Diagnosis Date    Acne     Alcoholism     Allergic     Anxiety     Arthritis     Arthritis of back     Arthritis of neck     Atrial fibrillation     Back pain     Benign prostatic hyperplasia     Cardiac disease     Cataracts, bilateral     Cervical disc disorder     Cholelithiasis     Chronic kidney disease, stage 3     moderate    CKD (chronic kidney disease)     Clotting disorder     Condition not found     hernia    Coronary artery disease     Depression     Diabetes     Enlarged prostate     Erectile dysfunction     Fatigue     GERD (gastroesophageal reflux disease)     Gout     Hearing loss     Hematospermia 12/09/2015    High blood pressure     High cholesterol     Hip arthrosis     History of cold sores     History of gastritis     [place] (hard of hearing)  "    Hyperlipidemia     Hypertension     Hypothyroidism     Irregular heart rhythm     Joint pain     Kidney disease     Knee swelling     Low back pain     Lumbosacral disc disease     Narrowing of airway 2012    PER ENT    Neuropathy     Obesity     Osteopenia     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     Pneumonia     PONV (postoperative nausea and vomiting)     Psychiatric diagnosis     Renal insufficiency     Rotator cuff syndrome     REPAIRED BILATERAL    Shortness of breath     Sinus trouble     Sleep apnea     BIPAP    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)     Swallowing difficulty     Thin skin     Thoracic disc disorder     Thyroid condition     Visual impairment     Vocal cord dysfunction      HISTORY OF DISORDER ON ONE SIDE AFTER TRACHEA       Past Surgical History:   Procedure Laterality Date    BACK SURGERY  2018    X2    BACK SURGERY  2011    CARDIAC CATHETERIZATION      CARDIAC SURGERY      CHOLECYSTECTOMY      COLONOSCOPY      CORONARY ARTERY BYPASS GRAFT  2012    CORONARY STENT PLACEMENT      EYE SURGERY      HERNIA REPAIR      INGUINAL HERNIA REPAIR      JOINT REPLACEMENT      KNEE SURGERY      right and left    LUMBAR DISCECTOMY FUSION INSTRUMENTATION N/A 02/11/2021    Procedure: EXPLORATION OF FUSION T-12 S-1 REMOVAL OF HARDWARE REVISION OF FUSION AND INSTRUMENTATION T12-S1 WITH APPLICATION OF BONE MORPHOGENIC PROTEIN;  Surgeon: Baltazar Blankenship MD;  Location: [facility];  Service: Orthopedic Spine;  Laterality: N/A;    NOSE SURGERY      PACEMAKER IMPLANTATION      REPLACEMENT TOTAL KNEE Bilateral     SHOULDER SURGERY Bilateral     rotator cuff repair    SPINE SURGERY           Current Outpatient Medications:     allopurinol (ZYLOPRIM) 300 MG tablet, Take 1 tablet by mouth once daily, Disp: 90 tablet, Rfl: 0    ALPRAZolam (XANAX) 0.5 MG tablet, TAKE 1 TABLET BY MOUTH AT NIGHT AS NEEDED FOR ANXIETY, Disp: 30 tablet, Rfl: 0    apixaban (ELIQUIS)  5 MG tablet tablet, Take 1 tablet by mouth 2 (Two) Times a Day., Disp: , Rfl:     aspirin 325 MG tablet, Take 1 tablet by mouth Daily. TO FOLLOW MD ORDERS WHEN TO STOP, Disp: , Rfl:     atorvastatin (LIPITOR) 40 MG tablet, Take 1 tablet by mouth Daily., Disp: 90 tablet, Rfl: 0    carvedilol (COREG) 12.5 MG tablet, Take 1 tablet by mouth 2 (Two) Times a Day With Meals., Disp: , Rfl:     clindamycin (CLEOCIN T) 1 % external solution, APPLY SOLUTION TOPICALLY TO AFFECTED AREA TWICE DAILY AS NEEDED, Disp: , Rfl:     empagliflozin (Jardiance) 25 MG tablet tablet, Take 1 tablet by mouth Daily for 180 days., Disp: 90 tablet, Rfl: 1    eplerenone (INSPRA) 25 MG tablet, Take 1 tablet by mouth Daily., Disp: , Rfl:     fexofenadine (ALLEGRA) 60 MG tablet, Take 3 tablets by mouth Daily., Disp: , Rfl:     finasteride (PROSCAR) 5 MG tablet, Take 1 tablet by mouth once daily, Disp: 90 tablet, Rfl: 0    Flaxseed, Linseed, 1000 MG capsule, Take 1,200 mg by mouth 2 (Two) Times a Day., Disp: , Rfl:     glucose blood test strip, , Disp: , Rfl:     imipramine (TOFRANIL) 50 MG tablet, TAKE 1 TABLET BY MOUTH IN THE MORNING AND 2 IN THE EVENING, Disp: 270 tablet, Rfl: 1    Insulin Glargine-yfgn 100 UNIT/ML solution pen-injector, Inject 29 Units under the skin into the appropriate area as directed Daily for 90 days., Disp: 27 mL, Rfl: 1    isosorbide mononitrate (IMDUR) 60 MG 24 hr tablet, Take 1 tablet by mouth Daily., Disp: 90 tablet, Rfl: 1    levothyroxine (SYNTHROID, LEVOTHROID) 150 MCG tablet, TAKE 1 TABLET BY MOUTH ONCE DAILY IN THE MORNING, Disp: 30 tablet, Rfl: 0    losartan (COZAAR) 100 MG tablet, Take 1 tablet by mouth Every Night. Takes based on b/p readings at night, Disp: , Rfl:     Melatonin 5 MG chewable tablet, Chew 1 tablet., Disp: , Rfl:     nitroglycerin (NITROSTAT) 0.4 MG SL tablet, Place 1 tablet under the tongue Every 5 (Five) Minutes As Needed for Chest Pain. do not exceed a total of 3 doses in 15 minutes, Disp: ,  "Rfl:     pantoprazole (PROTONIX) 40 MG EC tablet, Take 1 tablet by mouth once daily, Disp: 90 tablet, Rfl: 0    Semaglutide,0.25 or 0.5MG/DOS, (Ozempic, 0.25 or 0.5 MG/DOSE,) 2 MG/1.5ML solution pen-injector, Inject 1 mg under the skin into the appropriate area as directed 1 (One) Time Per Week., Disp: , Rfl:     tamsulosin (FLOMAX) 0.4 MG capsule 24 hr capsule, Take 2 capsules by mouth Daily., Disp: 180 capsule, Rfl: 3    levothyroxine (SYNTHROID, LEVOTHROID) 137 MCG tablet, Take 1 tablet by mouth Every Morning., Disp: 30 tablet, Rfl: 1     Allergies   Allergen Reactions    Bupropion Other (See Comments) and Unknown - Low Severity     MAKES PATIENT VERY AGGRESSIVE      Iodinated Contrast Media Other (See Comments)     Other reaction(s): Other (See Comments)  Chronic kidney disease - stage 3  Chronic kidney disease - stage 3    Lorazepam Other (See Comments) and Mental Status Change     MADE PATIENT VERY AGGRESSIVE      Propoxyphene Nausea And Vomiting and Unknown - Low Severity    Adhesive Tape Rash       Family History   Problem Relation Age of Onset    Heart attack Mother     Arthritis Mother     Cancer Mother     Heart disease Mother     Hypertension Mother     Cancer Father     Hypertension Sister     Hypertension Brother     Malig Hyperthermia Neg Hx         Social History     Social History Narrative     / 3 children / Retired from [employer]       Objective       Objective     Vital Signs:   /64 (BP Location: Left arm, Patient Position: Sitting, Cuff Size: Adult)   Pulse 67   Ht 185.4 cm (72.99\")   Wt 114 kg (251 lb 12.8 oz)   BMI 33.23 kg/m²     Body mass index is 33.23 kg/m².    Review of Systems   Constitutional:  Negative for appetite change, chills, diaphoresis, fatigue, fever and unexpected weight change.   HENT:  Positive for trouble swallowing. Negative for nosebleeds, postnasal drip, sore throat and voice change.    Respiratory:  Negative for cough, choking, chest tightness, " shortness of breath, wheezing and stridor.    Cardiovascular:  Negative for chest pain, palpitations and leg swelling.   Gastrointestinal:  Positive for abdominal distention, abdominal pain and constipation. Negative for anal bleeding, blood in stool, diarrhea, nausea, rectal pain and vomiting.   Endocrine: Negative for polydipsia, polyphagia and polyuria.   Musculoskeletal:  Negative for gait problem.   Skin:  Negative for rash and wound.   Allergic/Immunologic: Negative for food allergies.   Neurological:  Negative for dizziness, speech difficulty and light-headedness.   Psychiatric/Behavioral:  Negative for confusion, self-injury, sleep disturbance and suicidal ideas.         Physical Exam  Constitutional:       General: He is not in acute distress.     Appearance: He is well-developed. He is not ill-appearing.   HENT:      Head: Normocephalic.   Eyes:      Pupils: Pupils are equal, round, and reactive to light.   Cardiovascular:      Rate and Rhythm: Normal rate and regular rhythm.      Heart sounds: Normal heart sounds.   Pulmonary:      Effort: Pulmonary effort is normal.      Breath sounds: Normal breath sounds.   Abdominal:      General: Bowel sounds are normal. There is distension.      Palpations: Abdomen is soft. There is no mass.      Tenderness: There is abdominal tenderness. There is no guarding or rebound.      Hernia: No hernia is present.       Musculoskeletal:         General: Normal range of motion.   Skin:     General: Skin is warm and dry.   Neurological:      Mental Status: He is alert and oriented to person, place, and time.   Psychiatric:         Speech: Speech normal.         Behavior: Behavior normal.         Judgment: Judgment normal.              Assessment & Plan          Assessment and Plan    Diagnoses and all orders for this visit:    1. Right upper quadrant abdominal pain (Primary)    2. Abdominal bloating    3. Abnormal finding on imaging    4. Chronic idiopathic constipation    5.  History of colon polyps    6. FH: throat cancer      Reviewed imaging results with him today.  CT scan without contrast did show possible mobile cecum versus hernia.  It is clear on exam the patient has right upper quadrant pain and distention.  This has been ongoing for 2 years.  He does also have issues with constipation.  I have recommended nightly Senokot instead of the stool softeners.  I did discuss his case with Dr. Goodman.  I am waiting on her recommendations.  He is up-to-date on his colonoscopy.  GERD is well-controlled on Protonix 40 mg daily.  Patient to call the office with any issues.  Patient to follow-up with me in 3 months.  Once I have recommendations from Dr. Goodman I will let the patient know.  Patient is agreeable to the plan.          Follow Up       Follow Up   Return in about 3 months (around 7/22/2024) for ABDOMINAL PAIN, CONSTIPATION.  Patient was given instructions and counseling regarding his condition or for health maintenance advice. Please see specific information pulled into the AVS if appropriate.

## 2024-04-23 DIAGNOSIS — R10.11 RIGHT UPPER QUADRANT ABDOMINAL PAIN: ICD-10-CM

## 2024-04-23 DIAGNOSIS — R93.3 ABNORMAL FINDING ON GI TRACT IMAGING: Primary | ICD-10-CM

## 2024-05-03 ENCOUNTER — TRANSCRIBE ORDERS (OUTPATIENT)
Dept: ADMINISTRATIVE | Facility: HOSPITAL | Age: 74
End: 2024-05-03
Payer: MEDICARE

## 2024-05-03 ENCOUNTER — LAB (OUTPATIENT)
Dept: LAB | Facility: HOSPITAL | Age: 74
End: 2024-05-03
Payer: MEDICARE

## 2024-05-03 DIAGNOSIS — I10 HYPERTENSION, ESSENTIAL: ICD-10-CM

## 2024-05-03 DIAGNOSIS — E13.9 DIABETES MELLITUS DUE TO ABNORMAL INSULIN: ICD-10-CM

## 2024-05-03 DIAGNOSIS — N18.30 STAGE 3 CHRONIC KIDNEY DISEASE, UNSPECIFIED WHETHER STAGE 3A OR 3B CKD: ICD-10-CM

## 2024-05-03 DIAGNOSIS — N18.30 STAGE 3 CHRONIC KIDNEY DISEASE, UNSPECIFIED WHETHER STAGE 3A OR 3B CKD: Primary | ICD-10-CM

## 2024-05-03 LAB
25(OH)D3 SERPL-MCNC: 34.5 NG/ML (ref 30–100)
ANION GAP SERPL CALCULATED.3IONS-SCNC: 10.9 MMOL/L (ref 5–15)
BACTERIA UR QL AUTO: NORMAL /HPF
BILIRUB UR QL STRIP: NEGATIVE
BUN SERPL-MCNC: 23 MG/DL (ref 8–23)
BUN/CREAT SERPL: 13.9 (ref 7–25)
CALCIUM SPEC-SCNC: 9.4 MG/DL (ref 8.6–10.5)
CHLORIDE SERPL-SCNC: 100 MMOL/L (ref 98–107)
CLARITY UR: CLEAR
CO2 SERPL-SCNC: 22.1 MMOL/L (ref 22–29)
COLOR UR: YELLOW
CREAT SERPL-MCNC: 1.65 MG/DL (ref 0.76–1.27)
CREAT UR-MCNC: 77.9 MG/DL
DEPRECATED RDW RBC AUTO: 55.5 FL (ref 37–54)
EGFRCR SERPLBLD CKD-EPI 2021: 43.6 ML/MIN/1.73
ERYTHROCYTE [DISTWIDTH] IN BLOOD BY AUTOMATED COUNT: 16.3 % (ref 12.3–15.4)
GLUCOSE SERPL-MCNC: 270 MG/DL (ref 65–99)
GLUCOSE UR STRIP-MCNC: ABNORMAL MG/DL
HCT VFR BLD AUTO: 47.8 % (ref 37.5–51)
HGB BLD-MCNC: 15.2 G/DL (ref 13–17.7)
HGB UR QL STRIP.AUTO: NEGATIVE
KETONES UR QL STRIP: NEGATIVE
LEUKOCYTE ESTERASE UR QL STRIP.AUTO: NEGATIVE
MCH RBC QN AUTO: 29.5 PG (ref 26.6–33)
MCHC RBC AUTO-ENTMCNC: 31.8 G/DL (ref 31.5–35.7)
MCV RBC AUTO: 92.6 FL (ref 79–97)
NITRITE UR QL STRIP: NEGATIVE
PH UR STRIP.AUTO: 5.5 [PH] (ref 5–8)
PHOSPHATE SERPL-MCNC: 3.3 MG/DL (ref 2.5–4.5)
PLATELET # BLD AUTO: 243 10*3/MM3 (ref 140–450)
PMV BLD AUTO: 9.3 FL (ref 6–12)
POTASSIUM SERPL-SCNC: 5.1 MMOL/L (ref 3.5–5.2)
PROT ?TM UR-MCNC: 37.3 MG/DL
PROT UR QL STRIP: ABNORMAL
PROT/CREAT UR: 0.48 MG/G{CREAT}
PTH-INTACT SERPL-MCNC: 53.3 PG/ML (ref 15–65)
RBC # BLD AUTO: 5.16 10*6/MM3 (ref 4.14–5.8)
RBC # UR STRIP: NORMAL /HPF
REF LAB TEST METHOD: NORMAL
SODIUM SERPL-SCNC: 133 MMOL/L (ref 136–145)
SP GR UR STRIP: 1.01 (ref 1–1.03)
SQUAMOUS #/AREA URNS HPF: NORMAL /HPF
UROBILINOGEN UR QL STRIP: ABNORMAL
WBC # UR STRIP: NORMAL /HPF
WBC NRBC COR # BLD AUTO: 7.1 10*3/MM3 (ref 3.4–10.8)

## 2024-05-03 PROCEDURE — 85027 COMPLETE CBC AUTOMATED: CPT

## 2024-05-03 PROCEDURE — 82306 VITAMIN D 25 HYDROXY: CPT

## 2024-05-03 PROCEDURE — 81001 URINALYSIS AUTO W/SCOPE: CPT

## 2024-05-03 PROCEDURE — 84100 ASSAY OF PHOSPHORUS: CPT

## 2024-05-03 PROCEDURE — 84156 ASSAY OF PROTEIN URINE: CPT

## 2024-05-03 PROCEDURE — 80048 BASIC METABOLIC PNL TOTAL CA: CPT

## 2024-05-03 PROCEDURE — 36415 COLL VENOUS BLD VENIPUNCTURE: CPT

## 2024-05-03 PROCEDURE — 83970 ASSAY OF PARATHORMONE: CPT

## 2024-05-03 PROCEDURE — 82570 ASSAY OF URINE CREATININE: CPT

## 2024-05-07 DIAGNOSIS — F41.9 ANXIETY: ICD-10-CM

## 2024-05-08 ENCOUNTER — OFFICE VISIT (OUTPATIENT)
Dept: SURGERY | Facility: CLINIC | Age: 74
End: 2024-05-08
Payer: MEDICARE

## 2024-05-08 VITALS
BODY MASS INDEX: 32.87 KG/M2 | DIASTOLIC BLOOD PRESSURE: 75 MMHG | HEIGHT: 73 IN | WEIGHT: 248 LBS | HEART RATE: 61 BPM | SYSTOLIC BLOOD PRESSURE: 143 MMHG

## 2024-05-08 DIAGNOSIS — E78.00 HYPERCHOLESTEROLEMIA: ICD-10-CM

## 2024-05-08 DIAGNOSIS — R10.11 RIGHT UPPER QUADRANT ABDOMINAL PAIN: Primary | ICD-10-CM

## 2024-05-08 PROCEDURE — 3077F SYST BP >= 140 MM HG: CPT | Performed by: SURGERY

## 2024-05-08 PROCEDURE — 1159F MED LIST DOCD IN RCRD: CPT | Performed by: SURGERY

## 2024-05-08 PROCEDURE — 99203 OFFICE O/P NEW LOW 30 MIN: CPT | Performed by: SURGERY

## 2024-05-08 PROCEDURE — 3078F DIAST BP <80 MM HG: CPT | Performed by: SURGERY

## 2024-05-08 PROCEDURE — 1160F RVW MEDS BY RX/DR IN RCRD: CPT | Performed by: SURGERY

## 2024-05-08 RX ORDER — ATORVASTATIN CALCIUM 40 MG/1
40 TABLET, FILM COATED ORAL DAILY
Qty: 90 TABLET | Refills: 0 | Status: SHIPPED | OUTPATIENT
Start: 2024-05-08

## 2024-05-09 RX ORDER — ALPRAZOLAM 0.5 MG/1
0.5 TABLET ORAL NIGHTLY PRN
Qty: 30 TABLET | Refills: 0 | Status: SHIPPED | OUTPATIENT
Start: 2024-05-09

## 2024-05-14 DIAGNOSIS — K21.9 GASTROESOPHAGEAL REFLUX DISEASE, UNSPECIFIED WHETHER ESOPHAGITIS PRESENT: ICD-10-CM

## 2024-05-14 RX ORDER — PANTOPRAZOLE SODIUM 40 MG/1
40 TABLET, DELAYED RELEASE ORAL DAILY
Qty: 90 TABLET | Refills: 0 | Status: SHIPPED | OUTPATIENT
Start: 2024-05-14

## 2024-05-18 DIAGNOSIS — M1A.9XX0 CHRONIC GOUT WITHOUT TOPHUS, UNSPECIFIED CAUSE, UNSPECIFIED SITE: ICD-10-CM

## 2024-05-20 RX ORDER — ALLOPURINOL 300 MG/1
300 TABLET ORAL DAILY
Qty: 90 TABLET | Refills: 0 | Status: SHIPPED | OUTPATIENT
Start: 2024-05-20

## 2024-05-31 ENCOUNTER — TELEPHONE (OUTPATIENT)
Dept: DIABETES SERVICES | Facility: HOSPITAL | Age: 74
End: 2024-05-31
Payer: MEDICARE

## 2024-05-31 NOTE — TELEPHONE ENCOUNTER
Pt Called back and let me know this for Deb BENJAMIN with [facility] in [place]    This is [facility] provider with [facility]

## 2024-06-03 NOTE — ASSESSMENT & PLAN NOTE
"Subjective:     CC: Diagnoses of Type 2 diabetes mellitus without complication, without long-term current use of insulin (HCC), Alcoholic cirrhosis of liver without ascites (HCC), Portal hypertension (HCC), Essential (primary) hypertension, CLL (chronic lymphocytic leukemia) (HCC), and Screening for cardiovascular condition were pertinent to this visit.      Pt presents today for Diabetes follow up. He is feeling well.     HPI:   Sohail presents today with    Problem   Essential (Primary) Hypertension   Alcoholic Cirrhosis of Liver Without Ascites (Hcc)   Cll (Chronic Lymphocytic Leukemia) (Hcc)    Diagnosed in June 2014, was treated by Dr. Lang, then followed by Dr. Glass every 4 months.   Followed by oncology. Next appt to establish with new oncologist later this month.          Type 2 Diabetes Mellitus Without Complication, Without Long-Term Current Use of Insulin (Hcc)    This is a stable chronic condition.  Current medications:  Insulin:   Biguanide: Metformin ER 1000 mg twice daily.  GLP1-RA:    SGLT-2i:      DPP4-I:   TZD:   Jamil:  Sulfonyluria:     Last A1c: 6.7% 2/20/24; 5.5% 5/5/23  Last Microalb/Cr ratio: 6/3/24  Fasting sugars:  Last diabetic foot exam: 10/18/23  Last retinal eye exam: 3/8/24 with opthalmology  ACEi/ARB?   Statin?   Aspirin?   Concomitant HTN? Propranolol 10 mg daily; spironolactone 50 mg daily; furosemide 20 mg daily  Nightly foot checks?             ROS:  Review of Systems   All other systems reviewed and are negative.      Objective:     Exam:  /80 (BP Location: Right arm, Patient Position: Sitting, BP Cuff Size: Adult)   Pulse (!) 54   Temp 35.8 °C (96.5 °F) (Temporal)   Resp 16   Ht 1.702 m (5' 7\")   Wt 95.5 kg (210 lb 9.6 oz)   SpO2 98%   BMI 32.98 kg/m²  Body mass index is 32.98 kg/m².    Physical Exam  Vitals reviewed.   Constitutional:       General: He is not in acute distress.     Appearance: Normal appearance. He is obese. He is not ill-appearing.   HENT:      " Referral initiated.  Continue to use current BiPAP at current settings.   Head: Normocephalic and atraumatic.   Cardiovascular:      Rate and Rhythm: Normal rate.      Pulses: Normal pulses.   Pulmonary:      Effort: Pulmonary effort is normal. No respiratory distress.   Skin:     General: Skin is warm and dry.      Findings: No rash.   Neurological:      General: No focal deficit present.      Mental Status: He is alert and oriented to person, place, and time.   Psychiatric:         Mood and Affect: Mood normal.         Behavior: Behavior normal.       Assessment & Plan:     73 y.o. male with the following -     Problem List Items Addressed This Visit       Type 2 diabetes mellitus without complication, without long-term current use of insulin (HCC)     Chronic, stable. Continue A1c q 6 months, will get uacr today.  Continue metformin er 1000 mg BID         Relevant Orders    Lipid Profile    MICROALBUMIN CREAT RATIO URINE    HEMOGLOBIN A1C    CLL (chronic lymphocytic leukemia) (HCC)     Chronic, stable. Seeing lazaro every 3 weeks for ongoing care/maintenance.           Portal hypertension (HCC)    Relevant Medications    spironolactone (ALDACTONE) 50 MG Tab    furosemide (LASIX) 20 MG Tab    Alcoholic cirrhosis of liver without ascites (HCC)     Chronic, stable. Using lasix 20-40 mg daily,  mg daily.  Reports he is colleting daily weights, taking 40/100 with increased weight. Continue this, continue bp control, low sodium, etoh cessation         Essential (primary) hypertension     Chronic, stable. Continue propranolol 10 mg daily. Consider coreg   Reports consistently having lower blood pressures at home since stopping work. Continue healthy lifestyle recommendations. Continue spirololactone/lasix         Relevant Medications    spironolactone (ALDACTONE) 50 MG Tab    furosemide (LASIX) 20 MG Tab     Other Visit Diagnoses       Screening for cardiovascular condition        Relevant Orders    Lipid Profile          Patient was educated in proper administration of medication(s)  ordered today including safety, possible SE, risks, benefits, rationale and alternatives to therapy.   Supportive care, differential diagnoses, and indications for immediate follow-up discussed with patient.    Pathogenesis of diagnosis discussed including typical length and natural progression.    Instructed to return to clinic or nearest emergency department for any change in condition, further concerns, or worsening of symptoms.  Patient states understanding of the plan of care and discharge instructions.    Return in about 4 months (around 10/3/2024) for Diabetes.    Please note that this dictation was created using voice recognition software. I have made every reasonable attempt to correct obvious errors, but I expect that there are errors of grammar and possibly content that I did not discover before finalizing the note.

## 2024-06-06 DIAGNOSIS — F41.9 ANXIETY: ICD-10-CM

## 2024-06-09 RX ORDER — ALPRAZOLAM 0.5 MG/1
0.5 TABLET ORAL NIGHTLY PRN
Qty: 30 TABLET | Refills: 0 | Status: SHIPPED | OUTPATIENT
Start: 2024-06-09

## 2024-06-10 RX ORDER — LEVOTHYROXINE SODIUM 137 UG/1
137 TABLET ORAL EVERY MORNING
Qty: 30 TABLET | Refills: 0 | Status: SHIPPED | OUTPATIENT
Start: 2024-06-10

## 2024-06-19 ENCOUNTER — LAB (OUTPATIENT)
Dept: LAB | Facility: HOSPITAL | Age: 74
End: 2024-06-19
Payer: MEDICARE

## 2024-06-19 DIAGNOSIS — E03.9 HYPOTHYROIDISM, UNSPECIFIED TYPE: ICD-10-CM

## 2024-06-19 LAB — TSH SERPL DL<=0.05 MIU/L-ACNC: 0.33 UIU/ML (ref 0.27–4.2)

## 2024-06-19 PROCEDURE — 84443 ASSAY THYROID STIM HORMONE: CPT

## 2024-06-19 PROCEDURE — 36415 COLL VENOUS BLD VENIPUNCTURE: CPT

## 2024-06-20 ENCOUNTER — OFFICE VISIT (OUTPATIENT)
Dept: FAMILY MEDICINE CLINIC | Age: 74
End: 2024-06-20
Payer: MEDICARE

## 2024-06-20 VITALS
HEIGHT: 73 IN | SYSTOLIC BLOOD PRESSURE: 123 MMHG | BODY MASS INDEX: 32.87 KG/M2 | DIASTOLIC BLOOD PRESSURE: 64 MMHG | OXYGEN SATURATION: 97 % | TEMPERATURE: 97.9 F | WEIGHT: 248 LBS | HEART RATE: 62 BPM

## 2024-06-20 DIAGNOSIS — Z79.4 TYPE 2 DIABETES MELLITUS WITH STAGE 3 CHRONIC KIDNEY DISEASE, WITH LONG-TERM CURRENT USE OF INSULIN, UNSPECIFIED WHETHER STAGE 3A OR 3B CKD: ICD-10-CM

## 2024-06-20 DIAGNOSIS — M1A.9XX0 CHRONIC GOUT WITHOUT TOPHUS, UNSPECIFIED CAUSE, UNSPECIFIED SITE: ICD-10-CM

## 2024-06-20 DIAGNOSIS — E03.9 ACQUIRED HYPOTHYROIDISM: ICD-10-CM

## 2024-06-20 DIAGNOSIS — Z00.00 MEDICARE ANNUAL WELLNESS VISIT, SUBSEQUENT: Primary | ICD-10-CM

## 2024-06-20 DIAGNOSIS — N18.30 TYPE 2 DIABETES MELLITUS WITH STAGE 3 CHRONIC KIDNEY DISEASE, WITH LONG-TERM CURRENT USE OF INSULIN, UNSPECIFIED WHETHER STAGE 3A OR 3B CKD: ICD-10-CM

## 2024-06-20 DIAGNOSIS — I10 PRIMARY HYPERTENSION: ICD-10-CM

## 2024-06-20 DIAGNOSIS — I48.0 PAROXYSMAL ATRIAL FIBRILLATION: ICD-10-CM

## 2024-06-20 DIAGNOSIS — E11.22 TYPE 2 DIABETES MELLITUS WITH STAGE 3 CHRONIC KIDNEY DISEASE, WITH LONG-TERM CURRENT USE OF INSULIN, UNSPECIFIED WHETHER STAGE 3A OR 3B CKD: ICD-10-CM

## 2024-06-20 DIAGNOSIS — K21.9 GASTROESOPHAGEAL REFLUX DISEASE, UNSPECIFIED WHETHER ESOPHAGITIS PRESENT: ICD-10-CM

## 2024-06-20 DIAGNOSIS — E78.00 HYPERCHOLESTEROLEMIA: ICD-10-CM

## 2024-06-20 DIAGNOSIS — F41.9 ANXIETY: ICD-10-CM

## 2024-06-20 PROBLEM — R10.11 RIGHT UPPER QUADRANT ABDOMINAL PAIN: Status: RESOLVED | Noted: 2024-05-08 | Resolved: 2024-06-20

## 2024-06-20 PROCEDURE — 1160F RVW MEDS BY RX/DR IN RCRD: CPT | Performed by: NURSE PRACTITIONER

## 2024-06-20 PROCEDURE — 3074F SYST BP LT 130 MM HG: CPT | Performed by: NURSE PRACTITIONER

## 2024-06-20 PROCEDURE — 3052F HG A1C>EQUAL 8.0%<EQUAL 9.0%: CPT | Performed by: NURSE PRACTITIONER

## 2024-06-20 PROCEDURE — 1159F MED LIST DOCD IN RCRD: CPT | Performed by: NURSE PRACTITIONER

## 2024-06-20 PROCEDURE — G0439 PPPS, SUBSEQ VISIT: HCPCS | Performed by: NURSE PRACTITIONER

## 2024-06-20 PROCEDURE — 3078F DIAST BP <80 MM HG: CPT | Performed by: NURSE PRACTITIONER

## 2024-06-20 PROCEDURE — 99214 OFFICE O/P EST MOD 30 MIN: CPT | Performed by: NURSE PRACTITIONER

## 2024-06-20 PROCEDURE — 1125F AMNT PAIN NOTED PAIN PRSNT: CPT | Performed by: NURSE PRACTITIONER

## 2024-06-20 RX ORDER — IMIPRAMINE HCL 50 MG
TABLET ORAL
Qty: 270 TABLET | Refills: 1 | Status: SHIPPED | OUTPATIENT
Start: 2024-06-20

## 2024-06-20 RX ORDER — ALLOPURINOL 300 MG/1
300 TABLET ORAL DAILY
Qty: 90 TABLET | Refills: 1 | Status: SHIPPED | OUTPATIENT
Start: 2024-06-20

## 2024-06-20 RX ORDER — ISOSORBIDE MONONITRATE 60 MG/1
60 TABLET, EXTENDED RELEASE ORAL DAILY
Qty: 90 TABLET | Refills: 1 | Status: SHIPPED | OUTPATIENT
Start: 2024-06-20

## 2024-06-20 RX ORDER — CARVEDILOL 12.5 MG/1
12.5 TABLET ORAL 2 TIMES DAILY WITH MEALS
Qty: 180 TABLET | Refills: 1 | Status: SHIPPED | OUTPATIENT
Start: 2024-06-20

## 2024-06-20 RX ORDER — PANTOPRAZOLE SODIUM 40 MG/1
40 TABLET, DELAYED RELEASE ORAL DAILY
Qty: 90 TABLET | Refills: 1 | Status: SHIPPED | OUTPATIENT
Start: 2024-06-20

## 2024-06-20 RX ORDER — ATORVASTATIN CALCIUM 40 MG/1
40 TABLET, FILM COATED ORAL DAILY
Qty: 90 TABLET | Refills: 1 | Status: SHIPPED | OUTPATIENT
Start: 2024-06-20

## 2024-06-20 RX ORDER — AMLODIPINE BESYLATE 5 MG/1
5 TABLET ORAL DAILY
COMMUNITY

## 2024-06-20 RX ORDER — ALPRAZOLAM 0.5 MG/1
0.5 TABLET ORAL NIGHTLY PRN
Qty: 30 TABLET | Refills: 0 | Status: SHIPPED | OUTPATIENT
Start: 2024-06-20

## 2024-06-20 NOTE — PROGRESS NOTES
The ABCs of the Annual Wellness Visit  Subsequent Medicare Wellness Visit    Subjective    Kyler Staley is a 74 y.o. male who presents for a Subsequent Medicare Wellness Visit.    The following portions of the patient's history were reviewed and   updated as appropriate: allergies, current medications, past family history, past medical history, past social history, past surgical history, and problem list.    Compared to one year ago, the patient feels his physical   health is better.    Compared to one year ago, the patient feels his mental   health is the same.    Recent Hospitalizations:  He was not admitted to the hospital during the last year.       Current Medical Providers:  Patient Care Team:  Marisol Sellers APRN as PCP - General (Nurse Practitioner)  Baltazar Hardy APRN (Family Medicine)  Sola Torres MD (Pain Medicine)  Ede Nelson MD as Consulting Physician (Urology)  Keyonna Mcdowell MD as Consulting Physician (Pulmonary Disease)  Kristina Borrego MD as Consulting Physician (Nephrology)  Rajeev Nguyen DPM as Consulting Physician (Podiatry)  Fransisco Horta OD (Optometry)  Deb Moody APRN as Nurse Practitioner (Nurse Practitioner)    Outpatient Medications Prior to Visit   Medication Sig Dispense Refill   • amLODIPine (NORVASC) 5 MG tablet Take 1 tablet by mouth Daily.     • apixaban (ELIQUIS) 5 MG tablet tablet Take 1 tablet by mouth 2 (Two) Times a Day.     • aspirin 325 MG tablet Take 1 tablet by mouth Daily. TO FOLLOW MD ORDERS WHEN TO STOP     • clindamycin (CLEOCIN T) 1 % external solution APPLY SOLUTION TOPICALLY TO AFFECTED AREA TWICE DAILY AS NEEDED     • empagliflozin (Jardiance) 25 MG tablet tablet Take 1 tablet by mouth Daily for 180 days. 90 tablet 1   • eplerenone (INSPRA) 25 MG tablet Take 1 tablet by mouth Daily.     • fexofenadine (ALLEGRA) 60 MG tablet Take 3 tablets by mouth Daily.     • Flaxseed, Linseed, 1000 MG capsule Take 1,200 mg by  mouth 2 (Two) Times a Day.     • glucose blood test strip      • levothyroxine (SYNTHROID, LEVOTHROID) 137 MCG tablet TAKE 1 TABLET BY MOUTH ONCE DAILY IN THE MORNING 30 tablet 0   • losartan (COZAAR) 100 MG tablet Take 1 tablet by mouth Every Night. Takes based on b/p readings at night     • Melatonin 5 MG chewable tablet Chew 1 tablet.     • nitroglycerin (NITROSTAT) 0.4 MG SL tablet Place 1 tablet under the tongue Every 5 (Five) Minutes As Needed for Chest Pain. do not exceed a total of 3 doses in 15 minutes     • Semaglutide,0.25 or 0.5MG/DOS, (Ozempic, 0.25 or 0.5 MG/DOSE,) 2 MG/1.5ML solution pen-injector Inject 1 mg under the skin into the appropriate area as directed 1 (One) Time Per Week.     • tamsulosin (FLOMAX) 0.4 MG capsule 24 hr capsule Take 2 capsules by mouth Daily. 180 capsule 3   • allopurinol (ZYLOPRIM) 300 MG tablet Take 1 tablet by mouth once daily 90 tablet 0   • ALPRAZolam (XANAX) 0.5 MG tablet Take 1 tablet by mouth At Night As Needed for Anxiety. 30 tablet 0   • atorvastatin (LIPITOR) 40 MG tablet Take 1 tablet by mouth once daily 90 tablet 0   • carvedilol (COREG) 12.5 MG tablet Take 1 tablet by mouth 2 (Two) Times a Day With Meals.     • finasteride (PROSCAR) 5 MG tablet Take 1 tablet by mouth once daily 90 tablet 0   • imipramine (TOFRANIL) 50 MG tablet TAKE 1 TABLET BY MOUTH IN THE MORNING AND 2 IN THE EVENING 270 tablet 1   • isosorbide mononitrate (IMDUR) 60 MG 24 hr tablet Take 1 tablet by mouth Daily. 90 tablet 1   • pantoprazole (PROTONIX) 40 MG EC tablet Take 1 tablet by mouth once daily 90 tablet 0   • levothyroxine (SYNTHROID, LEVOTHROID) 150 MCG tablet TAKE 1 TABLET BY MOUTH ONCE DAILY IN THE MORNING (Patient not taking: Reported on 6/20/2024) 30 tablet 0     No facility-administered medications prior to visit.       No opioid medication identified on active medication list. I have reviewed chart for other potential  high risk medication/s and harmful drug interactions in the  elderly.        Aspirin is on active medication list. Aspirin use is indicated based on review of current medical condition/s. Pros and cons of this therapy have been discussed today. Benefits of this medication outweigh potential harm.  Patient has been encouraged to continue taking this medication.  .      Patient Active Problem List   Diagnosis   • Status post complete repair of rotator cuff   • History of fusion of lumbar spine   • Obstructive sleep apnea treated with BiPAP   • Type 2 diabetes mellitus with kidney complication, with long-term current use of insulin   • Paroxysmal atrial fibrillation   • Primary hypertension   • Stage 3b chronic kidney disease   • Hypothyroidism   • Presence of aortocoronary bypass graft   • Left ventricular hypertrophy   • Gastroesophageal reflux disease   • Depressive disorder   • Degeneration of intervertebral disc of lumbar region   • CHF (congestive heart failure)   • Chronic ischemic heart disease   • Cataracts, bilateral   • S/P TKR (total knee replacement), left   • Anxiety   • History of cold sores   • Abdominal hernia   • Arthritis   • Bilateral carotid artery stenosis   • Cardiac pacemaker in situ   • Spondylosis of lumbar spine   • Essential familial hypercholesterolemia   • Fusion of spine, lumbar region   • Peripheral vascular disease   • Sick sinus syndrome   • Benign prostatic hyperplasia with lower urinary tract symptoms   • Simple chronic bronchitis   • Chronic midline low back pain without sciatica     Advance Care Planning   Advance Care Planning     Advance Directive is not on file.  ACP discussion was held with the patient during this visit. Patient does not have an advance directive, declines further assistance.     Objective    Vitals:    06/20/24 1404   BP: 123/64   BP Location: Right arm   Patient Position: Sitting   Cuff Size: Large Adult   Pulse: 62   Temp: 97.9 °F (36.6 °C)   TempSrc: Oral   SpO2: 97%   Weight: 112 kg (248 lb)   Height: 185.4 cm  "(72.99\")     Estimated body mass index is 32.73 kg/m² as calculated from the following:    Height as of this encounter: 185.4 cm (72.99\").    Weight as of this encounter: 112 kg (248 lb).    BMI is >= 30 and <35. (Class 1 Obesity). The following options were offered after discussion;: exercise counseling/recommendations and nutrition counseling/recommendations      Does the patient have evidence of cognitive impairment? Yes          HEALTH RISK ASSESSMENT    Smoking Status:  Social History     Tobacco Use   Smoking Status Former   • Current packs/day: 0.00   • Average packs/day: 3.0 packs/day for 15.0 years (45.0 ttl pk-yrs)   • Types: Cigarettes   • Start date: 1960   • Quit date: 1975   • Years since quittin.5   • Passive exposure: Past   Smokeless Tobacco Never     Alcohol Consumption:  Social History     Substance and Sexual Activity   Alcohol Use No     Fall Risk Screen:    STEADI Fall Risk Assessment was completed, and patient is at MODERATE risk for falls. Assessment completed on:2024    Depression Screenin/20/2024     2:07 PM   PHQ-2/PHQ-9 Depression Screening   Little Interest or Pleasure in Doing Things 0-->not at all   Feeling Down, Depressed or Hopeless 0-->not at all   PHQ-9: Brief Depression Severity Measure Score 0       Health Habits and Functional and Cognitive Screenin/20/2024     2:07 PM   Functional & Cognitive Status   Do you have difficulty preparing food and eating? No   Do you have difficulty bathing yourself, getting dressed or grooming yourself? No   Do you have difficulty using the toilet? No   Do you have difficulty moving around from place to place? No   Do you have trouble with steps or getting out of a bed or a chair? No   Current Diet Other        Current Diet Comment inbetween well balanced and unhealthy   Dental Exam Up to date   Eye Exam Not up to date        Eye Exam Comment upcoming appoitment next week   Exercise (times per week) 0 times per " week   Current Exercises Include No Regular Exercise   Do you need help using the phone?  No   Are you deaf or do you have serious difficulty hearing?  Yes   Do you need help to go to places out of walking distance? No   Do you need help shopping? No   Do you need help preparing meals?  No   Do you need help with housework?  No   Do you need help with laundry? No   Do you need help taking your medications? No   Do you need help managing money? No   Do you ever drive or ride in a car without wearing a seat belt? No   Have you felt unusual stress, anger or loneliness in the last month? No   Who do you live with? Spouse   If you need help, do you have trouble finding someone available to you? No   Have you been bothered in the last four weeks by sexual problems? No   Do you have difficulty concentrating, remembering or making decisions? Yes       Age-appropriate Screening Schedule:  Refer to the list below for future screening recommendations based on patient's age, sex and/or medical conditions. Orders for these recommended tests are listed in the plan section. The patient has been provided with a written plan.    Health Maintenance   Topic Date Due   • DIABETIC FOOT EXAM  Never done   • COVID-19 Vaccine (7 - 2023-24 season) 03/16/2024   • HEMOGLOBIN A1C  09/15/2024   • INFLUENZA VACCINE  08/01/2024   • LIPID PANEL  11/28/2024   • DIABETIC EYE EXAM  02/27/2025   • URINE MICROALBUMIN  05/03/2025   • ANNUAL WELLNESS VISIT  06/20/2025   • BMI FOLLOWUP  06/20/2025   • COLORECTAL CANCER SCREENING  09/14/2031   • TDAP/TD VACCINES (2 - Td or Tdap) 01/19/2032   • HEPATITIS C SCREENING  Completed   • RSV Vaccine - Adults  Completed   • Pneumococcal Vaccine 65+  Completed   • AAA SCREEN (ONE-TIME)  Completed   • ZOSTER VACCINE  Completed   • DXA SCAN  Discontinued   • LUNG CANCER SCREENING  Discontinued                  CMS Preventative Services Quick Reference  Risk Factors Identified During Encounter  Hearing Problem:  [facility]  Immunizations Discussed/Encouraged: Tdap, Prevnar 20 (Pneumococcal 20-valent conjugate), Shingrix, COVID19, and RSV (Respiratory Syncytial Virus)  Polypharmacy: Medication List reviewed and Medications are appropriate for patient  Dental Screening Recommended  Vision Screening Recommended  The above risks/problems have been discussed with the patient.  Pertinent information has been shared with the patient in the After Visit Summary.  An After Visit Summary and PPPS were made available to the patient.    Follow Up:   Next Medicare Wellness visit to be scheduled in 1 year.       Additional E&M Note during same encounter follows:  Patient has multiple medical problems which are significant and separately identifiable that require additional work above and beyond the Medicare Wellness Visit.      Chief Complaint  Medicare Wellness-subsequent    Subjective        HPI  Kyler Staley is also being seen today for routine visit.   He is taking Coreg 12.5mg Bid and losartan 100mg daily for HTN. Has f/u with cardio scheduled. He is seeing urology for BPH and CASSIDY Tim for DM.     He is still taking losartan 100 mg and Inspra 25 mg daily, ordered by nephrology and carvedilol 12.5 mg twice daily.  Patient is also on Eliquis 5 mg twice daily and aspirin 81 mg for his A-fib.He states his BP has been in the 80/40s and nephrology and cardiology are trying to work on medication dosing.     He sees Baltazar Hardy for cardiology, CASSIDY Greer for urology (BPH), Dr. Kruger for Derm (every 6 months for precancerous skin lesions), CASSIDY Neumann at [facility] for his chronic kidney disease.  [facility].  He has an appointment with them on the 17th of this month and will get his second shingles vaccination.     Nephrology and cardiology manages hypertension.  Blood pressure is low today at 95/61.   "Patient denies dizziness.  States that he checked his blood pressure last night and it was in the 90s over 50s.  He is currently taking amlodipine 10 mg daily, carvedilol 12.5 mg twice daily and losartan 100 mg daily.  Patient has history of A. fib, pacemaker, multiple stents and CABG x4.     Patient has chronic back pain and has had multiple surgeries.  He uses cane for ambulation of longer distances.     Patient does have sleep apnea and has an older, recalled BiPAP.  Patient states it has been approximately 10 to 15 years since last sleep study.  Previous PCP was writing prescription for settings and equipment.     Pt has [employer] hearing aids.      Last prescription of Xanax from Dr. Will was Xanax 1 mg 3 times daily as needed.  Patient states he is only taking once every night.  He has been on this medication for 40 to 50 years.  Patient has Ativan listed as an allergy.  Patient states that when he was intubated status post surgery he became very agitated after he was given Ativan.     Patient states that his left breast/nipple have been sore and swollen for a week and a half.  Denies family history of breast cancer.          Objective   Vital Signs:  /64 (BP Location: Right arm, Patient Position: Sitting, Cuff Size: Large Adult)   Pulse 62   Temp 97.9 °F (36.6 °C) (Oral)   Ht 185.4 cm (72.99\")   Wt 112 kg (248 lb)   SpO2 97%   BMI 32.73 kg/m²     Physical Exam  Vitals reviewed.   Constitutional:       General: He is not in acute distress.     Appearance: Normal appearance. He is well-developed.   HENT:      Head: Normocephalic and atraumatic.   Eyes:      Conjunctiva/sclera: Conjunctivae normal.      Pupils: Pupils are equal, round, and reactive to light.   Cardiovascular:      Rate and Rhythm: Normal rate and regular rhythm.      Heart sounds: Normal heart sounds. No murmur heard.  Pulmonary:      Effort: Pulmonary effort is normal. No respiratory distress.      Breath sounds: Normal breath sounds. "   Skin:     General: Skin is warm and dry.   Neurological:      Mental Status: He is alert and oriented to person, place, and time.   Psychiatric:         Mood and Affect: Mood and affect normal.         Behavior: Behavior normal.         Thought Content: Thought content normal.         Judgment: Judgment normal.                       Assessment and Plan   Diagnoses and all orders for this visit:    1. Medicare annual wellness visit, subsequent (Primary)    2. Primary hypertension  Assessment & Plan:   Controlled. Continue meds as listed in HPI. Low Na diet.     Orders:  -     Comprehensive Metabolic Panel; Future  -     Lipid Panel; Future    3. Acquired hypothyroidism  Assessment & Plan:  Continue levothyroxine 137mcg daily. Will adjust if needed.     Orders:  -     TSH; Future    4. Anxiety  Assessment & Plan:  Continue xanax as rx. Pt is tolerating well with no complaints.     Orders:  -     ALPRAZolam (XANAX) 0.5 MG tablet; Take 1 tablet by mouth At Night As Needed for Anxiety.  Dispense: 30 tablet; Refill: 0    5. Type 2 diabetes mellitus with stage 3 chronic kidney disease, with long-term current use of insulin, unspecified whether stage 3a or 3b CKD  Assessment & Plan:   [facility]     Orders:  -     Hemoglobin A1c; Future    6. Chronic gout without tophus, unspecified cause, unspecified site  -     Uric Acid; Future  -     allopurinol (ZYLOPRIM) 300 MG tablet; Take 1 tablet by mouth Daily.  Dispense: 90 tablet; Refill: 1    7. Gastroesophageal reflux disease, unspecified whether esophagitis present  Assessment & Plan:  Controlled. Continue protonix 40mg daily.    Orders:  -     pantoprazole (PROTONIX) 40 MG EC tablet; Take 1 tablet by mouth Daily.  Dispense: 90 tablet; Refill: 1    8. Paroxysmal atrial fibrillation  Assessment & Plan:  Continue f/u with cardio and eliquis    Orders:  -     isosorbide mononitrate (IMDUR) 60 MG 24 hr tablet; Take 1 tablet by mouth Daily.   Dispense: 90 tablet; Refill: 1    9. Hypercholesterolemia  -     atorvastatin (LIPITOR) 40 MG tablet; Take 1 tablet by mouth Daily.  Dispense: 90 tablet; Refill: 1    Other orders  -     carvedilol (COREG) 12.5 MG tablet; Take 1 tablet by mouth 2 (Two) Times a Day With Meals.  Dispense: 180 tablet; Refill: 1  -     imipramine (TOFRANIL) 50 MG tablet; TAKE 1 TABLET BY MOUTH IN THE MORNING AND 2 IN THE EVENING  Dispense: 270 tablet; Refill: 1      Patient to notify office with any acute concerns or issues.  Patient verbalizes understanding, agrees with plan of care and has no further questions upon discharge.    Please note that portions of this note were completed with a voice recognition program.         Follow Up   Return in about 4 months (around 10/20/2024) for Recheck.  Patient was given instructions and counseling regarding his condition or for health maintenance advice. Please see specific information pulled into the AVS if appropriate.

## 2024-06-21 ENCOUNTER — TELEPHONE (OUTPATIENT)
Dept: DIABETES SERVICES | Facility: HOSPITAL | Age: 74
End: 2024-06-21
Payer: MEDICARE

## 2024-06-21 NOTE — TELEPHONE ENCOUNTER
"----- Message from Deb Moody sent at 6/17/2024  9:56 PM EDT -----  Regarding: FW: Blood sugar   Contact: [phone]  [facility]-we attempted to contact her before to see if we could add a sliding scale to help control his glucose levels  ----- Message -----  From: Kim Farrar MA  Sent: 6/17/2024   7:48 PM EDT  To: CASSIDY Moreland  Subject: Blood sugar                                      Please see pt message      ----- Message -----  From: Kyler Staley \"Peter\"  Sent: 6/17/2024   4:43 PM EDT  To: [facility] Clinical [facility]  Subject: Blood sugar                                      [facility] you all heard from [facility] about sliding [facility] Thanks Peter.  "

## 2024-06-21 NOTE — TELEPHONE ENCOUNTER
Talked to patient and also called [facility].  Deb will need to call back when she gets back in the office to talk with Marisol Chong, STEPHANE, patients PCP, at [facility] to verify the dose and the sliding scale insulin.

## 2024-07-02 DIAGNOSIS — N40.0 BENIGN PROSTATIC HYPERPLASIA, UNSPECIFIED WHETHER LOWER URINARY TRACT SYMPTOMS PRESENT: ICD-10-CM

## 2024-07-02 RX ORDER — FINASTERIDE 5 MG/1
5 TABLET, FILM COATED ORAL DAILY
Qty: 90 TABLET | Refills: 0 | Status: SHIPPED | OUTPATIENT
Start: 2024-07-02

## 2024-07-03 NOTE — ASSESSMENT & PLAN NOTE
Continue levothyroxine 137mcg daily. Will adjust if needed.    Patient: Michael Duran    Procedure Information       Date/Time: 12/07/23 1000    Scheduled providers: Jassi Herbert DO    Procedure: EGD    Location: [facility] OR            Relevant Problems   Cardiovascular   (+) Atherosclerotic heart disease of native coronary artery without angina pectoris   (+) Atrial flutter (CMS/HCC)   (+) Chronic combined systolic and diastolic heart failure (CMS/HCC)   (+) Chronic diastolic congestive heart failure (CMS/HCC)   (+) Essential hypertension   (+) Hyperlipidemia   (+) Paroxysmal atrial fibrillation (CMS/HCC)   (+) Peripheral vascular disease (CMS/HCC)   (+) Presence of permanent cardiac pacemaker   (+) Ventricular tachycardia (CMS/HCC)   (+) Ventricular trigeminy      GI   (+) Chronic diarrhea   (+) GI bleeding   (+) Gastrointestinal hemorrhage, unspecified gastrointestinal hemorrhage type   (+) Primary colon cancer (CMS/HCC)   (+) Rectal cancer (CMS/HCC)   (+) Ulcerative pancolitis with complication (CMS/HCC)      /Renal   (+) Acute renal failure superimposed on chronic kidney disease (CMS/HCC)   (+) Hypertensive kidney disease with stage 3b chronic kidney disease (CMS/HCC)   (+) Renal cysts, acquired, bilateral   (+) Stage 3 chronic kidney disease (CMS/HCC)      Neuro/Psych   (+) Lumbar radiculitis   (+) Sciatica      Pulmonary   (+) COPD (chronic obstructive pulmonary disease) (CMS/HCC)   (+) Obstructive sleep apnea   (+) Pulmonary nodules/lesions, multiple      GI/Hepatic   (+) Primary colon cancer (CMS/HCC)   (+) Rectal cancer (CMS/HCC)      Hematology   (+) Acute on chronic blood loss anemia   (+) Iron deficiency anemia   (+) Pancytopenia (CMS/HCC)      Eyes, Ears, Nose, and Throat   (+) Sensorineural hearing loss, bilateral      Infectious Disease   (+) Onychomycosis      Other   (+) Gout of right foot       Clinical information reviewed:   Tobacco  Allergies  Meds  Problems  Med Hx  Surg Hx   Fam Hx  Soc   Hx        NPO  Detail:  NPO/Void Status  Date of Last Liquid: 12/06/23  Time of Last Liquid: 2100  Date of Last Solid: 12/06/23  Time of Last Solid: 1900         Physical Exam    Airway  Mallampati: II  TM distance: >3 FB     Cardiovascular - normal exam     Dental   (+) upper dentures, lower dentures     Pulmonary - normal exam     Abdominal - normal exam             Anesthesia Plan    ASA 3     MAC     The patient is not a current smoker.    intravenous induction   Postoperative administration of opioids is intended.  Anesthetic plan and risks discussed with patient.  Use of blood products discussed with patient who.    Plan discussed with CRNA.

## 2024-07-05 RX ORDER — LEVOTHYROXINE SODIUM 137 UG/1
137 TABLET ORAL EVERY MORNING
Qty: 90 TABLET | Refills: 0 | Status: SHIPPED | OUTPATIENT
Start: 2024-07-05

## 2024-07-13 ENCOUNTER — PATIENT MESSAGE (OUTPATIENT)
Dept: DIABETES SERVICES | Facility: CLINIC | Age: 74
End: 2024-07-13
Payer: MEDICARE

## 2024-07-16 NOTE — TELEPHONE ENCOUNTER
he was discharged from the hospital on Friday due to chest pain.  he had a cardiac cath. States he had a 100% in the graft he had previously done.  he had an 80% blockage in another area which was stented.  he has been off the Ozempic since the surgery and placed him on a sliding scale insulin.  he feels better off the Ozempic.  He wishes to start a sliding scale insulin. The sliding scale was approved by [facility]. He would like an appt after [facility] on [facility].

## 2024-07-20 DIAGNOSIS — N40.0 BENIGN PROSTATIC HYPERPLASIA, UNSPECIFIED WHETHER LOWER URINARY TRACT SYMPTOMS PRESENT: ICD-10-CM

## 2024-07-22 ENCOUNTER — OFFICE VISIT (OUTPATIENT)
Dept: GASTROENTEROLOGY | Facility: CLINIC | Age: 74
End: 2024-07-22
Payer: MEDICARE

## 2024-07-22 VITALS
BODY MASS INDEX: 32.68 KG/M2 | HEART RATE: 60 BPM | HEIGHT: 73 IN | DIASTOLIC BLOOD PRESSURE: 58 MMHG | WEIGHT: 246.6 LBS | OXYGEN SATURATION: 97 % | RESPIRATION RATE: 17 BRPM | SYSTOLIC BLOOD PRESSURE: 108 MMHG

## 2024-07-22 DIAGNOSIS — R14.0 ABDOMINAL BLOATING: ICD-10-CM

## 2024-07-22 DIAGNOSIS — R10.11 RIGHT UPPER QUADRANT ABDOMINAL PAIN: Primary | ICD-10-CM

## 2024-07-22 DIAGNOSIS — Z86.010 HISTORY OF COLON POLYPS: ICD-10-CM

## 2024-07-22 DIAGNOSIS — K59.04 CHRONIC IDIOPATHIC CONSTIPATION: ICD-10-CM

## 2024-07-22 PROCEDURE — 3074F SYST BP LT 130 MM HG: CPT | Performed by: NURSE PRACTITIONER

## 2024-07-22 PROCEDURE — 1159F MED LIST DOCD IN RCRD: CPT | Performed by: NURSE PRACTITIONER

## 2024-07-22 PROCEDURE — 3078F DIAST BP <80 MM HG: CPT | Performed by: NURSE PRACTITIONER

## 2024-07-22 PROCEDURE — 99214 OFFICE O/P EST MOD 30 MIN: CPT | Performed by: NURSE PRACTITIONER

## 2024-07-22 PROCEDURE — 1160F RVW MEDS BY RX/DR IN RCRD: CPT | Performed by: NURSE PRACTITIONER

## 2024-07-22 RX ORDER — CLOPIDOGREL BISULFATE 75 MG
75 TABLET ORAL DAILY
COMMUNITY
Start: 2024-07-12

## 2024-07-22 RX ORDER — KETOCONAZOLE 20 MG/ML
SHAMPOO TOPICAL
COMMUNITY
Start: 2024-06-26 | End: 2024-07-23

## 2024-07-22 RX ORDER — INSULIN GLARGINE 100 [IU]/ML
29 INJECTION, SOLUTION SUBCUTANEOUS DAILY
COMMUNITY

## 2024-07-22 RX ORDER — LEVOTHYROXINE SODIUM 137 UG/1
137 TABLET ORAL EVERY MORNING
Qty: 30 TABLET | Refills: 0 | OUTPATIENT
Start: 2024-07-22

## 2024-07-22 RX ORDER — TAMSULOSIN HYDROCHLORIDE 0.4 MG/1
2 CAPSULE ORAL DAILY
Qty: 180 CAPSULE | Refills: 0 | Status: SHIPPED | OUTPATIENT
Start: 2024-07-22

## 2024-07-22 NOTE — PROGRESS NOTES
Chief Complaint   Abdominal Pain (Follow-up)    History of Present Illness       Kyler Staley is a 74 y.o. male who presents to [facility] for follow-up for abdominal pain.  He was last seen in the office by me on 4/22/2024.    He had a CT scan of the abdomen and pelvis done on 3/5/2024 for right upper quadrant pain, swelling and bloating.  CT scan showed no acute CT findings.  Multiple loops of proximal jejunum as well as the cecum located in the right hemiabdomen and upper quadrant respectively which is new from 2021.  No associated edema or bowel obstruction.  If there has been prior surgery this could represent a type of internal hernia or possible congenital variant.  Gynecomastia.  Benign subcutaneous sebaceous cyst left posterior lateral chest wall noted.  Small fat-containing left inguinal hernia.     He admits he has been having RUQ abd pain for the past 2 years. He admits he has constipation is getting worse. He has a BM every 3-4 days. He is on OZEMPIC> GES showed hypermotility. He has hx anemia and is on iron. Denies any rectal bleeding or melena. He does have hx dysphagia from having trach placed in the past after getting pneumonia after open heart surgery. He admits he does well with protonix 40 mg daily. He is a reformed smoker.      Last EGD/colonoscopy done by Dr. Plata at [facility] in 2021. History benign colon polyps.  Recall colonoscopy in 10 years.     GI family history-----Father with esophageal cancer (smoker).       He did end up seeing Dr. Hanesn with general surgery on 5/8/2024 for his opinion of his abdominal pain and abnormal imaging.  At the time Dr. Hansen's recommendation was the patient follow-up as needed.  If his symptoms were to worsen he was going to arrange an outpatient CT scan to assess any changes.    He was recently at [facility] for chest pain. Underwent cardiac cath with stents. Now on plavix.   Results       Result  "Review :       CMP          11/28/2023    12:10 2/12/2024    10:27 5/3/2024    12:06   CMP   Glucose 205  157  270    BUN 20  18  23    Creatinine 1.83  1.79  1.65    EGFR 38.5  39.5  43.6    Sodium 134  138  133    Potassium 5.0  4.8  5.1    Chloride 99  102  100    Calcium 9.5  9.9  9.4    Total Protein 6.4      Albumin 4.2      Globulin 2.2      Total Bilirubin 0.3      Alkaline Phosphatase 116      AST (SGOT) 18      ALT (SGPT) 32      Albumin/Globulin Ratio 1.9      BUN/Creatinine Ratio 10.9  10.1  13.9    Anion Gap 11.0  10.6  10.9      CBC          10/9/2023    12:05 2/12/2024    10:27 5/3/2024    12:06   CBC   WBC 7.11  6.89  7.10    RBC 5.14  5.30  5.16    Hemoglobin 14.3  15.3  15.2    Hematocrit 46.1  47.5  47.8    MCV 89.7  89.6  92.6    MCH 27.8  28.9  29.5    MCHC 31.0  32.2  31.8    RDW 16.8  16.7  16.3    Platelets 283  245  243      CBC w/diff          10/9/2023    12:05 2/12/2024    10:27 5/3/2024    12:06   CBC w/Diff   WBC 7.11  6.89  7.10    RBC 5.14  5.30  5.16    Hemoglobin 14.3  15.3  15.2    Hematocrit 46.1  47.5  47.8    MCV 89.7  89.6  92.6    MCH 27.8  28.9  29.5    MCHC 31.0  32.2  31.8    RDW 16.8  16.7  16.3    Platelets 283  245  243      Lipid Panel          11/28/2023    12:10   Lipid Panel   Total Cholesterol 153    Triglycerides 341    HDL Cholesterol 32    VLDL Cholesterol 54    LDL Cholesterol  67    LDL/HDL Ratio 1.65      TSH          3/28/2024    13:26 4/15/2024    12:59 6/19/2024    14:54   TSH   TSH 0.216  0.153  0.331        Lipase No results found for: \"LIPASE\"  Amylase No results found for: \"AMYLASE\"  Iron Profile No results found for: \"IRON\", \"TIBC\", \"LABIRON\", \"TRANSFERRIN\"  Ferritin   Ferritin   Date Value Ref Range Status   08/03/2021 25.70 (L) 30.00 - 400.00 ng/mL Final               Past Medical History       Past Medical History:   Diagnosis Date    Abnormal ECG     Acne     Alcoholism     Allergic     Anemia     Anxiety     Arthritis     Arthritis of back     " Arthritis of neck     Atrial fibrillation     Back pain     Benign prostatic hyperplasia     Cardiac disease     Cataracts, bilateral     Cervical disc disorder     Cholelithiasis     Chronic kidney disease, stage 3     moderate    CKD (chronic kidney disease)     Clotting disorder     Condition not found     hernia    Coronary artery disease     Depression     Diabetes     Enlarged prostate     Erectile dysfunction     Fatigue     GERD (gastroesophageal reflux disease)     Gout     Hearing loss     Hematospermia 12/09/2015    High blood pressure     High cholesterol     Hip arthrosis     History of cold sores     History of gastritis     [place] (hard of hearing)     Hyperlipidemia     Hypertension     Hypothyroidism     Irregular heart rhythm     Joint pain     Kidney disease     Knee swelling     Low back pain     Lumbosacral disc disease     Narrowing of airway 2012    PER ENT    Neuropathy     Obesity     Osteopenia     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     Pneumonia     PONV (postoperative nausea and vomiting)     Psychiatric diagnosis     Renal insufficiency     Rotator cuff syndrome     REPAIRED BILATERAL    Shortness of breath     Sinus trouble     Sleep apnea     BIPAP    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)     Substance abuse     Swallowing difficulty     Thin skin     Thoracic disc disorder     Thyroid condition     Visual impairment     Vocal cord dysfunction      HISTORY OF DISORDER ON ONE SIDE AFTER TRACHEA       Past Surgical History:   Procedure Laterality Date    BACK SURGERY  2018    X2    BACK SURGERY  2011    CARDIAC CATHETERIZATION      CARDIAC SURGERY      CHOLECYSTECTOMY      COLONOSCOPY      CORONARY ARTERY BYPASS GRAFT  2012    CORONARY STENT PLACEMENT      EYE SURGERY      HERNIA REPAIR      INGUINAL HERNIA REPAIR      JOINT REPLACEMENT      KNEE SURGERY      right and left    LUMBAR DISCECTOMY FUSION INSTRUMENTATION N/A 02/11/2021     Procedure: EXPLORATION OF FUSION T-12 S-1 REMOVAL OF HARDWARE REVISION OF FUSION AND INSTRUMENTATION T12-S1 WITH APPLICATION OF BONE MORPHOGENIC PROTEIN;  Surgeon: Baltazar Blankenship MD;  Location: [facility];  Service: Orthopedic Spine;  Laterality: N/A;    NOSE SURGERY      PACEMAKER IMPLANTATION      REPLACEMENT TOTAL KNEE Bilateral     SHOULDER SURGERY Bilateral     rotator cuff repair    SPINE SURGERY           Current Outpatient Medications:     allopurinol (ZYLOPRIM) 300 MG tablet, Take 1 tablet by mouth Daily., Disp: 90 tablet, Rfl: 1    ALPRAZolam (XANAX) 0.5 MG tablet, Take 1 tablet by mouth At Night As Needed for Anxiety., Disp: 30 tablet, Rfl: 0    amLODIPine (NORVASC) 5 MG tablet, Take 1 tablet by mouth Daily., Disp: , Rfl:     apixaban (ELIQUIS) 5 MG tablet tablet, Take 1 tablet by mouth 2 (Two) Times a Day., Disp: , Rfl:     atorvastatin (LIPITOR) 40 MG tablet, Take 1 tablet by mouth Daily., Disp: 90 tablet, Rfl: 1    carvedilol (COREG) 12.5 MG tablet, Take 1 tablet by mouth 2 (Two) Times a Day With Meals., Disp: 180 tablet, Rfl: 1    empagliflozin (Jardiance) 25 MG tablet tablet, Take 1 tablet by mouth Daily for 180 days., Disp: 90 tablet, Rfl: 1    eplerenone (INSPRA) 25 MG tablet, Take 1 tablet by mouth Daily., Disp: , Rfl:     fexofenadine (ALLEGRA) 60 MG tablet, Take 3 tablets by mouth Daily., Disp: , Rfl:     finasteride (PROSCAR) 5 MG tablet, Take 1 tablet by mouth once daily, Disp: 90 tablet, Rfl: 0    Flaxseed, Linseed, 1000 MG capsule, Take 1,200 mg by mouth 2 (Two) Times a Day., Disp: , Rfl:     glucose blood test strip, , Disp: , Rfl:     imipramine (TOFRANIL) 50 MG tablet, TAKE 1 TABLET BY MOUTH IN THE MORNING AND 2 IN THE EVENING, Disp: 270 tablet, Rfl: 1    insulin glargine (LANTUS, SEMGLEE) 100 UNIT/ML injection, Inject 26 Units under the skin into the appropriate area as directed Daily., Disp: , Rfl:     isosorbide mononitrate (IMDUR) 60 MG 24 hr tablet, Take 1 tablet by mouth  Daily., Disp: 90 tablet, Rfl: 1    ketoconazole (NIZORAL) 2 % shampoo, SHAMPOO TOPICALLY 3 TIMES WEEKLY, Disp: , Rfl:     levothyroxine (SYNTHROID, LEVOTHROID) 137 MCG tablet, TAKE 1 TABLET BY MOUTH ONCE DAILY IN THE MORNING, Disp: 90 tablet, Rfl: 0    Melatonin 5 MG chewable tablet, Chew 1 tablet., Disp: , Rfl:     nitroglycerin (NITROSTAT) 0.4 MG SL tablet, Place 1 tablet under the tongue Every 5 (Five) Minutes As Needed for Chest Pain. do not exceed a total of 3 doses in 15 minutes, Disp: , Rfl:     pantoprazole (PROTONIX) 40 MG EC tablet, Take 1 tablet by mouth Daily., Disp: 90 tablet, Rfl: 1    Plavix 75 MG tablet, , Disp: , Rfl:     tamsulosin (FLOMAX) 0.4 MG capsule 24 hr capsule, Take 2 capsules by mouth once daily, Disp: 180 capsule, Rfl: 0     Allergies   Allergen Reactions    Bupropion Other (See Comments) and Unknown - Low Severity     MAKES PATIENT VERY AGGRESSIVE      Iodinated Contrast Media Other (See Comments)     Other reaction(s): Other (See Comments)  Chronic kidney disease - stage 3  Chronic kidney disease - stage 3    Lorazepam Other (See Comments) and Mental Status Change     MADE PATIENT VERY AGGRESSIVE      Propoxyphene Nausea And Vomiting and Unknown - Low Severity    Adhesive Tape Rash       Family History   Problem Relation Age of Onset    Heart attack Mother     Arthritis Mother     Cancer Mother     Heart disease Mother     Hypertension Mother     Cancer Father     Hypertension Sister     Hypertension Brother     Malig Hyperthermia Neg Hx         Social History     Social History Narrative     / 3 children / Retired from [employer]       Objective       Review of Systems   Constitutional:  Negative for appetite change, fatigue, fever, unexpected weight gain and unexpected weight loss.   HENT:  Negative for trouble swallowing.    Respiratory:  Negative for cough, choking, chest tightness, shortness of breath, wheezing and stridor.    Cardiovascular:  Negative for chest pain,  "palpitations and leg swelling.   Gastrointestinal:  Positive for abdominal distention. Negative for abdominal pain, anal bleeding, blood in stool, constipation, diarrhea, nausea, rectal pain, vomiting, GERD and indigestion.        Vital Signs:   /58 (BP Location: Right arm, Patient Position: Sitting, Cuff Size: Adult)   Pulse 60   Resp 17   Ht 185.4 cm (72.99\")   Wt 112 kg (246 lb 9.6 oz)   SpO2 97%   BMI 32.54 kg/m²       Physical Exam  Constitutional:       General: He is not in acute distress.     Appearance: He is well-developed. He is not ill-appearing.   HENT:      Head: Normocephalic.   Eyes:      Pupils: Pupils are equal, round, and reactive to light.   Cardiovascular:      Rate and Rhythm: Normal rate and regular rhythm.      Heart sounds: Normal heart sounds.   Pulmonary:      Effort: Pulmonary effort is normal.      Breath sounds: Normal breath sounds.   Abdominal:      General: Bowel sounds are normal. There is no distension.      Palpations: Abdomen is soft. There is no mass.      Tenderness: There is no abdominal tenderness. There is no guarding or rebound.      Hernia: No hernia is present.   Musculoskeletal:         General: Normal range of motion.   Skin:     General: Skin is warm and dry.   Neurological:      Mental Status: He is alert and oriented to person, place, and time.   Psychiatric:         Speech: Speech normal.         Behavior: Behavior normal.         Judgment: Judgment normal.           Assessment & Plan          Assessment and Plan    Diagnoses and all orders for this visit:    1. Right upper quadrant abdominal pain (Primary)    2. Chronic idiopathic constipation    3. Abdominal bloating    4. History of colon polyps    Reviewed medical history with the patient today.  Reviewed most recent notes from general surgery.  At this time they want to hold off on any surgical intervention for his right upper quadrant pain and abnormality seen on imaging.  They did recommend that " if the patient's pain were to get worse or change they would reorder a CT scan and further evaluate.  Currently patient seems to be doing well.  He still gets the right upper quadrant pain but it is not as severe as before.  Still struggling with constipation and bloating.  Recommend that he use his bowel regimen more regularly.  Continue high-fiber diet.  Recall colonoscopy in 2031.  Patient to call the office with any issues.  Patient to follow-up with me as needed.  Patient is agreeable to the plan.            Follow Up       Follow Up   Return if symptoms worsen or fail to improve, for CONSTIPATION, ABDOMINAL PAIN.  Patient was given instructions and counseling regarding his condition or for health maintenance advice. Please see specific information pulled into the AVS if appropriate.

## 2024-07-23 ENCOUNTER — LAB (OUTPATIENT)
Dept: LAB | Facility: HOSPITAL | Age: 74
End: 2024-07-23
Payer: MEDICARE

## 2024-07-23 ENCOUNTER — OFFICE VISIT (OUTPATIENT)
Dept: FAMILY MEDICINE CLINIC | Age: 74
End: 2024-07-23
Payer: MEDICARE

## 2024-07-23 VITALS
WEIGHT: 243 LBS | DIASTOLIC BLOOD PRESSURE: 57 MMHG | BODY MASS INDEX: 32.2 KG/M2 | HEIGHT: 73 IN | HEART RATE: 59 BPM | OXYGEN SATURATION: 98 % | TEMPERATURE: 98 F | SYSTOLIC BLOOD PRESSURE: 113 MMHG

## 2024-07-23 DIAGNOSIS — E87.5 HYPERKALEMIA: ICD-10-CM

## 2024-07-23 DIAGNOSIS — M79.675 TOE PAIN, LEFT: Primary | ICD-10-CM

## 2024-07-23 DIAGNOSIS — M1A.9XX0 CHRONIC GOUT WITHOUT TOPHUS, UNSPECIFIED CAUSE, UNSPECIFIED SITE: ICD-10-CM

## 2024-07-23 DIAGNOSIS — I25.119 CORONARY ARTERY DISEASE INVOLVING NATIVE CORONARY ARTERY OF NATIVE HEART WITH ANGINA PECTORIS: ICD-10-CM

## 2024-07-23 LAB
DEPRECATED RDW RBC AUTO: 46.7 FL (ref 37–54)
ERYTHROCYTE [DISTWIDTH] IN BLOOD BY AUTOMATED COUNT: 14.4 % (ref 12.3–15.4)
HCT VFR BLD AUTO: 45.5 % (ref 37.5–51)
HGB BLD-MCNC: 15.2 G/DL (ref 13–17.7)
MCH RBC QN AUTO: 30 PG (ref 26.6–33)
MCHC RBC AUTO-ENTMCNC: 33.4 G/DL (ref 31.5–35.7)
MCV RBC AUTO: 89.7 FL (ref 79–97)
PLATELET # BLD AUTO: 282 10*3/MM3 (ref 140–450)
PMV BLD AUTO: 10.1 FL (ref 6–12)
RBC # BLD AUTO: 5.07 10*6/MM3 (ref 4.14–5.8)
URATE SERPL-MCNC: 4.2 MG/DL (ref 3.4–7)
WBC NRBC COR # BLD AUTO: 6.34 10*3/MM3 (ref 3.4–10.8)

## 2024-07-23 PROCEDURE — 1125F AMNT PAIN NOTED PAIN PRSNT: CPT | Performed by: NURSE PRACTITIONER

## 2024-07-23 PROCEDURE — G2211 COMPLEX E/M VISIT ADD ON: HCPCS | Performed by: NURSE PRACTITIONER

## 2024-07-23 PROCEDURE — 3078F DIAST BP <80 MM HG: CPT | Performed by: NURSE PRACTITIONER

## 2024-07-23 PROCEDURE — 99214 OFFICE O/P EST MOD 30 MIN: CPT | Performed by: NURSE PRACTITIONER

## 2024-07-23 PROCEDURE — 80048 BASIC METABOLIC PNL TOTAL CA: CPT

## 2024-07-23 PROCEDURE — 85027 COMPLETE CBC AUTOMATED: CPT

## 2024-07-23 PROCEDURE — 36415 COLL VENOUS BLD VENIPUNCTURE: CPT

## 2024-07-23 PROCEDURE — 84550 ASSAY OF BLOOD/URIC ACID: CPT

## 2024-07-23 PROCEDURE — 3052F HG A1C>EQUAL 8.0%<EQUAL 9.0%: CPT | Performed by: NURSE PRACTITIONER

## 2024-07-23 PROCEDURE — 3074F SYST BP LT 130 MM HG: CPT | Performed by: NURSE PRACTITIONER

## 2024-07-23 RX ORDER — LOSARTAN POTASSIUM 50 MG/1
50 TABLET ORAL DAILY
COMMUNITY
Start: 2024-05-10

## 2024-07-23 RX ORDER — CEPHALEXIN 500 MG/1
500 CAPSULE ORAL 3 TIMES DAILY
Qty: 30 CAPSULE | Refills: 0 | Status: SHIPPED | OUTPATIENT
Start: 2024-07-23

## 2024-07-23 NOTE — PROGRESS NOTES
"Chief Complaint  Toe Pain (Little toe on left foot still sore after hitting on counter x 2 weeks) and Hospital Follow Up Visit (Stent placed 7/10/24 at [facility])    Subjective          Kyler Staley presents to [facility] for left toe pain x 2 weeks. Hit corner of cabinets and blister with pus emerged after a couple of days. Small toe is swollen and red, tender to touch. Pt has tried peroxide at home to help with symptoms. Denies fever, chills.      Pt recently had cardiac stent on 7/10/24 at [facility]. He has f/u on 8/21/24 with CASSIDY Schneider. He had went to [facility] ED on 7/4/24 with chest pain/arm and was transferred to [facility]. Has occasional \"heart spasms.\" Cardio aware. SOA has improved. Pt is on elquis and plavix. Hospital notes not available for review.       Objective   Vital Signs:   Vitals:    07/23/24 1605   BP: 113/57   BP Location: Right arm   Patient Position: Sitting   Cuff Size: Large Adult   Pulse: 59   Temp: 98 °F (36.7 °C)   TempSrc: Oral   SpO2: 98%   Weight: 110 kg (243 lb)   Height: 185.4 cm (72.99\")       Body mass index is 32.07 kg/m².    Physical Exam  Vitals reviewed.   Constitutional:       General: He is not in acute distress.     Appearance: Normal appearance. He is well-developed.   HENT:      Head: Normocephalic and atraumatic.   Eyes:      Conjunctiva/sclera: Conjunctivae normal.      Pupils: Pupils are equal, round, and reactive to light.   Cardiovascular:      Rate and Rhythm: Normal rate and regular rhythm.      Heart sounds: Normal heart sounds. No murmur heard.  Pulmonary:      Effort: Pulmonary effort is normal. No respiratory distress.      Breath sounds: Normal breath sounds.   Musculoskeletal:         General: Swelling and tenderness present.      Comments: 5th digit left foot slightly red and swollen. Tender to touch and with movement.    Skin:     General: Skin is warm and dry.   Neurological:      Mental Status: He " is alert and oriented to person, place, and time.   Psychiatric:         Mood and Affect: Mood and affect normal.         Behavior: Behavior normal.         Thought Content: Thought content normal.         Judgment: Judgment normal.                     Assessment and Plan    Assessment & Plan  Toe pain, left  Complete rx abx. Notify office if pain continues and xray will be ordered.     Coronary artery disease involving native coronary artery of native heart with angina pectoris  f/u with cardiology as scheduled. Go to ED with chest pain. Continue eliquis.   Hyperkalemia  Will notify patient of results and treat accordingly.       Orders Placed This Encounter   Procedures    Basic Metabolic Panel    CBC (No Diff)    Basic Metabolic Panel     New Medications Ordered This Visit   Medications    cephalexin (Keflex) 500 MG capsule     Sig: Take 1 capsule by mouth 3 (Three) Times a Day.     Dispense:  30 capsule     Refill:  0         Patient to notify office with any acute concerns or issues.  Patient verbalizes understanding, agrees with plan of care and has no further questions upon discharge.    Please note that portions of this note were completed with a voice recognition program.      Follow Up    Return if symptoms worsen or fail to improve.  Patient was given instructions and counseling regarding his condition or for health maintenance advice. Please see specific information pulled into the AVS if appropriate.     Medications Discontinued During This Encounter   Medication Reason    ketoconazole (NIZORAL) 2 % shampoo Historical Med - Therapy completed

## 2024-07-24 LAB
ANION GAP SERPL CALCULATED.3IONS-SCNC: 7.4 MMOL/L (ref 5–15)
BUN SERPL-MCNC: 26 MG/DL (ref 8–23)
BUN/CREAT SERPL: 14.8 (ref 7–25)
CALCIUM SPEC-SCNC: 9.9 MG/DL (ref 8.6–10.5)
CHLORIDE SERPL-SCNC: 102 MMOL/L (ref 98–107)
CO2 SERPL-SCNC: 23.6 MMOL/L (ref 22–29)
CREAT SERPL-MCNC: 1.76 MG/DL (ref 0.76–1.27)
EGFRCR SERPLBLD CKD-EPI 2021: 40.1 ML/MIN/1.73
GLUCOSE SERPL-MCNC: 128 MG/DL (ref 65–99)
POTASSIUM SERPL-SCNC: 5.3 MMOL/L (ref 3.5–5.2)
SODIUM SERPL-SCNC: 133 MMOL/L (ref 136–145)

## 2024-08-01 ENCOUNTER — LAB (OUTPATIENT)
Dept: LAB | Facility: HOSPITAL | Age: 74
End: 2024-08-01
Payer: MEDICARE

## 2024-08-01 ENCOUNTER — PATIENT MESSAGE (OUTPATIENT)
Dept: FAMILY MEDICINE CLINIC | Age: 74
End: 2024-08-01
Payer: MEDICARE

## 2024-08-01 DIAGNOSIS — E87.5 HYPERKALEMIA: ICD-10-CM

## 2024-08-01 DIAGNOSIS — M79.675 TOE PAIN, LEFT: Primary | ICD-10-CM

## 2024-08-01 LAB
ANION GAP SERPL CALCULATED.3IONS-SCNC: 11.1 MMOL/L (ref 5–15)
BUN SERPL-MCNC: 23 MG/DL (ref 8–23)
BUN/CREAT SERPL: 14 (ref 7–25)
CALCIUM SPEC-SCNC: 9 MG/DL (ref 8.6–10.5)
CHLORIDE SERPL-SCNC: 100 MMOL/L (ref 98–107)
CO2 SERPL-SCNC: 21.9 MMOL/L (ref 22–29)
CREAT SERPL-MCNC: 1.64 MG/DL (ref 0.76–1.27)
EGFRCR SERPLBLD CKD-EPI 2021: 43.6 ML/MIN/1.73
GLUCOSE SERPL-MCNC: 173 MG/DL (ref 65–99)
POTASSIUM SERPL-SCNC: 4.8 MMOL/L (ref 3.5–5.2)
SODIUM SERPL-SCNC: 133 MMOL/L (ref 136–145)

## 2024-08-01 PROCEDURE — 80048 BASIC METABOLIC PNL TOTAL CA: CPT

## 2024-08-01 PROCEDURE — 36415 COLL VENOUS BLD VENIPUNCTURE: CPT

## 2024-08-02 NOTE — TELEPHONE ENCOUNTER
From: Kyler Staley  To: Marisol Sellers  Sent: 8/1/2024 8:26 PM EDT  Subject: Toe    Still having trouble with my toe. Thanks Peter

## 2024-08-07 ENCOUNTER — OFFICE VISIT (OUTPATIENT)
Dept: DIABETES SERVICES | Facility: HOSPITAL | Age: 74
End: 2024-08-07
Payer: MEDICARE

## 2024-08-07 ENCOUNTER — HOSPITAL ENCOUNTER (OUTPATIENT)
Dept: GENERAL RADIOLOGY | Facility: HOSPITAL | Age: 74
Discharge: HOME | End: 2024-08-07
Payer: MEDICARE

## 2024-08-07 VITALS
OXYGEN SATURATION: 99 % | WEIGHT: 245 LBS | TEMPERATURE: 98.1 F | HEIGHT: 73 IN | BODY MASS INDEX: 32.47 KG/M2 | HEART RATE: 88 BPM | DIASTOLIC BLOOD PRESSURE: 69 MMHG | SYSTOLIC BLOOD PRESSURE: 135 MMHG

## 2024-08-07 DIAGNOSIS — E11.22 TYPE 2 DIABETES MELLITUS WITH STAGE 3B CHRONIC KIDNEY DISEASE, WITH LONG-TERM CURRENT USE OF INSULIN: ICD-10-CM

## 2024-08-07 DIAGNOSIS — Z79.4 TYPE 2 DIABETES MELLITUS WITH DIABETIC NEUROPATHY, WITH LONG-TERM CURRENT USE OF INSULIN: ICD-10-CM

## 2024-08-07 DIAGNOSIS — E11.65 UNCONTROLLED TYPE 2 DIABETES MELLITUS WITH HYPERGLYCEMIA: Primary | ICD-10-CM

## 2024-08-07 DIAGNOSIS — E11.40 TYPE 2 DIABETES MELLITUS WITH DIABETIC NEUROPATHY, WITH LONG-TERM CURRENT USE OF INSULIN: ICD-10-CM

## 2024-08-07 DIAGNOSIS — N18.32 TYPE 2 DIABETES MELLITUS WITH STAGE 3B CHRONIC KIDNEY DISEASE, WITH LONG-TERM CURRENT USE OF INSULIN: ICD-10-CM

## 2024-08-07 DIAGNOSIS — Z79.4 TYPE 2 DIABETES MELLITUS WITH STAGE 3B CHRONIC KIDNEY DISEASE, WITH LONG-TERM CURRENT USE OF INSULIN: ICD-10-CM

## 2024-08-07 DIAGNOSIS — E66.9 OBESITY (BMI 30-39.9): ICD-10-CM

## 2024-08-07 LAB
EXPIRATION DATE: ABNORMAL
GLUCOSE BLDC GLUCOMTR-MCNC: 317 MG/DL (ref 70–99)
HBA1C MFR BLD: 8.1 % (ref 4.5–5.7)
Lab: ABNORMAL

## 2024-08-07 PROCEDURE — 1160F RVW MEDS BY RX/DR IN RCRD: CPT | Performed by: NURSE PRACTITIONER

## 2024-08-07 PROCEDURE — 95251 CONT GLUC MNTR ANALYSIS I&R: CPT | Performed by: NURSE PRACTITIONER

## 2024-08-07 PROCEDURE — 73660 X-RAY EXAM OF TOE(S): CPT

## 2024-08-07 PROCEDURE — G0463 HOSPITAL OUTPT CLINIC VISIT: HCPCS | Performed by: NURSE PRACTITIONER

## 2024-08-07 PROCEDURE — 99213 OFFICE O/P EST LOW 20 MIN: CPT | Performed by: NURSE PRACTITIONER

## 2024-08-07 PROCEDURE — 3078F DIAST BP <80 MM HG: CPT | Performed by: NURSE PRACTITIONER

## 2024-08-07 PROCEDURE — 82948 REAGENT STRIP/BLOOD GLUCOSE: CPT | Performed by: NURSE PRACTITIONER

## 2024-08-07 PROCEDURE — 3052F HG A1C>EQUAL 8.0%<EQUAL 9.0%: CPT | Performed by: NURSE PRACTITIONER

## 2024-08-07 PROCEDURE — 1159F MED LIST DOCD IN RCRD: CPT | Performed by: NURSE PRACTITIONER

## 2024-08-07 PROCEDURE — 3075F SYST BP GE 130 - 139MM HG: CPT | Performed by: NURSE PRACTITIONER

## 2024-08-07 PROCEDURE — 83036 HEMOGLOBIN GLYCOSYLATED A1C: CPT | Performed by: NURSE PRACTITIONER

## 2024-08-07 NOTE — PROGRESS NOTES
Chief Complaint  Diabetes (F/u, med mgt, A1C eval, )    Referred By: CASSIDY Kiser Aleksander Staley presents to [facility] for diabetes medication management    History of Present Illness    Visit type:  follow-up  Diabetes type:  Type 2  Current diabetes status/concerns/issues:  He stopped the Ozempic while in the hospital. States his GI symptoms resolved after discontinuing the Ozempic. [facility] and [facility] are [facility] up his Semglee by 2 units every 2 days until his fbs is less than 150mg/d. States they are also starting a sliding scale insulin for his meals. States he is waiting for short acting insulin to be mailed. Reports he is tolerating the increased dose of Jardiance w/o any side effects.   Other health concerns: Reports he went ot ER on 7/4/24 due to left arm pain. States he went to [facility] and his troponin level was normal but his EKG was abnormal so he was transferred to [facility]. States he had a cardiac cath and he had 80% blockage in 1 vessels and 100% blockage in another vessel which he had previous frafts. They did not want to do the stent due to his kidney function so they flushed his system for a day then placed the graft the following day. Reports he is having more neuropathy symptoms in his feet the last couple days.   Current Diabetes symptoms:    Polyuria: Yes occ   Polydipsia: No   Polyphagia: Yes increased since being off Ozempic   Blurred vision: No   Excessive fatigue: No  Known Diabetes complications:  Neuropathy: Numbness and Tingling; Location: Feet  Renal: Stage IIIb moderate (GFR = 30-44 mL/min)  Eyes: Other: wrinkle in left retina ; Location: Left; Last Eye Exam:  last wk ; Location:   Amputation/Wounds:  slow to heal  GI: Constipation, Reflux, and Indigestion  Cardiovascular: Hypertension, Hyperlipidemia, and Other: quadruple bypass, 9 stents  ED: Patient Reported  Other: None  Hypoglycemia:   None reported at this time  Hypoglycemia Symptoms:  No hypoglycemia at this time  Current diabetes treatment:  Insulin Glargine-yfgn 31 units qd, Jardiance 25mg qd    Blood glucose device:  Meter and Jackie CGM  Blood glucose monitoring frequency:  Continuous per CGM  Blood glucose range/average:  The 14-day sensor report shows an average glucose of 200mg/dL, with 41% in target range ( mgdL), 42% in the high range (181-250 mg/dL), 17% in the very high range (>250 mg/dL), 0% in the low range (54-70 mg/dL) and 0% in the very low range (<54 mg/dL). 100-160 fasting  Glucose Source: Device Reviewed  Dietary behavior:  Avoids sugary drinks, Number of meals each day - 2; Number of snacks each day - 2  Activity/Exercise:   limited due to back pain    Past Medical History:   Diagnosis Date    Abnormal ECG     Acne     Alcoholism     Allergic     Anemia     Anxiety     Arthritis     Arthritis of back     Arthritis of neck     Atrial fibrillation     Back pain     Benign prostatic hyperplasia     Cardiac disease     Cataracts, bilateral     Cervical disc disorder     Cholelithiasis     Chronic kidney disease, stage 3     moderate    CKD (chronic kidney disease)     Clotting disorder     Condition not found     hernia    Coronary artery disease     Depression     Diabetes     Enlarged prostate     Erectile dysfunction     Fatigue     GERD (gastroesophageal reflux disease)     Gout     Hearing loss     Hematospermia 12/09/2015    High blood pressure     High cholesterol     Hip arthrosis     History of cold sores     History of gastritis     [place] (hard of hearing)     Hyperlipidemia     Hypertension     Hypoglycemia     Hypothyroidism     Irregular heart rhythm     Joint pain     Kidney disease     Knee swelling     Low back pain     Lumbosacral disc disease     Narrowing of airway 2012    PER ENT    Neuropathy     Obesity     Osteopenia     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     Pneumonia     PONV  (postoperative nausea and vomiting)     Psychiatric diagnosis     Renal insufficiency     Rotator cuff syndrome     REPAIRED BILATERAL    Shortness of breath     Sinus trouble     Sleep apnea     BIPAP    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)     Substance abuse     Swallowing difficulty     Testosterone deficiency     Thin skin     Thoracic disc disorder     Thyroid condition     Type 2 diabetes mellitus     Visual impairment     Vitamin D deficiency     Vocal cord dysfunction      HISTORY OF DISORDER ON ONE SIDE AFTER TRACHEA     Past Surgical History:   Procedure Laterality Date    BACK SURGERY  2018    X2    BACK SURGERY  2011    CARDIAC CATHETERIZATION      CARDIAC SURGERY      CHOLECYSTECTOMY      COLONOSCOPY      CORONARY ARTERY BYPASS GRAFT  2012    CORONARY STENT PLACEMENT      EYE SURGERY      HERNIA REPAIR      INGUINAL HERNIA REPAIR      JOINT REPLACEMENT      KNEE SURGERY      right and left    LUMBAR DISCECTOMY FUSION INSTRUMENTATION N/A 02/11/2021    Procedure: EXPLORATION OF FUSION T-12 S-1 REMOVAL OF HARDWARE REVISION OF FUSION AND INSTRUMENTATION T12-S1 WITH APPLICATION OF BONE MORPHOGENIC PROTEIN;  Surgeon: Baltazar Blankenship MD;  Location: [facility];  Service: Orthopedic Spine;  Laterality: N/A;    NOSE SURGERY      PACEMAKER IMPLANTATION      REPLACEMENT TOTAL KNEE Bilateral     SHOULDER SURGERY Bilateral     rotator cuff repair    SPINE SURGERY       Family History   Problem Relation Age of Onset    Heart attack Mother     Arthritis Mother     Cancer Mother     Heart disease Mother     Hypertension Mother     Cancer Father     Hypertension Sister     Hypertension Brother     Malig Hyperthermia Neg Hx      Social History     Socioeconomic History    Marital status:    Tobacco Use    Smoking status: Former     Current packs/day: 0.00     Average packs/day: 3.0 packs/day for 15.0 years (45.0 ttl pk-yrs)     Types: Cigarettes     Start date: 1/1/1960      Quit date: 1975     Years since quittin.6     Passive exposure: Past    Smokeless tobacco: Never   Vaping Use    Vaping status: Never Used   Substance and Sexual Activity    Alcohol use: No    Drug use: Yes     Types: Hydrocodone    Sexual activity: Not Currently     Partners: Female     Allergies   Allergen Reactions    Bupropion Other (See Comments) and Unknown - Low Severity     MAKES PATIENT VERY AGGRESSIVE      Iodinated Contrast Media Other (See Comments)     Other reaction(s): Other (See Comments)  Chronic kidney disease - stage 3  Chronic kidney disease - stage 3    Lorazepam Other (See Comments) and Mental Status Change     MADE PATIENT VERY AGGRESSIVE      Propoxyphene Nausea And Vomiting and Unknown - Low Severity    Adhesive Tape Rash       Current Outpatient Medications:     allopurinol (ZYLOPRIM) 300 MG tablet, Take 1 tablet by mouth Daily., Disp: 90 tablet, Rfl: 1    ALPRAZolam (XANAX) 0.5 MG tablet, Take 1 tablet by mouth At Night As Needed for Anxiety., Disp: 30 tablet, Rfl: 0    amLODIPine (NORVASC) 5 MG tablet, Take 1 tablet by mouth Daily., Disp: , Rfl:     apixaban (ELIQUIS) 5 MG tablet tablet, Take 1 tablet by mouth 2 (Two) Times a Day., Disp: , Rfl:     atorvastatin (LIPITOR) 40 MG tablet, Take 1 tablet by mouth Daily., Disp: 90 tablet, Rfl: 1    carvedilol (COREG) 12.5 MG tablet, Take 1 tablet by mouth 2 (Two) Times a Day With Meals., Disp: 180 tablet, Rfl: 1    cephalexin (Keflex) 500 MG capsule, Take 1 capsule by mouth 3 (Three) Times a Day., Disp: 30 capsule, Rfl: 0    empagliflozin (Jardiance) 25 MG tablet tablet, Take 1 tablet by mouth Daily for 180 days., Disp: 90 tablet, Rfl: 1    eplerenone (INSPRA) 25 MG tablet, Take 1 tablet by mouth Daily., Disp: , Rfl:     fexofenadine (ALLEGRA) 60 MG tablet, Take 1 tablet by mouth Daily., Disp: , Rfl:     finasteride (PROSCAR) 5 MG tablet, Take 1 tablet by mouth once daily, Disp: 90 tablet, Rfl: 0    Flaxseed, Linseed, 1000 MG capsule,  "Take 1,200 mg by mouth 2 (Two) Times a Day., Disp: , Rfl:     glucose blood test strip, , Disp: , Rfl:     imipramine (TOFRANIL) 50 MG tablet, TAKE 1 TABLET BY MOUTH IN THE MORNING AND 2 IN THE EVENING, Disp: 270 tablet, Rfl: 1    insulin glargine (LANTUS, SEMGLEE) 100 UNIT/ML injection, Inject 29 Units under the skin into the appropriate area as directed Daily., Disp: , Rfl:     isosorbide mononitrate (IMDUR) 60 MG 24 hr tablet, Take 1 tablet by mouth Daily., Disp: 90 tablet, Rfl: 1    levothyroxine (SYNTHROID, LEVOTHROID) 137 MCG tablet, TAKE 1 TABLET BY MOUTH ONCE DAILY IN THE MORNING, Disp: 90 tablet, Rfl: 0    losartan (COZAAR) 50 MG tablet, Take 1 tablet by mouth Daily., Disp: , Rfl:     Melatonin 5 MG chewable tablet, Chew 1 tablet Daily As Needed., Disp: , Rfl:     nitroglycerin (NITROSTAT) 0.4 MG SL tablet, Place 1 tablet under the tongue Every 5 (Five) Minutes As Needed for Chest Pain. do not exceed a total of 3 doses in 15 minutes, Disp: , Rfl:     pantoprazole (PROTONIX) 40 MG EC tablet, Take 1 tablet by mouth Daily., Disp: 90 tablet, Rfl: 1    Plavix 75 MG tablet, Take 1 tablet by mouth Daily., Disp: , Rfl:     tamsulosin (FLOMAX) 0.4 MG capsule 24 hr capsule, Take 2 capsules by mouth once daily, Disp: 180 capsule, Rfl: 0    Objective     Vitals:    08/07/24 0937   BP: 135/69   BP Location: Right arm   Patient Position: Sitting   Cuff Size: Large Adult   Pulse: 88   Temp: 98.1 °F (36.7 °C)   TempSrc: Temporal   SpO2: 99%   Weight: 111 kg (245 lb)   Height: 185.4 cm (72.99\")     Body mass index is 32.33 kg/m².    Physical Exam  Constitutional:       Appearance: Normal appearance. He is obese.      Comments: Obesity (BMI 30 - 39.9) Pt Current BMI = 32.33     HENT:      Head: Normocephalic and atraumatic.      Right Ear: External ear normal.      Left Ear: External ear normal.      Nose: Nose normal.   Eyes:      Extraocular Movements: Extraocular movements intact.      Conjunctiva/sclera: Conjunctivae " "normal.   Pulmonary:      Effort: Pulmonary effort is normal.   Musculoskeletal:         General: Normal range of motion.      Cervical back: Normal range of motion.   Skin:     General: Skin is warm and dry.   Neurological:      General: No focal deficit present.      Mental Status: He is alert and oriented to person, place, and time. Mental status is at baseline.   Psychiatric:         Mood and Affect: Mood normal.         Behavior: Behavior normal.         Thought Content: Thought content normal.         Judgment: Judgment normal.             Result Review :   The following data was reviewed by: CASSIDY Moreland on 08/07/2024:    Most Recent A1C          8/7/2024    09:49   HGBA1C Most Recent   Hemoglobin A1C 8.1        A1C Last 3 Results          12/15/2023    16:54 3/15/2024    15:42 8/7/2024    09:49   HGBA1C Last 3 Results   Hemoglobin A1C 7.50  8.5  8.1      A1c collected in the office today is 8.1%, indicating Uncontrolled Type II diabetes.  This result is down from the prior result of 8.5% collected on 3/15/24     Glucose   Date Value Ref Range Status   08/07/2024 317 (H) 70 - 99 mg/dL Final     Comment:     Serial Number: [id number]:  [id number]   03/09/2018 151 (H) 74 - 106 mg/dL Final     Point of care glucose in the office today is  elevated at 317mg/dl.     Creatinine   Date Value Ref Range Status   08/01/2024 1.64 (H) 0.76 - 1.27 mg/dL Final   07/23/2024 1.76 (H) 0.76 - 1.27 mg/dL Final   12/18/2019 1.8 (H) 0.6 - 1.3 mg/dL Final   03/09/2018 1.3 0.7 - 1.5 mg/dL Final     eGFR   Date Value Ref Range Status   08/01/2024 43.6 (L) >60.0 mL/min/1.73 Final   07/23/2024 40.1 (L) >60.0 mL/min/1.73 Final     Labs collected on 8/1/24 show Stage IIIb moderate (GFR = 30-44 mL/min    No results found for: \"MICROALBUR\"  Creatinine, Urine   Date Value Ref Range Status   05/03/2024 77.9 mg/dL Final   02/12/2024 76.2 mg/dL Final     No results found for: \"MALBCRERATIO\"  Urine microalbuminuria " collected on 5/3/3/24 is positive for microalbuminuria    Total Cholesterol   Date Value Ref Range Status   11/28/2023 153 0 - 200 mg/dL Final   07/19/2023 129 0 - 200 mg/dL Final     Triglycerides   Date Value Ref Range Status   11/28/2023 341 (H) 0 - 150 mg/dL Final   07/19/2023 335 (H) 0 - 150 mg/dL Final     HDL Cholesterol   Date Value Ref Range Status   11/28/2023 32 (L) 40 - 60 mg/dL Final   07/19/2023 28 (L) 40 - 60 mg/dL Final     LDL Cholesterol    Date Value Ref Range Status   11/28/2023 67 0 - 100 mg/dL Final   07/19/2023 50 0 - 100 mg/dL Final     Lipid panel collected on 11/28/23 shows Hypertriglyceridemia and low HDL            Assessment: Pt is here today for a follow up on his diabetes. His last visit was 3/15/24. He stopped the Ozempic while in the hospital. States his GI symptoms resolved after discontinuing the Ozempic. [facility] and [facility] are [facility] up his Semglee by 2 units every 2 days until his fbs is less than 150mg/d. States they are also starting a sliding scale insulin for his meals. States he is waiting for short acting insulin to be mailed. Reports he is tolerating the increased dose of Jardiance w/o any side effects. Reviewed CGM report with pt in the office today. The 14-day sensor report shows an average glucose of 200mg/dL, with 41% in target range ( mgdL), 42% in the high range (181-250 mg/dL), 17% in the very high range (>250 mg/dL), 0% in the low range (54-70 mg/dL) and 0% in the very low range (<54 mg/dL).  His A1c in the office today is 8.1% which is down from his previous A1c of 8.5% in March.  In addition he complains of worsening neuropathy pain in his feet the past couple days.      Diagnoses and all orders for this visit:    1. Uncontrolled type 2 diabetes mellitus with hyperglycemia (Primary)  -     POC Glycosylated Hemoglobin (Hb A1C)    2. Type 2 diabetes mellitus with diabetic neuropathy, with long-term current use of insulin    3. Type 2  diabetes mellitus with stage 3b chronic kidney disease, with long-term current use of insulin    4. Obesity (BMI 30-39.9)    Other orders  -     POC Glucose        Plan: No changes were made to his plan of care today since [facility] has [facility] him on an insulin taper for his Semglee and added a short acting insulin.  We discussed Qutenza for his neuropathy symptoms once it becomes available.  Scheduled a 3-month follow-up and we will review his CGM and recheck his A1c at that time.    The patient will monitor his blood glucose levels for CGM.  If he develops problematic hyperglycemia or hypoglycemia or adverse drug reactions, he will contact the office for further instructions.        Follow Up     Return in about 3 months (around 11/7/2024) for Medication Mgmt, CGM Follow Up.    Patient was given instructions and counseling regarding his condition or for health maintenance advice. Please see specific information pulled into the AVS if appropriate.     Deb Moody, CASSIDY  08/07/2024      Dictated Utilizing Dragon Dictation.  Please note that portions of this note were completed with a voice recognition program.  Part of this note may be an electronic transcription/translation of spoken language to printed text using the Dragon Dictation System.

## 2024-08-09 ENCOUNTER — CLINICAL SUPPORT (OUTPATIENT)
Dept: FAMILY MEDICINE CLINIC | Age: 74
End: 2024-08-09
Payer: MEDICARE

## 2024-08-15 ENCOUNTER — OFFICE VISIT (OUTPATIENT)
Dept: PODIATRY | Facility: CLINIC | Age: 74
End: 2024-08-15
Payer: MEDICARE

## 2024-08-15 VITALS
BODY MASS INDEX: 31.94 KG/M2 | WEIGHT: 241 LBS | HEIGHT: 73 IN | DIASTOLIC BLOOD PRESSURE: 64 MMHG | OXYGEN SATURATION: 97 % | HEART RATE: 61 BPM | SYSTOLIC BLOOD PRESSURE: 128 MMHG

## 2024-08-15 DIAGNOSIS — E11.65 TYPE 2 DIABETES MELLITUS WITH HYPERGLYCEMIA, UNSPECIFIED WHETHER LONG TERM INSULIN USE: ICD-10-CM

## 2024-08-15 DIAGNOSIS — S92.535A CLOSED NONDISPLACED FRACTURE OF DISTAL PHALANX OF LESSER TOE OF LEFT FOOT, INITIAL ENCOUNTER: Primary | ICD-10-CM

## 2024-08-15 DIAGNOSIS — F41.9 ANXIETY: ICD-10-CM

## 2024-08-15 DIAGNOSIS — M79.672 LEFT FOOT PAIN: ICD-10-CM

## 2024-08-15 RX ORDER — ALPRAZOLAM 0.5 MG/1
0.5 TABLET ORAL NIGHTLY PRN
Qty: 30 TABLET | Refills: 0 | Status: SHIPPED | OUTPATIENT
Start: 2024-08-15

## 2024-08-16 NOTE — PROGRESS NOTES
[facility] - PODIATRY    Today's Date: 08/15/24    Patient Name: Kyler Staley  MRN: [id number]  CSN: [id number]  PCP: Marisol Sellers APRN,  Referring Provider: No ref. provider found    SUBJECTIVE     Chief Complaint   Patient presents with    Left Foot - Establish Care, Diabetes     Per imaging 08/02/2024 -Nondisplaced fracture of the base of the fifth digit distal phalanx     HPI: Kyler Staley, a 74 y.o.male, presents to clinic.    Patient is a 74 year old male presenting with pain to his 5th toe of his left foot. Patient says he jammed the toe and broke it. Patient says it is improving, but still sore. He is diabetic.     Past Medical History:   Diagnosis Date    Abnormal ECG     Acne     Alcoholism     Allergic     Anemia     Anxiety     Arthritis     Arthritis of back     Arthritis of neck     Atrial fibrillation     Back pain     Benign prostatic hyperplasia     Cardiac disease     Cataracts, bilateral     Cervical disc disorder     Cholelithiasis     Chronic kidney disease, stage 3     moderate    CKD (chronic kidney disease)     Clotting disorder     Condition not found     hernia    Coronary artery disease     Depression     Diabetes     Enlarged prostate     Erectile dysfunction     Fatigue     GERD (gastroesophageal reflux disease)     Gout     Hearing loss     Hematospermia 12/09/2015    High blood pressure     High cholesterol     Hip arthrosis     History of cold sores     History of gastritis     [place] (hard of hearing)     Hyperlipidemia     Hypertension     Hypoglycemia     Hypothyroidism     Irregular heart rhythm     Joint pain     Kidney disease     Knee swelling     Low back pain     Lumbosacral disc disease     Narrowing of airway 2012    PER ENT    Neuropathy     Obesity     Osteopenia     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     Pneumonia     PONV (postoperative nausea and vomiting)     Psychiatric diagnosis     Renal insufficiency      Rotator cuff syndrome     REPAIRED BILATERAL    Shortness of breath     Sinus trouble     Sleep apnea     BIPAP    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)     Substance abuse     Swallowing difficulty     Testosterone deficiency     Thin skin     Thoracic disc disorder     Thyroid condition     Type 2 diabetes mellitus     Visual impairment     Vitamin D deficiency     Vocal cord dysfunction      HISTORY OF DISORDER ON ONE SIDE AFTER TRACHEA     Past Surgical History:   Procedure Laterality Date    BACK SURGERY  2018    X2    BACK SURGERY  2011    CARDIAC CATHETERIZATION      CARDIAC SURGERY      CHOLECYSTECTOMY      COLONOSCOPY      CORONARY ARTERY BYPASS GRAFT  2012    CORONARY STENT PLACEMENT      EYE SURGERY      HERNIA REPAIR      INGUINAL HERNIA REPAIR      JOINT REPLACEMENT      KNEE SURGERY      right and left    LUMBAR DISCECTOMY FUSION INSTRUMENTATION N/A 2021    Procedure: EXPLORATION OF FUSION T-12 S-1 REMOVAL OF HARDWARE REVISION OF FUSION AND INSTRUMENTATION T12-S1 WITH APPLICATION OF BONE MORPHOGENIC PROTEIN;  Surgeon: Baltazar Blankenship MD;  Location: [facility] OR;  Service: Orthopedic Spine;  Laterality: N/A;    NOSE SURGERY      PACEMAKER IMPLANTATION      REPLACEMENT TOTAL KNEE Bilateral     SHOULDER SURGERY Bilateral     rotator cuff repair    SPINE SURGERY       Family History   Problem Relation Age of Onset    Heart attack Mother     Arthritis Mother     Cancer Mother     Heart disease Mother     Hypertension Mother     Cancer Father     Hypertension Sister     Hypertension Brother     Malig Hyperthermia Neg Hx      Social History     Socioeconomic History    Marital status:    Tobacco Use    Smoking status: Former     Current packs/day: 0.00     Average packs/day: 3.0 packs/day for 15.0 years (45.0 ttl pk-yrs)     Types: Cigarettes     Start date: 1960     Quit date: 1975     Years since quittin.6     Passive exposure: Past    Smokeless  tobacco: Never   Vaping Use    Vaping status: Never Used   Substance and Sexual Activity    Alcohol use: No    Drug use: Yes     Types: Hydrocodone    Sexual activity: Not Currently     Partners: Female     Allergies   Allergen Reactions    Bupropion Other (See Comments) and Unknown - Low Severity     MAKES PATIENT VERY AGGRESSIVE      Iodinated Contrast Media Other (See Comments)     Other reaction(s): Other (See Comments)  Chronic kidney disease - stage 3  Chronic kidney disease - stage 3    Lorazepam Other (See Comments) and Mental Status Change     MADE PATIENT VERY AGGRESSIVE      Propoxyphene Nausea And Vomiting and Unknown - Low Severity    Adhesive Tape Rash     Current Outpatient Medications   Medication Sig Dispense Refill    allopurinol (ZYLOPRIM) 300 MG tablet Take 1 tablet by mouth Daily. 90 tablet 1    amLODIPine (NORVASC) 5 MG tablet Take 1 tablet by mouth Daily.      apixaban (ELIQUIS) 5 MG tablet tablet Take 1 tablet by mouth 2 (Two) Times a Day.      atorvastatin (LIPITOR) 40 MG tablet Take 1 tablet by mouth Daily. 90 tablet 1    carvedilol (COREG) 12.5 MG tablet Take 1 tablet by mouth 2 (Two) Times a Day With Meals. 180 tablet 1    empagliflozin (Jardiance) 25 MG tablet tablet Take 1 tablet by mouth Daily for 180 days. 90 tablet 1    eplerenone (INSPRA) 25 MG tablet Take 1 tablet by mouth Daily.      fexofenadine (ALLEGRA) 60 MG tablet Take 1 tablet by mouth Daily.      finasteride (PROSCAR) 5 MG tablet Take 1 tablet by mouth once daily 90 tablet 0    Flaxseed, Linseed, 1000 MG capsule Take 1,200 mg by mouth 2 (Two) Times a Day.      glucose blood test strip       imipramine (TOFRANIL) 50 MG tablet TAKE 1 TABLET BY MOUTH IN THE MORNING AND 2 IN THE EVENING 270 tablet 1    insulin glargine (LANTUS, SEMGLEE) 100 UNIT/ML injection Inject 29 Units under the skin into the appropriate area as directed Daily.      isosorbide mononitrate (IMDUR) 60 MG 24 hr tablet Take 1 tablet by mouth Daily. 90 tablet 1     levothyroxine (SYNTHROID, LEVOTHROID) 137 MCG tablet TAKE 1 TABLET BY MOUTH ONCE DAILY IN THE MORNING 90 tablet 0    losartan (COZAAR) 50 MG tablet Take 1 tablet by mouth Daily.      Melatonin 5 MG chewable tablet Chew 1 tablet Daily As Needed.      nitroglycerin (NITROSTAT) 0.4 MG SL tablet Place 1 tablet under the tongue Every 5 (Five) Minutes As Needed for Chest Pain. do not exceed a total of 3 doses in 15 minutes      pantoprazole (PROTONIX) 40 MG EC tablet Take 1 tablet by mouth Daily. 90 tablet 1    Plavix 75 MG tablet Take 1 tablet by mouth Daily.      tamsulosin (FLOMAX) 0.4 MG capsule 24 hr capsule Take 2 capsules by mouth once daily 180 capsule 0    ALPRAZolam (XANAX) 0.5 MG tablet TAKE 1 TABLET BY MOUTH AT NIGHT AS NEEDED FOR ANXIETY 30 tablet 0    cephalexin (Keflex) 500 MG capsule Take 1 capsule by mouth 3 (Three) Times a Day. 30 capsule 0     No current facility-administered medications for this visit.     Review of Systems   Musculoskeletal:         Foot pain   All other systems reviewed and are negative.      OBJECTIVE     Vitals:    08/15/24 1317   BP: 128/64   Pulse: 61   SpO2: 97%       WBC   Date Value Ref Range Status   07/23/2024 6.34 3.40 - 10.80 10*3/mm3 Final   01/20/2021 7.08 4.80 - 10.80 10*3/uL Final   02/19/2018 6.00 4.5 - 11.0 10*3/uL Final     RBC   Date Value Ref Range Status   07/23/2024 5.07 4.14 - 5.80 10*6/mm3 Final   02/19/2018 4.83 4.5 - 5.9 10*6/uL Final     Hemoglobin   Date Value Ref Range Status   07/23/2024 15.2 13.0 - 17.7 g/dL Final   03/09/2018 10.7 (L) 13.5 - 17.5 g/dL Final     Hematocrit   Date Value Ref Range Status   07/23/2024 45.5 37.5 - 51.0 % Final   03/09/2018 33.5 (L) 41.0 - 53.0 % Final     MCV   Date Value Ref Range Status   07/23/2024 89.7 79.0 - 97.0 fL Final   02/19/2018 86.7 80.0 - 100.0 fL Final     MCH   Date Value Ref Range Status   07/23/2024 30.0 26.6 - 33.0 pg Final   02/19/2018 29.2 26.0 - 34.0 pg Final     MCHC   Date Value Ref Range Status    07/23/2024 33.4 31.5 - 35.7 g/dL Final   02/19/2018 33.7 31.0 - 37.0 g/dL Final     RDW   Date Value Ref Range Status   07/23/2024 14.4 12.3 - 15.4 % Final   02/19/2018 14.4 12.0 - 16.8 % Final     RDW-SD   Date Value Ref Range Status   07/23/2024 46.7 37.0 - 54.0 fl Final     MPV   Date Value Ref Range Status   07/23/2024 10.1 6.0 - 12.0 fL Final   02/19/2018 10.1 6.7 - 10.8 fL Final     Platelets   Date Value Ref Range Status   07/23/2024 282 140 - 450 10*3/mm3 Final   03/09/2018 266 140 - 440 10*3/uL Final     Neutrophil Rel %   Date Value Ref Range Status   02/19/2018 54.7 45 - 80 % Final     Neutrophil %   Date Value Ref Range Status   07/31/2023 63.8 42.7 - 76.0 % Final     Lymphocyte Rel %   Date Value Ref Range Status   02/19/2018 35.0 15 - 50 % Final     Lymphocyte %   Date Value Ref Range Status   07/31/2023 24.8 19.6 - 45.3 % Final     Monocyte Rel %   Date Value Ref Range Status   02/19/2018 7.0 0 - 15 % Final     Monocyte %   Date Value Ref Range Status   07/31/2023 7.9 5.0 - 12.0 % Final     Eosinophil %   Date Value Ref Range Status   07/31/2023 2.8 0.3 - 6.2 % Final   02/19/2018 2.3 0 - 7 % Final     Basophil Rel %   Date Value Ref Range Status   02/19/2018 0.7 0 - 2 % Final     Basophil %   Date Value Ref Range Status   07/31/2023 0.4 0.0 - 1.5 % Final     Immature Grans %   Date Value Ref Range Status   07/31/2023 0.3 0.0 - 0.5 % Final   02/19/2018 0.3 (H) 0 % Final     Neutrophils Absolute   Date Value Ref Range Status   03/17/2022 5.00 1.70 - 6.00 x10(3)/ul Final   02/19/2018 3.28 2.0 - 8.8 10*3/uL Final     Neutrophils, Absolute   Date Value Ref Range Status   07/31/2023 4.55 1.70 - 7.00 10*3/mm3 Final     Lymphocytes Absolute   Date Value Ref Range Status   02/19/2018 2.10 0.7 - 5.5 10*3/uL Final     Lymphocytes, Absolute   Date Value Ref Range Status   07/31/2023 1.77 0.70 - 3.10 10*3/mm3 Final     Monocytes Absolute   Date Value Ref Range Status   02/19/2018 0.42 0.0 - 1.7 10*3/uL Final      Monocytes, Absolute   Date Value Ref Range Status   07/31/2023 0.56 0.10 - 0.90 10*3/mm3 Final     Eosinophils Absolute   Date Value Ref Range Status   03/17/2022 0.2 0.0 - 0.6 x10(3)/ul Final   02/19/2018 0.14 0.0 - 0.8 10*3/uL Final     Eosinophils, Absolute   Date Value Ref Range Status   07/31/2023 0.20 0.00 - 0.40 10*3/mm3 Final     Basophils Absolute   Date Value Ref Range Status   03/17/2022 0.0 0.0 - 0.3 x10(3)/ul Final   02/19/2018 0.04 0.0 - 0.2 10*3/uL Final     Basophils, Absolute   Date Value Ref Range Status   07/31/2023 0.03 0.00 - 0.20 10*3/mm3 Final     Immature Grans, Absolute   Date Value Ref Range Status   07/31/2023 0.02 0.00 - 0.05 10*3/mm3 Final   02/19/2018 0.02 <1 10*3/uL Final     nRBC   Date Value Ref Range Status   02/12/2021 0.0 0.0 - 0.2 /100 WBC Final         Lab Results   Component Value Date    GLUCOSE 173 (H) 08/01/2024    BUN 23 08/01/2024    CREATININE 1.64 (H) 08/01/2024    EGFRIFNONA 23 (L) 02/01/2022    BCR 14.0 08/01/2024    K 4.8 08/01/2024    CO2 21.9 (L) 08/01/2024    CALCIUM 9.0 08/01/2024    ALBUMIN 4.2 11/28/2023    LABIL2 1.4 01/20/2021    AST 18 11/28/2023    ALT 32 11/28/2023       Patient seen in no apparent distress.      PHYSICAL EXAM:     Foot/Ankle Exam    GENERAL  Appearance:  appears stated age  Orientation:  AAOx3  Affect:  appropriate  Gait:  unimpaired  Assistance:  independent  Right shoe gear: casual shoe  Left shoe gear: casual shoe    VASCULAR     Right Foot Vascularity   Normal vascular exam    Dorsalis pedis:  2+  Posterior tibial:  2+  Skin temperature:  warm  Edema grading:  None  CFT:  < 3 seconds  Pedal hair growth:  Present  Varicosities:  none     Left Foot Vascularity   Normal vascular exam    Dorsalis pedis:  2+  Posterior tibial:  2+  Skin temperature:  warm  Edema grading:  None  CFT:  < 3 seconds  Pedal hair growth:  Present  Varicosities:  none     NEUROLOGIC     Right Foot Neurologic   Normal sensation    Light touch sensation:  normal  Vibratory sensation: normal  Hot/Cold sensation: normal  Protective Sensation using Darden-Walter Monofilament:   Sites intact: 10  Sites tested: 10     Left Foot Neurologic   Normal sensation    Light touch sensation: normal  Vibratory sensation: normal  Hot/Cold sensation:  normal  Protective Sensation using Darden-Walter Monofilament:   Sites intact: 10  Sites tested: 10    MUSCULOSKELETAL     Left Foot Musculoskeletal   Tenderness:  toe 5 tenderness    MUSCLE STRENGTH     Right Foot Muscle Strength   Foot dorsiflexion:  4  Foot plantar flexion:  4  Foot inversion:  4  Foot eversion:  4     Left Foot Muscle Strength   Foot dorsiflexion:  4  Foot plantar flexion:  4  Foot inversion:  4  Foot eversion:  4    RANGE OF MOTION     Right Foot Range of Motion   Foot and ankle ROM within normal limits       Left Foot Range of Motion   Foot and ankle ROM within normal limits      DERMATOLOGIC      Right Foot Dermatologic   Skin  Right foot skin is intact.      Left Foot Dermatologic   Skin  Left foot skin is intact.       RADIOLOGY:        XR Toe 2+ View Left (In Office)    Result Date: 8/8/2024  Narrative: XR TOE 2+ VW LEFT Date of Exam: 8/7/2024 3:13 PM EDT Indication: pain Comparison: None available. Findings: There is a nondisplaced fracture of the base of the fifth digit distal phalanx. No evidence of intra-articular extension. Normal joint alignment. Moderate first MTP joint arthritis.     Impression: Impression: Nondisplaced fracture of the base of the fifth digit distal phalanx Electronically Signed: Forrest Nguyễn MD  8/8/2024 4:09 PM EDT  Workstation ID: [id number]     ASSESSMENT/PLAN     Diagnoses and all orders for this visit:    1. Closed nondisplaced fracture of distal phalanx of lesser toe of left foot, initial encounter (Primary)    2. Type 2 diabetes mellitus with hyperglycemia, unspecified whether long term insulin use    3. Left foot pain      Rest, Ice, Elevate extremity. WBAT in surgical  shoe/open toe shoe. RTC in 1 month.   Comprehensive lower extremity examination and evaluation was performed.    Discussed findings and treatment plan including risks, benefits, and treatment options with patient in detail. Patient agreed with treatment plan.    Medications and allergies reviewed.  Reviewed available lab values along with other pertinent labs.  These were discussed with the patient.    An After Visit Summary was printed and given to the patient at discharge, including (if requested) any available informative/educational handouts regarding diagnosis, treatment, or medications. All questions were answered to patient/family satisfaction. Should symptoms fail to improve or worsen they agree to call or return to clinic or to go to the Emergency Department. Discussed the importance of following up with any needed screening tests/labs/specialist appointments and any requested follow-up recommended by me today. Importance of maintaining follow-up discussed and patient accepts that missed appointments can delay diagnosis and potentially lead to worsening of conditions.    Return in about 1 month (around 9/15/2024)., or sooner if acute issues arise.    This document has been electronically signed by Pablo Keyes DPM on August 15, 2024 20:07 EDT

## 2024-09-05 NOTE — PROGRESS NOTES
Chief Complaint  Diabetes (Follow up, med mgt, CGM eval, A1c eval)    Referred By: No ref. provider found    Subjective          Kyler Staley presents to [facility] for diabetes medication management    History of Present Illness    Visit type:  follow-up  Diabetes type:  Type 2  Current diabetes status/concerns/issues:  Reports he tapered up his Lantus to 65 units daily.  States he is taking Novolog tid per sliding scale 15-20 min prior to eating. [facility] approved him to restart Ozempic 1mg once wk. States he has not taken since July 4th. States the Ozemic caused bloating, gas, constipation and stomach cramps in the past so he is hesitant to restart it however his appetite has really increased and he is willing to try it to help curb the appetite. States his fbs is running 120-140mg/dl and up to 250mg/dl nonfasting.   Other health concerns: He had a stent placed in his heart 7/4/24.   Current Diabetes symptoms:    Polyuria: No   Polydipsia: No   Polyphagia: Yes     Blurred vision: No   Excessive fatigue: No  Known Diabetes complications:  Neuropathy: Numbness and Tingling; Location: Feet  Renal: Stage IIIb moderate (GFR = 30-44 mL/min)  Eyes: Other: wrinkle in left retina ; Location: Left; Last Eye Exam:  last wk ; Location:   Amputation/Wounds:  slow to heal  GI: Constipation, Reflux, and Indigestion  Cardiovascular: Hypertension, Hyperlipidemia, and Other: quadruple bypass, 9 stents  ED: Patient Reported  Other: None  Hypoglycemia:  None reported at this time and 0% per CGM  Hypoglycemia Symptoms:  No hypoglycemia at this time  Current diabetes treatment:  Insulin Glargine-yfgn 31 units qd, Jardiance 25mg qd. Humalog tid with meals.    Blood glucose device:  Jackie CGM  Blood glucose monitoring frequency:  Continuous per CGM  Blood glucose range/average:  The 14-day sensor report shows an average glucose of 200mg/dL, with 45% in target range ( mgdL), 38% in  the high range (181-250 mg/dL), 17% in the very high range (>250 mg/dL), 0% in the low range (54-70 mg/dL) and 0% in the very low range (<54 mg/dL).   Glucose Source: Device Reviewed  Dietary behavior:  Avoids sugary drinks, Number of meals each day - 2; Number of snacks each day - 2  Activity/Exercise:   limited due to back pain       Past Medical History:   Diagnosis Date    Abnormal ECG     Acne     Alcoholism     Allergic     Anemia     Anxiety     Arthritis     Arthritis of back     Arthritis of neck     Atrial fibrillation     Back pain     Benign prostatic hyperplasia     Cardiac disease     Cataracts, bilateral     Cervical disc disorder     Cholelithiasis     Chronic kidney disease, stage 3     moderate    CKD (chronic kidney disease)     Clotting disorder     Condition not found     hernia    Coronary artery disease     Depression     Diabetes     Enlarged prostate     Erectile dysfunction     Fatigue     GERD (gastroesophageal reflux disease)     Gout     Hearing loss     Hematospermia 12/09/2015    High blood pressure     High cholesterol     Hip arthrosis     History of cold sores     History of gastritis     [place] (hard of hearing)     Hyperlipidemia     Hypertension     Hypoglycemia     Hypothyroidism     Irregular heart rhythm     Joint pain     Kidney disease     Knee swelling     Low back pain     Lumbosacral disc disease     Narrowing of airway 2012    PER ENT    Neuropathy     Obesity     Osteopenia     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     Pneumonia     PONV (postoperative nausea and vomiting)     Psychiatric diagnosis     Renal insufficiency     Rotator cuff syndrome     REPAIRED BILATERAL    Shortness of breath     Sinus trouble     Sleep apnea     BIPAP    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)     Substance abuse     Swallowing difficulty     Testosterone deficiency     Thin skin     Thoracic disc disorder     Thyroid condition     Type 2  diabetes mellitus     Visual impairment     Vitamin D deficiency     Vocal cord dysfunction      HISTORY OF DISORDER ON ONE SIDE AFTER TRACHEA     Past Surgical History:   Procedure Laterality Date    BACK SURGERY  2018    X2    BACK SURGERY  2011    CARDIAC CATHETERIZATION      CARDIAC SURGERY      CHOLECYSTECTOMY      COLONOSCOPY      CORONARY ARTERY BYPASS GRAFT  2012    CORONARY STENT PLACEMENT      EYE SURGERY      HERNIA REPAIR      INGUINAL HERNIA REPAIR      JOINT REPLACEMENT      KNEE SURGERY      right and left    LUMBAR DISCECTOMY FUSION INSTRUMENTATION N/A 2021    Procedure: EXPLORATION OF FUSION T-12 S-1 REMOVAL OF HARDWARE REVISION OF FUSION AND INSTRUMENTATION T12-S1 WITH APPLICATION OF BONE MORPHOGENIC PROTEIN;  Surgeon: Baltazar Blankenship MD;  Location: [facility] OR;  Service: Orthopedic Spine;  Laterality: N/A;    NOSE SURGERY      PACEMAKER IMPLANTATION      REPLACEMENT TOTAL KNEE Bilateral     SHOULDER SURGERY Bilateral     rotator cuff repair    SPINE SURGERY       Family History   Problem Relation Age of Onset    Heart attack Mother     Arthritis Mother     Cancer Mother     Heart disease Mother     Hypertension Mother     Cancer Father     Hypertension Sister     Hypertension Brother     Malig Hyperthermia Neg Hx      Social History     Socioeconomic History    Marital status:    Tobacco Use    Smoking status: Former     Current packs/day: 0.00     Average packs/day: 3.0 packs/day for 15.0 years (45.0 ttl pk-yrs)     Types: Cigarettes     Start date: 1960     Quit date: 1975     Years since quittin.7     Passive exposure: Past    Smokeless tobacco: Never   Vaping Use    Vaping status: Never Used   Substance and Sexual Activity    Alcohol use: No    Drug use: Yes     Types: Hydrocodone    Sexual activity: Not Currently     Partners: Female     Allergies   Allergen Reactions    Bupropion Other (See Comments) and Unknown - Low Severity     MAKES PATIENT VERY AGGRESSIVE       Iodinated Contrast Media Other (See Comments)     Other reaction(s): Other (See Comments)  Chronic kidney disease - stage 3  Chronic kidney disease - stage 3    Lorazepam Other (See Comments) and Mental Status Change     MADE PATIENT VERY AGGRESSIVE      Propoxyphene Nausea And Vomiting and Unknown - Low Severity    Adhesive Tape Rash       Current Outpatient Medications:     allopurinol (ZYLOPRIM) 300 MG tablet, Take 1 tablet by mouth Daily., Disp: 90 tablet, Rfl: 1    ALPRAZolam (XANAX) 0.5 MG tablet, TAKE 1 TABLET BY MOUTH AT NIGHT AS NEEDED FOR ANXIETY, Disp: 30 tablet, Rfl: 0    amLODIPine (NORVASC) 5 MG tablet, Take 1 tablet by mouth Daily., Disp: , Rfl:     apixaban (ELIQUIS) 5 MG tablet tablet, Take 1 tablet by mouth 2 (Two) Times a Day., Disp: , Rfl:     atorvastatin (LIPITOR) 40 MG tablet, Take 1 tablet by mouth Daily., Disp: 90 tablet, Rfl: 1    carvedilol (COREG) 12.5 MG tablet, Take 1 tablet by mouth 2 (Two) Times a Day With Meals., Disp: 180 tablet, Rfl: 1    empagliflozin (Jardiance) 25 MG tablet tablet, Take 1 tablet by mouth Daily for 180 days., Disp: 90 tablet, Rfl: 1    eplerenone (INSPRA) 25 MG tablet, Take 1 tablet by mouth Daily., Disp: , Rfl:     fexofenadine (ALLEGRA) 60 MG tablet, Take 1 tablet by mouth Daily., Disp: , Rfl:     finasteride (PROSCAR) 5 MG tablet, Take 1 tablet by mouth once daily, Disp: 90 tablet, Rfl: 0    Flaxseed, Linseed, 1000 MG capsule, Take 1,200 mg by mouth 2 (Two) Times a Day., Disp: , Rfl:     glucose blood test strip, , Disp: , Rfl:     imipramine (TOFRANIL) 50 MG tablet, TAKE 1 TABLET BY MOUTH IN THE MORNING AND 2 IN THE EVENING, Disp: 270 tablet, Rfl: 1    insulin aspart (novoLOG FLEXPEN) 100 UNIT/ML solution pen-injector sc pen, Inject 7 Units under the skin into the appropriate area as directed 3 (Three) Times a Day With Meals., Disp: , Rfl:     insulin glargine (LANTUS, SEMGLEE) 100 UNIT/ML injection, Inject 29 Units under the skin into the appropriate area  "as directed Daily., Disp: , Rfl:     isosorbide mononitrate (IMDUR) 60 MG 24 hr tablet, Take 1 tablet by mouth Daily., Disp: 90 tablet, Rfl: 1    levothyroxine (SYNTHROID, LEVOTHROID) 137 MCG tablet, TAKE 1 TABLET BY MOUTH ONCE DAILY IN THE MORNING, Disp: 90 tablet, Rfl: 0    losartan (COZAAR) 50 MG tablet, Take 1 tablet by mouth Daily., Disp: , Rfl:     Melatonin 5 MG chewable tablet, Chew 1 tablet Daily As Needed., Disp: , Rfl:     nitroglycerin (NITROSTAT) 0.4 MG SL tablet, Place 1 tablet under the tongue Every 5 (Five) Minutes As Needed for Chest Pain. do not exceed a total of 3 doses in 15 minutes, Disp: , Rfl:     pantoprazole (PROTONIX) 40 MG EC tablet, Take 1 tablet by mouth Daily., Disp: 90 tablet, Rfl: 1    Plavix 75 MG tablet, Take 1 tablet by mouth Daily., Disp: , Rfl:     tamsulosin (FLOMAX) 0.4 MG capsule 24 hr capsule, Take 2 capsules by mouth once daily, Disp: 180 capsule, Rfl: 0    Objective     Vitals:    09/06/24 1604   BP: 130/60   Pulse: 64   SpO2: 98%   Weight: 112 kg (246 lb)   Height: 185.4 cm (72.99\")   PainSc: 0-No pain     Body mass index is 32.46 kg/m².    Physical Exam  Constitutional:       Appearance: Normal appearance. He is obese.      Comments: Obesity (BMI 30 - 39.9) Pt Current BMI = 32.46     HENT:      Head: Normocephalic and atraumatic.      Right Ear: External ear normal.      Left Ear: External ear normal.      Nose: Nose normal.   Eyes:      Extraocular Movements: Extraocular movements intact.      Conjunctiva/sclera: Conjunctivae normal.   Pulmonary:      Effort: Pulmonary effort is normal.   Musculoskeletal:         General: Normal range of motion.      Cervical back: Normal range of motion.   Skin:     General: Skin is warm and dry.   Neurological:      General: No focal deficit present.      Mental Status: He is alert and oriented to person, place, and time. Mental status is at baseline.   Psychiatric:         Mood and Affect: Mood normal.         Behavior: Behavior " "normal.         Thought Content: Thought content normal.         Judgment: Judgment normal.             Result Review :   The following data was reviewed by: CASSIDY Moreland on 09/06/2024:    Most Recent A1C          9/6/2024    16:19   HGBA1C Most Recent   Hemoglobin A1C 8.1        A1C Last 3 Results          3/15/2024    15:42 8/7/2024    09:49 9/6/2024    16:19   HGBA1C Last 3 Results   Hemoglobin A1C 8.5  8.1  8.1      A1c collected in the office today is 8.1%, indicating Uncontrolled Type II diabetes.  This result is unchanged from the prior result of 8.1% collected on 8/7/24.       Creatinine   Date Value Ref Range Status   08/01/2024 1.64 (H) 0.76 - 1.27 mg/dL Final   07/23/2024 1.76 (H) 0.76 - 1.27 mg/dL Final   12/18/2019 1.8 (H) 0.6 - 1.3 mg/dL Final   03/09/2018 1.3 0.7 - 1.5 mg/dL Final     eGFR   Date Value Ref Range Status   08/01/2024 43.6 (L) >60.0 mL/min/1.73 Final   07/23/2024 40.1 (L) >60.0 mL/min/1.73 Final     Labs collected on 8/1/2024 show Stage IIIb moderate (GFR = 30-44 mL/min    No results found for: \"MICROALBUR\"  Creatinine, Urine   Date Value Ref Range Status   05/03/2024 77.9 mg/dL Final   02/12/2024 76.2 mg/dL Final     No results found for: \"MALBCRERATIO\"  Urine microalbuminuria collected on 5/3/2024 is positive for microalbuminuria    Total Cholesterol   Date Value Ref Range Status   11/28/2023 153 0 - 200 mg/dL Final   07/19/2023 129 0 - 200 mg/dL Final     Triglycerides   Date Value Ref Range Status   11/28/2023 341 (H) 0 - 150 mg/dL Final   07/19/2023 335 (H) 0 - 150 mg/dL Final     HDL Cholesterol   Date Value Ref Range Status   11/28/2023 32 (L) 40 - 60 mg/dL Final   07/19/2023 28 (L) 40 - 60 mg/dL Final     LDL Cholesterol    Date Value Ref Range Status   11/28/2023 67 0 - 100 mg/dL Final   07/19/2023 50 0 - 100 mg/dL Final     Lipid panel collected on 11/28/2023 shows Hypertriglyceridemia and low HDL            Assessment: Pt is here today for a 1 month follow up on " his diabetes. Reports he tapered up his Lantus to 65 units daily.  States he is taking Novolog tid per sliding scale 15-20 min prior to eating. [facility] Ozempic 1mg once wk. States he has not taken since July 4th. States the Ozemic caused bloating, gas, constipation and stomach cramps in the past so he is hesitant to restart it however his appetite has really increased and he is willing to try it to help curb the appetite. States his fbs is running 120-140mg/dl and up to 250mg/dl nonfasting. Reviewed CGM report with pt in the office today. The 14-day sensor report shows an average glucose of 200mg/dL, with 45% in target range ( mgdL), 38% in the high range (181-250 mg/dL), 17% in the very high range (>250 mg/dL), 0% in the low range (54-70 mg/dL) and 0% in the very low range (<54 mg/dL). His A1c in the office today is 8.1% which is unchanged from his previous A1c in August.       Diagnoses and all orders for this visit:    1. Uncontrolled type 2 diabetes mellitus with hyperglycemia (Primary)  -     POC Glycosylated Hemoglobin (Hb A1C)    2. Type 2 diabetes mellitus with diabetic neuropathy, with long-term current use of insulin    3. Type 2 diabetes mellitus with stage 3b chronic kidney disease, with long-term current use of insulin    4. Obesity (BMI 30-39.9)        Plan: Added a carb correction in addition to his sliding scale as follows to decrease postprandial hyperglycemia:   BEFORE each meal take Humalog insulin as follows:    5 units for “small meal” (30 - 45 grams of carbohydrate)        (may take ½ dose if eating less than 30 grams)  8 units for “medium meal” (45 - 60 grams of carbohydrate)  10 units for “large meal” (60 - 90 grams of carbohydrate)     Take Humalog insulin to correct glucose levels over 150 mg/dl as follows:    (Add to mealtime insulin if ordered)    If blood glucose is less than 150 mg/dl - 0 units  If blood glucose is between 151 and 175 - 2 units  If blood  glucose is between 176 and 200 - 3 units  If blood glucose is between 201 and 225 - 4 units  If blood glucose is between 226 and 250 - 5 units  If blood glucose is between 251 and 275 - 6 units  If blood glucose is between 276 and 300 - 7 units  If blood glucose is between 301 and 325 - 8 units  If blood glucose is between 326 and 350 - 9 units  If blood glucose is 351 or above - 10 units     Pt wished to restart Ozempic. States he has the 1mg Ozempic pen at home. Instructed pt to dial up to 18 clicks on his Ozempic 1mg pen to equal 0.25mg once wk. Pt verbalized understanding of these instructions. Scheduled a 2 month follow up and we will review his CGM and recheck his A1c at that time.     The patient will monitor his blood glucose levels per CGM.  If he develops problematic hyperglycemia or hypoglycemia or adverse drug reactions, he will contact the office for further instructions.        Follow Up     Return in about 2 months (around 11/6/2024) for Medication Mgmt, CGM Follow Up.    Patient was given instructions and counseling regarding his condition or for health maintenance advice. Please see specific information pulled into the AVS if appropriate.     Deb Moody, APRN  09/06/2024      Dictated Utilizing Dragon Dictation.  Please note that portions of this note were completed with a voice recognition program.  Part of this note may be an electronic transcription/translation of spoken language to printed text using the Dragon Dictation System.

## 2024-09-06 ENCOUNTER — OFFICE VISIT (OUTPATIENT)
Dept: DIABETES SERVICES | Facility: CLINIC | Age: 74
End: 2024-09-06
Payer: MEDICARE

## 2024-09-06 VITALS
HEART RATE: 64 BPM | DIASTOLIC BLOOD PRESSURE: 60 MMHG | WEIGHT: 246 LBS | OXYGEN SATURATION: 98 % | HEIGHT: 73 IN | BODY MASS INDEX: 32.6 KG/M2 | SYSTOLIC BLOOD PRESSURE: 130 MMHG

## 2024-09-06 DIAGNOSIS — E11.22 TYPE 2 DIABETES MELLITUS WITH STAGE 3B CHRONIC KIDNEY DISEASE, WITH LONG-TERM CURRENT USE OF INSULIN: ICD-10-CM

## 2024-09-06 DIAGNOSIS — Z79.4 TYPE 2 DIABETES MELLITUS WITH STAGE 3B CHRONIC KIDNEY DISEASE, WITH LONG-TERM CURRENT USE OF INSULIN: ICD-10-CM

## 2024-09-06 DIAGNOSIS — N18.32 TYPE 2 DIABETES MELLITUS WITH STAGE 3B CHRONIC KIDNEY DISEASE, WITH LONG-TERM CURRENT USE OF INSULIN: ICD-10-CM

## 2024-09-06 DIAGNOSIS — E66.9 OBESITY (BMI 30-39.9): ICD-10-CM

## 2024-09-06 DIAGNOSIS — E11.65 UNCONTROLLED TYPE 2 DIABETES MELLITUS WITH HYPERGLYCEMIA: Primary | ICD-10-CM

## 2024-09-06 DIAGNOSIS — Z79.4 TYPE 2 DIABETES MELLITUS WITH DIABETIC NEUROPATHY, WITH LONG-TERM CURRENT USE OF INSULIN: ICD-10-CM

## 2024-09-06 DIAGNOSIS — E11.40 TYPE 2 DIABETES MELLITUS WITH DIABETIC NEUROPATHY, WITH LONG-TERM CURRENT USE OF INSULIN: ICD-10-CM

## 2024-09-06 LAB
EXPIRATION DATE: ABNORMAL
HBA1C MFR BLD: 8.1 % (ref 4.5–5.7)
Lab: ABNORMAL

## 2024-09-06 PROCEDURE — 3078F DIAST BP <80 MM HG: CPT | Performed by: NURSE PRACTITIONER

## 2024-09-06 PROCEDURE — 1159F MED LIST DOCD IN RCRD: CPT | Performed by: NURSE PRACTITIONER

## 2024-09-06 PROCEDURE — 95251 CONT GLUC MNTR ANALYSIS I&R: CPT | Performed by: NURSE PRACTITIONER

## 2024-09-06 PROCEDURE — 3075F SYST BP GE 130 - 139MM HG: CPT | Performed by: NURSE PRACTITIONER

## 2024-09-06 PROCEDURE — 3052F HG A1C>EQUAL 8.0%<EQUAL 9.0%: CPT | Performed by: NURSE PRACTITIONER

## 2024-09-06 PROCEDURE — 1160F RVW MEDS BY RX/DR IN RCRD: CPT | Performed by: NURSE PRACTITIONER

## 2024-09-06 PROCEDURE — 99214 OFFICE O/P EST MOD 30 MIN: CPT | Performed by: NURSE PRACTITIONER

## 2024-09-06 NOTE — PATIENT INSTRUCTIONS
BEFORE each meal take Humalog insulin as follows:    5 units for “small meal” (30 - 45 grams of carbohydrate)        (may take ½ dose if eating less than 30 grams)  8 units for “medium meal” (45 - 60 grams of carbohydrate)  10 units for “large meal” (60 - 90 grams of carbohydrate)     Take Humalog insulin to correct glucose levels over 150 mg/dl as follows:    (Add to mealtime insulin if ordered)    If blood glucose is less than 150 mg/dl - 0 units  If blood glucose is between 151 and 175 - 2 units  If blood glucose is between 176 and 200 - 3 units  If blood glucose is between 201 and 225 - 4 units  If blood glucose is between 226 and 250 - 5 units  If blood glucose is between 251 and 275 - 6 units  If blood glucose is between 276 and 300 - 7 units  If blood glucose is between 301 and 325 - 8 units  If blood glucose is between 326 and 350 - 9 units  If blood glucose is 351 or above - 10 units     With the 1mg Ozempic pen you will need to start off with 0.25mg once wk. On the Ozempic 1mg pen the 0.25mg dose can be achieved by going up 18 clicks on the 1mg pen-you should get 16 doses of the 0.25mg out of 1 pen

## 2024-09-13 ENCOUNTER — HOSPITAL ENCOUNTER (OUTPATIENT)
Dept: GENERAL RADIOLOGY | Facility: HOSPITAL | Age: 74
Discharge: HOME | End: 2024-09-13
Payer: MEDICARE

## 2024-09-13 PROCEDURE — 73630 X-RAY EXAM OF FOOT: CPT

## 2024-09-14 DIAGNOSIS — F41.9 ANXIETY: ICD-10-CM

## 2024-09-16 ENCOUNTER — PRIOR AUTHORIZATION (OUTPATIENT)
Dept: DIABETES SERVICES | Facility: HOSPITAL | Age: 74
End: 2024-09-16
Payer: MEDICARE

## 2024-09-16 RX ORDER — ALPRAZOLAM 0.5 MG
0.5 TABLET ORAL NIGHTLY PRN
Qty: 30 TABLET | Refills: 0 | Status: SHIPPED | OUTPATIENT
Start: 2024-09-16

## 2024-09-19 ENCOUNTER — OFFICE VISIT (OUTPATIENT)
Dept: PODIATRY | Facility: CLINIC | Age: 74
End: 2024-09-19
Payer: MEDICARE

## 2024-09-19 VITALS
DIASTOLIC BLOOD PRESSURE: 69 MMHG | OXYGEN SATURATION: 98 % | HEART RATE: 60 BPM | BODY MASS INDEX: 49.08 KG/M2 | WEIGHT: 250 LBS | HEIGHT: 60 IN | SYSTOLIC BLOOD PRESSURE: 176 MMHG

## 2024-09-19 DIAGNOSIS — L97.521 ULCER OF LEFT FOOT, LIMITED TO BREAKDOWN OF SKIN: ICD-10-CM

## 2024-09-19 DIAGNOSIS — S92.535A CLOSED NONDISPLACED FRACTURE OF DISTAL PHALANX OF LESSER TOE OF LEFT FOOT, INITIAL ENCOUNTER: Primary | ICD-10-CM

## 2024-09-19 DIAGNOSIS — I73.9 PAD (PERIPHERAL ARTERY DISEASE): ICD-10-CM

## 2024-09-19 RX ORDER — KETOCONAZOLE 20 MG/ML
SHAMPOO TOPICAL
COMMUNITY
Start: 2024-08-26

## 2024-09-19 RX ORDER — CLINDAMYCIN HCL 300 MG
300 CAPSULE ORAL 3 TIMES DAILY
Qty: 30 CAPSULE | Refills: 0 | Status: SHIPPED | OUTPATIENT
Start: 2024-09-19

## 2024-09-23 ENCOUNTER — OFFICE VISIT (OUTPATIENT)
Dept: UROLOGY | Facility: CLINIC | Age: 74
End: 2024-09-23
Payer: MEDICARE

## 2024-09-23 VITALS
HEART RATE: 60 BPM | WEIGHT: 249 LBS | DIASTOLIC BLOOD PRESSURE: 67 MMHG | OXYGEN SATURATION: 97 % | HEIGHT: 60 IN | SYSTOLIC BLOOD PRESSURE: 132 MMHG | BODY MASS INDEX: 48.88 KG/M2

## 2024-09-23 DIAGNOSIS — N40.0 BENIGN PROSTATIC HYPERPLASIA, UNSPECIFIED WHETHER LOWER URINARY TRACT SYMPTOMS PRESENT: Primary | ICD-10-CM

## 2024-09-23 PROBLEM — J41.0 SIMPLE CHRONIC BRONCHITIS: Status: RESOLVED | Noted: 2023-11-14 | Resolved: 2024-09-23

## 2024-09-23 PROBLEM — Z96.652 S/P TKR (TOTAL KNEE REPLACEMENT), LEFT: Status: RESOLVED | Noted: 2022-07-22 | Resolved: 2024-09-23

## 2024-09-23 PROBLEM — Z98.890 STATUS POST COMPLETE REPAIR OF ROTATOR CUFF: Status: RESOLVED | Noted: 2017-05-01 | Resolved: 2024-09-23

## 2024-09-23 PROBLEM — Z98.1 HISTORY OF FUSION OF LUMBAR SPINE: Status: RESOLVED | Noted: 2021-02-11 | Resolved: 2024-09-23

## 2024-09-23 PROBLEM — Z86.19 HISTORY OF COLD SORES: Status: RESOLVED | Noted: 2023-01-30 | Resolved: 2024-09-23

## 2024-09-23 LAB
BILIRUB BLD-MCNC: NEGATIVE MG/DL
CLARITY, POC: CLEAR
COLOR UR: YELLOW
EXPIRATION DATE: ABNORMAL
GLUCOSE UR STRIP-MCNC: ABNORMAL MG/DL
KETONES UR QL: NEGATIVE
LEUKOCYTE EST, POC: NEGATIVE
Lab: ABNORMAL
NITRITE UR-MCNC: NEGATIVE MG/ML
PH UR: 6 [PH] (ref 5–8)
PROT UR STRIP-MCNC: ABNORMAL MG/DL
RBC # UR STRIP: NEGATIVE /UL
SP GR UR: 1.01 (ref 1–1.03)
URINE VOLUME: 0
UROBILINOGEN UR QL: ABNORMAL

## 2024-09-23 PROCEDURE — 3075F SYST BP GE 130 - 139MM HG: CPT | Performed by: NURSE PRACTITIONER

## 2024-09-23 PROCEDURE — 81003 URINALYSIS AUTO W/O SCOPE: CPT | Performed by: NURSE PRACTITIONER

## 2024-09-23 PROCEDURE — 3078F DIAST BP <80 MM HG: CPT | Performed by: NURSE PRACTITIONER

## 2024-09-23 PROCEDURE — 1159F MED LIST DOCD IN RCRD: CPT | Performed by: NURSE PRACTITIONER

## 2024-09-23 PROCEDURE — 1160F RVW MEDS BY RX/DR IN RCRD: CPT | Performed by: NURSE PRACTITIONER

## 2024-09-23 PROCEDURE — 99213 OFFICE O/P EST LOW 20 MIN: CPT | Performed by: NURSE PRACTITIONER

## 2024-09-23 PROCEDURE — 51798 US URINE CAPACITY MEASURE: CPT | Performed by: NURSE PRACTITIONER

## 2024-09-23 RX ORDER — TAMSULOSIN HYDROCHLORIDE 0.4 MG/1
2 CAPSULE ORAL DAILY
Qty: 180 CAPSULE | Refills: 4 | Status: SHIPPED | OUTPATIENT
Start: 2024-09-23

## 2024-09-23 RX ORDER — FINASTERIDE 5 MG/1
5 TABLET, FILM COATED ORAL DAILY
Qty: 90 TABLET | Refills: 4 | Status: SHIPPED | OUTPATIENT
Start: 2024-09-23

## 2024-09-29 NOTE — ANESTHESIA PREPROCEDURE EVALUATION
Anesthesia Evaluation     Patient summary reviewed and Nursing notes reviewed   NPO Solid Status: > 8 hours  NPO Liquid Status: > 2 hours           Airway   Mallampati: II  TM distance: >3 FB  Neck ROM: full  No difficulty expected  Dental - normal exam     Pulmonary - normal exam   (+) a smoker Former, sleep apnea on CPAP,   Cardiovascular - normal exam    ECG reviewed  PT is on anticoagulation therapy  Patient on routine beta blocker and Beta blocker given within 24 hours of surgery    (+) hypertension, CABG >6 Months, cardiac stents more than 12 months ago dysrhythmias Atrial Fib, hyperlipidemia,     ROS comment: 1/15/21 ECHO  Nml LV function  EF nml    Neuro/Psych  (+) psychiatric history Anxiety,     GI/Hepatic/Renal/Endo    (+) obesity,  GERD,  renal disease CRI, diabetes mellitus type 2,     Musculoskeletal     (+) back pain,   Abdominal    Substance History      OB/GYN          Other   arthritis,          Phys Exam Other: History of vocal cord paralysis from ETT/trach in past                Anesthesia Plan    ASA 3     general     intravenous induction     Anesthetic plan, all risks, benefits, and alternatives have been provided, discussed and informed consent has been obtained with: patient.    Plan discussed with CRNA.       No

## 2024-10-01 RX ORDER — LEVOTHYROXINE SODIUM 137 UG/1
137 TABLET ORAL EVERY MORNING
Qty: 90 TABLET | Refills: 0 | Status: SHIPPED | OUTPATIENT
Start: 2024-10-01

## 2024-10-03 ENCOUNTER — OFFICE VISIT (OUTPATIENT)
Dept: PODIATRY | Facility: CLINIC | Age: 74
End: 2024-10-03
Payer: MEDICARE

## 2024-10-03 VITALS
DIASTOLIC BLOOD PRESSURE: 59 MMHG | OXYGEN SATURATION: 97 % | BODY MASS INDEX: 33 KG/M2 | HEIGHT: 73 IN | SYSTOLIC BLOOD PRESSURE: 129 MMHG | WEIGHT: 249 LBS | HEART RATE: 61 BPM

## 2024-10-03 DIAGNOSIS — I73.9 PAD (PERIPHERAL ARTERY DISEASE): ICD-10-CM

## 2024-10-03 DIAGNOSIS — E11.65 TYPE 2 DIABETES MELLITUS WITH HYPERGLYCEMIA, UNSPECIFIED WHETHER LONG TERM INSULIN USE: ICD-10-CM

## 2024-10-03 DIAGNOSIS — L97.521 ULCER OF LEFT FOOT, LIMITED TO BREAKDOWN OF SKIN: ICD-10-CM

## 2024-10-03 DIAGNOSIS — M79.672 LEFT FOOT PAIN: ICD-10-CM

## 2024-10-03 DIAGNOSIS — S92.535A CLOSED NONDISPLACED FRACTURE OF DISTAL PHALANX OF LESSER TOE OF LEFT FOOT, INITIAL ENCOUNTER: Primary | ICD-10-CM

## 2024-10-03 NOTE — PROGRESS NOTES
[facility]    Today's Date: 10/03/24    Patient Name: Kyler Staley  MRN: [id number]  CSN: [id number]  PCP: Marisol Sellers APRN,  Referring Provider: No ref. provider found    SUBJECTIVE     Chief Complaint   Patient presents with    Left Foot - Follow-up, Fracture, Foot Ulcer     Doppler scheduled for November 4  Finished Clindamycin      HPI: Kyler Staley, a 74 y.o.male, presents to clinic.    Patient states there has been no drainage from his left fifth toe has healed up without any complications.  Patient states his arterial study is scheduled for November 4, 2024.    Patient denies any fevers, chills, nausea, vomiting, shortness of breathe, nor any other constitutional signs nor symptoms.  Past Medical History:   Diagnosis Date    Abnormal ECG     Acne     Alcoholism     Allergic     Anemia     Anxiety     Arthritis     Arthritis of back     Arthritis of neck     Atrial fibrillation     Back pain     Benign prostatic hyperplasia     Callus     Cardiac disease     Cataracts, bilateral     Cervical disc disorder     Cholelithiasis     Chronic kidney disease, stage 3     moderate    CKD (chronic kidney disease)     Clotting disorder     Condition not found     hernia    Coronary artery disease     Depression     Diabetes     Difficulty walking     Enlarged prostate     Erectile dysfunction     Fatigue     GERD (gastroesophageal reflux disease)     Gout     Hearing loss     Hematospermia 12/09/2015    High blood pressure     High cholesterol     Hip arthrosis     History of cold sores     History of gastritis     [place] (hard of hearing)     Hyperlipidemia     Hypertension     Hypoglycemia     Hypothyroidism     Ingrown toenail     Irregular heart rhythm     Joint pain     Kidney disease     Knee swelling     Low back pain     Lumbosacral disc disease     Narrowing of airway 2012    PER ENT    Neuropathy     Neuropathy in diabetes     Obesity     Osteopenia     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 CABG     Plantar fasciitis     Pneumonia     PONV (postoperative nausea and vomiting)     Psychiatric diagnosis     Renal insufficiency     Rotator cuff syndrome     REPAIRED BILATERAL    Shin splints     Shortness of breath     Sinus trouble     Sleep apnea     BIPAP    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)     Substance abuse     Swallowing difficulty     Testosterone deficiency     Thin skin     Thoracic disc disorder     Thyroid condition     Type 2 diabetes mellitus     Visual impairment     Vitamin D deficiency     Vocal cord dysfunction      HISTORY OF DISORDER ON ONE SIDE AFTER TRACHEA     Past Surgical History:   Procedure Laterality Date    BACK SURGERY  2018    X2    BACK SURGERY  2011    CARDIAC CATHETERIZATION      CARDIAC SURGERY      CHOLECYSTECTOMY      COLONOSCOPY      CORONARY ARTERY BYPASS GRAFT  2012    CORONARY STENT PLACEMENT      EYE SURGERY      HERNIA REPAIR      INGUINAL HERNIA REPAIR      JOINT REPLACEMENT      KNEE SURGERY      right and left    LUMBAR DISCECTOMY FUSION INSTRUMENTATION N/A 02/11/2021    Procedure: EXPLORATION OF FUSION T-12 S-1 REMOVAL OF HARDWARE REVISION OF FUSION AND INSTRUMENTATION T12-S1 WITH APPLICATION OF BONE MORPHOGENIC PROTEIN;  Surgeon: Baltazar Blankenship MD;  Location: [facility];  Service: Orthopedic Spine;  Laterality: N/A;    NOSE SURGERY      PACEMAKER IMPLANTATION      REPLACEMENT TOTAL KNEE Bilateral     SHOULDER SURGERY Bilateral     rotator cuff repair    SPINE SURGERY      TOENAIL EXCISION       Family History   Problem Relation Age of Onset    Heart attack Mother     Arthritis Mother     Cancer Mother     Heart disease Mother     Hypertension Mother     Cancer Father     Hypertension Sister     Hypertension Brother     Malig Hyperthermia Neg Hx      Social History     Socioeconomic History    Marital status:    Tobacco Use    Smoking status: Former     Current  packs/day: 0.00     Average packs/day: 3.0 packs/day for 15.0 years (45.0 ttl pk-yrs)     Types: Cigarettes     Start date: 1960     Quit date: 1975     Years since quittin.7     Passive exposure: Past    Smokeless tobacco: Never   Vaping Use    Vaping status: Never Used   Substance and Sexual Activity    Alcohol use: No    Drug use: Yes     Types: Hydrocodone    Sexual activity: Not Currently     Partners: Female     Allergies   Allergen Reactions    Bupropion Other (See Comments) and Unknown - Low Severity     MAKES PATIENT VERY AGGRESSIVE      Iodinated Contrast Media Other (See Comments)     Other reaction(s): Other (See Comments)  Chronic kidney disease - stage 3  Chronic kidney disease - stage 3    Lorazepam Other (See Comments) and Mental Status Change     MADE PATIENT VERY AGGRESSIVE      Propoxyphene Nausea And Vomiting and Unknown - Low Severity    Adhesive Tape Rash     Current Outpatient Medications   Medication Sig Dispense Refill    allopurinol (ZYLOPRIM) 300 MG tablet Take 1 tablet by mouth Daily. 90 tablet 1    ALPRAZolam (XANAX) 0.5 MG tablet TAKE 1 TABLET BY MOUTH AT NIGHT AS NEEDED FOR ANXIETY 30 tablet 0    amLODIPine (NORVASC) 5 MG tablet Take 1 tablet by mouth Daily.      apixaban (ELIQUIS) 5 MG tablet tablet Take 1 tablet by mouth 2 (Two) Times a Day.      atorvastatin (LIPITOR) 40 MG tablet Take 1 tablet by mouth Daily. 90 tablet 1    carvedilol (COREG) 12.5 MG tablet Take 1 tablet by mouth 2 (Two) Times a Day With Meals. 180 tablet 1    eplerenone (INSPRA) 25 MG tablet Take 1 tablet by mouth Daily.      fexofenadine (ALLEGRA) 60 MG tablet Take 1 tablet by mouth Daily.      finasteride (PROSCAR) 5 MG tablet Take 1 tablet by mouth Daily. 90 tablet 4    Flaxseed, Linseed, 1000 MG capsule Take 1,200 mg by mouth 2 (Two) Times a Day.      glucose blood test strip       imipramine (TOFRANIL) 50 MG tablet TAKE 1 TABLET BY MOUTH IN THE MORNING AND 2 IN THE EVENING 270 tablet 1    insulin  aspart (novoLOG FLEXPEN) 100 UNIT/ML solution pen-injector sc pen Inject 7 Units under the skin into the appropriate area as directed 3 (Three) Times a Day With Meals.      insulin glargine (LANTUS, SEMGLEE) 100 UNIT/ML injection Inject 29 Units under the skin into the appropriate area as directed Daily.      isosorbide mononitrate (IMDUR) 60 MG 24 hr tablet Take 1 tablet by mouth Daily. 90 tablet 1    ketoconazole (NIZORAL) 2 % shampoo SHAMPOO TOPICALLY 3 TIMES WEEKLY      levothyroxine (SYNTHROID, LEVOTHROID) 137 MCG tablet TAKE 1 TABLET BY MOUTH ONCE DAILY IN THE MORNING 90 tablet 0    losartan (COZAAR) 50 MG tablet Take 1 tablet by mouth Daily.      Melatonin 5 MG chewable tablet Chew 1 tablet Daily As Needed.      nitroglycerin (NITROSTAT) 0.4 MG SL tablet Place 1 tablet under the tongue Every 5 (Five) Minutes As Needed for Chest Pain. do not exceed a total of 3 doses in 15 minutes      pantoprazole (PROTONIX) 40 MG EC tablet Take 1 tablet by mouth Daily. 90 tablet 1    Plavix 75 MG tablet Take 1 tablet by mouth Daily.      Semaglutide (OZEMPIC, 0.25 OR 0.5 MG/DOSE, SC) Inject  under the skin into the appropriate area as directed.      tamsulosin (FLOMAX) 0.4 MG capsule 24 hr capsule Take 2 capsules by mouth Daily. 180 capsule 4    clindamycin (CLEOCIN) 300 MG capsule Take 1 capsule by mouth 3 (Three) Times a Day. (Patient not taking: Reported on 10/3/2024) 30 capsule 0     No current facility-administered medications for this visit.     Review of Systems   Constitutional: Negative.    Musculoskeletal:         Foot pain   All other systems reviewed and are negative.      OBJECTIVE     Vitals:    10/03/24 1334   BP: 129/59   Pulse: 61   SpO2: 97%       WBC   Date Value Ref Range Status   07/23/2024 6.34 3.40 - 10.80 10*3/mm3 Final   01/20/2021 7.08 4.80 - 10.80 10*3/uL Final   02/19/2018 6.00 4.5 - 11.0 10*3/uL Final     RBC   Date Value Ref Range Status   07/23/2024 5.07 4.14 - 5.80 10*6/mm3 Final   02/19/2018  4.83 4.5 - 5.9 10*6/uL Final     Hemoglobin   Date Value Ref Range Status   07/23/2024 15.2 13.0 - 17.7 g/dL Final   03/09/2018 10.7 (L) 13.5 - 17.5 g/dL Final     Hematocrit   Date Value Ref Range Status   07/23/2024 45.5 37.5 - 51.0 % Final   03/09/2018 33.5 (L) 41.0 - 53.0 % Final     MCV   Date Value Ref Range Status   07/23/2024 89.7 79.0 - 97.0 fL Final   02/19/2018 86.7 80.0 - 100.0 fL Final     MCH   Date Value Ref Range Status   07/23/2024 30.0 26.6 - 33.0 pg Final   02/19/2018 29.2 26.0 - 34.0 pg Final     MCHC   Date Value Ref Range Status   07/23/2024 33.4 31.5 - 35.7 g/dL Final   02/19/2018 33.7 31.0 - 37.0 g/dL Final     RDW   Date Value Ref Range Status   07/23/2024 14.4 12.3 - 15.4 % Final   02/19/2018 14.4 12.0 - 16.8 % Final     RDW-SD   Date Value Ref Range Status   07/23/2024 46.7 37.0 - 54.0 fl Final     MPV   Date Value Ref Range Status   07/23/2024 10.1 6.0 - 12.0 fL Final   02/19/2018 10.1 6.7 - 10.8 fL Final     Platelets   Date Value Ref Range Status   07/23/2024 282 140 - 450 10*3/mm3 Final   03/09/2018 266 140 - 440 10*3/uL Final     Neutrophil Rel %   Date Value Ref Range Status   02/19/2018 54.7 45 - 80 % Final     Neutrophil %   Date Value Ref Range Status   07/31/2023 63.8 42.7 - 76.0 % Final     Lymphocyte Rel %   Date Value Ref Range Status   02/19/2018 35.0 15 - 50 % Final     Lymphocyte %   Date Value Ref Range Status   07/31/2023 24.8 19.6 - 45.3 % Final     Monocyte Rel %   Date Value Ref Range Status   02/19/2018 7.0 0 - 15 % Final     Monocyte %   Date Value Ref Range Status   07/31/2023 7.9 5.0 - 12.0 % Final     Eosinophil %   Date Value Ref Range Status   07/31/2023 2.8 0.3 - 6.2 % Final   02/19/2018 2.3 0 - 7 % Final     Basophil Rel %   Date Value Ref Range Status   02/19/2018 0.7 0 - 2 % Final     Basophil %   Date Value Ref Range Status   07/31/2023 0.4 0.0 - 1.5 % Final     Immature Grans %   Date Value Ref Range Status   07/31/2023 0.3 0.0 - 0.5 % Final   02/19/2018  0.3 (H) 0 % Final     Neutrophils Absolute   Date Value Ref Range Status   03/17/2022 5.00 1.70 - 6.00 x10(3)/ul Final   02/19/2018 3.28 2.0 - 8.8 10*3/uL Final     Neutrophils, Absolute   Date Value Ref Range Status   07/31/2023 4.55 1.70 - 7.00 10*3/mm3 Final     Lymphocytes Absolute   Date Value Ref Range Status   02/19/2018 2.10 0.7 - 5.5 10*3/uL Final     Lymphocytes, Absolute   Date Value Ref Range Status   07/31/2023 1.77 0.70 - 3.10 10*3/mm3 Final     Monocytes Absolute   Date Value Ref Range Status   02/19/2018 0.42 0.0 - 1.7 10*3/uL Final     Monocytes, Absolute   Date Value Ref Range Status   07/31/2023 0.56 0.10 - 0.90 10*3/mm3 Final     Eosinophils Absolute   Date Value Ref Range Status   03/17/2022 0.2 0.0 - 0.6 x10(3)/ul Final   02/19/2018 0.14 0.0 - 0.8 10*3/uL Final     Eosinophils, Absolute   Date Value Ref Range Status   07/31/2023 0.20 0.00 - 0.40 10*3/mm3 Final     Basophils Absolute   Date Value Ref Range Status   03/17/2022 0.0 0.0 - 0.3 x10(3)/ul Final   02/19/2018 0.04 0.0 - 0.2 10*3/uL Final     Basophils, Absolute   Date Value Ref Range Status   07/31/2023 0.03 0.00 - 0.20 10*3/mm3 Final     Immature Grans, Absolute   Date Value Ref Range Status   07/31/2023 0.02 0.00 - 0.05 10*3/mm3 Final   02/19/2018 0.02 <1 10*3/uL Final     nRBC   Date Value Ref Range Status   02/12/2021 0.0 0.0 - 0.2 /100 WBC Final         Lab Results   Component Value Date    GLUCOSE 173 (H) 08/01/2024    BUN 23 08/01/2024    CREATININE 1.64 (H) 08/01/2024    EGFRIFNONA 23 (L) 02/01/2022    BCR 14.0 08/01/2024    K 4.8 08/01/2024    CO2 21.9 (L) 08/01/2024    CALCIUM 9.0 08/01/2024    ALBUMIN 4.2 11/28/2023    LABIL2 1.4 01/20/2021    AST 18 11/28/2023    ALT 32 11/28/2023       Patient seen in no apparent distress.      PHYSICAL EXAM:     Foot/Ankle Exam    GENERAL  Appearance:  appears stated age  Orientation:  AAOx3  Affect:  appropriate  Gait:  unimpaired  Assistance:  independent  Right shoe gear: casual  shoe  Left shoe gear: surgical shoe    VASCULAR     Right Foot Vascularity   Dorsalis pedis:  1+  Posterior tibial:  1+  Skin temperature:  warm  Edema grading:  None  CFT:  < 3 seconds  Pedal hair growth:  Absent  Varicosities:  mild varicosities     Left Foot Vascularity   Dorsalis pedis:  1+  Posterior tibial:  1+  Skin temperature:  warm  Edema grading:  None  CFT:  < 3 seconds  Pedal hair growth:  Absent  Varicosities:  mild varicosities     NEUROLOGIC     Right Foot Neurologic   Normal sensation    Light touch sensation: normal  Vibratory sensation: normal  Hot/Cold sensation: normal  Protective Sensation using Shelley-Walter Monofilament:   Sites intact: 10  Sites tested: 10     Left Foot Neurologic   Normal sensation    Light touch sensation: normal  Vibratory sensation: normal  Hot/Cold sensation:  normal  Protective Sensation using Shelley-Walter Monofilament:   Sites intact: 10  Sites tested: 10    MUSCULOSKELETAL     Left Foot Musculoskeletal   Tenderness:  toe 5 tenderness (Minimal tenderness to palpation.)    MUSCLE STRENGTH     Right Foot Muscle Strength   Foot dorsiflexion:  4  Foot plantar flexion:  4  Foot inversion:  4  Foot eversion:  4     Left Foot Muscle Strength   Foot dorsiflexion:  4  Foot plantar flexion:  4  Foot inversion:  4  Foot eversion:  4    RANGE OF MOTION     Right Foot Range of Motion   Foot and ankle ROM within normal limits       Left Foot Range of Motion   Foot and ankle ROM within normal limits      DERMATOLOGIC      Right Foot Dermatologic   Skin  Right foot skin is intact.      Left Foot Dermatologic   Skin  Left foot skin is intact.      Left foot additional comments: Firm healing cicatrix formation over distal lateral fifth toe.  No drainage no signs of edema, erythema, lymphangitis, nor signs of infection.  Toe in rectus position.      ASSESSMENT/PLAN     Diagnoses and all orders for this visit:    1. Closed nondisplaced fracture of distal phalanx of lesser toe of  left foot, initial encounter (Primary)    2. PAD (peripheral artery disease)    3. Left foot pain    4. Type 2 diabetes mellitus with hyperglycemia, unspecified whether long term insulin use    5. Ulcer of left foot, limited to breakdown of skin    Padding dispensed to off-weight pressure area.  Patient was instructed on proper use of the padding.  They state understanding and agreement with using the dispensed padding.    Discussed findings and treatment plan including risks, benefits, and treatment options with patient in detail. Patient agreed with treatment plan.    Medications and allergies reviewed.  Reviewed available lab values along with other pertinent labs.  These were discussed with the patient.    Patient is to monitor for recurrence and any new symptoms and to contact Dr. Calloway's office for a follow-up appointment.      The patient states understanding and agreement with this plan.    An After Visit Summary was printed and given to the patient at discharge, including (if requested) any available informative/educational handouts regarding diagnosis, treatment, or medications. All questions were answered to patient/family satisfaction. Should symptoms fail to improve or worsen they agree to call or return to clinic or to go to the Emergency Department. Discussed the importance of following up with any needed screening tests/labs/specialist appointments and any requested follow-up recommended by me today. Importance of maintaining follow-up discussed and patient accepts that missed appointments can delay diagnosis and potentially lead to worsening of conditions.    Return in about 7 weeks (around 11/21/2024) for F/U with Dr. Keyes., or sooner if acute issues arise.    This document has been electronically signed by Eitan Calloway DPM on October 3, 2024 14:00 EDT

## 2024-10-13 DIAGNOSIS — F41.9 ANXIETY: ICD-10-CM

## 2024-10-14 RX ORDER — ALPRAZOLAM 0.5 MG
0.5 TABLET ORAL NIGHTLY PRN
Qty: 30 TABLET | Refills: 0 | Status: SHIPPED | OUTPATIENT
Start: 2024-10-14

## 2024-10-22 ENCOUNTER — LAB (OUTPATIENT)
Dept: LAB | Facility: HOSPITAL | Age: 74
End: 2024-10-22
Payer: MEDICARE

## 2024-10-22 ENCOUNTER — OFFICE VISIT (OUTPATIENT)
Dept: FAMILY MEDICINE CLINIC | Age: 74
End: 2024-10-22
Payer: MEDICARE

## 2024-10-22 VITALS
WEIGHT: 249.8 LBS | SYSTOLIC BLOOD PRESSURE: 114 MMHG | BODY MASS INDEX: 33.11 KG/M2 | DIASTOLIC BLOOD PRESSURE: 65 MMHG | HEIGHT: 73 IN | HEART RATE: 59 BPM | TEMPERATURE: 97.6 F | OXYGEN SATURATION: 100 %

## 2024-10-22 DIAGNOSIS — E03.9 ACQUIRED HYPOTHYROIDISM: ICD-10-CM

## 2024-10-22 DIAGNOSIS — G47.33 OBSTRUCTIVE SLEEP APNEA TREATED WITH BIPAP: Primary | ICD-10-CM

## 2024-10-22 DIAGNOSIS — E78.01 ESSENTIAL FAMILIAL HYPERCHOLESTEROLEMIA: ICD-10-CM

## 2024-10-22 DIAGNOSIS — N18.32 STAGE 3B CHRONIC KIDNEY DISEASE: ICD-10-CM

## 2024-10-22 DIAGNOSIS — J30.2 SEASONAL ALLERGIES: ICD-10-CM

## 2024-10-22 DIAGNOSIS — E11.22 TYPE 2 DIABETES MELLITUS WITH STAGE 3 CHRONIC KIDNEY DISEASE, WITH LONG-TERM CURRENT USE OF INSULIN, UNSPECIFIED WHETHER STAGE 3A OR 3B CKD: ICD-10-CM

## 2024-10-22 DIAGNOSIS — I10 PRIMARY HYPERTENSION: ICD-10-CM

## 2024-10-22 DIAGNOSIS — Z79.4 TYPE 2 DIABETES MELLITUS WITH STAGE 3 CHRONIC KIDNEY DISEASE, WITH LONG-TERM CURRENT USE OF INSULIN, UNSPECIFIED WHETHER STAGE 3A OR 3B CKD: ICD-10-CM

## 2024-10-22 DIAGNOSIS — I48.0 PAROXYSMAL ATRIAL FIBRILLATION: ICD-10-CM

## 2024-10-22 DIAGNOSIS — K21.9 GASTROESOPHAGEAL REFLUX DISEASE WITHOUT ESOPHAGITIS: ICD-10-CM

## 2024-10-22 DIAGNOSIS — N18.30 TYPE 2 DIABETES MELLITUS WITH STAGE 3 CHRONIC KIDNEY DISEASE, WITH LONG-TERM CURRENT USE OF INSULIN, UNSPECIFIED WHETHER STAGE 3A OR 3B CKD: ICD-10-CM

## 2024-10-22 DIAGNOSIS — F41.9 ANXIETY: ICD-10-CM

## 2024-10-22 LAB
ALBUMIN SERPL-MCNC: 3.8 G/DL (ref 3.5–5.2)
ALBUMIN/GLOB SERPL: 1.2 G/DL
ALP SERPL-CCNC: 129 U/L (ref 39–117)
ALT SERPL W P-5'-P-CCNC: 33 U/L (ref 1–41)
ANION GAP SERPL CALCULATED.3IONS-SCNC: 8.3 MMOL/L (ref 5–15)
AST SERPL-CCNC: 22 U/L (ref 1–40)
BILIRUB SERPL-MCNC: 0.4 MG/DL (ref 0–1.2)
BUN SERPL-MCNC: 21 MG/DL (ref 8–23)
BUN/CREAT SERPL: 12 (ref 7–25)
CALCIUM SPEC-SCNC: 9.8 MG/DL (ref 8.6–10.5)
CHLORIDE SERPL-SCNC: 104 MMOL/L (ref 98–107)
CHOLEST SERPL-MCNC: 129 MG/DL (ref 0–200)
CO2 SERPL-SCNC: 25.7 MMOL/L (ref 22–29)
CREAT SERPL-MCNC: 1.75 MG/DL (ref 0.76–1.27)
EGFRCR SERPLBLD CKD-EPI 2021: 40.4 ML/MIN/1.73
GLOBULIN UR ELPH-MCNC: 3.2 GM/DL
GLUCOSE SERPL-MCNC: 142 MG/DL (ref 65–99)
HBA1C MFR BLD: 7.3 % (ref 4.8–5.6)
HDLC SERPL-MCNC: 26 MG/DL (ref 40–60)
LDLC SERPL CALC-MCNC: 50 MG/DL (ref 0–100)
LDLC/HDLC SERPL: 1.28 {RATIO}
POTASSIUM SERPL-SCNC: 5.2 MMOL/L (ref 3.5–5.2)
PROT SERPL-MCNC: 7 G/DL (ref 6–8.5)
SODIUM SERPL-SCNC: 138 MMOL/L (ref 136–145)
TRIGL SERPL-MCNC: 349 MG/DL (ref 0–150)
TSH SERPL DL<=0.05 MIU/L-ACNC: 1.08 UIU/ML (ref 0.27–4.2)
VLDLC SERPL-MCNC: 53 MG/DL (ref 5–40)

## 2024-10-22 PROCEDURE — 3052F HG A1C>EQUAL 8.0%<EQUAL 9.0%: CPT | Performed by: NURSE PRACTITIONER

## 2024-10-22 PROCEDURE — 1159F MED LIST DOCD IN RCRD: CPT | Performed by: NURSE PRACTITIONER

## 2024-10-22 PROCEDURE — 80053 COMPREHEN METABOLIC PANEL: CPT

## 2024-10-22 PROCEDURE — 3078F DIAST BP <80 MM HG: CPT | Performed by: NURSE PRACTITIONER

## 2024-10-22 PROCEDURE — 80061 LIPID PANEL: CPT

## 2024-10-22 PROCEDURE — 1126F AMNT PAIN NOTED NONE PRSNT: CPT | Performed by: NURSE PRACTITIONER

## 2024-10-22 PROCEDURE — 84443 ASSAY THYROID STIM HORMONE: CPT

## 2024-10-22 PROCEDURE — 3074F SYST BP LT 130 MM HG: CPT | Performed by: NURSE PRACTITIONER

## 2024-10-22 PROCEDURE — 36415 COLL VENOUS BLD VENIPUNCTURE: CPT

## 2024-10-22 PROCEDURE — 1160F RVW MEDS BY RX/DR IN RCRD: CPT | Performed by: NURSE PRACTITIONER

## 2024-10-22 PROCEDURE — 99214 OFFICE O/P EST MOD 30 MIN: CPT | Performed by: NURSE PRACTITIONER

## 2024-10-22 PROCEDURE — 83036 HEMOGLOBIN GLYCOSYLATED A1C: CPT

## 2024-10-22 NOTE — ASSESSMENT & PLAN NOTE
Continue follow-up with cardiology and medications as listed in HPI.  Go to ER with any sustained palpitations, chest pains or shortness of air.

## 2024-10-22 NOTE — PROGRESS NOTES
Chief Complaint  Hypothyroidism (4 month f/u)    Subjective          Kyler Staley presents to [facility] for routine f/u.     He is on levothyroxine 137mcg daily for hypothyroid.     He is taking amlodipine 5mg daily, Coreg 12.5mg Bid, Inspra 25mg daily and losartan 50mg daily for HTN. Has f/u with cardio scheduled, CASSIDY Schneider. Patient is also on Eliquis 5 mg twice daily and aspirin 81 mg for his A-fib, pacemaker, multiple stents and CABG x 4. Pt is also taking imdur 60mg daily.     He is on Lipitor 40mg daily for HLD.      He is seeing urology, CASSIDY Greer for BPH and is on flomax 0.8mg daily and proscar 5mg daily.     CASSIDY Tim for DM and is on Novolog 7 units BID, lantus 65 units daily, ozempic 0.5mg weekly (2nd month).  Does have some upset stomach. Will have follow up with her  next month.     He is [facility] specialist for CKD stage 3b.       He states his BP has been in the 80/40s and nephrology and cardiology are trying to work on medication dosing.      Dr. Kruger for Derm (every 6 months for precancerous skin lesions),       Patient has chronic back pain and has had multiple surgeries.  He uses cane for ambulation of longer distances.     Patient does have sleep apnea and has an older, recalled BiPAP. Has been to Dr. Mcdowell within the last year.      Pt has [employer] hearing aids, but [facility].      Xanax, initially started by Dr. Will, is taken once at night if needed for anxiety/insomnia. He has been on this medication for 40 to 50 years.  Patient has Ativan listed as an allergy.  Patient states that when he was intubated status post surgery he became very agitated after he was given Ativan. He does not have any adverse reactions to medication.     He is on allopurinol 300mg daily for chronic gout.     Allergies controlled with allegra.     He is seeing Dr Keyes on Nov 21st. For wound on foot, which has  "calloused over.  Has vascular study 11/4/24. If not sufficient, may have to remove part of toe.     GERD controlled with protonix.            Objective   Vital Signs:   Vitals:    10/22/24 1257   BP: 114/65   BP Location: Right arm   Patient Position: Sitting   Cuff Size: Large Adult   Pulse: 59   Temp: 97.6 °F (36.4 °C)   TempSrc: Oral   SpO2: 100%   Weight: 113 kg (249 lb 12.8 oz)   Height: 185.4 cm (72.99\")       Body mass index is 32.96 kg/m².         Physical Exam  Vitals reviewed.   Constitutional:       General: He is not in acute distress.     Appearance: Normal appearance. He is well-developed.   HENT:      Head: Normocephalic and atraumatic.   Eyes:      Conjunctiva/sclera: Conjunctivae normal.      Pupils: Pupils are equal, round, and reactive to light.   Cardiovascular:      Rate and Rhythm: Normal rate and regular rhythm.      Heart sounds: Normal heart sounds. No murmur heard.  Pulmonary:      Effort: Pulmonary effort is normal. No respiratory distress.      Breath sounds: Normal breath sounds.   Skin:     General: Skin is warm and dry.   Neurological:      Mental Status: He is alert and oriented to person, place, and time.   Psychiatric:         Mood and Affect: Mood and affect normal.         Behavior: Behavior normal.         Thought Content: Thought content normal.         Judgment: Judgment normal.            Lab Results   Component Value Date    GLUCOSE 173 (H) 08/01/2024    BUN 23 08/01/2024    CREATININE 1.64 (H) 08/01/2024    EGFR 43.6 (L) 08/01/2024    BCR 14.0 08/01/2024    K 4.8 08/01/2024    CO2 21.9 (L) 08/01/2024    CALCIUM 9.0 08/01/2024    ALBUMIN 4.2 11/28/2023    BILITOT 0.3 11/28/2023    AST 18 11/28/2023    ALT 32 11/28/2023     Lab Results   Component Value Date    HGBA1C 8.1 (A) 09/06/2024     Lab Results   Component Value Date    WBC 6.34 07/23/2024    HGB 15.2 07/23/2024    HCT 45.5 07/23/2024    MCV 89.7 07/23/2024     07/23/2024     Lab Results   Component Value Date "    CHOL 153 11/28/2023    CHOL 129 07/19/2023    CHOL 154 01/09/2023     Lab Results   Component Value Date    TRIG 341 (H) 11/28/2023    TRIG 335 (H) 07/19/2023    TRIG 436 (H) 01/09/2023     Lab Results   Component Value Date    HDL 32 (L) 11/28/2023    HDL 28 (L) 07/19/2023    HDL 27 (L) 01/09/2023     Lab Results   Component Value Date    LDL 67 11/28/2023    LDL 50 07/19/2023    LDL 60 01/09/2023     Lab Results   Component Value Date    TSH 0.331 06/19/2024              Assessment and Plan    Assessment & Plan  Obstructive sleep apnea treated with BiPAP  Continue follow-up with sleep medicine and BiPAP.  Paroxysmal atrial fibrillation  Continue follow-up with cardiology and medications as listed in HPI.  Go to ER with any sustained palpitations, chest pains or shortness of air.  Type 2 diabetes mellitus with stage 3 chronic kidney disease, with long-term current use of insulin, unspecified whether stage 3a or 3b CKD   continue follow-up with CASSIDY Munoz and medications as listed in HPI.  Continue diabetic diet.  Stage 3b chronic kidney disease   continue follow-up with nephrology as scheduled.  Avoid NSAIDs.  Primary hypertension   continue follow-up with cardiology and medications as listed in HPI.  Low-sodium diet.  Essential familial hypercholesterolemia    continue atorvastatin 40 mg daily and heart healthy diet.  Acquired hypothyroidism  Continue levothyroxine 137 mcg daily at this time.  Will adjust medication if needed.  Anxiety  Continue Xanax 0.5 mg every night as needed for anxiety.  Patient is aware of potential side effects of medication including to but not limited to, addiction, dementia, increased risk of falls, etc.  Gastroesophageal reflux disease without esophagitis  Controlled with Protonix 40 mg daily.  Seasonal allergies  Controlled with Allegra 60 mg daily.    Orders Placed This Encounter   Procedures    Comprehensive Metabolic Panel    Lipid Panel    Hemoglobin A1c    TSH         Lab orders placed for next routine follow up.       Patient to notify office with any acute concerns or issues.  Patient verbalizes understanding, agrees with plan of care and has no further questions upon discharge.    Please note that portions of this note were completed with a voice recognition program.      Follow Up    Return in about 3 months (around 1/22/2025).  Patient was given instructions and counseling regarding his condition or for health maintenance advice. Please see specific information pulled into the AVS if appropriate.     Medications Discontinued During This Encounter   Medication Reason    clindamycin (CLEOCIN) 300 MG capsule Historical Med - Therapy completed

## 2024-10-22 NOTE — ASSESSMENT & PLAN NOTE
Continue Xanax 0.5 mg every night as needed for anxiety.  Patient is aware of potential side effects of medication including to but not limited to, addiction, dementia, increased risk of falls, etc.

## 2024-10-31 NOTE — PROGRESS NOTES
Chief Complaint  Diabetes (Follow up, med mgt, CG< eval, A1c eval)    Referred By: No ref. provider found    Subjective          Kyler Staley presents to [facility] for diabetes medication management    History of Present Illness    Visit type:  follow-up  Diabetes type:  Type 2  Current diabetes status/concerns/issues:   Reports he tapered up his Ozempic to 0.5mg once wk. States he is having he side effect of bloating, flatulence and cramps. States the Humalog Carb Correction in addition to the sliding scale has really helped. States his fbs is normally 128mg/dl. States he has been in target 90% of the time.   Other health concerns:  states he just received new dentures. Reports his last set lasted 48 years.   Current Diabetes symptoms:    Polyuria: No   Polydipsia: No   Polyphagia: No   Blurred vision: No   Excessive fatigue: No  Known Diabetes complications:  Neuropathy: Numbness and Tingling; Location: Feet  Renal: Stage IIIb moderate (GFR = 30-44 mL/min)  Eyes: Other: wrinkle in left retina ; Location: Left; Last Eye Exam:  last wk ; Location:   Amputation/Wounds:  slow to heal  GI: Constipation, Reflux, and Indigestion  Cardiovascular: Hypertension, Hyperlipidemia, and Other: quadruple bypass, 9 stents  ED: Patient Reported  Other: None  Hypoglycemia:  None reported at this time and 0% per CGM  Hypoglycemia Symptoms:  No hypoglycemia at this time  Current diabetes treatment:   Insulin Glargine-yfgn 31 units qd, Jardiance 25mg qd. Humalog carb correction 5-10 units plus sliding scale , Ozempic 0.5mg once wk  Blood glucose device:  [employer] CGM  Blood glucose monitoring frequency:  Continuous per CGM  Blood glucose range/average:  The 14-day sensor report shows an average glucose of 142 mg/dL, with 87% in target range ( mgdL), 12% in the high range (181-250 mg/dL), 1% in the very high range (>250 mg/dL), 0% in the low range (54-70 mg/dL) and 0% in the very low  range (<54 mg/dL).   Glucose Source: Device Reviewed  Dietary behavior:  Avoids sugary drinks, Number of meals each day - 2; Number of snacks each day - 2  Activity/Exercise:   limited due to back pain       Past Medical History:   Diagnosis Date    Abnormal ECG     Acne     Alcoholism     Allergic     Anemia     Anxiety     Arthritis     Arthritis of back     Arthritis of neck     Atrial fibrillation     Back pain     Benign prostatic hyperplasia     Callus     Cardiac disease     Cataracts, bilateral     Cervical disc disorder     Cholelithiasis     Chronic kidney disease, stage 3     moderate    CKD (chronic kidney disease)     Clotting disorder     Condition not found     hernia    Coronary artery disease     Depression     Diabetes     Difficulty walking     Enlarged prostate     Erectile dysfunction     Fatigue     GERD (gastroesophageal reflux disease)     Gout     Hearing loss     Hematospermia 12/09/2015    High blood pressure     High cholesterol     Hip arthrosis     History of cold sores     History of gastritis     [place] (hard of hearing)     Hyperlipidemia     Hypertension     Hypoglycemia     Hypothyroidism     Ingrown toenail     Irregular heart rhythm     Joint pain     Kidney disease     Knee swelling     Low back pain     Lumbosacral disc disease     Narrowing of airway 2012    PER ENT    Neuropathy     Neuropathy in diabetes     Obesity     Osteopenia     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     Plantar fasciitis     Pneumonia     PONV (postoperative nausea and vomiting)     Psychiatric diagnosis     Renal insufficiency     Rotator cuff syndrome     REPAIRED BILATERAL    Shin splints     Shortness of breath     Sinus trouble     Sleep apnea     BIPAP    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)     Substance abuse     Swallowing difficulty     Testosterone deficiency     Thin skin     Thoracic disc disorder     Thyroid condition     Type 2 diabetes  mellitus     Visual impairment     Vitamin D deficiency     Vocal cord dysfunction      HISTORY OF DISORDER ON ONE SIDE AFTER TRACHEA     Past Surgical History:   Procedure Laterality Date    BACK SURGERY  2018    X2    BACK SURGERY  2011    CARDIAC CATHETERIZATION      CARDIAC SURGERY      CHOLECYSTECTOMY      COLONOSCOPY      CORONARY ARTERY BYPASS GRAFT  2012    CORONARY STENT PLACEMENT      EYE SURGERY      HERNIA REPAIR      INGUINAL HERNIA REPAIR      JOINT REPLACEMENT      KNEE SURGERY      right and left    LUMBAR DISCECTOMY FUSION INSTRUMENTATION N/A 2021    Procedure: EXPLORATION OF FUSION T-12 S-1 REMOVAL OF HARDWARE REVISION OF FUSION AND INSTRUMENTATION T12-S1 WITH APPLICATION OF BONE MORPHOGENIC PROTEIN;  Surgeon: Baltazar Blankenship MD;  Location: [facility] MAIN OR;  Service: Orthopedic Spine;  Laterality: N/A;    NOSE SURGERY      PACEMAKER IMPLANTATION      REPLACEMENT TOTAL KNEE Bilateral     SHOULDER SURGERY Bilateral     rotator cuff repair    SPINE SURGERY      TOENAIL EXCISION       Family History   Problem Relation Age of Onset    Heart attack Mother     Arthritis Mother     Cancer Mother     Heart disease Mother     Hypertension Mother     Cancer Father     Hypertension Sister     Hypertension Brother     Malig Hyperthermia Neg Hx      Social History     Socioeconomic History    Marital status:    Tobacco Use    Smoking status: Former     Current packs/day: 0.00     Average packs/day: 3.0 packs/day for 15.0 years (45.0 ttl pk-yrs)     Types: Cigarettes     Start date: 1960     Quit date: 1975     Years since quittin.8     Passive exposure: Past    Smokeless tobacco: Never   Vaping Use    Vaping status: Never Used   Substance and Sexual Activity    Alcohol use: No    Drug use: Yes     Types: Hydrocodone    Sexual activity: Not Currently     Partners: Female     Allergies   Allergen Reactions    Bupropion Other (See Comments) and Unknown - Low Severity     MAKES PATIENT VERY  AGGRESSIVE      Iodinated Contrast Media Other (See Comments)     Other reaction(s): Other (See Comments)  Chronic kidney disease - stage 3  Chronic kidney disease - stage 3    Lorazepam Other (See Comments) and Mental Status Change     MADE PATIENT VERY AGGRESSIVE      Propoxyphene Nausea And Vomiting and Unknown - Low Severity    Adhesive Tape Rash       Current Outpatient Medications:     allopurinol (ZYLOPRIM) 300 MG tablet, Take 1 tablet by mouth Daily., Disp: 90 tablet, Rfl: 1    ALPRAZolam (XANAX) 0.5 MG tablet, Take 1 tablet by mouth At Night As Needed for Anxiety., Disp: 30 tablet, Rfl: 0    amLODIPine (NORVASC) 5 MG tablet, Take 1 tablet by mouth Daily., Disp: , Rfl:     apixaban (ELIQUIS) 5 MG tablet tablet, Take 1 tablet by mouth 2 (Two) Times a Day., Disp: , Rfl:     atorvastatin (LIPITOR) 40 MG tablet, Take 1 tablet by mouth Daily., Disp: 90 tablet, Rfl: 1    carvedilol (COREG) 12.5 MG tablet, Take 1 tablet by mouth 2 (Two) Times a Day With Meals., Disp: 180 tablet, Rfl: 1    eplerenone (INSPRA) 25 MG tablet, Take 1 tablet by mouth Daily., Disp: , Rfl:     fexofenadine (ALLEGRA) 60 MG tablet, Take 1 tablet by mouth Daily., Disp: , Rfl:     finasteride (PROSCAR) 5 MG tablet, Take 1 tablet by mouth Daily., Disp: 90 tablet, Rfl: 4    Flaxseed, Linseed, 1000 MG capsule, Take 1,200 mg by mouth 2 (Two) Times a Day., Disp: , Rfl:     glucose blood test strip, , Disp: , Rfl:     imipramine (TOFRANIL) 50 MG tablet, TAKE 1 TABLET BY MOUTH IN THE MORNING AND 2 IN THE EVENING, Disp: 270 tablet, Rfl: 1    insulin aspart (novoLOG FLEXPEN) 100 UNIT/ML solution pen-injector sc pen, Inject 7 Units under the skin into the appropriate area as directed 2 (Two) Times a Day. 1/14 units BID, Disp: , Rfl:     insulin glargine (LANTUS, SEMGLEE) 100 UNIT/ML injection, Inject 29 Units under the skin into the appropriate area as directed Daily., Disp: , Rfl:     isosorbide mononitrate (IMDUR) 60 MG 24 hr tablet, Take 1 tablet by  "mouth Daily., Disp: 90 tablet, Rfl: 1    ketoconazole (NIZORAL) 2 % shampoo, SHAMPOO TOPICALLY 3 TIMES WEEKLY, Disp: , Rfl:     levothyroxine (SYNTHROID, LEVOTHROID) 137 MCG tablet, TAKE 1 TABLET BY MOUTH ONCE DAILY IN THE MORNING, Disp: 90 tablet, Rfl: 0    losartan (COZAAR) 50 MG tablet, Take 1 tablet by mouth Daily., Disp: , Rfl:     Melatonin 5 MG chewable tablet, Chew 1 tablet Daily As Needed., Disp: , Rfl:     nitroglycerin (NITROSTAT) 0.4 MG SL tablet, Place 1 tablet under the tongue Every 5 (Five) Minutes As Needed for Chest Pain. do not exceed a total of 3 doses in 15 minutes, Disp: , Rfl:     pantoprazole (PROTONIX) 40 MG EC tablet, Take 1 tablet by mouth Daily., Disp: 90 tablet, Rfl: 1    Plavix 75 MG tablet, Take 1 tablet by mouth Daily., Disp: , Rfl:     tamsulosin (FLOMAX) 0.4 MG capsule 24 hr capsule, Take 2 capsules by mouth Daily., Disp: 180 capsule, Rfl: 4    Semaglutide,0.25 or 0.5MG/DOS, (Ozempic, 0.25 or 0.5 MG/DOSE,) 2 MG/3ML solution pen-injector, Inject 0.5 mg under the skin into the appropriate area as directed 1 (One) Time Per Week for 90 days., Disp: 9 mL, Rfl: 1    Objective     Vitals:    11/01/24 1418   BP: 135/72   Pulse: 67   SpO2: 96%   Weight: 115 kg (253 lb 3.2 oz)   Height: 185.4 cm (72.99\")   PainSc: 0-No pain     Body mass index is 33.41 kg/m².    Physical Exam  Constitutional:       Appearance: Normal appearance. He is obese.      Comments: Obesity (BMI 30 - 39.9) Pt Current BMI = 33.41     HENT:      Head: Normocephalic and atraumatic.      Right Ear: External ear normal.      Left Ear: External ear normal.      Nose: Nose normal.   Eyes:      Extraocular Movements: Extraocular movements intact.      Conjunctiva/sclera: Conjunctivae normal.   Pulmonary:      Effort: Pulmonary effort is normal.   Musculoskeletal:         General: Normal range of motion.      Cervical back: Normal range of motion.   Skin:     General: Skin is warm and dry.   Neurological:      General: No focal " deficit present.      Mental Status: He is alert and oriented to person, place, and time. Mental status is at baseline.   Psychiatric:         Mood and Affect: Mood normal.         Behavior: Behavior normal.         Thought Content: Thought content normal.         Judgment: Judgment normal.             Result Review :   The following data was reviewed by: CASSIDY Moreland on 11/01/2024:    Most Recent A1C          10/22/2024    13:45   HGBA1C Most Recent   Hemoglobin A1C 7.30        A1C Last 3 Results          8/7/2024    09:49 9/6/2024    16:19 10/22/2024    13:45   HGBA1C Last 3 Results   Hemoglobin A1C 8.1  8.1  7.30      A1c collected on 10/22/2024  is 7.3%, indicating Uncontrolled Type II diabetes.  This result is down from the prior result of 8.1% collected on 9/6/2024        Creatinine   Date Value Ref Range Status   10/22/2024 1.75 (H) 0.76 - 1.27 mg/dL Final   08/01/2024 1.64 (H) 0.76 - 1.27 mg/dL Final     eGFR   Date Value Ref Range Status   10/22/2024 40.4 (L) >60.0 mL/min/1.73 Final   08/01/2024 43.6 (L) >60.0 mL/min/1.73 Final     Labs collected on 10/22/2024 show Stage IIIb moderate (GFR = 30-44 mL/min      Total Cholesterol   Date Value Ref Range Status   10/22/2024 129 0 - 200 mg/dL Final   11/28/2023 153 0 - 200 mg/dL Final     Triglycerides   Date Value Ref Range Status   10/22/2024 349 (H) 0 - 150 mg/dL Final   11/28/2023 341 (H) 0 - 150 mg/dL Final     HDL Cholesterol   Date Value Ref Range Status   10/22/2024 26 (L) 40 - 60 mg/dL Final   11/28/2023 32 (L) 40 - 60 mg/dL Final     LDL Cholesterol    Date Value Ref Range Status   10/22/2024 50 0 - 100 mg/dL Final   11/28/2023 67 0 - 100 mg/dL Final     Lipid panel collected on 10/22/2024 shows Hypertriglyceridemia and low HDL            Assessment: Patient is here today for 3-month follow-up on his diabetes.Reports he tapered up his Ozempic to 0.5mg once wk. States he is having he side effect of bloating, flatulence and cramps. States  the Humalog Carb Correction in addition to the sliding scale has really helped. States his fbs is normally 128mg/dl. States he has been in target 90% of the time.  Reviewed CGM report with patient the office today.The 14-day sensor report shows an average glucose of 142 mg/dL, with 87% in target range ( mgdL), 12% in the high range (181-250 mg/dL), 1% in the very high range (>250 mg/dL), 0% in the low range (54-70 mg/dL) and 0% in the very low range (<54 mg/dL).  His A1c on 10/22/2024 was is 7.3% which is down from his previous A1c of 8.1% in September.  In review of his CGM his GMI is 6.7% showing his glucose levels are trending down.      Diagnoses and all orders for this visit:    1. Uncontrolled type 2 diabetes mellitus with hyperglycemia (Primary)  -     Semaglutide,0.25 or 0.5MG/DOS, (Ozempic, 0.25 or 0.5 MG/DOSE,) 2 MG/3ML solution pen-injector; Inject 0.5 mg under the skin into the appropriate area as directed 1 (One) Time Per Week for 90 days.  Dispense: 9 mL; Refill: 1    2. Type 2 diabetes mellitus with diabetic neuropathy, with long-term current use of insulin    3. Type 2 diabetes mellitus with stage 3b chronic kidney disease, with long-term current use of insulin    4. Obesity (BMI 30-39.9)        Plan: Patient prefers to continue Ozempic despite the side effects.  No changes were made to his plan of care today since his glucose levels are trending down.  Discussed with patient if he continues his current glucose trend his next A1c will be within goal.  Scheduled a 3-month follow-up and we will review his CGM and recheck his A1c at that time.    The patient will monitor his blood glucose levels per CGM.  If he develops problematic hyperglycemia or hypoglycemia or adverse drug reactions, he will contact the office for further instructions.        Follow Up     Return in about 3 months (around 2/1/2025) for Medication Mgmt, CGM Follow Up.    Patient was given instructions and counseling regarding  his condition or for health maintenance advice. Please see specific information pulled into the AVS if appropriate.     Deb Moody, APRN  11/01/2024      Dictated Utilizing Dragon Dictation.  Please note that portions of this note were completed with a voice recognition program.  Part of this note may be an electronic transcription/translation of spoken language to printed text using the Dragon Dictation System.

## 2024-11-01 ENCOUNTER — OFFICE VISIT (OUTPATIENT)
Dept: DIABETES SERVICES | Facility: CLINIC | Age: 74
End: 2024-11-01
Payer: MEDICARE

## 2024-11-01 VITALS
DIASTOLIC BLOOD PRESSURE: 72 MMHG | HEIGHT: 73 IN | BODY MASS INDEX: 33.56 KG/M2 | HEART RATE: 67 BPM | SYSTOLIC BLOOD PRESSURE: 135 MMHG | OXYGEN SATURATION: 96 % | WEIGHT: 253.2 LBS

## 2024-11-01 DIAGNOSIS — Z79.4 TYPE 2 DIABETES MELLITUS WITH STAGE 3B CHRONIC KIDNEY DISEASE, WITH LONG-TERM CURRENT USE OF INSULIN: ICD-10-CM

## 2024-11-01 DIAGNOSIS — E66.9 OBESITY (BMI 30-39.9): ICD-10-CM

## 2024-11-01 DIAGNOSIS — Z79.4 TYPE 2 DIABETES MELLITUS WITH DIABETIC NEUROPATHY, WITH LONG-TERM CURRENT USE OF INSULIN: ICD-10-CM

## 2024-11-01 DIAGNOSIS — E11.65 UNCONTROLLED TYPE 2 DIABETES MELLITUS WITH HYPERGLYCEMIA: Primary | ICD-10-CM

## 2024-11-01 DIAGNOSIS — E11.40 TYPE 2 DIABETES MELLITUS WITH DIABETIC NEUROPATHY, WITH LONG-TERM CURRENT USE OF INSULIN: ICD-10-CM

## 2024-11-01 DIAGNOSIS — E11.22 TYPE 2 DIABETES MELLITUS WITH STAGE 3B CHRONIC KIDNEY DISEASE, WITH LONG-TERM CURRENT USE OF INSULIN: ICD-10-CM

## 2024-11-01 DIAGNOSIS — N18.32 TYPE 2 DIABETES MELLITUS WITH STAGE 3B CHRONIC KIDNEY DISEASE, WITH LONG-TERM CURRENT USE OF INSULIN: ICD-10-CM

## 2024-11-01 PROCEDURE — 95251 CONT GLUC MNTR ANALYSIS I&R: CPT | Performed by: NURSE PRACTITIONER

## 2024-11-01 PROCEDURE — 3075F SYST BP GE 130 - 139MM HG: CPT | Performed by: NURSE PRACTITIONER

## 2024-11-01 PROCEDURE — 3051F HG A1C>EQUAL 7.0%<8.0%: CPT | Performed by: NURSE PRACTITIONER

## 2024-11-01 PROCEDURE — 99214 OFFICE O/P EST MOD 30 MIN: CPT | Performed by: NURSE PRACTITIONER

## 2024-11-01 PROCEDURE — 3078F DIAST BP <80 MM HG: CPT | Performed by: NURSE PRACTITIONER

## 2024-11-01 PROCEDURE — 1160F RVW MEDS BY RX/DR IN RCRD: CPT | Performed by: NURSE PRACTITIONER

## 2024-11-01 PROCEDURE — 1159F MED LIST DOCD IN RCRD: CPT | Performed by: NURSE PRACTITIONER

## 2024-11-01 RX ORDER — SEMAGLUTIDE 0.68 MG/ML
0.5 INJECTION, SOLUTION SUBCUTANEOUS WEEKLY
Qty: 9 ML | Refills: 1 | Status: SHIPPED | OUTPATIENT
Start: 2024-11-01 | End: 2025-01-30

## 2024-11-04 ENCOUNTER — TRANSCRIBE ORDERS (OUTPATIENT)
Dept: ADMINISTRATIVE | Facility: HOSPITAL | Age: 74
End: 2024-11-04
Payer: MEDICARE

## 2024-11-04 ENCOUNTER — LAB (OUTPATIENT)
Dept: LAB | Facility: HOSPITAL | Age: 74
End: 2024-11-04
Payer: MEDICARE

## 2024-11-04 ENCOUNTER — HOSPITAL ENCOUNTER (OUTPATIENT)
Dept: CARDIOLOGY | Facility: HOSPITAL | Age: 74
Discharge: HOME | End: 2024-11-04
Payer: MEDICARE

## 2024-11-04 DIAGNOSIS — N18.4 CHRONIC KIDNEY DISEASE, STAGE IV (SEVERE): ICD-10-CM

## 2024-11-04 DIAGNOSIS — I73.9 PAD (PERIPHERAL ARTERY DISEASE): ICD-10-CM

## 2024-11-04 DIAGNOSIS — S92.535A CLOSED NONDISPLACED FRACTURE OF DISTAL PHALANX OF LESSER TOE OF LEFT FOOT, INITIAL ENCOUNTER: ICD-10-CM

## 2024-11-04 DIAGNOSIS — N18.4 CHRONIC KIDNEY DISEASE, STAGE IV (SEVERE): Primary | ICD-10-CM

## 2024-11-04 LAB
ANION GAP SERPL CALCULATED.3IONS-SCNC: 10 MMOL/L (ref 5–15)
BACTERIA UR QL AUTO: NORMAL /HPF
BASOPHILS # BLD AUTO: 0.03 10*3/MM3 (ref 0–0.2)
BASOPHILS NFR BLD AUTO: 0.4 % (ref 0–1.5)
BH CV LOWER ARTERIAL LEFT ABI RATIO: 0.9
BH CV LOWER ARTERIAL LEFT DORSALIS PEDIS SYS MAX: 115
BH CV LOWER ARTERIAL LEFT GREAT TOE SYS MAX: 98
BH CV LOWER ARTERIAL LEFT LOW THIGH SYS MAX: 105
BH CV LOWER ARTERIAL LEFT POPLITEAL SYS MAX: 95
BH CV LOWER ARTERIAL LEFT POST TIBIAL SYS MAX: 130
BH CV LOWER ARTERIAL LEFT TBI RATIO: 0.68
BH CV LOWER ARTERIAL RIGHT ABI RATIO: 1.14
BH CV LOWER ARTERIAL RIGHT DORSALIS PEDIS SYS MAX: 165
BH CV LOWER ARTERIAL RIGHT GREAT TOE SYS MAX: 129
BH CV LOWER ARTERIAL RIGHT LOW THIGH SYS MAX: 164
BH CV LOWER ARTERIAL RIGHT POPLITEAL SYS MAX: 163
BH CV LOWER ARTERIAL RIGHT POST TIBIAL SYS MAX: >255
BH CV LOWER ARTERIAL RIGHT TBI RATIO: 0.89
BILIRUB UR QL STRIP: NEGATIVE
BUN SERPL-MCNC: 19 MG/DL (ref 8–23)
BUN/CREAT SERPL: 10.6 (ref 7–25)
CALCIUM SPEC-SCNC: 9.2 MG/DL (ref 8.6–10.5)
CHLORIDE SERPL-SCNC: 103 MMOL/L (ref 98–107)
CLARITY UR: CLEAR
CO2 SERPL-SCNC: 23 MMOL/L (ref 22–29)
COLOR UR: YELLOW
CREAT SERPL-MCNC: 1.79 MG/DL (ref 0.76–1.27)
CREAT UR-MCNC: 52.7 MG/DL
DEPRECATED RDW RBC AUTO: 55.5 FL (ref 37–54)
EGFRCR SERPLBLD CKD-EPI 2021: 39.3 ML/MIN/1.73
EOSINOPHIL # BLD AUTO: 0.15 10*3/MM3 (ref 0–0.4)
EOSINOPHIL NFR BLD AUTO: 2 % (ref 0.3–6.2)
ERYTHROCYTE [DISTWIDTH] IN BLOOD BY AUTOMATED COUNT: 15.6 % (ref 12.3–15.4)
GLUCOSE SERPL-MCNC: 128 MG/DL (ref 65–99)
GLUCOSE UR STRIP-MCNC: ABNORMAL MG/DL
HCT VFR BLD AUTO: 46 % (ref 37.5–51)
HGB BLD-MCNC: 14.5 G/DL (ref 13–17.7)
HGB UR QL STRIP.AUTO: NEGATIVE
IMM GRANULOCYTES # BLD AUTO: 0.02 10*3/MM3 (ref 0–0.05)
IMM GRANULOCYTES NFR BLD AUTO: 0.3 % (ref 0–0.5)
KETONES UR QL STRIP: NEGATIVE
LEUKOCYTE ESTERASE UR QL STRIP.AUTO: NEGATIVE
LYMPHOCYTES # BLD AUTO: 1.85 10*3/MM3 (ref 0.7–3.1)
LYMPHOCYTES NFR BLD AUTO: 25 % (ref 19.6–45.3)
MCH RBC QN AUTO: 30.4 PG (ref 26.6–33)
MCHC RBC AUTO-ENTMCNC: 31.5 G/DL (ref 31.5–35.7)
MCV RBC AUTO: 96.4 FL (ref 79–97)
MONOCYTES # BLD AUTO: 0.64 10*3/MM3 (ref 0.1–0.9)
MONOCYTES NFR BLD AUTO: 8.6 % (ref 5–12)
NEUTROPHILS NFR BLD AUTO: 4.72 10*3/MM3 (ref 1.7–7)
NEUTROPHILS NFR BLD AUTO: 63.7 % (ref 42.7–76)
NITRITE UR QL STRIP: NEGATIVE
PH UR STRIP.AUTO: 5.5 [PH] (ref 5–8)
PLATELET # BLD AUTO: 243 10*3/MM3 (ref 140–450)
PMV BLD AUTO: 9.6 FL (ref 6–12)
POTASSIUM SERPL-SCNC: 4.5 MMOL/L (ref 3.5–5.2)
PROT ?TM UR-MCNC: 31.4 MG/DL
PROT UR QL STRIP: ABNORMAL
PROT/CREAT UR: 0.6 MG/G{CREAT}
RBC # BLD AUTO: 4.77 10*6/MM3 (ref 4.14–5.8)
RBC # UR STRIP: NORMAL /HPF
REF LAB TEST METHOD: NORMAL
SODIUM SERPL-SCNC: 136 MMOL/L (ref 136–145)
SP GR UR STRIP: 1.01 (ref 1–1.03)
SQUAMOUS #/AREA URNS HPF: NORMAL /HPF
UPPER ARTERIAL LEFT ARM BRACHIAL SYS MAX: 135
UPPER ARTERIAL RIGHT ARM BRACHIAL SYS MAX: 145
UROBILINOGEN UR QL STRIP: ABNORMAL
WBC # UR STRIP: NORMAL /HPF
WBC NRBC COR # BLD AUTO: 7.41 10*3/MM3 (ref 3.4–10.8)

## 2024-11-04 PROCEDURE — 80048 BASIC METABOLIC PNL TOTAL CA: CPT

## 2024-11-04 PROCEDURE — 84156 ASSAY OF PROTEIN URINE: CPT

## 2024-11-04 PROCEDURE — 93923 UPR/LXTR ART STDY 3+ LVLS: CPT | Performed by: SURGERY

## 2024-11-04 PROCEDURE — 85025 COMPLETE CBC W/AUTO DIFF WBC: CPT

## 2024-11-04 PROCEDURE — 81001 URINALYSIS AUTO W/SCOPE: CPT

## 2024-11-04 PROCEDURE — 82570 ASSAY OF URINE CREATININE: CPT

## 2024-11-04 PROCEDURE — 36415 COLL VENOUS BLD VENIPUNCTURE: CPT

## 2024-11-04 PROCEDURE — 93923 UPR/LXTR ART STDY 3+ LVLS: CPT

## 2024-11-10 DIAGNOSIS — F41.9 ANXIETY: ICD-10-CM

## 2024-11-11 RX ORDER — ALPRAZOLAM 0.5 MG
0.5 TABLET ORAL NIGHTLY PRN
Qty: 30 TABLET | Refills: 0 | Status: SHIPPED | OUTPATIENT
Start: 2024-11-11

## 2024-11-15 RX ORDER — AMOXICILLIN 500 MG
1200 CAPSULE ORAL DAILY
Qty: 90 CAPSULE | Refills: 3 | Status: SHIPPED | OUTPATIENT
Start: 2024-11-15

## 2024-11-23 DIAGNOSIS — K21.9 GASTROESOPHAGEAL REFLUX DISEASE, UNSPECIFIED WHETHER ESOPHAGITIS PRESENT: ICD-10-CM

## 2024-11-25 RX ORDER — PANTOPRAZOLE SODIUM 40 MG/1
40 TABLET, DELAYED RELEASE ORAL DAILY
Qty: 90 TABLET | Refills: 0 | Status: SHIPPED | OUTPATIENT
Start: 2024-11-25

## 2024-12-11 DIAGNOSIS — F41.9 ANXIETY: ICD-10-CM

## 2024-12-12 RX ORDER — ALPRAZOLAM 0.5 MG
0.5 TABLET ORAL NIGHTLY PRN
Qty: 30 TABLET | Refills: 0 | Status: SHIPPED | OUTPATIENT
Start: 2024-12-12

## 2024-12-19 ENCOUNTER — OFFICE VISIT (OUTPATIENT)
Dept: PODIATRY | Facility: CLINIC | Age: 74
End: 2024-12-19
Payer: MEDICARE

## 2024-12-19 VITALS
WEIGHT: 252 LBS | HEART RATE: 72 BPM | BODY MASS INDEX: 33.26 KG/M2 | OXYGEN SATURATION: 98 % | DIASTOLIC BLOOD PRESSURE: 70 MMHG | SYSTOLIC BLOOD PRESSURE: 147 MMHG

## 2024-12-19 DIAGNOSIS — E11.65 TYPE 2 DIABETES MELLITUS WITH HYPERGLYCEMIA, UNSPECIFIED WHETHER LONG TERM INSULIN USE: ICD-10-CM

## 2024-12-19 DIAGNOSIS — M79.672 LEFT FOOT PAIN: Primary | ICD-10-CM

## 2024-12-19 DIAGNOSIS — I73.9 PAD (PERIPHERAL ARTERY DISEASE): ICD-10-CM

## 2024-12-19 RX ORDER — ATORVASTATIN CALCIUM 80 MG/1
80 TABLET, FILM COATED ORAL
COMMUNITY
Start: 2024-10-21

## 2024-12-19 NOTE — PROGRESS NOTES
[facility]    Today's Date: 12/20/24    Patient Name: Kyler Staley  MRN: [id number]  CSN: [id number]  PCP: Marisol Sellers APRN,  Referring Provider: No ref. provider found    SUBJECTIVE     Chief Complaint   Patient presents with    Left Foot - Follow-up, Foot Ulcer     Here for doppler results    Right Foot - Follow-up, Callouses     HPI: Kyler Staley, a 74 y.o.male, presents to clinic.  Follow-up of his left foot.  Patient states that he is doing well and the wound is healed.  Recently had Doppler done.  Past Medical History:   Diagnosis Date    Abnormal ECG     Acne     Alcoholism     Allergic     Anemia     Anxiety     Arthritis     Arthritis of back     Arthritis of neck     Atrial fibrillation     Back pain     Benign prostatic hyperplasia     Callus     Cardiac disease     Cataracts, bilateral     Cervical disc disorder     Cholelithiasis     Chronic kidney disease, stage 3     moderate    CKD (chronic kidney disease)     Clotting disorder     Condition not found     hernia    Coronary artery disease     Depression     Diabetes     Difficulty walking     Enlarged prostate     Erectile dysfunction     Fatigue     GERD (gastroesophageal reflux disease)     Gout     Hearing loss     Hematospermia 12/09/2015    High blood pressure     High cholesterol     Hip arthrosis     History of cold sores     History of gastritis     [place] (hard of hearing)     Hyperlipidemia     Hypertension     Hypoglycemia     Hypothyroidism     Ingrown toenail     Irregular heart rhythm     Joint pain     Kidney disease     Knee swelling     Low back pain     Lumbosacral disc disease     Narrowing of airway 2012    PER ENT    Neuropathy     Neuropathy in diabetes     Obesity     Osteopenia     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     Plantar fasciitis     Pneumonia     PONV (postoperative nausea and vomiting)     Psychiatric diagnosis     Renal insufficiency     Rotator cuff  syndrome     REPAIRED BILATERAL    Shin splints     Shortness of breath     Sinus trouble     Sleep apnea     BIPAP    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)     Substance abuse     Swallowing difficulty     Testosterone deficiency     Thin skin     Thoracic disc disorder     Thyroid condition     Type 2 diabetes mellitus     Visual impairment     Vitamin D deficiency     Vocal cord dysfunction      HISTORY OF DISORDER ON ONE SIDE AFTER TRACHEA     Past Surgical History:   Procedure Laterality Date    BACK SURGERY  2018    X2    BACK SURGERY  2011    CARDIAC CATHETERIZATION      CARDIAC SURGERY      CHOLECYSTECTOMY      COLONOSCOPY      CORONARY ARTERY BYPASS GRAFT  2012    CORONARY STENT PLACEMENT      EYE SURGERY      HERNIA REPAIR      INGUINAL HERNIA REPAIR      JOINT REPLACEMENT      KNEE SURGERY      right and left    LUMBAR DISCECTOMY FUSION INSTRUMENTATION N/A 2021    Procedure: EXPLORATION OF FUSION T-12 S-1 REMOVAL OF HARDWARE REVISION OF FUSION AND INSTRUMENTATION T12-S1 WITH APPLICATION OF BONE MORPHOGENIC PROTEIN;  Surgeon: Baltazar Blankenship MD;  Location: [facility] OR;  Service: Orthopedic Spine;  Laterality: N/A;    NOSE SURGERY      PACEMAKER IMPLANTATION      REPLACEMENT TOTAL KNEE Bilateral     SHOULDER SURGERY Bilateral     rotator cuff repair    SPINE SURGERY      TOENAIL EXCISION       Family History   Problem Relation Age of Onset    Heart attack Mother     Arthritis Mother     Cancer Mother     Heart disease Mother     Hypertension Mother     Cancer Father     Hypertension Sister     Hypertension Brother     Malig Hyperthermia Neg Hx      Social History     Socioeconomic History    Marital status:    Tobacco Use    Smoking status: Former     Current packs/day: 0.00     Average packs/day: 3.0 packs/day for 15.0 years (45.0 ttl pk-yrs)     Types: Cigarettes     Start date: 1960     Quit date: 1975     Years since quittin.0     Passive  exposure: Past    Smokeless tobacco: Never   Vaping Use    Vaping status: Never Used   Substance and Sexual Activity    Alcohol use: No    Drug use: Yes     Types: Hydrocodone    Sexual activity: Not Currently     Partners: Female     Allergies   Allergen Reactions    Bupropion Other (See Comments) and Unknown - Low Severity     MAKES PATIENT VERY AGGRESSIVE      Iodinated Contrast Media Other (See Comments)     Other reaction(s): Other (See Comments)  Chronic kidney disease - stage 3  Chronic kidney disease - stage 3    Lorazepam Other (See Comments) and Mental Status Change     MADE PATIENT VERY AGGRESSIVE      Propoxyphene Nausea And Vomiting and Unknown - Low Severity    Adhesive Tape Rash     Current Outpatient Medications   Medication Sig Dispense Refill    allopurinol (ZYLOPRIM) 300 MG tablet Take 1 tablet by mouth Daily. 90 tablet 1    ALPRAZolam (XANAX) 0.5 MG tablet TAKE 1 TABLET BY MOUTH AT NIGHT AS NEEDED FOR ANXIETY 30 tablet 0    amLODIPine (NORVASC) 5 MG tablet Take 1 tablet by mouth Daily.      apixaban (ELIQUIS) 5 MG tablet tablet Take 1 tablet by mouth 2 (Two) Times a Day.      atorvastatin (LIPITOR) 40 MG tablet Take 1 tablet by mouth Daily. 90 tablet 1    atorvastatin (LIPITOR) 80 MG tablet Take 1 tablet by mouth every night at bedtime.      carvedilol (COREG) 12.5 MG tablet Take 1 tablet by mouth 2 (Two) Times a Day With Meals. 180 tablet 1    eplerenone (INSPRA) 25 MG tablet Take 1 tablet by mouth Daily.      fexofenadine (ALLEGRA) 60 MG tablet Take 1 tablet by mouth Daily.      finasteride (PROSCAR) 5 MG tablet Take 1 tablet by mouth Daily. 90 tablet 4    Flaxseed, Linseed, 1000 MG capsule Take 1,200 mg by mouth 2 (Two) Times a Day.      glucose blood test strip       imipramine (TOFRANIL) 50 MG tablet TAKE 1 TABLET BY MOUTH IN THE MORNING AND 2 IN THE EVENING 270 tablet 1    insulin aspart (novoLOG FLEXPEN) 100 UNIT/ML solution pen-injector sc pen Inject 7 Units under the skin into the  appropriate area as directed 2 (Two) Times a Day. 1/14 units BID      insulin glargine (LANTUS, SEMGLEE) 100 UNIT/ML injection Inject 29 Units under the skin into the appropriate area as directed Daily.      isosorbide mononitrate (IMDUR) 60 MG 24 hr tablet Take 1 tablet by mouth Daily. 90 tablet 1    ketoconazole (NIZORAL) 2 % shampoo SHAMPOO TOPICALLY 3 TIMES WEEKLY      levothyroxine (SYNTHROID, LEVOTHROID) 137 MCG tablet TAKE 1 TABLET BY MOUTH ONCE DAILY IN THE MORNING 90 tablet 0    losartan (COZAAR) 50 MG tablet Take 1 tablet by mouth Daily.      Melatonin 5 MG chewable tablet Chew 1 tablet Daily As Needed.      nitroglycerin (NITROSTAT) 0.4 MG SL tablet Place 1 tablet under the tongue Every 5 (Five) Minutes As Needed for Chest Pain. do not exceed a total of 3 doses in 15 minutes      Omega-3 Fatty Acids (fish oil) 1200 MG capsule capsule Take 1 capsule by mouth Daily. 90 capsule 3    pantoprazole (PROTONIX) 40 MG EC tablet Take 1 tablet by mouth once daily 90 tablet 0    Plavix 75 MG tablet Take 1 tablet by mouth Daily.      Semaglutide,0.25 or 0.5MG/DOS, (Ozempic, 0.25 or 0.5 MG/DOSE,) 2 MG/3ML solution pen-injector Inject 0.5 mg under the skin into the appropriate area as directed 1 (One) Time Per Week for 90 days. 9 mL 1    tamsulosin (FLOMAX) 0.4 MG capsule 24 hr capsule Take 2 capsules by mouth Daily. 180 capsule 4     No current facility-administered medications for this visit.     Review of Systems   Musculoskeletal:         Foot pain   All other systems reviewed and are negative.      OBJECTIVE     Vitals:    12/19/24 1324   BP: 147/70   Pulse: 72   SpO2: 98%       WBC   Date Value Ref Range Status   11/04/2024 7.41 3.40 - 10.80 10*3/mm3 Final   01/20/2021 7.08 4.80 - 10.80 10*3/uL Final   02/19/2018 6.00 4.5 - 11.0 10*3/uL Final     RBC   Date Value Ref Range Status   11/04/2024 4.77 4.14 - 5.80 10*6/mm3 Final   02/19/2018 4.83 4.5 - 5.9 10*6/uL Final     Hemoglobin   Date Value Ref Range Status    11/04/2024 14.5 13.0 - 17.7 g/dL Final   03/09/2018 10.7 (L) 13.5 - 17.5 g/dL Final     Hematocrit   Date Value Ref Range Status   11/04/2024 46.0 37.5 - 51.0 % Final   03/09/2018 33.5 (L) 41.0 - 53.0 % Final     MCV   Date Value Ref Range Status   11/04/2024 96.4 79.0 - 97.0 fL Final   02/19/2018 86.7 80.0 - 100.0 fL Final     MCH   Date Value Ref Range Status   11/04/2024 30.4 26.6 - 33.0 pg Final   02/19/2018 29.2 26.0 - 34.0 pg Final     MCHC   Date Value Ref Range Status   11/04/2024 31.5 31.5 - 35.7 g/dL Final   02/19/2018 33.7 31.0 - 37.0 g/dL Final     RDW   Date Value Ref Range Status   11/04/2024 15.6 (H) 12.3 - 15.4 % Final   02/19/2018 14.4 12.0 - 16.8 % Final     RDW-SD   Date Value Ref Range Status   11/04/2024 55.5 (H) 37.0 - 54.0 fl Final     MPV   Date Value Ref Range Status   11/04/2024 9.6 6.0 - 12.0 fL Final   02/19/2018 10.1 6.7 - 10.8 fL Final     Platelets   Date Value Ref Range Status   11/04/2024 243 140 - 450 10*3/mm3 Final   03/09/2018 266 140 - 440 10*3/uL Final     Neutrophil Rel %   Date Value Ref Range Status   02/19/2018 54.7 45 - 80 % Final     Neutrophil %   Date Value Ref Range Status   11/04/2024 63.7 42.7 - 76.0 % Final     Lymphocyte Rel %   Date Value Ref Range Status   02/19/2018 35.0 15 - 50 % Final     Lymphocyte %   Date Value Ref Range Status   11/04/2024 25.0 19.6 - 45.3 % Final     Monocyte Rel %   Date Value Ref Range Status   02/19/2018 7.0 0 - 15 % Final     Monocyte %   Date Value Ref Range Status   11/04/2024 8.6 5.0 - 12.0 % Final     Eosinophil %   Date Value Ref Range Status   11/04/2024 2.0 0.3 - 6.2 % Final   02/19/2018 2.3 0 - 7 % Final     Basophil Rel %   Date Value Ref Range Status   02/19/2018 0.7 0 - 2 % Final     Basophil %   Date Value Ref Range Status   11/04/2024 0.4 0.0 - 1.5 % Final     Immature Grans %   Date Value Ref Range Status   11/04/2024 0.3 0.0 - 0.5 % Final   02/19/2018 0.3 (H) 0 % Final     Neutrophils Absolute   Date Value Ref Range  Status   03/17/2022 5.00 1.70 - 6.00 x10(3)/ul Final   02/19/2018 3.28 2.0 - 8.8 10*3/uL Final     Neutrophils, Absolute   Date Value Ref Range Status   11/04/2024 4.72 1.70 - 7.00 10*3/mm3 Final     Lymphocytes Absolute   Date Value Ref Range Status   02/19/2018 2.10 0.7 - 5.5 10*3/uL Final     Lymphocytes, Absolute   Date Value Ref Range Status   11/04/2024 1.85 0.70 - 3.10 10*3/mm3 Final     Monocytes Absolute   Date Value Ref Range Status   02/19/2018 0.42 0.0 - 1.7 10*3/uL Final     Monocytes, Absolute   Date Value Ref Range Status   11/04/2024 0.64 0.10 - 0.90 10*3/mm3 Final     Eosinophils Absolute   Date Value Ref Range Status   03/17/2022 0.2 0.0 - 0.6 x10(3)/ul Final   02/19/2018 0.14 0.0 - 0.8 10*3/uL Final     Eosinophils, Absolute   Date Value Ref Range Status   11/04/2024 0.15 0.00 - 0.40 10*3/mm3 Final     Basophils Absolute   Date Value Ref Range Status   03/17/2022 0.0 0.0 - 0.3 x10(3)/ul Final   02/19/2018 0.04 0.0 - 0.2 10*3/uL Final     Basophils, Absolute   Date Value Ref Range Status   11/04/2024 0.03 0.00 - 0.20 10*3/mm3 Final     Immature Grans, Absolute   Date Value Ref Range Status   11/04/2024 0.02 0.00 - 0.05 10*3/mm3 Final   02/19/2018 0.02 <1 10*3/uL Final     nRBC   Date Value Ref Range Status   02/12/2021 0.0 0.0 - 0.2 /100 WBC Final         Lab Results   Component Value Date    GLUCOSE 128 (H) 11/04/2024    BUN 19 11/04/2024    CREATININE 1.79 (H) 11/04/2024    EGFRIFNONA 23 (L) 02/01/2022    BCR 10.6 11/04/2024    K 4.5 11/04/2024    CO2 23.0 11/04/2024    CALCIUM 9.2 11/04/2024    ALBUMIN 3.8 10/22/2024    LABIL2 1.4 01/20/2021    AST 22 10/22/2024    ALT 33 10/22/2024       Patient seen in no apparent distress.      PHYSICAL EXAM:     Foot/Ankle Exam    GENERAL  Appearance:  appears stated age  Orientation:  AAOx3  Affect:  appropriate  Gait:  unimpaired  Assistance:  independent  Right shoe gear: casual shoe  Left shoe gear: casual shoe    VASCULAR     Right Foot Vascularity    Normal vascular exam    Dorsalis pedis:  2+  Posterior tibial:  2+  Skin temperature:  warm  Edema grading:  None  CFT:  < 3 seconds  Pedal hair growth:  Present  Varicosities:  none     Left Foot Vascularity   Normal vascular exam    Dorsalis pedis:  2+  Posterior tibial:  2+  Skin temperature:  warm  Edema grading:  None  CFT:  < 3 seconds  Pedal hair growth:  Present  Varicosities:  none     NEUROLOGIC     Right Foot Neurologic   Normal sensation    Light touch sensation: normal  Vibratory sensation: normal  Hot/Cold sensation: normal  Protective Sensation using [place] Monofilament:   Sites intact: 10  Sites tested: 10     Left Foot Neurologic   Normal sensation    Light touch sensation: normal  Vibratory sensation: normal  Hot/Cold sensation:  normal  Protective Sensation using [place] Monofilament:   Sites intact: 10  Sites tested: 10    MUSCULOSKELETAL     Left Foot Musculoskeletal   Tenderness:  toe 5 tenderness    MUSCLE STRENGTH     Right Foot Muscle Strength   Foot dorsiflexion:  4  Foot plantar flexion:  4  Foot inversion:  4  Foot eversion:  4     Left Foot Muscle Strength   Foot dorsiflexion:  4  Foot plantar flexion:  4  Foot inversion:  4  Foot eversion:  4    RANGE OF MOTION     Right Foot Range of Motion   Foot and ankle ROM within normal limits       Left Foot Range of Motion   Foot and ankle ROM within normal limits      DERMATOLOGIC      Right Foot Dermatologic   Skin  Right foot skin is intact.      Left Foot Dermatologic   Skin  Left foot skin is intact.      Left foot additional comments: Healed      RADIOLOGY:        No results found.    ASSESSMENT/PLAN     Diagnoses and all orders for this visit:    1. Left foot pain (Primary)    2. PAD (peripheral artery disease)    3. Type 2 diabetes mellitus with hyperglycemia, unspecified whether long term insulin use      Doppler results reviewed with patient.  Asymptomatic.    Wounds healed at this time.    Discussed findings and  treatment plan including risks, benefits, and treatment options with patient in detail. Patient agreed with treatment plan.    Medications and allergies reviewed.  Reviewed available lab values along with other pertinent labs.  These were discussed with the patient.    An After Visit Summary was printed and given to the patient at discharge, including (if requested) any available informative/educational handouts regarding diagnosis, treatment, or medications. All questions were answered to patient/family satisfaction. Should symptoms fail to improve or worsen they agree to call or return to clinic or to go to the Emergency Department. Discussed the importance of following up with any needed screening tests/labs/specialist appointments and any requested follow-up recommended by me today. Importance of maintaining follow-up discussed and patient accepts that missed appointments can delay diagnosis and potentially lead to worsening of conditions.    Return in about 1 year (around 12/19/2025)., or sooner if acute issues arise.    This document has been electronically signed by Pablo Keyes DPM on December 20, 2024 07:23 EST

## 2024-12-26 RX ORDER — LEVOTHYROXINE SODIUM 137 UG/1
137 TABLET ORAL EVERY MORNING
Qty: 90 TABLET | Refills: 0 | Status: SHIPPED | OUTPATIENT
Start: 2024-12-26

## 2025-01-10 ENCOUNTER — TELEMEDICINE (OUTPATIENT)
Dept: SLEEP MEDICINE | Facility: HOSPITAL | Age: 75
End: 2025-01-10
Payer: MEDICARE

## 2025-01-10 DIAGNOSIS — G47.33 OSA (OBSTRUCTIVE SLEEP APNEA): Primary | ICD-10-CM

## 2025-01-10 NOTE — PROGRESS NOTES
[facility]                          Chief Complaint:   F/up JACI    History of present illness:   Subjective     The provider is located in [place], [place] using a secure MyChart Video Visit through [employer]. Patient is being seen remotely via telehealth at their home address in [place], and stated that they are in a secure environment for this session. The patient's condition being diagnosed/treated is appropriate for telemedicine. The provider has identified herself as well as her credentials. The patient and/or patient's guardian, consent to be seen remotely, and when consent is given they understand that the consent allows for patient identifiable information to be sent to a third party as needed. They may refuse to be seen remotely at any time. The electronic data is encrypted and password protected, and the patient and/or guardian has been advised of the potential risks to privacy not withstanding such measures.  PT identifiers used: Name and .     You have chosen to receive care through a telehealth visit. Do you consent to use a video/audio connection for your medical care today? Yes.    Patient is a 74 y.o. male with HTN and A fib/pacemaker.      JACI:  Diagnosed 20 years ago.     PSG 2023 showed AHI 9.5/H; RDI 21.9/H; Yohan SPO2 81%; hypoxic burden 99 min.      Titration study 7/10/23: AHI=3.3/h; PLM I=46/h; Final pressure=10/11 (AHI=7.7/h); Hypoxic burden= 63 min.      Mask: FFM which does fit well.  No significant air leak or dry mouth  DME: [employer] [facility]     Sleep schedule:10 - 8 AM    ESS:       Nocturnal hypoxemia: On oxygen 2 L/min with PAP.    Comorbid conditions:  HTN/A-fib/pacemaker and CHF.    REVIEW OF SYSTEMS:   HEENT: No nasal congestion or postnasal drip   CARDIOVASCULAR: No chest pain, chest pressure or chest discomfort. No palpitations or edema.   RESPIRATORY: Shortness of breath.  No cough.  GASTROINTESTINAL: No abdominal bloating or reflux    NEUROLOGICAL/PSYCHOATRY: Anxiety.  No depression.    Past Medical History:  Past Medical History:   Diagnosis Date    Abnormal ECG     Acne     Alcoholism     Allergic     Anemia     Anxiety     Arthritis     Arthritis of back     Arthritis of neck     Atrial fibrillation     Back pain     Benign prostatic hyperplasia     Callus     Cardiac disease     Cataracts, bilateral     Cervical disc disorder     Cholelithiasis     Chronic kidney disease, stage 3     moderate    CKD (chronic kidney disease)     Clotting disorder     Condition not found     hernia    Coronary artery disease     Depression     Diabetes     Difficulty walking     Enlarged prostate     Erectile dysfunction     Fatigue     GERD (gastroesophageal reflux disease)     Gout     Hearing loss     Hematospermia 12/09/2015    High blood pressure     High cholesterol     Hip arthrosis     History of cold sores     History of gastritis     [place] (hard of hearing)     Hyperlipidemia     Hypertension     Hypoglycemia     Hypothyroidism     Ingrown toenail     Irregular heart rhythm     Joint pain     Kidney disease     Knee swelling     Low back pain     Lumbosacral disc disease     Narrowing of airway 2012    PER ENT    Neuropathy     Neuropathy in diabetes     Obesity     Osteopenia     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     Plantar fasciitis     Pneumonia     PONV (postoperative nausea and vomiting)     Psychiatric diagnosis     Renal insufficiency     Rotator cuff syndrome     REPAIRED BILATERAL    Shin splints     Shortness of breath     Sinus trouble     Sleep apnea     BIPAP    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)     Substance abuse     Swallowing difficulty     Testosterone deficiency     Thin skin     Thoracic disc disorder     Thyroid condition     Type 2 diabetes mellitus     Visual impairment     Vitamin D deficiency     Vocal cord dysfunction      HISTORY OF DISORDER ON ONE SIDE AFTER  TRACHEA   ,   Past Surgical History:   Procedure Laterality Date    BACK SURGERY  2018    X2    BACK SURGERY  2011    CARDIAC CATHETERIZATION      CARDIAC SURGERY      CHOLECYSTECTOMY      COLONOSCOPY      CORONARY ARTERY BYPASS GRAFT  2012    CORONARY STENT PLACEMENT      EYE SURGERY      HERNIA REPAIR      INGUINAL HERNIA REPAIR      JOINT REPLACEMENT      KNEE SURGERY      right and left    LUMBAR DISCECTOMY FUSION INSTRUMENTATION N/A 2021    Procedure: EXPLORATION OF FUSION T-12 S-1 REMOVAL OF HARDWARE REVISION OF FUSION AND INSTRUMENTATION T12-S1 WITH APPLICATION OF BONE MORPHOGENIC PROTEIN;  Surgeon: Baltazar Blankenship MD;  Location: [facility] OR;  Service: Orthopedic Spine;  Laterality: N/A;    NOSE SURGERY      PACEMAKER IMPLANTATION      REPLACEMENT TOTAL KNEE Bilateral     SHOULDER SURGERY Bilateral     rotator cuff repair    SPINE SURGERY      TOENAIL EXCISION     ,   Social History     Socioeconomic History    Marital status:    Tobacco Use    Smoking status: Former     Current packs/day: 0.00     Average packs/day: 3.0 packs/day for 15.0 years (45.0 ttl pk-yrs)     Types: Cigarettes     Start date: 1960     Quit date: 1975     Years since quittin.0     Passive exposure: Past    Smokeless tobacco: Never   Vaping Use    Vaping status: Never Used   Substance and Sexual Activity    Alcohol use: No    Drug use: Yes     Types: Hydrocodone    Sexual activity: Not Currently     Partners: Female     E-cigarette/Vaping    E-cigarette/Vaping Use Never User      E-cigarette/Vaping Substances    Nicotine No     THC No     CBD No     Flavoring No      E-cigarette/Vaping Devices    Disposable No     Pre-filled or Refillable Cartridge No     Refillable Tank No     Pre-filled Pod No          , and Allergies:  Bupropion, Iodinated contrast media, Lorazepam, Propoxyphene, and Adhesive tape    Medication Review:     Current Outpatient Medications:     allopurinol (ZYLOPRIM) 300 MG tablet, Take 1  tablet by mouth Daily., Disp: 90 tablet, Rfl: 1    ALPRAZolam (XANAX) 0.5 MG tablet, TAKE 1 TABLET BY MOUTH AT NIGHT AS NEEDED FOR ANXIETY, Disp: 30 tablet, Rfl: 0    amLODIPine (NORVASC) 5 MG tablet, Take 1 tablet by mouth Daily., Disp: , Rfl:     apixaban (ELIQUIS) 5 MG tablet tablet, Take 1 tablet by mouth 2 (Two) Times a Day., Disp: , Rfl:     atorvastatin (LIPITOR) 40 MG tablet, Take 1 tablet by mouth Daily., Disp: 90 tablet, Rfl: 1    atorvastatin (LIPITOR) 80 MG tablet, Take 1 tablet by mouth every night at bedtime., Disp: , Rfl:     carvedilol (COREG) 12.5 MG tablet, Take 1 tablet by mouth 2 (Two) Times a Day With Meals., Disp: 180 tablet, Rfl: 1    eplerenone (INSPRA) 25 MG tablet, Take 1 tablet by mouth Daily., Disp: , Rfl:     fexofenadine (ALLEGRA) 60 MG tablet, Take 1 tablet by mouth Daily., Disp: , Rfl:     finasteride (PROSCAR) 5 MG tablet, Take 1 tablet by mouth Daily., Disp: 90 tablet, Rfl: 4    Flaxseed, Linseed, 1000 MG capsule, Take 1,200 mg by mouth 2 (Two) Times a Day., Disp: , Rfl:     glucose blood test strip, , Disp: , Rfl:     imipramine (TOFRANIL) 50 MG tablet, TAKE 1 TABLET BY MOUTH IN THE MORNING AND 2 IN THE EVENING, Disp: 270 tablet, Rfl: 1    insulin aspart (novoLOG FLEXPEN) 100 UNIT/ML solution pen-injector sc pen, Inject 7 Units under the skin into the appropriate area as directed 2 (Two) Times a Day. 1/14 units BID, Disp: , Rfl:     insulin glargine (LANTUS, SEMGLEE) 100 UNIT/ML injection, Inject 29 Units under the skin into the appropriate area as directed Daily., Disp: , Rfl:     isosorbide mononitrate (IMDUR) 60 MG 24 hr tablet, Take 1 tablet by mouth Daily., Disp: 90 tablet, Rfl: 1    ketoconazole (NIZORAL) 2 % shampoo, SHAMPOO TOPICALLY 3 TIMES WEEKLY, Disp: , Rfl:     levothyroxine (SYNTHROID, LEVOTHROID) 137 MCG tablet, TAKE 1 TABLET BY MOUTH ONCE DAILY IN THE MORNING, Disp: 90 tablet, Rfl: 0    losartan (COZAAR) 50 MG tablet, Take 1 tablet by mouth Daily., Disp: , Rfl:      Melatonin 5 MG chewable tablet, Chew 1 tablet Daily As Needed., Disp: , Rfl:     nitroglycerin (NITROSTAT) 0.4 MG SL tablet, Place 1 tablet under the tongue Every 5 (Five) Minutes As Needed for Chest Pain. do not exceed a total of 3 doses in 15 minutes, Disp: , Rfl:     Omega-3 Fatty Acids (fish oil) 1200 MG capsule capsule, Take 1 capsule by mouth Daily., Disp: 90 capsule, Rfl: 3    pantoprazole (PROTONIX) 40 MG EC tablet, Take 1 tablet by mouth once daily, Disp: 90 tablet, Rfl: 0    Plavix 75 MG tablet, Take 1 tablet by mouth Daily., Disp: , Rfl:     Semaglutide,0.25 or 0.5MG/DOS, (Ozempic, 0.25 or 0.5 MG/DOSE,) 2 MG/3ML solution pen-injector, Inject 0.5 mg under the skin into the appropriate area as directed 1 (One) Time Per Week for 90 days., Disp: 9 mL, Rfl: 1    tamsulosin (FLOMAX) 0.4 MG capsule 24 hr capsule, Take 2 capsules by mouth Daily., Disp: 180 capsule, Rfl: 4      Objective   Vital Signs:  There were no vitals filed for this visit.    There is no height or weight on file to calculate BMI.          Physical Exam:   Limited exam via Telehealth.    -General appearance: Not in acute distress.  -Eyes: Injected conjunctiva. EOMI.  -Neck: No visible thyromegaly.  -ENMT: Merino score 3.  -Chest: Symmetrical expansion. Non labored breathing.  -Musculoskeletal: No cyanosis, clubbing or edema  -Neurologic: Conscious, alert, oriented x3. Moves all extremities  -Integumentary: No rash on face or arms  -Psychiatry: Appropriate mood and affect.       Diagnostic data:    CPAP download showed:  Date: Last 30 days  Usage (days): 100 %  Days used>4h: 100 %  AHI: 1.4/h  Leak: 3.2  Usage: 11h and 40 min  P 90% : 18/13  Auto BPAP: 20/10- PS 5 cm H2O    Lab Results   Component Value Date    HGBA1C 7.30 (H) 10/22/2024     Total Cholesterol   Date Value Ref Range Status   10/22/2024 129 0 - 200 mg/dL Final     Triglycerides   Date Value Ref Range Status   10/22/2024 349 (H) 0 - 150 mg/dL Final     HDL Cholesterol   Date  Value Ref Range Status   10/22/2024 26 (L) 40 - 60 mg/dL Final     Hemoglobin   Date Value Ref Range Status   11/04/2024 14.5 13.0 - 17.7 g/dL Final   03/09/2018 10.7 (L) 13.5 - 17.5 g/dL Final     CO2   Date Value Ref Range Status   11/04/2024 23.0 22.0 - 29.0 mmol/L Final     Total CO2   Date Value Ref Range Status   03/09/2018 25 22 - 30 mmol/L Final        Assessment   JACI  Obesity, BMI 32  HTN  A-fib      PLAN:    Discussed the result of the download.   Patient is compliant with therapy and clinically benefit from treatment.  Patient was encouraged to continue using PAP.  Refill supplies.    Continue Losartan. Isosorbide mononitrate and Amlodipine.     Counseled for weight loss.  Encouraged to exercise regularly and cut down on carbohydrates.  Discussed that losing weight may decrease the severity of sleep apnea and obviate the need of CPAP therapy.     RTC 12 months.              This note was dictated utilizing Dragon dictation

## 2025-01-12 DIAGNOSIS — F41.9 ANXIETY: ICD-10-CM

## 2025-01-13 RX ORDER — ALPRAZOLAM 0.5 MG
0.5 TABLET ORAL NIGHTLY PRN
Qty: 30 TABLET | Refills: 0 | Status: SHIPPED | OUTPATIENT
Start: 2025-01-13

## 2025-01-16 ENCOUNTER — LAB (OUTPATIENT)
Dept: LAB | Facility: HOSPITAL | Age: 75
End: 2025-01-16
Payer: MEDICARE

## 2025-01-16 ENCOUNTER — OFFICE VISIT (OUTPATIENT)
Dept: FAMILY MEDICINE CLINIC | Age: 75
End: 2025-01-16
Payer: MEDICARE

## 2025-01-16 VITALS
HEART RATE: 65 BPM | DIASTOLIC BLOOD PRESSURE: 71 MMHG | TEMPERATURE: 97 F | HEIGHT: 73 IN | WEIGHT: 256.2 LBS | BODY MASS INDEX: 33.95 KG/M2 | SYSTOLIC BLOOD PRESSURE: 149 MMHG | OXYGEN SATURATION: 96 %

## 2025-01-16 DIAGNOSIS — Z79.4 TYPE 2 DIABETES MELLITUS WITH STAGE 3 CHRONIC KIDNEY DISEASE, WITH LONG-TERM CURRENT USE OF INSULIN, UNSPECIFIED WHETHER STAGE 3A OR 3B CKD: ICD-10-CM

## 2025-01-16 DIAGNOSIS — E03.9 ACQUIRED HYPOTHYROIDISM: ICD-10-CM

## 2025-01-16 DIAGNOSIS — I10 PRIMARY HYPERTENSION: ICD-10-CM

## 2025-01-16 DIAGNOSIS — L03.90 CELLULITIS, UNSPECIFIED CELLULITIS SITE: Primary | ICD-10-CM

## 2025-01-16 DIAGNOSIS — E11.22 TYPE 2 DIABETES MELLITUS WITH STAGE 3 CHRONIC KIDNEY DISEASE, WITH LONG-TERM CURRENT USE OF INSULIN, UNSPECIFIED WHETHER STAGE 3A OR 3B CKD: ICD-10-CM

## 2025-01-16 DIAGNOSIS — N18.30 TYPE 2 DIABETES MELLITUS WITH STAGE 3 CHRONIC KIDNEY DISEASE, WITH LONG-TERM CURRENT USE OF INSULIN, UNSPECIFIED WHETHER STAGE 3A OR 3B CKD: ICD-10-CM

## 2025-01-16 LAB
ALBUMIN SERPL-MCNC: 3.9 G/DL (ref 3.5–5.2)
ALBUMIN/GLOB SERPL: 1.3 G/DL
ALP SERPL-CCNC: 136 U/L (ref 39–117)
ALT SERPL W P-5'-P-CCNC: 34 U/L (ref 1–41)
ANION GAP SERPL CALCULATED.3IONS-SCNC: 8.8 MMOL/L (ref 5–15)
AST SERPL-CCNC: 28 U/L (ref 1–40)
BILIRUB SERPL-MCNC: 0.4 MG/DL (ref 0–1.2)
BUN SERPL-MCNC: 19 MG/DL (ref 8–23)
BUN/CREAT SERPL: 10.4 (ref 7–25)
CALCIUM SPEC-SCNC: 9.6 MG/DL (ref 8.6–10.5)
CHLORIDE SERPL-SCNC: 102 MMOL/L (ref 98–107)
CHOLEST SERPL-MCNC: 142 MG/DL (ref 0–200)
CO2 SERPL-SCNC: 25.2 MMOL/L (ref 22–29)
CREAT SERPL-MCNC: 1.82 MG/DL (ref 0.76–1.27)
EGFRCR SERPLBLD CKD-EPI 2021: 38.5 ML/MIN/1.73
GLOBULIN UR ELPH-MCNC: 2.9 GM/DL
GLUCOSE SERPL-MCNC: 145 MG/DL (ref 65–99)
HBA1C MFR BLD: 7.2 % (ref 4.8–5.6)
HDLC SERPL-MCNC: 28 MG/DL (ref 40–60)
LDLC SERPL CALC-MCNC: 51 MG/DL (ref 0–100)
LDLC/HDLC SERPL: 1.07 {RATIO}
POTASSIUM SERPL-SCNC: 5.2 MMOL/L (ref 3.5–5.2)
PROT SERPL-MCNC: 6.8 G/DL (ref 6–8.5)
SODIUM SERPL-SCNC: 136 MMOL/L (ref 136–145)
TRIGL SERPL-MCNC: 420 MG/DL (ref 0–150)
TSH SERPL DL<=0.05 MIU/L-ACNC: 0.74 UIU/ML (ref 0.27–4.2)
VLDLC SERPL-MCNC: 63 MG/DL (ref 5–40)

## 2025-01-16 PROCEDURE — 99213 OFFICE O/P EST LOW 20 MIN: CPT | Performed by: NURSE PRACTITIONER

## 2025-01-16 PROCEDURE — 83036 HEMOGLOBIN GLYCOSYLATED A1C: CPT

## 2025-01-16 PROCEDURE — 1126F AMNT PAIN NOTED NONE PRSNT: CPT | Performed by: NURSE PRACTITIONER

## 2025-01-16 PROCEDURE — 3077F SYST BP >= 140 MM HG: CPT | Performed by: NURSE PRACTITIONER

## 2025-01-16 PROCEDURE — 1159F MED LIST DOCD IN RCRD: CPT | Performed by: NURSE PRACTITIONER

## 2025-01-16 PROCEDURE — 80053 COMPREHEN METABOLIC PANEL: CPT

## 2025-01-16 PROCEDURE — 1160F RVW MEDS BY RX/DR IN RCRD: CPT | Performed by: NURSE PRACTITIONER

## 2025-01-16 PROCEDURE — 36415 COLL VENOUS BLD VENIPUNCTURE: CPT

## 2025-01-16 PROCEDURE — 84443 ASSAY THYROID STIM HORMONE: CPT

## 2025-01-16 PROCEDURE — 80061 LIPID PANEL: CPT

## 2025-01-16 PROCEDURE — 3078F DIAST BP <80 MM HG: CPT | Performed by: NURSE PRACTITIONER

## 2025-01-16 RX ORDER — DOXYCYCLINE HYCLATE 100 MG
100 TABLET ORAL 2 TIMES DAILY
Qty: 20 TABLET | Refills: 0 | Status: SHIPPED | OUTPATIENT
Start: 2025-01-16 | End: 2025-01-26

## 2025-01-16 NOTE — PROGRESS NOTES
"Chief Complaint  Right lower leg redness (\"I cut my leg Friday and I think its infected\")    Subjective          Kyler Staley presents to [facility] for an acute visit. He is c/o      Objective   Vital Signs:   Vitals:    01/16/25 1259   BP: 149/71   BP Location: Left arm   Patient Position: Sitting   Pulse: 65   Temp: 97 °F (36.1 °C)   TempSrc: Oral   SpO2: 96%   Weight: 116 kg (256 lb 3.2 oz)   Height: 185.4 cm (72.99\")       Body mass index is 33.81 kg/m².          Physical Exam  Constitutional:       General: He is not in acute distress.     Appearance: Normal appearance. He is not ill-appearing.   Pulmonary:      Effort: Pulmonary effort is normal. No respiratory distress.   Skin:     General: Skin is warm.      Findings: Erythema and lesion present.      Comments: Scabbed lesion with surrounding erythema to posterior right calf/ankle.  See wound image attached below.   Neurological:      Mental Status: He is alert and oriented to person, place, and time.   Psychiatric:         Mood and Affect: Mood normal.         Behavior: Behavior normal.         Thought Content: Thought content normal.         Judgment: Judgment normal.      WOUND IMAGE - Right ankle (01/16/2025)                Assessment and Plan    Assessment & Plan  Cellulitis, unspecified cellulitis site  Complete prescribed antibiotic.  Monitor for worsening infection.  Notify office with any concerns.  Orders:    doxycycline (VIBRAMYICN) 100 MG tablet; Take 1 tablet by mouth 2 (Two) Times a Day for 10 days.    Primary hypertension   patient to check with nephrology on increasing losartan back up to 100 mg as his systolic blood pressure has been elevated.           Patient to notify office with any acute concerns or issues.  Patient verbalizes understanding, agrees with plan of care and has no further questions upon discharge.    Please note that portions of this note were completed with a voice recognition " program.      Follow Up    Return if symptoms worsen or fail to improve.  Patient was given instructions and counseling regarding his condition or for health maintenance advice. Please see specific information pulled into the AVS if appropriate.     Medications Discontinued During This Encounter   Medication Reason    atorvastatin (LIPITOR) 40 MG tablet Duplicate order

## 2025-01-16 NOTE — ASSESSMENT & PLAN NOTE
patient to check with nephrology on increasing losartan back up to 100 mg as his systolic blood pressure has been elevated.

## 2025-01-29 NOTE — PROGRESS NOTES
"    [facility]  [address] [place] [address]    Outpatient Speech Language Pathology   Pediatric Speech and Language Treatment Note       Today's Visit Information         Patient Name: Manuela Graves      : 2021      MRN: [id number]           Visit Date: 2025          Visit Dx:  (Q90.9) Down syndrome    (F80.2) Mixed receptive-expressive language disorder    (F80.9) Speech and language deficits    (F80.9) Speech delay       Subjective    Manuela was seen for speech and language therapy on today's date. Manuela was accompanied to the session by her mother. She transitioned to go with the therapist without difficulty. Family remains in vehicle/lobby.     Behavior(s) observed this date: alert, awake, cooperative, required consistent physical prompts and redirection, poor attention/distractible, delayed response, frustrated, and happy.        Objective    Planned Interventions: play based interventions, sing song stimuli, basic concepts, sensory gym stimuli, sensory light room stimuli, outdoor play and puzzles        Speech Goals    Long Term Goals:     1. Pt will improve overall receptive language skills to functional level to communicate w/ others.   2. Pt will improve overall expressive language skills to functional level to communicate w/ others.   3. Pt will improve overall social pragmatic language skills to functional level to communicate w/ others.          Short Term Goals:  1. Child will verbally produce early developing sounds in all word positions (M, N, B, P, Y, H, D, W) in 8/10 opp w/ min cues over 3 consecutive sessions.  *child produces vocal play of jargon and babbling. She shakes head \"no\", waves 'bye', gives high fives. SLP models use of signs paired w/ verbalizations. She verbally produces babbling and unintelligible productions this session for entire session. Verbally produces 4 true words (go, bye, dad, no) this session.  Auditory " Chief Complaint   Patient presents with   • Left Shoulder - Results           HPI The patient is here today for MRI results of his left shoulder.  Patient has increasing pain and discomfort over the left shoulder.  His symptoms are a lot worse over the past 6 months.  He has difficulty in sleeping in bed at night.  Difficulty in reaching into the overhead, abducted position.  Cross body adduction activities bother the patient significantly.  He feels like his symptoms are getting worse all the time.  He is not interested in surgical intervention at this point but he does think that he was going to need to have it done because of his worsening of his symptoms.  A steroid injection was offered to him by me and he will think about that option.        There were no vitals filed for this visit.        Review of Systems   Constitutional: Negative for activity change, appetite change, fatigue, fever and unexpected weight change.   HENT: Negative for congestion, sneezing and voice change.    Eyes: Negative for visual disturbance.   Respiratory: Negative for cough, chest tightness and wheezing.    Cardiovascular: Negative for chest pain, palpitations and leg swelling.   Gastrointestinal: Negative for abdominal distention, constipation, diarrhea and vomiting.   Endocrine: Negative for polydipsia, polyphagia and polyuria.   Genitourinary: Negative for decreased urine volume, difficulty urinating, dysuria, flank pain and frequency.   Musculoskeletal: Positive for joint swelling and myalgias. Negative for arthralgias, back pain, gait problem, neck pain and neck stiffness.   Skin: Negative for color change, pallor and wound.   Allergic/Immunologic: Negative for environmental allergies and food allergies.   Neurological: Negative for dizziness, weakness and headaches.   Hematological: Does not bruise/bleed easily.   Psychiatric/Behavioral: Positive for sleep disturbance. Negative for agitation, confusion and decreased  "bombardment from SLP across entire session for early developmental sounds and sequences.      2. Child will respond to spoken name productions in 4/5 opp w/ min cues over 3 consecutive sessions.  *pt turns head to name approx 50% opp w/ mod-max cues from SLP, often felt s/t behavioral aversion as child w/ increased behavioral difficulties throughout sessions    3. Child will id age-appropriate basic concepts in 8/10 opp w/ min cues over 3 consecutive sessions  *child produces vocal play of jargon and babbling. She shakes head \"no\", waves 'bye', gives high fives. SLP models use of signs paired w/ verbalizations. She verbally produces babbling and unintelligible productions this session for entire session. Verbally produces 4 true words (go, bye, dad, no)  this session.  Auditory bombardment from SLP across entire session for early developmental sounds and sequences. Most success w/ child allowed free play routines versus structured activities.    4. Child will indicate item desired via gesture or verbal approximation in 3/5 opp accuracy given mod cues over 3 consecutive sessions  *child produces vocal play of jargon and babbling. She shakes head \"no\", waves 'bye', gives high fives. SLP models use of signs paired w/ verbalizations. She verbally produces babbling and unintelligible productions this session for entire session. Verbally produces 4 true words (go, bye, dad, no)  this session.  Auditory bombardment from SLP across entire session for early developmental sounds and sequences.     5. Child will follow simple age-appropriate 1-step directions in 3/5 opp w/ min cues  *direct assistance from SLP for all activities. Limited safety awareness. She is unaware of unsafe conditions and requires direct supervision and assistance.  Child only participates w/ activities of her own choosing. She likes play based stimuli and seeks watching therapist or similar age peers. She demonstrates intermittent behavior aversions w/ " concentration. The patient is not nervous/anxious.            Physical Exam   Constitutional: He is oriented to person, place, and time. He appears well-developed and well-nourished.   HENT:   Head: Normocephalic.   Eyes: Conjunctivae are normal. Pupils are equal, round, and reactive to light.   Neck: Normal range of motion. Neck supple. No JVD present.   Cardiovascular: Normal rate, normal heart sounds and intact distal pulses.    Pulmonary/Chest: Effort normal and breath sounds normal. No respiratory distress.   Abdominal: Soft. Bowel sounds are normal. There is no tenderness. There is no guarding.   Musculoskeletal: He exhibits edema and tenderness.   Lymphadenopathy:     He has no cervical adenopathy.   Neurological: He is alert and oriented to person, place, and time. He has normal reflexes.   Skin: Skin is warm and dry.   Psychiatric: His behavior is normal. Judgment and thought content normal.   Vitals reviewed.          Joint/Body Part Specific Exam:  left shoulder. The shoulder is extremely tender anteriorly. There is tenderness over the insertion of the rotator cuff over the greater tuberosity of the humerus. Slight proximal migration of the humeral head is noted. AC joint is tender. Crossover adduction test is positive. Drop arm sign is positive. Forward flexion is 0-90° degrees, abduction is 0-120° degrees, external rotation is 0-40° degrees. Patient has mild atrophy both of the supra and infraspinatus fossa. Sulcus sign is negative. There is no evidence of multidirectional instability. Neer test is positive on compression. Skin and soft tissue tenderness is noted. Patient’s strength in abduction and external rotation are extremely lacking. The pain level is 6.      X-RAY Report:        Diagnostics:  MRI reports discussed with the patient and they show a anterior complete tear of the supraspinatus at its insertion.  There is 3 cm of retraction.  There is a partial undersurface tear of the infraspinatus  as well.  There is also a small degenerative tear at the base of the superior glenoid labrum      Kyler was seen today for results.    Diagnoses and all orders for this visit:    Complete tear of left rotator cuff          Procedures        Plan:  Home based exercise program with stretching and strapping of the shoulder to prevent a frozen shoulder.    Extensive discussion the patient about treatment options and I have discussed the MRI findings with the patient great detail.    He is having increasing symptoms and my recommendations are to go ahead with a arthroscopic surgery of the left shoulder with a mini open repair of the rotator cuff and a Gema resection.    Risks and benefits discussed with the patient to the extent of 30 minutes in the office today.    Patient has been diagnosed with a rotator cuff tear.  These tears can range from partial tears to complete tears of the muscle and/or tendon. They can also be categorized in size by width as being small, medium or large.  Diagnosis is confirmed with MRI or CT/arthrogram.  Management of these tears can be conservative with injections, physical therapy, activity modification and use of NSAIDS as needed.  Surgery is the only way to actually fix these tears, as they will not heal or repair on their own.  These tears can usually be repaired with arthroscopic surgery, but occasionally open procedures are necessary.  Many factors can influence the outcomes. First, larger tears have a higher chance of failure from a healing perspective, although pain may improve nonetheless, potentially longer recovery and sometimes, depending on the quality of the tissue and the length of time the tear has been present, may not be repairable at all.  If the tear has been going on for a long time, the muscle can actually change to fatty tissue, and no longer act as muscle.  This can have a direct effect on not only the ability to repair the tear but also the outcome from the  screaming, hitting/kicking therapist, and refusal of tasks despite max cues and attempts from therapist. Child w/ most success in sensory light room or gym w/ unstructured tasks      6. Child will correct receptively/expressively identify item/object FO2 w/ min cues over 3 consecutive session  *She likes play based stimuli and seeks watching therapist or similar age peers. She demonstrates intermittent behavior aversions w/ screaming, hitting/kicking therapist, and refusal of tasks despite max cues and attempts from therapist. Increased participation this session. She enjoys watching self in mirror and playing w/ ball pit, bubbles, coloring, etc.  Seeks roaming between therapy tasks quickly unless she selects the item herself.     7.Child will attend to structured tasks in 4/5 opp w/ min cues in all settings and contexts over 3 consecutive sessions  *child attends to tasks unstructured play for approx 5 minute w/ mod cues and prompts. Child seeks being left alone. Most success w/ mirror play routines. Child seeks being alone; max cues for functional participation. Most success w/ music and mirror play.  Will participate w/ mirror play for longer duration of time.     8. Child will demonstrate functional play in structured therapeutic environment in 4/5 opp w/ min cues over 3 consecutive sessions  *She likes play based stimuli and seeks watching therapist or similar age peers. Enjoys mirror play. Likes roaming. Minimal play w/ toys; most success w/ mirror play, ball pit, singing. She enjoys snack this session.     9. Child will functionally participate in age-appropriate activities for speech therapy in 4/5 opp w/ min cues over 3 consecutive sessions   *child participates functionally approx 60% opp w/ mod cues while seated in activity chair. She demonstrates intermittent behavior aversions w/ screaming, hitting/kicking therapist, and refusal of tasks despite max cues and attempts from therapist. Most success w/  mirror play, music, and babydolls, max cues for other toy items.               Early screening for diagnosis and treatment will be utilized.          Assessment     Manuela presents with moderate-severely delayed receptive language skills (understanding what is said to her) and expressive language skills (communicating their wants and needs to others with gestures, AAC or spoken language) as characterized by today's evaluation and mother's report. This is impacting her ability to communicate effectively with medical professionals and communication partners in all activities of daily living across all settings.      Plan     It is recommended that Manuela continue speech and language therapy to allow for improved independence communicating wants and needs during ADLs per patient's plan of care.           Plan of Care: Continue Speech Therapy 1 time(s) per week for 12 weeks.         Planned Interventions: play based interventions, sing song stimuli, basic concepts, sensory gym stimuli, sensory light room stimuli, outdoor play and puzzles          Billed Treatment Time    Total Time Calculation:    Time Calculation- SLP       Row Name 01/29/25 1019             Untimed Charges    97695-GF Treatment/ST Modification Prosth Aug Alter  45  -KB         Total Minutes    Untimed Charges Total Minutes 45  -KB       Total Minutes 45  -KB                User Key  (r) = Recorded By, (t) = Taken By, (c) = Cosigned By      Initials Name Provider Type    Axel Sanchez MA,CCC-SLP Speech and Language Pathologist                        Planned CPT Codes: Speech/Language 10073 and AAC Treatment 43743          Referring Provider:  Leela Sorenson        Today's Treatment Provided by:      Thank you for allowing me to participate in the care of your patient-      Liliya Kim M.A.Ed., CCC-SLP, ASD        1/29/2025    Speech-Language Pathologist  [facility]  [address] [place], [place],  surgery itself both from a healing and functional perspective.  Therefore, the decision to proceed with surgery does have a time factor which can affect the quality of the tissue and reparability but also the potential for the tear to increase in size.  The longer you wait to repair the torn tendon there can be decreased potential for a successful outcome.    Risks of surgery have been discussed with the patient in detail.  These risks include but are not limited to possibility of infection, DVT, continued pain, continued progression of arthritis, use of allograft tissue, limited range of motion and strength and further surgical intervention.  Patient understands that the surgery will benefit mechanical symptoms of pain and instability but may not help with symptoms of arthritis.               [id number]  Office [phone]    Fax [phone]       [place] License Number: [id number]  [facility] License Number: [id number]     Electronically Signed                 Therapy Charges for Today       Code Description Service Date Service Provider Modifiers Qty    [id number] [facility] TREATMENT SPEECH 3 1/29/2025 Axel Kim MA,CCC-SLP 59, GN 1

## 2025-02-09 DIAGNOSIS — M1A.9XX0 CHRONIC GOUT WITHOUT TOPHUS, UNSPECIFIED CAUSE, UNSPECIFIED SITE: ICD-10-CM

## 2025-02-10 RX ORDER — ALLOPURINOL 300 MG/1
300 TABLET ORAL DAILY
Qty: 90 TABLET | Refills: 0 | Status: SHIPPED | OUTPATIENT
Start: 2025-02-10

## 2025-02-11 DIAGNOSIS — F41.9 ANXIETY: ICD-10-CM

## 2025-02-13 DIAGNOSIS — F41.9 ANXIETY: ICD-10-CM

## 2025-02-13 RX ORDER — ALPRAZOLAM 0.5 MG
0.5 TABLET ORAL NIGHTLY PRN
Qty: 30 TABLET | Refills: 0 | Status: SHIPPED | OUTPATIENT
Start: 2025-02-13

## 2025-02-13 RX ORDER — ALPRAZOLAM 0.5 MG
0.5 TABLET ORAL NIGHTLY PRN
Qty: 30 TABLET | Refills: 0 | Status: CANCELLED | OUTPATIENT
Start: 2025-02-13

## 2025-02-13 NOTE — TELEPHONE ENCOUNTER
"     Caller: Kyler Staley \"Peter\"    Relationship: Self    Best call back number:   Telephone Information:   Mobile [phone]         Requested Prescriptions:   Requested Prescriptions     Pending Prescriptions Disp Refills    ALPRAZolam (XANAX) 0.5 MG tablet 30 tablet 0     Sig: Take 1 tablet by mouth At Night As Needed for Anxiety.        Pharmacy where request should be sent: [facility] [address] [place] [address] RADHA HUSSEIN [facility] [phone] [facility] [phone]      Last office visit with prescribing clinician: 1/16/2025   Last telemedicine visit with prescribing clinician: Visit date not found   Next office visit with prescribing clinician: 2/24/2025     Additional details provided by patient:      Does the patient have less than a 3 day supply:  [x] Yes  [] No    Would you like a call back once the refill request has been completed: [] Yes [x] No    If the office needs to give you a call back, can they leave a voicemail: [] Yes [x] No    Yovana Tovar Rep   02/13/25 12:23 EST        "

## 2025-02-17 ENCOUNTER — OFFICE VISIT (OUTPATIENT)
Dept: FAMILY MEDICINE CLINIC | Age: 75
End: 2025-02-17
Payer: MEDICARE

## 2025-02-17 VITALS
OXYGEN SATURATION: 93 % | TEMPERATURE: 97.8 F | HEIGHT: 73 IN | WEIGHT: 259 LBS | SYSTOLIC BLOOD PRESSURE: 132 MMHG | BODY MASS INDEX: 34.33 KG/M2 | DIASTOLIC BLOOD PRESSURE: 79 MMHG | HEART RATE: 70 BPM

## 2025-02-17 DIAGNOSIS — R09.81 NASAL CONGESTION: ICD-10-CM

## 2025-02-17 DIAGNOSIS — N18.32 STAGE 3B CHRONIC KIDNEY DISEASE: ICD-10-CM

## 2025-02-17 DIAGNOSIS — J01.00 ACUTE NON-RECURRENT MAXILLARY SINUSITIS: Primary | ICD-10-CM

## 2025-02-17 DIAGNOSIS — R05.1 ACUTE COUGH: ICD-10-CM

## 2025-02-17 LAB
EXPIRATION DATE: NORMAL
FLUAV AG UPPER RESP QL IA.RAPID: NOT DETECTED
FLUBV AG UPPER RESP QL IA.RAPID: NOT DETECTED
INTERNAL CONTROL: NORMAL
Lab: NORMAL
SARS-COV-2 AG UPPER RESP QL IA.RAPID: NOT DETECTED

## 2025-02-17 PROCEDURE — 3051F HG A1C>EQUAL 7.0%<8.0%: CPT

## 2025-02-17 PROCEDURE — 1160F RVW MEDS BY RX/DR IN RCRD: CPT

## 2025-02-17 PROCEDURE — 87428 SARSCOV & INF VIR A&B AG IA: CPT

## 2025-02-17 PROCEDURE — 1126F AMNT PAIN NOTED NONE PRSNT: CPT

## 2025-02-17 PROCEDURE — 3075F SYST BP GE 130 - 139MM HG: CPT

## 2025-02-17 PROCEDURE — 1159F MED LIST DOCD IN RCRD: CPT

## 2025-02-17 PROCEDURE — 99213 OFFICE O/P EST LOW 20 MIN: CPT

## 2025-02-17 PROCEDURE — 3078F DIAST BP <80 MM HG: CPT

## 2025-02-17 RX ORDER — BENZONATATE 100 MG/1
100 CAPSULE ORAL 3 TIMES DAILY PRN
Qty: 21 CAPSULE | Refills: 0 | Status: SHIPPED | OUTPATIENT
Start: 2025-02-17

## 2025-02-17 RX ORDER — DOXYCYCLINE 100 MG/1
100 CAPSULE ORAL 2 TIMES DAILY
Qty: 14 CAPSULE | Refills: 0 | Status: SHIPPED | OUTPATIENT
Start: 2025-02-17 | End: 2025-02-24

## 2025-02-17 NOTE — PROGRESS NOTES
Subjective     CHIEF COMPLAINT    Chief Complaint   Patient presents with    Nasal Congestion     Yellow nasal congestion. Pt would like covid testing today.     Influenza     Dx 8 days ago with home test.      History of Present Illness:  Kyler Staley is a 74 y.o. male who presents to [facility] with acute complaint of nasal congestion, rhinorrhea, cough.  Took a home test which was positive for flu about 8 days ago.  He was feeling better but then last night started feeling worse with these new symptoms.  He did have a fever of 99.9 when he had the flu but that has resolved.  He did not take Tamiflu for flu.  No shortness of breath, wheezing or chest pain.  Has been taking over-the-counter medications for symptomatic treatment. He is concerned he may have a sinus infection.       Review of Systems   Constitutional:  Negative for chills and fever.   HENT:  Positive for congestion, sinus pressure and sinus pain. Negative for rhinorrhea and sore throat.    Respiratory:  Negative for cough, shortness of breath and wheezing.    Cardiovascular:  Negative for chest pain.   Gastrointestinal:  Negative for nausea and vomiting.   Musculoskeletal:  Negative for myalgias.   Neurological:  Negative for headaches.         Past Medical History:   Diagnosis Date    Abnormal ECG     Acne     Alcoholism     Allergic     Anemia     Anxiety     Arthritis     Arthritis of back     Arthritis of neck     Asthma     Atrial fibrillation     Back pain     Benign prostatic hyperplasia     Callus     Cardiac disease     Cataracts, bilateral     Cervical disc disorder     CHF (congestive heart failure)     Cholelithiasis     Chronic kidney disease, stage 3     moderate    CKD (chronic kidney disease)     Clotting disorder     Condition not found     hernia    Coronary artery disease     Depression     Diabetes     Difficulty walking     Enlarged prostate     Erectile dysfunction     Fatigue     GERD  (gastroesophageal reflux disease)     Gout     Hearing loss     Hematospermia 12/09/2015    High blood pressure     High cholesterol     Hip arthrosis     History of cold sores     History of gastritis     [place] (hard of hearing)     Hyperlipidemia     Hypertension     Hypoglycemia     Hypothyroidism     Ingrown toenail     Irregular heart rhythm     Joint pain     Kidney disease     Knee swelling     Low back pain     Lumbosacral disc disease     Narrowing of airway 2012    PER ENT    Neuropathy     Neuropathy in diabetes     Obesity     Osteopenia     Pancreatitis     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     Plantar fasciitis     Pneumonia     PONV (postoperative nausea and vomiting)     Psychiatric diagnosis     Renal insufficiency     Rotator cuff syndrome     REPAIRED BILATERAL    Shin splints     Shortness of breath     Sinus trouble     Sleep apnea     BIPAP    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)     Substance abuse     Swallowing difficulty     Testosterone deficiency     Thin skin     Thoracic disc disorder     Thyroid condition     Type 2 diabetes mellitus     Visual impairment     Vitamin D deficiency     Vocal cord dysfunction      HISTORY OF DISORDER ON ONE SIDE AFTER TRACHEA         Past Surgical History:   Procedure Laterality Date    BACK SURGERY  2018    X2    BACK SURGERY  2011    CARDIAC CATHETERIZATION      CARDIAC SURGERY      CHOLECYSTECTOMY      COLONOSCOPY      CORONARY ARTERY BYPASS GRAFT  2012    CORONARY STENT PLACEMENT      EYE SURGERY      HERNIA REPAIR      INGUINAL HERNIA REPAIR      JOINT REPLACEMENT      KNEE SURGERY      right and left    LUMBAR DISCECTOMY FUSION INSTRUMENTATION N/A 02/11/2021    Procedure: EXPLORATION OF FUSION T-12 S-1 REMOVAL OF HARDWARE REVISION OF FUSION AND INSTRUMENTATION T12-S1 WITH APPLICATION OF BONE MORPHOGENIC PROTEIN;  Surgeon: Baltazar Blankenship MD;  Location: [facility];  Service: Orthopedic Spine;   Laterality: N/A;    NOSE SURGERY      PACEMAKER IMPLANTATION      REPLACEMENT TOTAL KNEE Bilateral     SHOULDER SURGERY Bilateral     rotator cuff repair    SINUS SURGERY      SPINE SURGERY      TOENAIL EXCISION           Family History   Problem Relation Age of Onset    Heart attack Mother     Arthritis Mother     Cancer Mother     Heart disease Mother     Hypertension Mother     Cancer Father     Hypertension Sister     Hypertension Brother     Malig Hyperthermia Neg Hx          Social History     Socioeconomic History    Marital status:    Tobacco Use    Smoking status: Former     Current packs/day: 0.00     Average packs/day: 3.0 packs/day for 15.0 years (45.0 ttl pk-yrs)     Types: Cigarettes     Start date: 1960     Quit date: 1975     Years since quittin.1     Passive exposure: Past    Smokeless tobacco: Never   Vaping Use    Vaping status: Never Used   Substance and Sexual Activity    Alcohol use: No    Drug use: Yes     Types: Hydrocodone    Sexual activity: Not Currently     Partners: Female         Allergies   Allergen Reactions    Bupropion Other (See Comments) and Unknown - Low Severity     MAKES PATIENT VERY AGGRESSIVE      Iodinated Contrast Media Other (See Comments)     Other reaction(s): Other (See Comments)  Chronic kidney disease - stage 3  Chronic kidney disease - stage 3    Lorazepam Other (See Comments) and Mental Status Change     MADE PATIENT VERY AGGRESSIVE      Propoxyphene Nausea And Vomiting and Unknown - Low Severity    Adhesive Tape Rash          Current Outpatient Medications on File Prior to Visit   Medication Sig Dispense Refill    allopurinol (ZYLOPRIM) 300 MG tablet Take 1 tablet by mouth once daily 90 tablet 0    ALPRAZolam (XANAX) 0.5 MG tablet TAKE 1 TABLET BY MOUTH AT NIGHT AS NEEDED FOR ANXIETY 30 tablet 0    amLODIPine (NORVASC) 5 MG tablet Take 1 tablet by mouth Daily.      apixaban (ELIQUIS) 5 MG tablet tablet Take 1 tablet by mouth 2 (Two) Times a Day.       atorvastatin (LIPITOR) 80 MG tablet Take 1 tablet by mouth every night at bedtime.      carvedilol (COREG) 12.5 MG tablet Take 1 tablet by mouth 2 (Two) Times a Day With Meals. 180 tablet 1    eplerenone (INSPRA) 25 MG tablet Take 1 tablet by mouth Daily.      fexofenadine (ALLEGRA) 60 MG tablet Take 1 tablet by mouth Daily.      finasteride (PROSCAR) 5 MG tablet Take 1 tablet by mouth Daily. 90 tablet 4    Flaxseed, Linseed, 1000 MG capsule Take 1,200 mg by mouth 2 (Two) Times a Day.      glucose blood test strip       imipramine (TOFRANIL) 50 MG tablet TAKE 1 TABLET BY MOUTH IN THE MORNING AND 2 IN THE EVENING 270 tablet 1    insulin aspart (novoLOG FLEXPEN) 100 UNIT/ML solution pen-injector sc pen Inject 7 Units under the skin into the appropriate area as directed 2 (Two) Times a Day. 1/14 units BID      insulin glargine (LANTUS, SEMGLEE) 100 UNIT/ML injection Inject 29 Units under the skin into the appropriate area as directed Daily.      isosorbide mononitrate (IMDUR) 60 MG 24 hr tablet Take 1 tablet by mouth Daily. 90 tablet 1    ketoconazole (NIZORAL) 2 % shampoo SHAMPOO TOPICALLY 3 TIMES WEEKLY      levothyroxine (SYNTHROID, LEVOTHROID) 137 MCG tablet TAKE 1 TABLET BY MOUTH ONCE DAILY IN THE MORNING 90 tablet 0    losartan (COZAAR) 50 MG tablet Take 1 tablet by mouth Daily. (Patient taking differently: Take 2 tablets by mouth Daily.)      Melatonin 5 MG chewable tablet Chew 1 tablet Daily As Needed.      nitroglycerin (NITROSTAT) 0.4 MG SL tablet Place 1 tablet under the tongue Every 5 (Five) Minutes As Needed for Chest Pain. do not exceed a total of 3 doses in 15 minutes      pantoprazole (PROTONIX) 40 MG EC tablet Take 1 tablet by mouth once daily 90 tablet 0    Plavix 75 MG tablet Take 1 tablet by mouth Daily.      tamsulosin (FLOMAX) 0.4 MG capsule 24 hr capsule Take 2 capsules by mouth Daily. 180 capsule 4    Omega-3 Fatty Acids (fish oil) 1200 MG capsule capsule Take 1 capsule by mouth Daily.  "(Patient not taking: Reported on 2/17/2025) 90 capsule 3    [DISCONTINUED] atorvastatin (LIPITOR) 40 MG tablet Take 1 tablet by mouth Daily. 90 tablet 0     No current facility-administered medications on file prior to visit.       /79   Pulse 70   Temp 97.8 °F (36.6 °C) (Oral)   Ht 185.4 cm (72.99\")   Wt 117 kg (259 lb)   SpO2 93% Comment: room air  BMI 34.18 kg/m²     Objective     Physical Exam  Vitals and nursing note reviewed.   Constitutional:       General: He is not in acute distress.     Appearance: Normal appearance. He is not ill-appearing.   HENT:      Head: Normocephalic.      Right Ear: Tympanic membrane, ear canal and external ear normal.      Left Ear: Tympanic membrane, ear canal and external ear normal.      Nose: Nose normal.      Right Sinus: Maxillary sinus tenderness present. No frontal sinus tenderness.      Left Sinus: Maxillary sinus tenderness present. No frontal sinus tenderness.      Mouth/Throat:      Lips: Pink.      Mouth: Mucous membranes are moist.      Pharynx: Oropharynx is clear. Uvula midline. No pharyngeal swelling, oropharyngeal exudate, posterior oropharyngeal erythema or uvula swelling.   Eyes:      Pupils: Pupils are equal, round, and reactive to light.   Cardiovascular:      Rate and Rhythm: Normal rate and regular rhythm.      Heart sounds: Normal heart sounds. No murmur heard.  Pulmonary:      Effort: Pulmonary effort is normal. No accessory muscle usage or respiratory distress.      Breath sounds: Normal breath sounds. No wheezing or rhonchi.   Musculoskeletal:      Cervical back: Normal range of motion.   Lymphadenopathy:      Cervical: No cervical adenopathy.   Skin:     General: Skin is warm and dry.   Neurological:      General: No focal deficit present.      Mental Status: He is alert and oriented to person, place, and time.   Psychiatric:         Mood and Affect: Mood and affect normal.         Behavior: Behavior normal.         Lab Results (last 24 " hours)       Procedure Component Value Units Date/Time    POCT SARS-CoV-2 Antigen KIRK + Flu [[id number]] Collected: 02/17/25 1319    Specimen: Swab Updated: 02/17/25 1320     SARS Antigen Not Detected     Influenza A Antigen KIRK Not Detected     Influenza B Antigen KIRK Not Detected     Internal Control Passed     Lot Number [id number]     Expiration Date 7-18-25          Assessment & Plan  Acute non-recurrent maxillary sinusitis  Concern for bacterial sinusitis due to second sickening syndrome.  Treatment with doxycycline as noted.  Recommend increase fluid intake, rest.  Consider humidification in room.  He was negative for COVID and flu today.    Orders:    doxycycline (VIBRAMYCIN) 100 MG capsule; Take 1 capsule by mouth 2 (Two) Times a Day for 7 days.    Nasal congestion    Orders:    POCT SARS-CoV-2 Antigen KIRK + Flu    Acute cough    Orders:    benzonatate (Tessalon Perles) 100 MG capsule; Take 1 capsule by mouth 3 (Three) Times a Day As Needed for Cough.    Stage 3b chronic kidney disease               Follow up:  Return if symptoms worsen or fail to improve.  Patient was given instructions and counseling regarding his condition or for health maintenance advice. Please see specific information pulled into the AVS if appropriate.

## 2025-02-23 DIAGNOSIS — I48.0 PAROXYSMAL ATRIAL FIBRILLATION: ICD-10-CM

## 2025-02-24 ENCOUNTER — OFFICE VISIT (OUTPATIENT)
Dept: FAMILY MEDICINE CLINIC | Age: 75
End: 2025-02-24
Payer: MEDICARE

## 2025-02-24 ENCOUNTER — HOSPITAL ENCOUNTER (OUTPATIENT)
Dept: GENERAL RADIOLOGY | Facility: HOSPITAL | Age: 75
Discharge: HOME | End: 2025-02-24
Admitting: NURSE PRACTITIONER
Payer: MEDICARE

## 2025-02-24 VITALS
HEIGHT: 73 IN | BODY MASS INDEX: 34.11 KG/M2 | DIASTOLIC BLOOD PRESSURE: 68 MMHG | HEART RATE: 64 BPM | TEMPERATURE: 98.1 F | SYSTOLIC BLOOD PRESSURE: 127 MMHG | WEIGHT: 257.4 LBS | OXYGEN SATURATION: 98 %

## 2025-02-24 DIAGNOSIS — R74.8 ELEVATED ALKALINE PHOSPHATASE LEVEL: ICD-10-CM

## 2025-02-24 DIAGNOSIS — E11.22 TYPE 2 DIABETES MELLITUS WITH STAGE 3 CHRONIC KIDNEY DISEASE, WITH LONG-TERM CURRENT USE OF INSULIN, UNSPECIFIED WHETHER STAGE 3A OR 3B CKD: Primary | ICD-10-CM

## 2025-02-24 DIAGNOSIS — R06.00 DYSPNEA, UNSPECIFIED TYPE: ICD-10-CM

## 2025-02-24 DIAGNOSIS — N40.1 BENIGN PROSTATIC HYPERPLASIA WITH LOWER URINARY TRACT SYMPTOMS, SYMPTOM DETAILS UNSPECIFIED: ICD-10-CM

## 2025-02-24 DIAGNOSIS — Z79.4 TYPE 2 DIABETES MELLITUS WITH STAGE 3 CHRONIC KIDNEY DISEASE, WITH LONG-TERM CURRENT USE OF INSULIN, UNSPECIFIED WHETHER STAGE 3A OR 3B CKD: Primary | ICD-10-CM

## 2025-02-24 DIAGNOSIS — F41.9 ANXIETY: ICD-10-CM

## 2025-02-24 DIAGNOSIS — E78.01 ESSENTIAL FAMILIAL HYPERCHOLESTEROLEMIA: ICD-10-CM

## 2025-02-24 DIAGNOSIS — I10 PRIMARY HYPERTENSION: ICD-10-CM

## 2025-02-24 DIAGNOSIS — K21.9 GASTROESOPHAGEAL REFLUX DISEASE WITHOUT ESOPHAGITIS: ICD-10-CM

## 2025-02-24 DIAGNOSIS — F32.A DEPRESSIVE DISORDER: ICD-10-CM

## 2025-02-24 DIAGNOSIS — N18.30 TYPE 2 DIABETES MELLITUS WITH STAGE 3 CHRONIC KIDNEY DISEASE, WITH LONG-TERM CURRENT USE OF INSULIN, UNSPECIFIED WHETHER STAGE 3A OR 3B CKD: Primary | ICD-10-CM

## 2025-02-24 DIAGNOSIS — E03.9 ACQUIRED HYPOTHYROIDISM: ICD-10-CM

## 2025-02-24 DIAGNOSIS — R10.11 RUQ PAIN: ICD-10-CM

## 2025-02-24 DIAGNOSIS — I48.0 PAROXYSMAL ATRIAL FIBRILLATION: ICD-10-CM

## 2025-02-24 DIAGNOSIS — N18.32 STAGE 3B CHRONIC KIDNEY DISEASE: ICD-10-CM

## 2025-02-24 DIAGNOSIS — Z79.899 LONG-TERM USE OF HIGH-RISK MEDICATION: ICD-10-CM

## 2025-02-24 LAB
ALBUMIN UR-MCNC: 16.2 MG/DL
AMPHET+METHAMPHET UR QL: NEGATIVE
AMPHETAMINES UR QL: NEGATIVE
BARBITURATES UR QL SCN: NEGATIVE
BENZODIAZ UR QL SCN: POSITIVE
BUPRENORPHINE SERPL-MCNC: NEGATIVE NG/ML
CANNABINOIDS SERPL QL: NEGATIVE
COCAINE UR QL: NEGATIVE
CREAT UR-MCNC: 71.8 MG/DL
EXPIRATION DATE: ABNORMAL
Lab: ABNORMAL
MDMA UR QL SCN: NEGATIVE
METHADONE UR QL SCN: NEGATIVE
MICROALBUMIN/CREAT UR: 225.6 MG/G (ref 0–29)
MORPHINE/OPIATES SCREEN, URINE: NEGATIVE
OXYCODONE UR QL SCN: NEGATIVE
PCP UR QL SCN: NEGATIVE

## 2025-02-24 PROCEDURE — 82043 UR ALBUMIN QUANTITATIVE: CPT | Performed by: NURSE PRACTITIONER

## 2025-02-24 PROCEDURE — 3074F SYST BP LT 130 MM HG: CPT | Performed by: NURSE PRACTITIONER

## 2025-02-24 PROCEDURE — G2211 COMPLEX E/M VISIT ADD ON: HCPCS | Performed by: NURSE PRACTITIONER

## 2025-02-24 PROCEDURE — 99214 OFFICE O/P EST MOD 30 MIN: CPT | Performed by: NURSE PRACTITIONER

## 2025-02-24 PROCEDURE — 3051F HG A1C>EQUAL 7.0%<8.0%: CPT | Performed by: NURSE PRACTITIONER

## 2025-02-24 PROCEDURE — 1126F AMNT PAIN NOTED NONE PRSNT: CPT | Performed by: NURSE PRACTITIONER

## 2025-02-24 PROCEDURE — 3078F DIAST BP <80 MM HG: CPT | Performed by: NURSE PRACTITIONER

## 2025-02-24 PROCEDURE — 82570 ASSAY OF URINE CREATININE: CPT | Performed by: NURSE PRACTITIONER

## 2025-02-24 PROCEDURE — 71046 X-RAY EXAM CHEST 2 VIEWS: CPT

## 2025-02-24 RX ORDER — ISOSORBIDE MONONITRATE 60 MG/1
60 TABLET, EXTENDED RELEASE ORAL DAILY
Qty: 90 TABLET | Refills: 0 | Status: SHIPPED | OUTPATIENT
Start: 2025-02-24

## 2025-02-24 NOTE — ASSESSMENT & PLAN NOTE
Stable on insulin as noted in hpi. Continue f/u with endo. Continue to monitor.     Orders:    Microalbumin / Creatinine Urine Ratio - Urine, Clean Catch; Future    Microalbumin / Creatinine Urine Ratio - Urine, Clean Catch

## 2025-02-24 NOTE — PROGRESS NOTES
Chief Complaint  Hypothyroidism (4 month )    Subjective          Kyler Staley presents to [facility] for routine f/u.     He is seeing urology, CASSIDY Greer, for BPH and it on flomax 0.8mg at night and proscar 5mg daily.     Patient is seeing CASSIDY Tim for DM. [facility]. He is on Lantus 65 units daily and novolog 1-14 units before meals.     He is taking losartan 100 mg, amlodipine 5mg daily and Inspra 25 mg daily, (ordered by nephrology) and carvedilol 12.5 mg twice daily.  Patient is also on Eliquis 5 mg twice daily and aspirin 81 mg for his A-fib. He sees Baltazar Hardy for cardiology.  Hx of CABG x 4, pacemaker and multiple stents. Pt is rx imdur 60mg daily and has nitro on hand for chest pain.    Hld treated with atorvastatin 80mg and flax seed supplement.     Chronic gout treated with allopurinol 300mg daily. Denies recent flair.     Hypothyroid treated with levothyroxine 137mcg daily.     Allergies controlled with allegra.         Patient is seeing Dr. Kruger for Derm (every 6 months for precancerous skin lesions).     He sees [facility] for his chronic kidney disease.        Patient has chronic back pain and has had multiple surgeries.  He uses cane for ambulation of longer distances.     Patient does have sleep apnea and uses BiPap.     Pt has [place] hearing aids.      Pt's anxiety and depression controlled with tofranil 50mg in the am and 100mg in the pm. He also has xanax 0.5mg BID as needed for anxiety. We have successfully weaned off the initial 1mg dose when he transferred care. Denies any adverse reactions or falls. Has been on this medication for years.     Pt is c/o right upper pain. No known injury. Does not hurt more with movement. Had a CT of the abd on 3/5/24 with following results.      Multiple loops of proximal jejunum as well as the cecum located in the  "right hemiabdomen and   upper quadrant respectively which is new from 2021. No associated edema or bowel obstruction.  If   there has been prior surgery this could represent a type of internal hernia or possible congenital   variant (such as mobile cecum).    He was seen last by gastro in July 2024. UTD on colonoscopy and had recent EGD. He was to f/u with them with any worsening pain.       Objective   Vital Signs:   Vitals:    02/24/25 1239   BP: 127/68   Pulse: 64   Temp: 98.1 °F (36.7 °C)   TempSrc: Oral   SpO2: 98%  Comment: room air   Weight: 117 kg (257 lb 6.4 oz)   Height: 185.4 cm (72.99\")       Body mass index is 33.97 kg/m².         Physical Exam  Vitals reviewed.   Constitutional:       General: He is not in acute distress.     Appearance: Normal appearance. He is well-developed.   HENT:      Head: Normocephalic and atraumatic.   Eyes:      Conjunctiva/sclera: Conjunctivae normal.      Pupils: Pupils are equal, round, and reactive to light.   Cardiovascular:      Rate and Rhythm: Normal rate and regular rhythm.      Heart sounds: Normal heart sounds. No murmur heard.  Pulmonary:      Effort: Pulmonary effort is normal. No respiratory distress.      Breath sounds: Normal breath sounds.   Skin:     General: Skin is warm and dry.   Neurological:      Mental Status: He is alert and oriented to person, place, and time.   Psychiatric:         Mood and Affect: Mood and affect normal.         Behavior: Behavior normal.         Thought Content: Thought content normal.         Judgment: Judgment normal.            Lab Results   Component Value Date    GLUCOSE 145 (H) 01/16/2025    BUN 19 01/16/2025    CREATININE 1.82 (H) 01/16/2025    EGFR 38.5 (L) 01/16/2025    BCR 10.4 01/16/2025    K 5.2 01/16/2025    CO2 25.2 01/16/2025    CALCIUM 9.6 01/16/2025    ALBUMIN 3.9 01/16/2025    BILITOT 0.4 01/16/2025    AST 28 01/16/2025    ALT 34 01/16/2025     Lab Results   Component Value Date    HGBA1C 7.20 (H) 01/16/2025 "     Lab Results   Component Value Date    WBC 7.41 11/04/2024    HGB 14.5 11/04/2024    HCT 46.0 11/04/2024    MCV 96.4 11/04/2024     11/04/2024     Lab Results   Component Value Date    CHOL 142 01/16/2025    CHOL 129 10/22/2024    CHOL 153 11/28/2023     Lab Results   Component Value Date    TRIG 420 (H) 01/16/2025    TRIG 349 (H) 10/22/2024    TRIG 341 (H) 11/28/2023     Lab Results   Component Value Date    HDL 28 (L) 01/16/2025    HDL 26 (L) 10/22/2024    HDL 32 (L) 11/28/2023     Lab Results   Component Value Date    LDL 51 01/16/2025    LDL 50 10/22/2024    LDL 67 11/28/2023     Lab Results   Component Value Date    TSH 0.738 01/16/2025     Labs reviewed with patient.          Assessment and Plan    Assessment & Plan  Type 2 diabetes mellitus with stage 3 chronic kidney disease, with long-term current use of insulin, unspecified whether stage 3a or 3b CKD  Stable on insulin as noted in hpi. Continue f/u with endo. Continue to monitor.     Orders:    Microalbumin / Creatinine Urine Ratio - Urine, Clean Catch; Future    Microalbumin / Creatinine Urine Ratio - Urine, Clean Catch    Elevated alkaline phosphatase level  Will notify patient of results and treat accordingly.     Orders:    US Abdomen Limited; Future    RUQ pain  Will notify patient of results and treat accordingly. Go to the ER with severe pain. May need to send back to gastro.     Orders:    US Abdomen Limited; Future    Dyspnea, unspecified type  Will notify patient of results and treat accordingly.     Orders:    XR Chest PA & Lateral; Future    Long-term use of high-risk medication  [place] reviewed. Drug screen appropriate. Low risk for abuse.  Medication sent. Risk and benefits of controlled medication discussed and controlled substance agreement was signed.    Orders:    POC Medline 12 Panel Urine Drug Screen    Depressive disorder  Stable on tofranil 50mg in the am and 100mg in the pm.   Continue to monitor.          Anxiety  Stable  on xanax 0.5mg BID prn. Continue to monitor. Pt aware of potential s/e of medication including drowsiness, increased risk of falls and dementia.        Stage 3b chronic kidney disease   Continue f/u with nephrology and avoid NSAIDs.          Primary hypertension  Stable.  Continue meds as listed in hpi.  Low-sodium diet.  Routine exercise and weight loss. Monitor BP at home. Goal less than 130/80. Continue follow-up with cardiology.         Paroxysmal atrial fibrillation  Stable on beta blocker. Continue eliquis 5mg BID. F/u with cardio as scheduled. Go to the ER with any chest pain, soa or palpitations.     Gastroesophageal reflux disease without esophagitis  Stable on protonix 40mg daily.  Continue to monitor.        Essential familial hypercholesterolemia   Stable on atorvastatin  80mg and flaxseed  daily.  Heart healthy diet. Routine exercise and weight loss. Continue to monitor.         Benign prostatic hyperplasia with lower urinary tract symptoms, symptom details unspecified  Continue flomax 0.8mg and proscar 5mg daily. Continue f/u with urology as scheduled.        Acquired hypothyroidism  Stable on levothyroxine 127mcg daily.  Continue to monitor.          Patient to notify office with any acute concerns or issues.  Patient verbalizes understanding, agrees with plan of care and has no further questions upon discharge.    Please note that portions of this note were completed with a voice recognition program.      Follow Up    No follow-ups on file.  Patient was given instructions and counseling regarding his condition or for health maintenance advice. Please see specific information pulled into the AVS if appropriate.     Medications Discontinued During This Encounter   Medication Reason    Omega-3 Fatty Acids (fish oil) 1200 MG capsule capsule Historical Med - Therapy completed    doxycycline (VIBRAMYCIN) 100 MG capsule Historical Med - Therapy completed

## 2025-03-04 NOTE — ASSESSMENT & PLAN NOTE
Stable on xanax 0.5mg BID prn. Continue to monitor. Pt aware of potential s/e of medication including drowsiness, increased risk of falls and dementia.

## 2025-03-04 NOTE — ASSESSMENT & PLAN NOTE
Continue flomax 0.8mg and proscar 5mg daily. Continue f/u with urology as scheduled.         Communicate Risk of Fall with Harm to all staff/Reinforce activity limits and safety measures with patient and family/Tailored Fall Risk Interventions/Visual Cue: Yellow wristband and red socks/Bed in lowest position, wheels locked, appropriate side rails in place/Call bell, personal items and telephone in reach/Instruct patient to call for assistance before getting out of bed or chair/Non-slip footwear when patient is out of bed/[place] to call system/Physically safe environment - no spills, clutter or unnecessary equipment/Purposeful Proactive Rounding/Room/bathroom lighting operational, light cord in reach

## 2025-03-04 NOTE — ASSESSMENT & PLAN NOTE
Stable on atorvastatin  80mg and flaxseed  daily.  Heart healthy diet. Routine exercise and weight loss. Continue to monitor.

## 2025-03-04 NOTE — ASSESSMENT & PLAN NOTE
Stable on beta blocker. Continue eliquis 5mg BID. F/u with cardio as scheduled. Go to the ER with any chest pain, soa or palpitations.

## 2025-03-04 NOTE — ASSESSMENT & PLAN NOTE
Stable.  Continue meds as listed in hpi.  Low-sodium diet.  Routine exercise and weight loss. Monitor BP at home. Goal less than 130/80. Continue follow-up with cardiology.

## 2025-03-07 ENCOUNTER — HOSPITAL ENCOUNTER (OUTPATIENT)
Dept: ULTRASOUND IMAGING | Facility: HOSPITAL | Age: 75
Discharge: HOME | End: 2025-03-07
Payer: MEDICARE

## 2025-03-07 DIAGNOSIS — R74.8 ELEVATED ALKALINE PHOSPHATASE LEVEL: ICD-10-CM

## 2025-03-07 DIAGNOSIS — R10.11 RUQ PAIN: ICD-10-CM

## 2025-03-07 PROCEDURE — 76705 ECHO EXAM OF ABDOMEN: CPT

## 2025-03-11 ENCOUNTER — PATIENT MESSAGE (OUTPATIENT)
Dept: FAMILY MEDICINE CLINIC | Age: 75
End: 2025-03-11
Payer: MEDICARE

## 2025-03-12 DIAGNOSIS — F41.9 ANXIETY: ICD-10-CM

## 2025-03-13 RX ORDER — ALPRAZOLAM 0.5 MG
0.5 TABLET ORAL NIGHTLY PRN
Qty: 30 TABLET | Refills: 0 | Status: SHIPPED | OUTPATIENT
Start: 2025-03-13

## 2025-03-17 RX ORDER — IMIPRAMINE HYDROCHLORIDE 50 MG/1
TABLET, FILM COATED ORAL
Qty: 270 TABLET | Refills: 1 | Status: SHIPPED | OUTPATIENT
Start: 2025-03-17

## 2025-03-21 RX ORDER — LEVOTHYROXINE SODIUM 137 UG/1
137 TABLET ORAL EVERY MORNING
Qty: 90 TABLET | Refills: 0 | Status: SHIPPED | OUTPATIENT
Start: 2025-03-21

## 2025-04-10 DIAGNOSIS — F41.9 ANXIETY: ICD-10-CM

## 2025-04-11 NOTE — TELEPHONE ENCOUNTER
Controlled refill request:  Requested Prescriptions     Pending Prescriptions Disp Refills    ALPRAZolam (XANAX) 0.5 MG tablet [Pharmacy Med Name: ALPRAZolam 0.5 MG Oral Tablet] 30 tablet 0     Sig: TAKE 1 TABLET BY MOUTH AT NIGHT AS NEEDED FOR ANXIETY      Last OV:  2/24/2025  Next OV:  7/1/2025  Last fill:  3/13/25  Last tox:  2/24/25

## 2025-04-14 RX ORDER — ALPRAZOLAM 0.5 MG
0.5 TABLET ORAL NIGHTLY PRN
Qty: 30 TABLET | Refills: 0 | Status: SHIPPED | OUTPATIENT
Start: 2025-04-14

## 2025-04-17 NOTE — PROGRESS NOTES
Chief Complaint  Diabetes (Follow up, med mgt, CGM eval, A1c eval)    Referred By: CASSIDY Kiser    Patient or patient representative verbalized consent for the use of Ambient Listening during the visit with  CASSIDY Moreland for chart documentation. 4/18/2025  14:01 EDT    Subjective          Kyler Staley presents to [facility] for diabetes medication management    History of Present Illness    History of Present Illness  The patient presents for follow-up on his diabetes management.    He reports overall good health, though he missed his last appointment due to an influenza A infection that lasted for 8 days, followed by a persistent cough for 3 weeks. The course of Tessalon Perles for the cough has been completed. No issues with frequent thirst or urination are reported. There are no complaints of blurred vision or fatigue. Hypoglycemic episodes have not been experienced. Blood glucose levels typically rise post-breakfast and then decrease. Weight loss is acknowledged as a goal. [facility], and no additional refills or testing supplies are needed at this time. Sensors for glucose monitoring are received via mail. The current regimen includes Lantus at 65 units daily, increased from a previous dosage of 29 units, and NovoLog, with a dosage range of 1 to 14 units twice daily as needed for meals. Ozempic 0.50 is reported to be effective in managing appetite without significant side effects.    Kidney disease is managed with a follow-up appointment scheduled with nephrologist Dr. Carranza in July. Losartan 50 mg is taken daily.    Back pain is managed with Tylenol and ibuprofen.    A heart stent was placed previously, and a recent cardiology appointment 2 to 3 weeks ago indicated good status. Shortness of breath was noted, and a breathing test was conducted.    PAST SURGICAL HISTORY:  - Heart stent placement  - Lumbar  surgery  - Pneumonia-related kidney failure         Visit type:  follow-up  Diabetes type:  Type 2  Current diabetes status/concerns/issues: Reports the side effects from Ozempic has resolved.  States his glucose levels are pretty good with the exception of postprandial hyperglycemia.  Other health concerns:  reports he has been having back pain.   Current Diabetes symptoms:    Polyuria: No   Polydipsia: No   Polyphagia: No   Blurred vision: No   Excessive fatigue: No  Known Diabetes complications:  Neuropathy: Numbness and Tingling; Location: Feet  Renal: Stage IIIb moderate (GFR = 30-44 mL/min)  Eyes: Other: wrinkle in left retina ; Location: Left; Last Eye Exam:  last wk ; Location:   Amputation/Wounds:  slow to heal  GI: Constipation, Reflux, and Indigestion  Cardiovascular: Hypertension, Hyperlipidemia, and Other: quadruple bypass, 9 stents  ED: Patient Reported  Other: None  Hypoglycemia:  None reported at this time and 0%  per CGM  Hypoglycemia Symptoms:  No hypoglycemia at this time  Current diabetes treatment:     Insulin Glargine-yfgn 65 units qd, Jardiance 25mg qd. Humalog carb correction 5-10 units plus sliding scale , Ozempic 0.5mg once wk   Blood glucose device:  Leho CGM  Blood glucose monitoring frequency:  Continuous per CGM  Blood glucose range/average:  The 14-day sensor report shows an average glucose of 146mg/dL, with 79% in target range ( mgdL), 20% in the high range (181-250 mg/dL), 1% in the very high range (>250 mg/dL), 0% in the low range (54-70 mg/dL) and 0% in the very low range (<54 mg/dL).   Glucose Source: Device Reviewed  Dietary behavior:  Avoids sugary drinks, Number of meals each day - 2; Number of snacks each day - 2  Activity/Exercise:   limited due to back pain    Past Medical History:   Diagnosis Date    Abnormal ECG     Acne     Alcoholism     Allergic     Anemia     Anxiety     Arthritis     Arthritis of back     Arthritis of neck     Asthma     Atrial  fibrillation     Back pain     Benign prostatic hyperplasia     Callus     Cardiac disease     Cataracts, bilateral     Cervical disc disorder     CHF (congestive heart failure)     Cholelithiasis     Chronic kidney disease, stage 3     moderate    CKD (chronic kidney disease)     Clotting disorder     Condition not found     hernia    Coronary artery disease     Depression     Diabetes     Difficulty walking     Enlarged prostate     Erectile dysfunction     Fatigue     GERD (gastroesophageal reflux disease)     Gout     Hearing loss     Hematospermia 12/09/2015    High blood pressure     High cholesterol     Hip arthrosis     History of cold sores     History of gastritis     [place] (hard of hearing)     Hyperlipidemia     Hypertension     Hypoglycemia     Hypothyroidism     Ingrown toenail     Irregular heart rhythm     Joint pain     Kidney disease     Knee swelling     Low back pain     Lumbosacral disc disease     Narrowing of airway 2012    PER ENT    Neuropathy     Neuropathy in diabetes     Obesity     Osteopenia     Pancreatitis     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     Plantar fasciitis     Pneumonia     PONV (postoperative nausea and vomiting)     Psychiatric diagnosis     Renal insufficiency     Rotator cuff syndrome     REPAIRED BILATERAL    Shin splints     Shortness of breath     Sinus trouble     Sleep apnea     BIPAP    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)     Substance abuse     Swallowing difficulty     Testosterone deficiency     Thin skin     Thoracic disc disorder     Thyroid condition     Type 2 diabetes mellitus     Visual impairment     Vitamin D deficiency     Vocal cord dysfunction      HISTORY OF DISORDER ON ONE SIDE AFTER TRACHEA     Past Surgical History:   Procedure Laterality Date    BACK SURGERY  2018    X2    BACK SURGERY  2011    CARDIAC CATHETERIZATION      CARDIAC SURGERY      CHOLECYSTECTOMY      COLONOSCOPY      CORONARY  ARTERY BYPASS GRAFT      CORONARY STENT PLACEMENT      EYE SURGERY      HERNIA REPAIR      INGUINAL HERNIA REPAIR      JOINT REPLACEMENT      KNEE SURGERY      right and left    LUMBAR DISCECTOMY FUSION INSTRUMENTATION N/A 2021    Procedure: EXPLORATION OF FUSION T-12 S-1 REMOVAL OF HARDWARE REVISION OF FUSION AND INSTRUMENTATION T12-S1 WITH APPLICATION OF BONE MORPHOGENIC PROTEIN;  Surgeon: Baltazar Blankenship MD;  Location: [facility] OR;  Service: Orthopedic Spine;  Laterality: N/A;    NOSE SURGERY      PACEMAKER IMPLANTATION      REPLACEMENT TOTAL KNEE Bilateral     SHOULDER SURGERY Bilateral     rotator cuff repair    SINUS SURGERY      SPINE SURGERY      TOENAIL EXCISION       Family History   Problem Relation Age of Onset    Heart attack Mother     Arthritis Mother     Cancer Mother     Heart disease Mother     Hypertension Mother     Cancer Father     Hypertension Sister     Hypertension Brother     Malig Hyperthermia Neg Hx      Social History     Socioeconomic History    Marital status:    Tobacco Use    Smoking status: Former     Current packs/day: 0.00     Average packs/day: 3.0 packs/day for 15.0 years (45.0 ttl pk-yrs)     Types: Cigarettes     Start date: 1960     Quit date: 1975     Years since quittin.3     Passive exposure: Past    Smokeless tobacco: Never   Vaping Use    Vaping status: Never Used   Substance and Sexual Activity    Alcohol use: No    Drug use: Yes     Types: Hydrocodone    Sexual activity: Not Currently     Partners: Female     Allergies   Allergen Reactions    Bupropion Other (See Comments) and Unknown - Low Severity     MAKES PATIENT VERY AGGRESSIVE      Iodinated Contrast Media Other (See Comments)     Other reaction(s): Other (See Comments)  Chronic kidney disease - stage 3  Chronic kidney disease - stage 3    Lorazepam Other (See Comments) and Mental Status Change     MADE PATIENT VERY AGGRESSIVE      Propoxyphene Nausea And Vomiting and Unknown -  Low Severity    Adhesive Tape Rash       Current Outpatient Medications:     allopurinol (ZYLOPRIM) 300 MG tablet, Take 1 tablet by mouth once daily, Disp: 90 tablet, Rfl: 0    ALPRAZolam (XANAX) 0.5 MG tablet, TAKE 1 TABLET BY MOUTH AT NIGHT AS NEEDED FOR ANXIETY, Disp: 30 tablet, Rfl: 0    amLODIPine (NORVASC) 5 MG tablet, Take 1 tablet by mouth Daily., Disp: , Rfl:     apixaban (ELIQUIS) 5 MG tablet tablet, Take 1 tablet by mouth 2 (Two) Times a Day., Disp: , Rfl:     atorvastatin (LIPITOR) 80 MG tablet, Take 1 tablet by mouth every night at bedtime., Disp: , Rfl:     carvedilol (COREG) 12.5 MG tablet, Take 1 tablet by mouth 2 (Two) Times a Day With Meals., Disp: 180 tablet, Rfl: 1    eplerenone (INSPRA) 25 MG tablet, Take 1 tablet by mouth Daily., Disp: , Rfl:     fexofenadine (ALLEGRA) 60 MG tablet, Take 1 tablet by mouth Daily., Disp: , Rfl:     finasteride (PROSCAR) 5 MG tablet, Take 1 tablet by mouth Daily., Disp: 90 tablet, Rfl: 4    Flaxseed, Linseed, 1000 MG capsule, Take 1,200 mg by mouth 2 (Two) Times a Day., Disp: , Rfl:     glucose blood test strip, , Disp: , Rfl:     imipramine (TOFRANIL) 50 MG tablet, TAKE 1 TABLET BY MOUTH ONCE DAILY IN THE MORNING AND 2 ONCE DAILY IN THE EVENING, Disp: 270 tablet, Rfl: 1    insulin aspart (novoLOG FLEXPEN) 100 UNIT/ML solution pen-injector sc pen, Inject 7 Units under the skin into the appropriate area as directed 2 (Two) Times a Day. 1/14 units BID, Disp: , Rfl:     insulin glargine (LANTUS, SEMGLEE) 100 UNIT/ML injection, Inject 65 Units under the skin into the appropriate area as directed Daily., Disp: , Rfl:     isosorbide mononitrate (IMDUR) 60 MG 24 hr tablet, Take 1 tablet by mouth once daily, Disp: 90 tablet, Rfl: 0    ketoconazole (NIZORAL) 2 % shampoo, SHAMPOO TOPICALLY 3 TIMES WEEKLY, Disp: , Rfl:     levothyroxine (SYNTHROID, LEVOTHROID) 137 MCG tablet, TAKE 1 TABLET BY MOUTH ONCE DAILY IN THE MORNING, Disp: 90 tablet, Rfl: 0    losartan (COZAAR) 50 MG  "tablet, Take 1 tablet by mouth Daily. (Patient taking differently: Take 2 tablets by mouth Daily.), Disp: , Rfl:     Melatonin 5 MG chewable tablet, Chew 1 tablet Daily As Needed., Disp: , Rfl:     nitroglycerin (NITROSTAT) 0.4 MG SL tablet, Place 1 tablet under the tongue Every 5 (Five) Minutes As Needed for Chest Pain. do not exceed a total of 3 doses in 15 minutes, Disp: , Rfl:     pantoprazole (PROTONIX) 40 MG EC tablet, Take 1 tablet by mouth once daily, Disp: 90 tablet, Rfl: 0    Plavix 75 MG tablet, Take 1 tablet by mouth Daily., Disp: , Rfl:     Semaglutide (OZEMPIC, 0.25 OR 0.5 MG/DOSE, SC), Inject 0.5 mg under the skin into the appropriate area as directed 1 (One) Time Per Week., Disp: , Rfl:     tamsulosin (FLOMAX) 0.4 MG capsule 24 hr capsule, Take 2 capsules by mouth Daily., Disp: 180 capsule, Rfl: 4    Objective     Vitals:    04/18/25 1424   BP: 140/67   Pulse: 63   SpO2: 95%   Weight: 116 kg (256 lb)   Height: 185.4 cm (72.99\")   PainSc: 0-No pain     Body mass index is 33.78 kg/m².    Physical Exam  Constitutional:       Appearance: Normal appearance. He is obese.   HENT:      Head: Normocephalic and atraumatic.      Right Ear: External ear normal.      Left Ear: External ear normal.      Nose: Nose normal.   Eyes:      Extraocular Movements: Extraocular movements intact.      Conjunctiva/sclera: Conjunctivae normal.   Pulmonary:      Effort: Pulmonary effort is normal.   Musculoskeletal:         General: Normal range of motion.      Cervical back: Normal range of motion.   Skin:     General: Skin is warm and dry.   Neurological:      General: No focal deficit present.      Mental Status: He is alert and oriented to person, place, and time. Mental status is at baseline.   Psychiatric:         Mood and Affect: Mood normal.         Behavior: Behavior normal.         Thought Content: Thought content normal.         Judgment: Judgment normal.             Result Review :   The following data was reviewed " by: CASSIDY Moreland on 04/18/2025:    Most Recent A1C          4/18/2025    14:02   HGBA1C Most Recent   Hemoglobin A1C 6.7        A1C Last 3 Results          10/22/2024    13:45 1/16/2025    13:38 4/18/2025    14:02   HGBA1C Last 3 Results   Hemoglobin A1C 7.30  7.20  6.7      A1c collected in the office today is 6.7%, indicating Controlled Type II diabetes.  This result is down from the prior result of 7.2% collected on 1/16/25         Creatinine   Date Value Ref Range Status   01/16/2025 1.82 (H) 0.76 - 1.27 mg/dL Final   11/04/2024 1.79 (H) 0.76 - 1.27 mg/dL Final     eGFR   Date Value Ref Range Status   01/16/2025 38.5 (L) >60.0 mL/min/1.73 Final   11/04/2024 39.3 (L) >60.0 mL/min/1.73 Final     Labs collected on 1/16/25 show Stage IIIb moderate (GFR = 30-44 mL/min-he is followed by nephrology    Microalbumin, Urine   Date Value Ref Range Status   02/24/2025 16.2 mg/dL Final     Creatinine, Urine   Date Value Ref Range Status   02/24/2025 71.8 mg/dL Final   11/04/2024 52.7 mg/dL Final     Microalbumin/Creatinine Ratio   Date Value Ref Range Status   02/24/2025 225.6 (H) 0.0 - 29.0 mg/g Final     Urine microalbuminuria collected on 2/24/25 is positive for microalbuminuria    Total Cholesterol   Date Value Ref Range Status   01/16/2025 142 0 - 200 mg/dL Final   10/22/2024 129 0 - 200 mg/dL Final     Triglycerides   Date Value Ref Range Status   01/16/2025 420 (H) 0 - 150 mg/dL Final   10/22/2024 349 (H) 0 - 150 mg/dL Final     HDL Cholesterol   Date Value Ref Range Status   01/16/2025 28 (L) 40 - 60 mg/dL Final   10/22/2024 26 (L) 40 - 60 mg/dL Final     LDL Cholesterol    Date Value Ref Range Status   01/16/2025 51 0 - 100 mg/dL Final   10/22/2024 50 0 - 100 mg/dL Final     Lipid panel collected on 1/16/25 shows Hypertriglyceridemia and low HDL              Diagnoses and all orders for this visit:    1. Controlled type 2 diabetes mellitus with hyperglycemia, with long-term current use of insulin  (Primary)  -     POC Glycosylated Hemoglobin (Hb A1C)    2. Type 2 diabetes mellitus with diabetic neuropathy, with long-term current use of insulin    3. Type 2 diabetes mellitus with stage 3b chronic kidney disease, with long-term current use of insulin    4. Obesity (BMI 30-39.9)    5. Type 2 diabetes mellitus with hemoglobin A1c goal of less than 7.0%          Assessment & Plan  1. Diabetes Mellitus.  - His A1c has improved from 7.2 to 6.7, indicating good control.  - His average blood glucose level is 146, with 79% within the target range.  - He reports no issues with low blood sugars or significant side effects from Ozempic.  - He will continue his current regimen of Lantus 65 units daily and NovoLog 1 to 14 units twice a day as needed. He will also continue Ozempic 0.50 mg despite occasional side effects, as it has been effective in stabilizing his blood glucose levels.    2. Chronic Kidney Disease.  - He has a follow-up appointment with his nephrologist in July.  - He is currently taking losartan 50 mg daily.  - No changes in medication or management at this time.    3. Back Pain.  - He manages his back pain with Tylenol and ibuprofen as needed.  - No new treatments or referrals discussed.  - No significant changes in symptoms reported.  - Continue current pain management regimen.    4. Coronary Artery Disease.  - He had a heart stent placed and recently had an appointment with his cardiologist, which showed stable condition.  - He reports some shortness of breath and has undergone a breathing test.  - No new health issues related to the heart stent reported.  - Continue monitoring for any changes in symptoms.    Follow-up  The patient will follow up on 08/01/2025 at 1:40 PM.      The patient will monitor his blood glucose levels per continuous glucose monitor.  If he develops problematic hyperglycemia or hypoglycemia or adverse drug reactions, he will contact the office for further  instructions.        Follow Up     No follow-ups on file.    Patient was given instructions and counseling regarding his condition or for health maintenance advice. Please see specific information pulled into the AVS if appropriate.     Deb Moody, CASSIDY  04/18/2025      Dictated Utilizing Dragon Dictation.  Please note that portions of this note were completed with a voice recognition program.  Part of this note may be an electronic transcription/translation of spoken language to printed text using the Dragon Dictation System.

## 2025-04-18 ENCOUNTER — OFFICE VISIT (OUTPATIENT)
Dept: DIABETES SERVICES | Facility: CLINIC | Age: 75
End: 2025-04-18
Payer: MEDICARE

## 2025-04-18 VITALS
HEART RATE: 63 BPM | OXYGEN SATURATION: 95 % | WEIGHT: 256 LBS | BODY MASS INDEX: 33.93 KG/M2 | DIASTOLIC BLOOD PRESSURE: 67 MMHG | SYSTOLIC BLOOD PRESSURE: 140 MMHG | HEIGHT: 73 IN

## 2025-04-18 DIAGNOSIS — E11.40 TYPE 2 DIABETES MELLITUS WITH DIABETIC NEUROPATHY, WITH LONG-TERM CURRENT USE OF INSULIN: ICD-10-CM

## 2025-04-18 DIAGNOSIS — Z79.4 TYPE 2 DIABETES MELLITUS WITH DIABETIC NEUROPATHY, WITH LONG-TERM CURRENT USE OF INSULIN: ICD-10-CM

## 2025-04-18 DIAGNOSIS — Z79.4 TYPE 2 DIABETES MELLITUS WITH STAGE 3B CHRONIC KIDNEY DISEASE, WITH LONG-TERM CURRENT USE OF INSULIN: ICD-10-CM

## 2025-04-18 DIAGNOSIS — E11.65 CONTROLLED TYPE 2 DIABETES MELLITUS WITH HYPERGLYCEMIA, WITH LONG-TERM CURRENT USE OF INSULIN: Primary | ICD-10-CM

## 2025-04-18 DIAGNOSIS — Z79.4 CONTROLLED TYPE 2 DIABETES MELLITUS WITH HYPERGLYCEMIA, WITH LONG-TERM CURRENT USE OF INSULIN: Primary | ICD-10-CM

## 2025-04-18 DIAGNOSIS — E66.9 OBESITY (BMI 30-39.9): ICD-10-CM

## 2025-04-18 DIAGNOSIS — E11.9 TYPE 2 DIABETES MELLITUS WITH HEMOGLOBIN A1C GOAL OF LESS THAN 7.0%: ICD-10-CM

## 2025-04-18 DIAGNOSIS — E11.22 TYPE 2 DIABETES MELLITUS WITH STAGE 3B CHRONIC KIDNEY DISEASE, WITH LONG-TERM CURRENT USE OF INSULIN: ICD-10-CM

## 2025-04-18 DIAGNOSIS — N18.32 TYPE 2 DIABETES MELLITUS WITH STAGE 3B CHRONIC KIDNEY DISEASE, WITH LONG-TERM CURRENT USE OF INSULIN: ICD-10-CM

## 2025-04-18 LAB
EXPIRATION DATE: ABNORMAL
HBA1C MFR BLD: 6.7 % (ref 4.5–5.7)
Lab: ABNORMAL

## 2025-04-18 PROCEDURE — 99214 OFFICE O/P EST MOD 30 MIN: CPT | Performed by: NURSE PRACTITIONER

## 2025-04-18 PROCEDURE — 1159F MED LIST DOCD IN RCRD: CPT | Performed by: NURSE PRACTITIONER

## 2025-04-18 PROCEDURE — 3044F HG A1C LEVEL LT 7.0%: CPT | Performed by: NURSE PRACTITIONER

## 2025-04-18 PROCEDURE — 3077F SYST BP >= 140 MM HG: CPT | Performed by: NURSE PRACTITIONER

## 2025-04-18 PROCEDURE — 3078F DIAST BP <80 MM HG: CPT | Performed by: NURSE PRACTITIONER

## 2025-04-18 PROCEDURE — G2211 COMPLEX E/M VISIT ADD ON: HCPCS | Performed by: NURSE PRACTITIONER

## 2025-04-18 PROCEDURE — 1160F RVW MEDS BY RX/DR IN RCRD: CPT | Performed by: NURSE PRACTITIONER

## 2025-04-25 DIAGNOSIS — K21.9 GASTROESOPHAGEAL REFLUX DISEASE, UNSPECIFIED WHETHER ESOPHAGITIS PRESENT: ICD-10-CM

## 2025-04-25 RX ORDER — PANTOPRAZOLE SODIUM 40 MG/1
40 TABLET, DELAYED RELEASE ORAL DAILY
Qty: 90 TABLET | Refills: 0 | Status: SHIPPED | OUTPATIENT
Start: 2025-04-25

## 2025-05-05 ENCOUNTER — TELEPHONE (OUTPATIENT)
Dept: DIABETES SERVICES | Facility: HOSPITAL | Age: 75
End: 2025-05-05

## 2025-05-05 NOTE — TELEPHONE ENCOUNTER
"    Caller: Kyler Staley \"Peter\"    Relationship: Self    Best call back number:     [phone]       What form or medical record are you requesting: OFFICE NOTES/ PAPERWORK    Who is requesting this form or medical record from you: [facility]    FAX    Additional notes: [employer] CALLED PT AND NEEDS PAPERWORK BEFORE THEY CAN SEND SUPPLIES. I TRIED TO CALL PT BACK TO SEE WHICH [employer] AND HE DID NOT ANSWER        "

## 2025-05-08 ENCOUNTER — OFFICE VISIT (OUTPATIENT)
Dept: FAMILY MEDICINE CLINIC | Age: 75
End: 2025-05-08
Payer: MEDICARE

## 2025-05-08 ENCOUNTER — LAB (OUTPATIENT)
Dept: LAB | Facility: HOSPITAL | Age: 75
End: 2025-05-08
Payer: MEDICARE

## 2025-05-08 VITALS
HEART RATE: 61 BPM | BODY MASS INDEX: 34.33 KG/M2 | WEIGHT: 259 LBS | OXYGEN SATURATION: 97 % | SYSTOLIC BLOOD PRESSURE: 124 MMHG | HEIGHT: 73 IN | TEMPERATURE: 97.5 F | DIASTOLIC BLOOD PRESSURE: 65 MMHG

## 2025-05-08 DIAGNOSIS — R35.0 URINARY FREQUENCY: ICD-10-CM

## 2025-05-08 DIAGNOSIS — M54.9 MID BACK PAIN: ICD-10-CM

## 2025-05-08 DIAGNOSIS — M54.50 ACUTE BILATERAL LOW BACK PAIN WITHOUT SCIATICA: Primary | ICD-10-CM

## 2025-05-08 LAB
ALBUMIN SERPL-MCNC: 4 G/DL (ref 3.5–5.2)
ALBUMIN/GLOB SERPL: 1.4 G/DL
ALP SERPL-CCNC: 129 U/L (ref 39–117)
ALT SERPL W P-5'-P-CCNC: 17 U/L (ref 1–41)
ANION GAP SERPL CALCULATED.3IONS-SCNC: 9.5 MMOL/L (ref 5–15)
AST SERPL-CCNC: 20 U/L (ref 1–40)
BILIRUB BLD-MCNC: NEGATIVE MG/DL
BILIRUB SERPL-MCNC: 0.4 MG/DL (ref 0–1.2)
BUN SERPL-MCNC: 18 MG/DL (ref 8–23)
BUN/CREAT SERPL: 10.4 (ref 7–25)
CALCIUM SPEC-SCNC: 10 MG/DL (ref 8.6–10.5)
CHLORIDE SERPL-SCNC: 101 MMOL/L (ref 98–107)
CLARITY, POC: CLEAR
CO2 SERPL-SCNC: 24.5 MMOL/L (ref 22–29)
COLOR UR: YELLOW
CREAT SERPL-MCNC: 1.73 MG/DL (ref 0.76–1.27)
EGFRCR SERPLBLD CKD-EPI 2021: 40.9 ML/MIN/1.73
EXPIRATION DATE: ABNORMAL
GLOBULIN UR ELPH-MCNC: 2.9 GM/DL
GLUCOSE SERPL-MCNC: 134 MG/DL (ref 65–99)
GLUCOSE UR STRIP-MCNC: ABNORMAL MG/DL
KETONES UR QL: NEGATIVE
LEUKOCYTE EST, POC: NEGATIVE
Lab: ABNORMAL
NITRITE UR-MCNC: NEGATIVE MG/ML
PH UR: 6 [PH] (ref 5–8)
POTASSIUM SERPL-SCNC: 5 MMOL/L (ref 3.5–5.2)
PROT SERPL-MCNC: 6.9 G/DL (ref 6–8.5)
PROT UR STRIP-MCNC: ABNORMAL MG/DL
PSA SERPL-MCNC: 0.59 NG/ML (ref 0–4)
RBC # UR STRIP: NEGATIVE /UL
SODIUM SERPL-SCNC: 135 MMOL/L (ref 136–145)
SP GR UR: 1.01 (ref 1–1.03)
UROBILINOGEN UR QL: NORMAL

## 2025-05-08 PROCEDURE — 3074F SYST BP LT 130 MM HG: CPT | Performed by: NURSE PRACTITIONER

## 2025-05-08 PROCEDURE — 84153 ASSAY OF PSA TOTAL: CPT

## 2025-05-08 PROCEDURE — 3078F DIAST BP <80 MM HG: CPT | Performed by: NURSE PRACTITIONER

## 2025-05-08 PROCEDURE — 1160F RVW MEDS BY RX/DR IN RCRD: CPT | Performed by: NURSE PRACTITIONER

## 2025-05-08 PROCEDURE — 99213 OFFICE O/P EST LOW 20 MIN: CPT | Performed by: NURSE PRACTITIONER

## 2025-05-08 PROCEDURE — 36415 COLL VENOUS BLD VENIPUNCTURE: CPT

## 2025-05-08 PROCEDURE — 81003 URINALYSIS AUTO W/O SCOPE: CPT | Performed by: NURSE PRACTITIONER

## 2025-05-08 PROCEDURE — 1159F MED LIST DOCD IN RCRD: CPT | Performed by: NURSE PRACTITIONER

## 2025-05-08 PROCEDURE — 3044F HG A1C LEVEL LT 7.0%: CPT | Performed by: NURSE PRACTITIONER

## 2025-05-08 PROCEDURE — 80053 COMPREHEN METABOLIC PANEL: CPT

## 2025-05-08 PROCEDURE — 1126F AMNT PAIN NOTED NONE PRSNT: CPT | Performed by: NURSE PRACTITIONER

## 2025-05-08 NOTE — TELEPHONE ENCOUNTER
Patient uses [facility] not [facility]. Documents were uploaded to [facility] as requested on [employer] on 5/6/25 and is currently being processed.

## 2025-05-08 NOTE — ASSESSMENT & PLAN NOTE
No sign of a bladder infection or kidney stone.  Is okay to recheck his kidney function.  Also check a PSA.  Patient does not feel the need to repeat any spinal imaging at this time and denies abdominal pain.  Monitor closely and follow-up if not resolving.  Further treatment recommendations pending lab results.

## 2025-05-08 NOTE — PROGRESS NOTES
"Chief Complaint  Back Pain (Thinks he has a kidney stone )    Subjective          Kyler Staley presents to [facility]     Patient is a 74-year-old male who is here today with concerns he may have kidney stone.  He has never had a kidney stone previously but is having some mid back pain bilaterally.  He also reports to having kidney disease and is concerned about his current kidney function.  His last labs were done in January check kidney function.  He does see urology for enlarged prostate and also having some urinary frequency.  His urinalysis today is negative for blood or sign of infection.  Glucose and protein have been noted in the urine today.  Patient is a known diabetic.  He follows a diabetes specialist.  He denies nausea, vomiting, diarrhea, rashes, stomach upset or abdominal pain or any other associated symptoms.  He has had multiple spine surgeries in the past with the last occurring in 2018.  His last imaging of the spine was in 2022.  He denies a fall or injury.     Objective   Vital Signs:   Vitals:    05/08/25 1345   BP: 124/65   BP Location: Left arm   Patient Position: Sitting   Cuff Size: Adult   Pulse: 61   Temp: 97.5 °F (36.4 °C)   TempSrc: Temporal   SpO2: 97%   Weight: 117 kg (259 lb)   Height: 185.4 cm (72.99\")       Wt Readings from Last 3 Encounters:   05/08/25 117 kg (259 lb)   04/18/25 116 kg (256 lb)   02/24/25 117 kg (257 lb 6.4 oz)      BP Readings from Last 3 Encounters:   05/08/25 124/65   04/18/25 140/67   02/24/25 127/68       Body mass index is 34.18 kg/m².             Physical Exam  Vitals reviewed.   Constitutional:       General: He is not in acute distress.     Appearance: Normal appearance. He is well-developed. He is obese.   Cardiovascular:      Rate and Rhythm: Normal rate and regular rhythm.      Heart sounds: Normal heart sounds.   Pulmonary:      Effort: Pulmonary effort is normal.      Breath sounds: Normal breath sounds. "   Abdominal:      General: Bowel sounds are normal.   Musculoskeletal:        Arms:       Right lower leg: No edema.      Left lower leg: No edema.      Comments: Location of pain.   Skin:     General: Skin is warm and dry.   Neurological:      General: No focal deficit present.      Mental Status: He is alert.   Psychiatric:         Attention and Perception: Attention normal.         Mood and Affect: Mood and affect normal.         Behavior: Behavior normal.           Current Outpatient Medications:     allopurinol (ZYLOPRIM) 300 MG tablet, Take 1 tablet by mouth once daily, Disp: 90 tablet, Rfl: 0    ALPRAZolam (XANAX) 0.5 MG tablet, TAKE 1 TABLET BY MOUTH AT NIGHT AS NEEDED FOR ANXIETY, Disp: 30 tablet, Rfl: 0    amLODIPine (NORVASC) 5 MG tablet, Take 1 tablet by mouth Daily., Disp: , Rfl:     apixaban (ELIQUIS) 5 MG tablet tablet, Take 1 tablet by mouth 2 (Two) Times a Day., Disp: , Rfl:     atorvastatin (LIPITOR) 80 MG tablet, Take 1 tablet by mouth every night at bedtime., Disp: , Rfl:     carvedilol (COREG) 12.5 MG tablet, Take 1 tablet by mouth 2 (Two) Times a Day With Meals., Disp: 180 tablet, Rfl: 1    eplerenone (INSPRA) 25 MG tablet, Take 1 tablet by mouth Daily., Disp: , Rfl:     fexofenadine (ALLEGRA) 60 MG tablet, Take 1 tablet by mouth Daily., Disp: , Rfl:     finasteride (PROSCAR) 5 MG tablet, Take 1 tablet by mouth Daily., Disp: 90 tablet, Rfl: 4    Flaxseed, Linseed, 1000 MG capsule, Take 1,200 mg by mouth 2 (Two) Times a Day., Disp: , Rfl:     glucose blood test strip, , Disp: , Rfl:     imipramine (TOFRANIL) 50 MG tablet, TAKE 1 TABLET BY MOUTH ONCE DAILY IN THE MORNING AND 2 ONCE DAILY IN THE EVENING, Disp: 270 tablet, Rfl: 1    insulin aspart (novoLOG FLEXPEN) 100 UNIT/ML solution pen-injector sc pen, Inject 7 Units under the skin into the appropriate area as directed 2 (Two) Times a Day. 1/14 units BID (sliding scale 1-15 units), Disp: , Rfl:     insulin glargine (LANTUS, SEMGLEE) 100 UNIT/ML  injection, Inject 65 Units under the skin into the appropriate area as directed Daily., Disp: , Rfl:     isosorbide mononitrate (IMDUR) 60 MG 24 hr tablet, Take 1 tablet by mouth once daily, Disp: 90 tablet, Rfl: 0    ketoconazole (NIZORAL) 2 % shampoo, SHAMPOO TOPICALLY 3 TIMES WEEKLY, Disp: , Rfl:     levothyroxine (SYNTHROID, LEVOTHROID) 137 MCG tablet, TAKE 1 TABLET BY MOUTH ONCE DAILY IN THE MORNING, Disp: 90 tablet, Rfl: 0    losartan (COZAAR) 50 MG tablet, Take 1 tablet by mouth Daily. (Patient taking differently: Take 2 tablets by mouth Daily.), Disp: , Rfl:     Melatonin 5 MG chewable tablet, Chew 1 tablet Daily As Needed., Disp: , Rfl:     nitroglycerin (NITROSTAT) 0.4 MG SL tablet, Place 1 tablet under the tongue Every 5 (Five) Minutes As Needed for Chest Pain. do not exceed a total of 3 doses in 15 minutes, Disp: , Rfl:     pantoprazole (PROTONIX) 40 MG EC tablet, Take 1 tablet by mouth once daily, Disp: 90 tablet, Rfl: 0    Plavix 75 MG tablet, Take 1 tablet by mouth Daily., Disp: , Rfl:     Semaglutide (OZEMPIC, 0.25 OR 0.5 MG/DOSE, SC), Inject 0.5 mg under the skin into the appropriate area as directed 1 (One) Time Per Week., Disp: , Rfl:     tamsulosin (FLOMAX) 0.4 MG capsule 24 hr capsule, Take 2 capsules by mouth Daily., Disp: 180 capsule, Rfl: 4   Past Medical History:   Diagnosis Date    Abnormal ECG     Acne     Alcoholism     Allergic     Anemia     Anxiety     Arthritis     Arthritis of back     Arthritis of neck     Asthma     Atrial fibrillation     Back pain     Benign prostatic hyperplasia     Callus     Cardiac disease     Cataracts, bilateral     Cervical disc disorder     CHF (congestive heart failure)     Cholelithiasis     Chronic kidney disease, stage 3     moderate    CKD (chronic kidney disease)     Clotting disorder     Condition not found     hernia    Coronary artery disease     Depression     Diabetes     Difficulty walking     Enlarged prostate     Erectile dysfunction      Fatigue     GERD (gastroesophageal reflux disease)     Gout     Hearing loss     Hematospermia 12/09/2015    Hernia     High blood pressure     High cholesterol     Hip arthrosis     History of cold sores     History of gastritis     [place] (hard of hearing)     Hyperlipidemia     Hypertension     Hypoglycemia     Hypothyroidism     Ingrown toenail     Irregular heart rhythm     Joint pain     Kidney disease     Knee swelling     Low back pain     Lumbosacral disc disease     Narrowing of airway 2012    PER ENT    Neuropathy     Neuropathy in diabetes     Obesity     Osteopenia     Pancreatitis     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     Plantar fasciitis     Pneumonia     PONV (postoperative nausea and vomiting)     Psychiatric diagnosis     Renal insufficiency     Rotator cuff syndrome     REPAIRED BILATERAL    Shin splints     Shortness of breath     Sinus trouble     Sleep apnea     BIPAP    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)     Substance abuse     Swallowing difficulty     Testosterone deficiency     Thin skin     Thoracic disc disorder     Thyroid condition     Type 2 diabetes mellitus     Visual impairment     Vitamin D deficiency     Vocal cord dysfunction      HISTORY OF DISORDER ON ONE SIDE AFTER TRACHEA     Allergies   Allergen Reactions    Bupropion Other (See Comments) and Unknown - Low Severity     MAKES PATIENT VERY AGGRESSIVE      Iodinated Contrast Media Other (See Comments)     Other reaction(s): Other (See Comments)  Chronic kidney disease - stage 3  Chronic kidney disease - stage 3    Lorazepam Other (See Comments) and Mental Status Change     MADE PATIENT VERY AGGRESSIVE      Propoxyphene Nausea And Vomiting and Unknown - Low Severity    Adhesive Tape Rash               Result Review :     Common labs          1/16/2025    13:38 2/24/2025    12:47 4/18/2025    14:02   Common Labs   Glucose 145      BUN 19      Creatinine 1.82      Sodium 136       Potassium 5.2      Chloride 102      Calcium 9.6      Albumin 3.9      Total Bilirubin 0.4      Alkaline Phosphatase 136      AST (SGOT) 28      ALT (SGPT) 34      Total Cholesterol 142      Triglycerides 420      HDL Cholesterol 28      LDL Cholesterol  51      Hemoglobin A1C 7.20   6.7    Microalbumin, Urine  16.2          US Abdomen Limited  Result Date: 3/8/2025  1. Limited study due to body habitus and bowel gas with poor visualization of the liver. 2. Prior cholecystectomy 3. Hypoechoic and anechoic structures within the right kidney. Anechoic structures are compatible with cyst. Hypoechoic structure likely a cyst but poorly visualized. Electronically Signed: Luis Dawson MD  3/8/2025 11:25 PM EST  Workstation ID: [id number]    XR Chest PA & Lateral  Result Date: 2025  Probably no acute infiltrate is appreciated. No cardiac enlargement. Please see above comments for further detail.   Portions of this note were completed with a voice recognition program.  2025 5:55 AM by Sebastien Mayer MD on Workstation: [email or web address]!               Social History     Tobacco Use   Smoking Status Former    Current packs/day: 0.00    Average packs/day: 3.0 packs/day for 15.0 years (45.0 ttl pk-yrs)    Types: Cigarettes    Start date: 1960    Quit date: 1975    Years since quittin.3    Passive exposure: Past   Smokeless Tobacco Never           Assessment and Plan    Diagnoses and all orders for this visit:    1. Acute bilateral low back pain without sciatica (Primary)  -     POCT urinalysis dipstick, automated    2. Mid back pain  -     Comprehensive metabolic panel; Future    3. Urinary frequency  -     Comprehensive metabolic panel; Future  -     PSA DIAGNOSTIC ONLY; Future        Follow Up    No follow-ups on file.  Patient was given instructions and counseling regarding his condition or for health maintenance advice. Please see specific information pulled into the AVS if appropriate.

## 2025-05-10 DIAGNOSIS — M1A.9XX0 CHRONIC GOUT WITHOUT TOPHUS, UNSPECIFIED CAUSE, UNSPECIFIED SITE: ICD-10-CM

## 2025-05-12 RX ORDER — ALLOPURINOL 300 MG/1
300 TABLET ORAL DAILY
Qty: 90 TABLET | Refills: 0 | Status: SHIPPED | OUTPATIENT
Start: 2025-05-12

## 2025-05-14 DIAGNOSIS — F41.9 ANXIETY: ICD-10-CM

## 2025-05-14 RX ORDER — ALPRAZOLAM 0.5 MG
0.5 TABLET ORAL NIGHTLY PRN
Qty: 30 TABLET | Refills: 0 | Status: SHIPPED | OUTPATIENT
Start: 2025-05-14

## 2025-05-14 NOTE — TELEPHONE ENCOUNTER
Controlled refill request:  Requested Prescriptions     Pending Prescriptions Disp Refills    ALPRAZolam (XANAX) 0.5 MG tablet [Pharmacy Med Name: ALPRAZolam 0.5 MG Oral Tablet] 30 tablet 0     Sig: TAKE 1 TABLET BY MOUTH AT NIGHT AS NEEDED FOR ANXIETY      Last OV:  2/24/2025  Next OV:  7/1/2025  Last fill:  4/14/25  Last tox:  2/24/25

## 2025-05-19 ENCOUNTER — TELEPHONE (OUTPATIENT)
Dept: DIABETES SERVICES | Facility: HOSPITAL | Age: 75
End: 2025-05-19
Payer: MEDICARE

## 2025-05-19 NOTE — TELEPHONE ENCOUNTER
"Caller: Kyler Staley \"Peter\"    Relationship to patient: Self    Best call back number: [phone]    Patient is needing: HOME DELIVERY IS NEEDING LAST OFFICE VISIT NOTES TO SHIP OUT HIS SENSORS, HE IS OUT   "

## 2025-05-20 NOTE — TELEPHONE ENCOUNTER
"  Caller: Kyler Staley \"Peter\"    Relationship: Self    Best call back number:   [phone]            What was the call regarding: PATIENT STATES HE HAS NOT HEARD ANYTHING AND HE IS OUT OF SENSORS. PLEASE CONTACT PATIENT AND AE IF THEY HAVE BEEN SENT.      "

## 2025-05-20 NOTE — TELEPHONE ENCOUNTER
We have not received anything asking for information for this patient for his Jackie.     Berenice is calling his home delivery to see what they need and will contact the patient.

## 2025-05-22 ENCOUNTER — TELEPHONE (OUTPATIENT)
Dept: DIABETES SERVICES | Facility: HOSPITAL | Age: 75
End: 2025-05-22
Payer: MEDICARE

## 2025-05-22 NOTE — TELEPHONE ENCOUNTER
"Left message for patient explaining that office notes were sent on 5/6 to his DME company. I spoke with the DME company on 5/20 who says they just didn't \"push the documents a;; the way through\". They are current;y processing the documents and said they will be reaching out to the patient to set up delivery.  "

## 2025-05-22 NOTE — TELEPHONE ENCOUNTER
Last OV 04/18/2025    Voice message from patient states it was his 5th phone call.  Still has not heard anything back about his sensors     Berenice was calling to check on this and was going to call patient back.      States he has not heard anything and still does not have any sensors.

## 2025-05-23 DIAGNOSIS — I48.0 PAROXYSMAL ATRIAL FIBRILLATION: ICD-10-CM

## 2025-05-23 RX ORDER — ISOSORBIDE MONONITRATE 60 MG/1
60 TABLET, EXTENDED RELEASE ORAL DAILY
Qty: 90 TABLET | Refills: 0 | Status: SHIPPED | OUTPATIENT
Start: 2025-05-23

## 2025-06-06 ENCOUNTER — TELEPHONE (OUTPATIENT)
Dept: DIABETES SERVICES | Facility: CLINIC | Age: 75
End: 2025-06-06
Payer: MEDICARE

## 2025-06-06 DIAGNOSIS — E11.65 CONTROLLED TYPE 2 DIABETES MELLITUS WITH HYPERGLYCEMIA, WITH LONG-TERM CURRENT USE OF INSULIN: Primary | ICD-10-CM

## 2025-06-06 DIAGNOSIS — Z79.4 CONTROLLED TYPE 2 DIABETES MELLITUS WITH HYPERGLYCEMIA, WITH LONG-TERM CURRENT USE OF INSULIN: Primary | ICD-10-CM

## 2025-06-06 DIAGNOSIS — R11.0 NAUSEA: ICD-10-CM

## 2025-06-06 RX ORDER — ONDANSETRON 4 MG/1
4 TABLET, FILM COATED ORAL DAILY PRN
Qty: 30 TABLET | Refills: 0 | Status: CANCELLED | OUTPATIENT
Start: 2025-06-06 | End: 2026-06-06

## 2025-06-06 RX ORDER — TIRZEPATIDE 2.5 MG/.5ML
2.5 INJECTION, SOLUTION SUBCUTANEOUS
Qty: 2 ML | Refills: 0 | Status: CANCELLED | OUTPATIENT
Start: 2025-06-06

## 2025-06-06 NOTE — TELEPHONE ENCOUNTER
Last OV 04-    Next scheduled appt  08-    Patient stopped Ozempic X 2 1/2 weeks. To many side effects. Gas, constipation, bloating and cramps    Brought Jackie in so that we can download his report.   [facility]    Report downloaded to chart so that you and look at to see if you wanted to change medication.   Patient was asking about Zepbound.  States his daughter is on it and doing well.

## 2025-06-10 ENCOUNTER — PATIENT MESSAGE (OUTPATIENT)
Dept: DIABETES SERVICES | Facility: CLINIC | Age: 75
End: 2025-06-10
Payer: MEDICARE

## 2025-06-13 DIAGNOSIS — F41.9 ANXIETY: ICD-10-CM

## 2025-06-16 ENCOUNTER — OFFICE VISIT (OUTPATIENT)
Dept: FAMILY MEDICINE CLINIC | Age: 75
End: 2025-06-16
Payer: MEDICARE

## 2025-06-16 ENCOUNTER — TELEPHONE (OUTPATIENT)
Dept: PODIATRY | Facility: CLINIC | Age: 75
End: 2025-06-16

## 2025-06-16 VITALS
OXYGEN SATURATION: 99 % | HEIGHT: 73 IN | SYSTOLIC BLOOD PRESSURE: 131 MMHG | BODY MASS INDEX: 34.23 KG/M2 | DIASTOLIC BLOOD PRESSURE: 62 MMHG | TEMPERATURE: 97.8 F | HEART RATE: 67 BPM | WEIGHT: 258.3 LBS

## 2025-06-16 DIAGNOSIS — N18.30 TYPE 2 DIABETES MELLITUS WITH STAGE 3 CHRONIC KIDNEY DISEASE, WITH LONG-TERM CURRENT USE OF INSULIN, UNSPECIFIED WHETHER STAGE 3A OR 3B CKD: ICD-10-CM

## 2025-06-16 DIAGNOSIS — E11.22 TYPE 2 DIABETES MELLITUS WITH STAGE 3 CHRONIC KIDNEY DISEASE, WITH LONG-TERM CURRENT USE OF INSULIN, UNSPECIFIED WHETHER STAGE 3A OR 3B CKD: ICD-10-CM

## 2025-06-16 DIAGNOSIS — T14.8XXA BLISTER: Primary | ICD-10-CM

## 2025-06-16 DIAGNOSIS — Z79.4 TYPE 2 DIABETES MELLITUS WITH STAGE 3 CHRONIC KIDNEY DISEASE, WITH LONG-TERM CURRENT USE OF INSULIN, UNSPECIFIED WHETHER STAGE 3A OR 3B CKD: ICD-10-CM

## 2025-06-16 PROCEDURE — 1159F MED LIST DOCD IN RCRD: CPT | Performed by: STUDENT IN AN ORGANIZED HEALTH CARE EDUCATION/TRAINING PROGRAM

## 2025-06-16 PROCEDURE — 3044F HG A1C LEVEL LT 7.0%: CPT | Performed by: STUDENT IN AN ORGANIZED HEALTH CARE EDUCATION/TRAINING PROGRAM

## 2025-06-16 PROCEDURE — 3075F SYST BP GE 130 - 139MM HG: CPT | Performed by: STUDENT IN AN ORGANIZED HEALTH CARE EDUCATION/TRAINING PROGRAM

## 2025-06-16 PROCEDURE — 99214 OFFICE O/P EST MOD 30 MIN: CPT | Performed by: STUDENT IN AN ORGANIZED HEALTH CARE EDUCATION/TRAINING PROGRAM

## 2025-06-16 PROCEDURE — 1126F AMNT PAIN NOTED NONE PRSNT: CPT | Performed by: STUDENT IN AN ORGANIZED HEALTH CARE EDUCATION/TRAINING PROGRAM

## 2025-06-16 PROCEDURE — 1160F RVW MEDS BY RX/DR IN RCRD: CPT | Performed by: STUDENT IN AN ORGANIZED HEALTH CARE EDUCATION/TRAINING PROGRAM

## 2025-06-16 PROCEDURE — 3078F DIAST BP <80 MM HG: CPT | Performed by: STUDENT IN AN ORGANIZED HEALTH CARE EDUCATION/TRAINING PROGRAM

## 2025-06-16 RX ORDER — GINSENG 100 MG
1 CAPSULE ORAL 2 TIMES DAILY
Qty: 28 G | Refills: 0 | Status: SHIPPED | OUTPATIENT
Start: 2025-06-16

## 2025-06-16 RX ORDER — DOXYCYCLINE 100 MG/1
100 CAPSULE ORAL 2 TIMES DAILY
Qty: 10 CAPSULE | Refills: 0 | Status: SHIPPED | OUTPATIENT
Start: 2025-06-16 | End: 2025-06-23

## 2025-06-16 RX ORDER — ALPRAZOLAM 0.5 MG
0.5 TABLET ORAL NIGHTLY PRN
Qty: 10 TABLET | Refills: 0 | Status: SHIPPED | OUTPATIENT
Start: 2025-06-16

## 2025-06-16 RX ORDER — LEVOTHYROXINE SODIUM 137 UG/1
137 TABLET ORAL EVERY MORNING
Qty: 90 TABLET | Refills: 0 | Status: SHIPPED | OUTPATIENT
Start: 2025-06-16

## 2025-06-16 NOTE — TELEPHONE ENCOUNTER
"Caller: Kyler Staley \"Peter\"    Relationship to patient: Self    Best call back number: [phone]*    Chief complaint: BLISTERS OF BOTH FEET    Type of visit: FOLLOW UP    Requested date: ASAP       "

## 2025-06-16 NOTE — ASSESSMENT & PLAN NOTE
Continue with the use of Lantus and Humalog as directed.  Make sure to keep close eye on blood sugars.  Needs to follow-up with podiatry sooner than December due to wounds on foot and issues with healing due to being diabetic.

## 2025-06-16 NOTE — PROGRESS NOTES
Chief Complaint     Blister (Pt states he developed a blister on both heels, x3 days after a epsom salt bath )    History of Present Illness     Kyler Staley is a 75 y.o. male who presents to [facility].     Patient or patient representative verbalized consent for the use of Ambient Listening during the visit with  CASSIDY Christian for chart documentation. 6/16/2025  16:08 EDT      History of Present Illness  The patient is a 75-year-old male who presents for evaluation of blisters on his feet.    He has been managing his diabetes with Ozempic, which has been effective in controlling his blood sugar levels. However, due to severe side effects, he discontinued the medication approximately 2 weeks ago, leading to an increase in his blood sugar levels. He continues to use Lantus and Humalog.  Last A1c was 6.4. [facility] and [facility] upcoming [facility] next Monday, during which he hopes to transition back to [facility].    He reports developing blisters on both heels after soaking his feet in Epsom salt last Thursday. The left blister ruptured during the night, causing significant pain and impairing his ability to walk. The right foot still has a small blister. He reports no recent increase in physical activity such as walking. He retains sensation in his feet and reports no systemic symptoms such as fever, chills, or body aches. He has previously consulted with Dr. Keyes, a podiatrist, but has not contacted him recently. He also mentions a long-standing white spot under his toe. He has previously taken doxycycline and is not currently on dialysis.    He has stage IV kidney disease.    PAST SURGICAL HISTORY:  Nine stents placed approximately 13 years ago.         History      Past Medical History:   Diagnosis Date    Abnormal ECG     Acne     Alcoholism     Allergic     Anemia     Anxiety     Arthritis     Arthritis of back     Arthritis of  neck     Asthma     Atrial fibrillation     Back pain     Benign prostatic hyperplasia     Callus     Cardiac disease     Cataracts, bilateral     Cervical disc disorder     CHF (congestive heart failure)     Cholelithiasis     Chronic kidney disease, stage 3     moderate    CKD (chronic kidney disease)     Clotting disorder     Condition not found     hernia    Coronary artery disease     Depression     Diabetes     Difficulty walking     Enlarged prostate     Erectile dysfunction     Fatigue     GERD (gastroesophageal reflux disease)     Gout     Hearing loss     Hematospermia 12/09/2015    Hernia     High blood pressure     High cholesterol     Hip arthrosis     History of cold sores     History of gastritis     [place] (hard of hearing)     Hyperlipidemia     Hypertension     Hypoglycemia     Hypothyroidism     Ingrown toenail     Irregular heart rhythm     Joint pain     Kidney disease     Knee swelling     Low back pain     Lumbosacral disc disease     Narrowing of airway 2012    PER ENT    Neuropathy     Neuropathy in diabetes     Obesity     Osteopenia     Pancreatitis     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     Plantar fasciitis     Pneumonia     PONV (postoperative nausea and vomiting)     Psychiatric diagnosis     Renal insufficiency     Rotator cuff syndrome     REPAIRED BILATERAL    Shin splints     Shortness of breath     Sinus trouble     Sleep apnea     BIPAP    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)     Substance abuse     Swallowing difficulty     Testosterone deficiency     Thin skin     Thoracic disc disorder     Thyroid condition     Type 2 diabetes mellitus     Visual impairment     Vitamin D deficiency     Vocal cord dysfunction      HISTORY OF DISORDER ON ONE SIDE AFTER TRACHEA       Past Surgical History:   Procedure Laterality Date    BACK SURGERY  2018    X2    BACK SURGERY  2011    CARDIAC CATHETERIZATION      CARDIAC SURGERY       CHOLECYSTECTOMY      COLONOSCOPY      CORONARY ARTERY BYPASS GRAFT  2012    CORONARY STENT PLACEMENT      CYSTOSCOPY      EYE SURGERY      HERNIA REPAIR      INGUINAL HERNIA REPAIR      JOINT REPLACEMENT      KNEE SURGERY      right and left    LUMBAR DISCECTOMY FUSION INSTRUMENTATION N/A 02/11/2021    Procedure: EXPLORATION OF FUSION T-12 S-1 REMOVAL OF HARDWARE REVISION OF FUSION AND INSTRUMENTATION T12-S1 WITH APPLICATION OF BONE MORPHOGENIC PROTEIN;  Surgeon: Baltazar Blankenship MD;  Location: [facility] MAIN OR;  Service: Orthopedic Spine;  Laterality: N/A;    NOSE SURGERY      PACEMAKER IMPLANTATION      REPLACEMENT TOTAL KNEE Bilateral     SHOULDER SURGERY Bilateral     rotator cuff repair    SINUS SURGERY      SPINE SURGERY      TOENAIL EXCISION      UPPER GASTROINTESTINAL ENDOSCOPY         Family History   Problem Relation Age of Onset    Heart attack Mother     Arthritis Mother     Cancer Mother     Heart disease Mother     Hypertension Mother     Cancer Father     Hypertension Sister     Hypertension Brother     Malig Hyperthermia Neg Hx         Current Medications        Current Outpatient Medications:     allopurinol (ZYLOPRIM) 300 MG tablet, Take 1 tablet by mouth once daily, Disp: 90 tablet, Rfl: 0    ALPRAZolam (XANAX) 0.5 MG tablet, TAKE 1 TABLET BY MOUTH AT NIGHT AS NEEDED FOR ANXIETY, Disp: 30 tablet, Rfl: 0    amLODIPine (NORVASC) 5 MG tablet, Take 1 tablet by mouth Daily., Disp: , Rfl:     apixaban (ELIQUIS) 5 MG tablet tablet, Take 1 tablet by mouth 2 (Two) Times a Day., Disp: , Rfl:     atorvastatin (LIPITOR) 80 MG tablet, Take 1 tablet by mouth every night at bedtime., Disp: , Rfl:     carvedilol (COREG) 12.5 MG tablet, Take 1 tablet by mouth 2 (Two) Times a Day With Meals., Disp: 180 tablet, Rfl: 1    eplerenone (INSPRA) 25 MG tablet, Take 1 tablet by mouth Daily., Disp: , Rfl:     fexofenadine (ALLEGRA) 60 MG tablet, Take 1 tablet by mouth Daily., Disp: , Rfl:     finasteride (PROSCAR) 5 MG tablet,  Take 1 tablet by mouth Daily., Disp: 90 tablet, Rfl: 4    Flaxseed, Linseed, 1000 MG capsule, Take 1,200 mg by mouth 2 (Two) Times a Day., Disp: , Rfl:     glucose blood test strip, , Disp: , Rfl:     imipramine (TOFRANIL) 50 MG tablet, TAKE 1 TABLET BY MOUTH ONCE DAILY IN THE MORNING AND 2 ONCE DAILY IN THE EVENING, Disp: 270 tablet, Rfl: 1    insulin aspart (novoLOG FLEXPEN) 100 UNIT/ML solution pen-injector sc pen, Inject 7 Units under the skin into the appropriate area as directed 2 (Two) Times a Day. 1/14 units BID (sliding scale 1-15 units), Disp: , Rfl:     insulin glargine (LANTUS, SEMGLEE) 100 UNIT/ML injection, Inject 65 Units under the skin into the appropriate area as directed Daily., Disp: , Rfl:     isosorbide mononitrate (IMDUR) 60 MG 24 hr tablet, Take 1 tablet by mouth once daily, Disp: 90 tablet, Rfl: 0    ketoconazole (NIZORAL) 2 % shampoo, SHAMPOO TOPICALLY 3 TIMES WEEKLY, Disp: , Rfl:     levothyroxine (SYNTHROID, LEVOTHROID) 137 MCG tablet, TAKE 1 TABLET BY MOUTH ONCE DAILY IN THE MORNING, Disp: 90 tablet, Rfl: 0    losartan (COZAAR) 50 MG tablet, Take 1 tablet by mouth Daily. (Patient taking differently: Take 2 tablets by mouth Daily.), Disp: , Rfl:     Melatonin 5 MG chewable tablet, Chew 1 tablet Daily As Needed., Disp: , Rfl:     nitroglycerin (NITROSTAT) 0.4 MG SL tablet, Place 1 tablet under the tongue Every 5 (Five) Minutes As Needed for Chest Pain. do not exceed a total of 3 doses in 15 minutes, Disp: , Rfl:     pantoprazole (PROTONIX) 40 MG EC tablet, Take 1 tablet by mouth once daily, Disp: 90 tablet, Rfl: 0    Plavix 75 MG tablet, Take 1 tablet by mouth Daily., Disp: , Rfl:     tamsulosin (FLOMAX) 0.4 MG capsule 24 hr capsule, Take 2 capsules by mouth Daily., Disp: 180 capsule, Rfl: 4    bacitracin 500 UNIT/GM ointment, Apply 1 Application topically to the appropriate area as directed 2 (Two) Times a Day., Disp: 28 g, Rfl: 0    doxycycline (VIBRAMYCIN) 100 MG capsule, Take 1 capsule  "by mouth 2 (Two) Times a Day., Disp: 10 capsule, Rfl: 0     Allergies     Allergies   Allergen Reactions    Bupropion Other (See Comments) and Unknown - Low Severity     MAKES PATIENT VERY AGGRESSIVE      Iodinated Contrast Media Other (See Comments)     Other reaction(s): Other (See Comments)  Chronic kidney disease - stage 3  Chronic kidney disease - stage 3    Lorazepam Other (See Comments) and Mental Status Change     MADE PATIENT VERY AGGRESSIVE      Propoxyphene Nausea And Vomiting and Unknown - Low Severity    Adhesive Tape Rash       Social History       Social History     Social History Narrative     / 3 children / Retired from [employer]       Immunizations     Immunization:  Immunization History   Administered Date(s) Administered    Arexvy (RSV, Adults 60+ yrs) 09/05/2023    COVID-19 (MODERNA) 12YRS+ (SPIKEVAX) 11/16/2023, 10/17/2024    COVID-19 (MODERNA) 1st,2nd,3rd Dose Monovalent 01/21/2021, 02/19/2021, 10/25/2021, 04/11/2022    COVID-19 (MODERNA) BIVALENT 12+YRS 09/23/2022    Fluzone High-Dose 65+YRS 09/14/2016, 09/01/2017, 09/27/2019, 09/04/2024    Fluzone High-Dose 65+yrs 09/01/2017, 09/27/2019, 09/14/2022, 09/05/2023    Pneumococcal Conjugate 20-Valent (PCV20) 10/12/2022    Pneumococcal Polysaccharide (PPSV23) 01/19/2022    Shingrix 10/12/2022, 01/17/2023    Tdap 01/19/2022, 09/04/2024          Objective     Objective     Vital Signs:   /62 (BP Location: Left arm, Patient Position: Sitting)   Pulse 67   Temp 97.8 °F (36.6 °C) (Oral)   Ht 185.4 cm (72.99\")   Wt 117 kg (258 lb 4.8 oz)   SpO2 99%   BMI 34.09 kg/m²       Physical Exam  Vitals and nursing note reviewed.   Constitutional:       Appearance: Normal appearance. He is obese.   HENT:      Head: Normocephalic.   Eyes:      Conjunctiva/sclera: Conjunctivae normal.      Pupils: Pupils are equal, round, and reactive to light.   Cardiovascular:      Rate and Rhythm: Normal rate and regular rhythm.      Pulses: Normal pulses. "      Heart sounds: Normal heart sounds.   Pulmonary:      Effort: Pulmonary effort is normal.      Breath sounds: Normal breath sounds.   Abdominal:      General: Bowel sounds are normal.      Palpations: Abdomen is soft.   Musculoskeletal:         General: Normal range of motion.      Cervical back: Normal range of motion and neck supple.        Feet:    Feet:      Right foot:      Skin integrity: Blister present.      Left foot:      Skin integrity: Blister present.      Comments: Blisters on the plantar aspect of both feet the blister on the left is larger and has popped.  The blister on the right skin is still intact.  Skin:     General: Skin is warm and dry.   Neurological:      General: No focal deficit present.      Mental Status: He is alert and oriented to person, place, and time.   Psychiatric:         Attention and Perception: Attention normal.         Mood and Affect: Mood and affect normal.         Behavior: Behavior normal. Behavior is cooperative.               Physical Exam  Respiratory: Clear to auscultation, no wheezing, rales or rhonchi  Extremities: Blisters noted on bilateral heels, left foot blister burst and draining, right foot blister smaller      Results    The following data was reviewed by: CASSIDY Christian on 06/16/25               Results           Assessment and Plan        Assessment and Plan       Blister    Orders:    doxycycline (VIBRAMYCIN) 100 MG capsule; Take 1 capsule by mouth 2 (Two) Times a Day.    bacitracin 500 UNIT/GM ointment; Apply 1 Application topically to the appropriate area as directed 2 (Two) Times a Day.    Type 2 diabetes mellitus with stage 3 chronic kidney disease, with long-term current use of insulin, unspecified whether stage 3a or 3b CKD    Continue with the use of Lantus and Humalog as directed.  Make sure to keep close eye on blood sugars.  Needs to follow-up with podiatry sooner than December due to wounds on foot and issues with healing due to being  diabetic.            Assessment & Plan  1. Diabetes mellitus.  - Uncontrolled blood sugar levels after discontinuing Ozempic 2 weeks ago due to side effects.  - Currently using Lantus and Humalog.  - Considering switching to Mounjaro, with potential for similar side effects as Ozempic.  - [facility].    2. Blisters on bilateral feet.  - Developed blisters on both heels after soaking feet, with the left foot having a large blister that burst and remains very sore.  - Increased pain and limited mobility.  - No signs of infection; advised to contact Dr. Keyes's office for an earlier appointment.  - Prescribed doxycycline, twice daily for 5 days, and an ointment for application twice daily. Keep the affected area clean and dry, using mild soap and water for cleaning. Monitor for purulent drainage, fever, redness, or swelling extending up the leg.        Follow Up        Follow Up   Return for With PCP, Next scheduled follow up, sooner if condition worsens.  Patient was given instructions and counseling regarding his condition or for health maintenance advice. Please see specific information pulled into the AV[facility] if appropriate.

## 2025-06-16 NOTE — TELEPHONE ENCOUNTER
ON CALL FOR JAMES    Controlled refill request:  Requested Prescriptions     Pending Prescriptions Disp Refills    ALPRAZolam (XANAX) 0.5 MG tablet [Pharmacy Med Name: ALPRAZolam 0.5 MG Oral Tablet] 30 tablet 0     Sig: Take 1 tablet by mouth At Night As Needed for Anxiety.      Last OV:  2/24/2025  Next OV:  6/16/2025  Last fill:  5/14/25  Last tox:  2/24/25

## 2025-06-20 ENCOUNTER — TELEPHONE (OUTPATIENT)
Dept: DIABETES SERVICES | Facility: CLINIC | Age: 75
End: 2025-06-20
Payer: MEDICARE

## 2025-06-20 NOTE — TELEPHONE ENCOUNTER
Patient called office is going to come over this afternoon after 1:30pm so that we can download his Jackie report.     Patient also stated that [facility] Mounjaro and it has been shipped to him.     Patient wanted you to know  he soaked his feet in Epson salt 06-.   The next had large blood blister on left heel and rash on left ankle and foot.   Has small blood blister on his right heel.      Has appt with foot doctor on Monday 06-

## 2025-06-21 DIAGNOSIS — F41.9 ANXIETY: ICD-10-CM

## 2025-06-23 ENCOUNTER — OFFICE VISIT (OUTPATIENT)
Dept: PODIATRY | Facility: CLINIC | Age: 75
End: 2025-06-23
Payer: MEDICARE

## 2025-06-23 VITALS
TEMPERATURE: 97.8 F | HEART RATE: 60 BPM | BODY MASS INDEX: 34.19 KG/M2 | HEIGHT: 73 IN | OXYGEN SATURATION: 95 % | SYSTOLIC BLOOD PRESSURE: 146 MMHG | DIASTOLIC BLOOD PRESSURE: 77 MMHG | WEIGHT: 258 LBS

## 2025-06-23 DIAGNOSIS — E11.65 TYPE 2 DIABETES MELLITUS WITH HYPERGLYCEMIA, UNSPECIFIED WHETHER LONG TERM INSULIN USE: Primary | ICD-10-CM

## 2025-06-23 DIAGNOSIS — I73.9 PAD (PERIPHERAL ARTERY DISEASE): ICD-10-CM

## 2025-06-23 DIAGNOSIS — L97.511 ULCER OF RIGHT FOOT, LIMITED TO BREAKDOWN OF SKIN: ICD-10-CM

## 2025-06-23 DIAGNOSIS — L97.521 ULCER OF LEFT FOOT, LIMITED TO BREAKDOWN OF SKIN: ICD-10-CM

## 2025-06-23 PROCEDURE — 3078F DIAST BP <80 MM HG: CPT | Performed by: PODIATRIST

## 2025-06-23 PROCEDURE — 1159F MED LIST DOCD IN RCRD: CPT | Performed by: PODIATRIST

## 2025-06-23 PROCEDURE — 3077F SYST BP >= 140 MM HG: CPT | Performed by: PODIATRIST

## 2025-06-23 PROCEDURE — 99213 OFFICE O/P EST LOW 20 MIN: CPT | Performed by: PODIATRIST

## 2025-06-23 PROCEDURE — 1160F RVW MEDS BY RX/DR IN RCRD: CPT | Performed by: PODIATRIST

## 2025-06-23 RX ORDER — DOXYCYCLINE 100 MG/1
100 CAPSULE ORAL 2 TIMES DAILY
Qty: 20 CAPSULE | Refills: 0 | Status: SHIPPED | OUTPATIENT
Start: 2025-06-23

## 2025-06-24 RX ORDER — ALPRAZOLAM 0.5 MG
0.5 TABLET ORAL NIGHTLY PRN
Qty: 30 TABLET | Refills: 0 | Status: SHIPPED | OUTPATIENT
Start: 2025-06-24

## 2025-06-24 NOTE — PROGRESS NOTES
[facility] - PODIATRY    Today's Date: 06/24/25    Patient Name: Kyler Staley  MRN: [id number]  CSN: [id number]  PCP: Marisol Sellers APRN,   Referring Provider: No ref. provider found    SUBJECTIVE     Chief Complaint   Patient presents with    Left Foot - Pain, Foot Ulcer     New problem 10 days ago soaked in epsom salt and developed blister  saw PCP 6/16/25 given doxycycline referred here    Right Foot - Pain, Foot Ulcer     New problem blister after soaking foot in epsom salt     HPI: Kyler Staley, a 75 y.o.male, presents to clinic.    History of Present Illness  The patient presents for evaluation of blood blisters on his heels.    He reports the development of blood blisters on his heels, accompanied by a rash. The blisters are swollen, extremely painful, and continuously draining. He first noticed these symptoms approximately 2 weeks ago, following an Epsom salt bath. He reports no recent changes in footwear. He describes the pain as throbbing and notes that it was initially associated with severe itching, which he attributes to the adhesive on the bandages he was using. He has been applying Bacitracin ointment to the affected area. He has been managing the condition by keeping the area clean and dry, and applying bandages when necessary.              Past Medical History:   Diagnosis Date    Abnormal ECG     Acne     Alcoholism     Allergic     Anemia     Anxiety     Arthritis     Arthritis of back     Arthritis of neck     Asthma     Atrial fibrillation     Back pain     Benign prostatic hyperplasia     Callus     Cardiac disease     Cataracts, bilateral     Cervical disc disorder     CHF (congestive heart failure)     Cholelithiasis     Chronic kidney disease, stage 3     moderate    CKD (chronic kidney disease)     Clotting disorder     Condition not found     hernia    Coronary artery disease     Depression     Diabetes     Difficulty walking     Enlarged prostate      Erectile dysfunction     Fatigue     GERD (gastroesophageal reflux disease)     Gout     Hearing loss     Hematospermia 12/09/2015    Hernia     High blood pressure     High cholesterol     Hip arthrosis     History of cold sores     History of gastritis     [place] (hard of hearing)     Hyperlipidemia     Hypertension     Hypoglycemia     Hypothyroidism     Ingrown toenail     Irregular heart rhythm     Joint pain     Kidney disease     Knee swelling     Low back pain     Lumbosacral disc disease     Narrowing of airway 2012    PER ENT    Neuropathy     Neuropathy in diabetes     Obesity     Osteopenia     Pancreatitis     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     Plantar fasciitis     Pneumonia     PONV (postoperative nausea and vomiting)     Psychiatric diagnosis     Renal insufficiency     Rotator cuff syndrome     REPAIRED BILATERAL    Shin splints     Shortness of breath     Sinus trouble     Sleep apnea     BIPAP    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)     Substance abuse     Swallowing difficulty     Testosterone deficiency     Thin skin     Thoracic disc disorder     Thyroid condition     Type 2 diabetes mellitus     Visual impairment     Vitamin D deficiency     Vocal cord dysfunction      HISTORY OF DISORDER ON ONE SIDE AFTER TRACHEA     Past Surgical History:   Procedure Laterality Date    BACK SURGERY  2018    X2    BACK SURGERY  2011    CARDIAC CATHETERIZATION      CARDIAC SURGERY      CHOLECYSTECTOMY      COLONOSCOPY      CORONARY ARTERY BYPASS GRAFT  2012    CORONARY STENT PLACEMENT      CYSTOSCOPY      EYE SURGERY      HERNIA REPAIR      INGUINAL HERNIA REPAIR      JOINT REPLACEMENT      KNEE SURGERY      right and left    LUMBAR DISCECTOMY FUSION INSTRUMENTATION N/A 02/11/2021    Procedure: EXPLORATION OF FUSION T-12 S-1 REMOVAL OF HARDWARE REVISION OF FUSION AND INSTRUMENTATION T12-S1 WITH APPLICATION OF BONE MORPHOGENIC PROTEIN;  Surgeon: Krzysztof  MD Baltazar;  Location: [facility] MAIN OR;  Service: Orthopedic Spine;  Laterality: N/A;    NOSE SURGERY      PACEMAKER IMPLANTATION      REPLACEMENT TOTAL KNEE Bilateral     SHOULDER SURGERY Bilateral     rotator cuff repair    SINUS SURGERY      SPINE SURGERY      TOENAIL EXCISION      UPPER GASTROINTESTINAL ENDOSCOPY       Family History   Problem Relation Age of Onset    Heart attack Mother     Arthritis Mother     Cancer Mother     Heart disease Mother     Hypertension Mother     Cancer Father     Hypertension Sister     Hypertension Brother     Malig Hyperthermia Neg Hx      Social History     Socioeconomic History    Marital status:    Tobacco Use    Smoking status: Former     Current packs/day: 0.00     Average packs/day: 3.0 packs/day for 15.0 years (45.0 ttl pk-yrs)     Types: Cigarettes     Start date: 1960     Quit date: 1975     Years since quittin.5     Passive exposure: Past    Smokeless tobacco: Never   Vaping Use    Vaping status: Never Used   Substance and Sexual Activity    Alcohol use: Not Currently    Drug use: Yes     Types: Hydrocodone    Sexual activity: Not Currently     Partners: Female     Allergies   Allergen Reactions    Bupropion Other (See Comments) and Unknown - Low Severity     MAKES PATIENT VERY AGGRESSIVE      Iodinated Contrast Media Other (See Comments)     Other reaction(s): Other (See Comments)  Chronic kidney disease - stage 3  Chronic kidney disease - stage 3    Lorazepam Other (See Comments) and Mental Status Change     MADE PATIENT VERY AGGRESSIVE      Propoxyphene Nausea And Vomiting and Unknown - Low Severity    Adhesive Tape Rash     Current Outpatient Medications   Medication Sig Dispense Refill    allopurinol (ZYLOPRIM) 300 MG tablet Take 1 tablet by mouth once daily 90 tablet 0    ALPRAZolam (XANAX) 0.5 MG tablet Take 1 tablet by mouth At Night As Needed for Anxiety. 10 tablet 0    amLODIPine (NORVASC) 5 MG tablet Take 1 tablet by mouth Daily.       apixaban (ELIQUIS) 5 MG tablet tablet Take 1 tablet by mouth 2 (Two) Times a Day.      atorvastatin (LIPITOR) 80 MG tablet Take 1 tablet by mouth every night at bedtime.      bacitracin 500 UNIT/GM ointment Apply 1 Application topically to the appropriate area as directed 2 (Two) Times a Day. 28 g 0    carvedilol (COREG) 12.5 MG tablet Take 1 tablet by mouth 2 (Two) Times a Day With Meals. 180 tablet 1    eplerenone (INSPRA) 25 MG tablet Take 1 tablet by mouth Daily.      fexofenadine (ALLEGRA) 60 MG tablet Take 1 tablet by mouth Daily.      finasteride (PROSCAR) 5 MG tablet Take 1 tablet by mouth Daily. 90 tablet 4    Flaxseed, Linseed, 1000 MG capsule Take 1,200 mg by mouth 2 (Two) Times a Day.      glucose blood test strip       imipramine (TOFRANIL) 50 MG tablet TAKE 1 TABLET BY MOUTH ONCE DAILY IN THE MORNING AND 2 ONCE DAILY IN THE EVENING 270 tablet 1    insulin aspart (novoLOG FLEXPEN) 100 UNIT/ML solution pen-injector sc pen Inject 7 Units under the skin into the appropriate area as directed 2 (Two) Times a Day. 1/14 units BID (sliding scale 1-15 units)      insulin glargine (LANTUS, SEMGLEE) 100 UNIT/ML injection Inject 65 Units under the skin into the appropriate area as directed Daily.      isosorbide mononitrate (IMDUR) 60 MG 24 hr tablet Take 1 tablet by mouth once daily 90 tablet 0    ketoconazole (NIZORAL) 2 % shampoo SHAMPOO TOPICALLY 3 TIMES WEEKLY      levothyroxine (SYNTHROID, LEVOTHROID) 137 MCG tablet TAKE 1 TABLET BY MOUTH ONCE DAILY IN THE MORNING 90 tablet 0    losartan (COZAAR) 50 MG tablet Take 1 tablet by mouth Daily. (Patient taking differently: Take 2 tablets by mouth Daily.)      Melatonin 5 MG chewable tablet Chew 1 tablet Daily As Needed.      nitroglycerin (NITROSTAT) 0.4 MG SL tablet Place 1 tablet under the tongue Every 5 (Five) Minutes As Needed for Chest Pain. do not exceed a total of 3 doses in 15 minutes      pantoprazole (PROTONIX) 40 MG EC tablet Take 1 tablet by mouth once  daily 90 tablet 0    Plavix 75 MG tablet Take 1 tablet by mouth Daily.      tamsulosin (FLOMAX) 0.4 MG capsule 24 hr capsule Take 2 capsules by mouth Daily. 180 capsule 4    doxycycline (VIBRAMYCIN) 100 MG capsule Take 1 capsule by mouth 2 (Two) Times a Day. 20 capsule 0     No current facility-administered medications for this visit.         OBJECTIVE     Vitals:    06/23/25 1403   BP: 146/77   Pulse: 60   Temp: 97.8 °F (36.6 °C)   SpO2: 95%       WBC   Date Value Ref Range Status   11/04/2024 7.41 3.40 - 10.80 10*3/mm3 Final   01/20/2021 7.08 4.80 - 10.80 10*3/uL Final   02/19/2018 6.00 4.5 - 11.0 10*3/uL Final     RBC   Date Value Ref Range Status   11/04/2024 4.77 4.14 - 5.80 10*6/mm3 Final   02/19/2018 4.83 4.5 - 5.9 10*6/uL Final     Hemoglobin   Date Value Ref Range Status   11/04/2024 14.5 13.0 - 17.7 g/dL Final   03/09/2018 10.7 (L) 13.5 - 17.5 g/dL Final     Hematocrit   Date Value Ref Range Status   11/04/2024 46.0 37.5 - 51.0 % Final   03/09/2018 33.5 (L) 41.0 - 53.0 % Final     MCV   Date Value Ref Range Status   11/04/2024 96.4 79.0 - 97.0 fL Final   02/19/2018 86.7 80.0 - 100.0 fL Final     MCH   Date Value Ref Range Status   11/04/2024 30.4 26.6 - 33.0 pg Final   02/19/2018 29.2 26.0 - 34.0 pg Final     MCHC   Date Value Ref Range Status   11/04/2024 31.5 31.5 - 35.7 g/dL Final   02/19/2018 33.7 31.0 - 37.0 g/dL Final     RDW   Date Value Ref Range Status   11/04/2024 15.6 (H) 12.3 - 15.4 % Final   02/19/2018 14.4 12.0 - 16.8 % Final     RDW-SD   Date Value Ref Range Status   11/04/2024 55.5 (H) 37.0 - 54.0 fl Final     MPV   Date Value Ref Range Status   11/04/2024 9.6 6.0 - 12.0 fL Final   02/19/2018 10.1 6.7 - 10.8 fL Final     Platelets   Date Value Ref Range Status   11/04/2024 243 140 - 450 10*3/mm3 Final   03/09/2018 266 140 - 440 10*3/uL Final     Neutrophil Rel %   Date Value Ref Range Status   02/19/2018 54.7 45 - 80 % Final     Neutrophil %   Date Value Ref Range Status   11/04/2024 63.7  42.7 - 76.0 % Final     Lymphocyte Rel %   Date Value Ref Range Status   02/19/2018 35.0 15 - 50 % Final     Lymphocyte %   Date Value Ref Range Status   11/04/2024 25.0 19.6 - 45.3 % Final     Monocyte Rel %   Date Value Ref Range Status   02/19/2018 7.0 0 - 15 % Final     Monocyte %   Date Value Ref Range Status   11/04/2024 8.6 5.0 - 12.0 % Final     Eosinophil %   Date Value Ref Range Status   11/04/2024 2.0 0.3 - 6.2 % Final   02/19/2018 2.3 0 - 7 % Final     Basophil Rel %   Date Value Ref Range Status   02/19/2018 0.7 0 - 2 % Final     Basophil %   Date Value Ref Range Status   11/04/2024 0.4 0.0 - 1.5 % Final     Immature Grans %   Date Value Ref Range Status   11/04/2024 0.3 0.0 - 0.5 % Final   02/19/2018 0.3 (H) 0 % Final     Neutrophils Absolute   Date Value Ref Range Status   03/17/2022 5.00 1.70 - 6.00 x10(3)/ul Final   02/19/2018 3.28 2.0 - 8.8 10*3/uL Final     Neutrophils, Absolute   Date Value Ref Range Status   11/04/2024 4.72 1.70 - 7.00 10*3/mm3 Final     Lymphocytes Absolute   Date Value Ref Range Status   02/19/2018 2.10 0.7 - 5.5 10*3/uL Final     Lymphocytes, Absolute   Date Value Ref Range Status   11/04/2024 1.85 0.70 - 3.10 10*3/mm3 Final     Monocytes Absolute   Date Value Ref Range Status   02/19/2018 0.42 0.0 - 1.7 10*3/uL Final     Monocytes, Absolute   Date Value Ref Range Status   11/04/2024 0.64 0.10 - 0.90 10*3/mm3 Final     Eosinophils Absolute   Date Value Ref Range Status   03/17/2022 0.2 0.0 - 0.6 x10(3)/ul Final   02/19/2018 0.14 0.0 - 0.8 10*3/uL Final     Eosinophils, Absolute   Date Value Ref Range Status   11/04/2024 0.15 0.00 - 0.40 10*3/mm3 Final     Basophils Absolute   Date Value Ref Range Status   03/17/2022 0.0 0.0 - 0.3 x10(3)/ul Final   02/19/2018 0.04 0.0 - 0.2 10*3/uL Final     Basophils, Absolute   Date Value Ref Range Status   11/04/2024 0.03 0.00 - 0.20 10*3/mm3 Final     Immature Grans, Absolute   Date Value Ref Range Status   11/04/2024 0.02 0.00 - 0.05  10*3/mm3 Final   02/19/2018 0.02 <1 10*3/uL Final     nRBC   Date Value Ref Range Status   02/12/2021 0.0 0.0 - 0.2 /100 WBC Final         Lab Results   Component Value Date    GLUCOSE 134 (H) 05/08/2025    BUN 18 05/08/2025    CREATININE 1.73 (H) 05/08/2025    EGFRIFNONA 23 (L) 02/01/2022    BCR 10.4 05/08/2025    K 5.0 05/08/2025    CO2 24.5 05/08/2025    CALCIUM 10.0 05/08/2025    ALBUMIN 4.0 05/08/2025    LABIL2 1.4 02/19/2018    AST 20 05/08/2025    ALT 17 05/08/2025       Patient seen in no apparent distress.      PHYSICAL EXAM:     Foot/Ankle Exam    GENERAL  Appearance:  appears stated age  Orientation:  AAOx3  Affect:  appropriate  Gait:  unimpaired  Assistance:  independent  Right shoe gear: casual shoe  Left shoe gear: casual shoe    VASCULAR     Right Foot Vascularity   Normal vascular exam    Dorsalis pedis:  2+  Posterior tibial:  2+  Skin temperature:  warm  Edema grading:  None  CFT:  < 3 seconds  Pedal hair growth:  Present  Varicosities:  none     Left Foot Vascularity   Normal vascular exam    Dorsalis pedis:  2+  Posterior tibial:  2+  Skin temperature:  warm  Edema grading:  None  CFT:  < 3 seconds  Pedal hair growth:  Present  Varicosities:  none     NEUROLOGIC     Right Foot Neurologic   Light touch sensation: diminished  Vibratory sensation: diminished  Hot/Cold sensation: diminished     Left Foot Neurologic   Light touch sensation: diminished  Vibratory sensation: diminished  Hot/Cold sensation:  diminished    MUSCLE STRENGTH     Right Foot Muscle Strength   Foot dorsiflexion:  4  Foot plantar flexion:  4  Foot inversion:  4  Foot eversion:  4     Left Foot Muscle Strength   Foot dorsiflexion:  4  Foot plantar flexion:  4  Foot inversion:  4  Foot eversion:  4    RANGE OF MOTION     Right Foot Range of Motion   Foot and ankle ROM within normal limits       Left Foot Range of Motion   Foot and ankle ROM within normal limits      DERMATOLOGIC      Right Foot Dermatologic   Skin  Right foot  skin is intact.      Left Foot Dermatologic   Skin  Left foot skin is intact.      Right foot additional comments: Blood-filled blister to plantar aspect of the heel.  Without malodor or purulent drainage.     Left foot additional comments: Blood-filled blister to plantar aspect of the heel.  Without malodor or purulent drainage.      RADIOLOGY:          No results found.    ASSESSMENT/PLAN     Diagnoses and all orders for this visit:    1. Type 2 diabetes mellitus with hyperglycemia, unspecified whether long term insulin use (Primary)    2. PAD (peripheral artery disease)    3. Ulcer of left foot, limited to breakdown of skin    4. Ulcer of right foot, limited to breakdown of skin    Other orders  -     doxycycline (VIBRAMYCIN) 100 MG capsule; Take 1 capsule by mouth 2 (Two) Times a Day.  Dispense: 20 capsule; Refill: 0        Comprehensive lower extremity examination and evaluation was performed.    Assessment & Plan  The blood blisters on both heels are likely due to friction or a burn. The presence of cellulitis is also noted. He is advised to apply Betadine daily, wrap the area, and avoid exerting pressure on it. Surgical shoes will be provided for comfort. A 10-day course of doxycycline will be prescribed. If the condition worsens, he should contact the office immediately.    Follow-up  The patient will follow up in 2 weeks.       Discussed findings and treatment plan including risks, benefits, and treatment options with patient in detail. Patient agreed with treatment plan.    Medications and allergies reviewed.  Reviewed available lab values along with other pertinent labs.  These were discussed with the patient.    All questions were answered to patient/family satisfaction. Should symptoms fail to improve or worsen they agree to call or return to clinic or to go to the Emergency Department. Discussed the importance of following up with any needed screening tests/labs/specialist appointments and any  requested follow-up recommended by me today. Importance of maintaining follow-up discussed and patient accepts that missed appointments can delay diagnosis and potentially lead to worsening of conditions.    Return in about 2 weeks (around 7/7/2025)., or sooner if acute issues arise.    Patient or patient representative verbalized consent for the use of Ambient Listening during the visit with  Pablo Keyes DPM for chart documentation. 6/24/2025  07:08 EDT    This document has been electronically signed by Pablo Keyes DPM on June 24, 2025 07:08 EDT

## 2025-06-30 DIAGNOSIS — R11.0 NAUSEA: Primary | ICD-10-CM

## 2025-06-30 RX ORDER — ONDANSETRON 4 MG/1
4 TABLET, ORALLY DISINTEGRATING ORAL EVERY 8 HOURS PRN
Qty: 30 TABLET | Refills: 0 | Status: SHIPPED | OUTPATIENT
Start: 2025-06-30

## 2025-07-01 ENCOUNTER — LAB (OUTPATIENT)
Dept: LAB | Facility: HOSPITAL | Age: 75
End: 2025-07-01
Payer: MEDICARE

## 2025-07-01 ENCOUNTER — OFFICE VISIT (OUTPATIENT)
Dept: FAMILY MEDICINE CLINIC | Age: 75
End: 2025-07-01
Payer: MEDICARE

## 2025-07-01 VITALS
DIASTOLIC BLOOD PRESSURE: 69 MMHG | BODY MASS INDEX: 34.19 KG/M2 | HEART RATE: 60 BPM | OXYGEN SATURATION: 94 % | TEMPERATURE: 97.1 F | SYSTOLIC BLOOD PRESSURE: 108 MMHG | HEIGHT: 73 IN | WEIGHT: 258 LBS

## 2025-07-01 DIAGNOSIS — K21.9 GASTROESOPHAGEAL REFLUX DISEASE WITHOUT ESOPHAGITIS: ICD-10-CM

## 2025-07-01 DIAGNOSIS — Z79.4 TYPE 2 DIABETES MELLITUS WITH STAGE 3 CHRONIC KIDNEY DISEASE, WITH LONG-TERM CURRENT USE OF INSULIN, UNSPECIFIED WHETHER STAGE 3A OR 3B CKD: ICD-10-CM

## 2025-07-01 DIAGNOSIS — I10 PRIMARY HYPERTENSION: ICD-10-CM

## 2025-07-01 DIAGNOSIS — Z12.5 PROSTATE CANCER SCREENING: ICD-10-CM

## 2025-07-01 DIAGNOSIS — E11.22 TYPE 2 DIABETES MELLITUS WITH STAGE 3 CHRONIC KIDNEY DISEASE, WITH LONG-TERM CURRENT USE OF INSULIN, UNSPECIFIED WHETHER STAGE 3A OR 3B CKD: ICD-10-CM

## 2025-07-01 DIAGNOSIS — N18.30 TYPE 2 DIABETES MELLITUS WITH STAGE 3 CHRONIC KIDNEY DISEASE, WITH LONG-TERM CURRENT USE OF INSULIN, UNSPECIFIED WHETHER STAGE 3A OR 3B CKD: ICD-10-CM

## 2025-07-01 DIAGNOSIS — Z00.00 MEDICARE ANNUAL WELLNESS VISIT, SUBSEQUENT: Primary | ICD-10-CM

## 2025-07-01 LAB
ALBUMIN SERPL-MCNC: 3.8 G/DL (ref 3.5–5.2)
ALBUMIN/GLOB SERPL: 1.2 G/DL
ALP SERPL-CCNC: 127 U/L (ref 39–117)
ALT SERPL W P-5'-P-CCNC: 15 U/L (ref 1–41)
ANION GAP SERPL CALCULATED.3IONS-SCNC: 11.9 MMOL/L (ref 5–15)
AST SERPL-CCNC: 19 U/L (ref 1–40)
BASOPHILS # BLD AUTO: 0.02 10*3/MM3 (ref 0–0.2)
BASOPHILS NFR BLD AUTO: 0.2 % (ref 0–1.5)
BILIRUB SERPL-MCNC: 0.3 MG/DL (ref 0–1.2)
BUN SERPL-MCNC: 28 MG/DL (ref 8–23)
BUN/CREAT SERPL: 14 (ref 7–25)
CALCIUM SPEC-SCNC: 10.2 MG/DL (ref 8.6–10.5)
CHLORIDE SERPL-SCNC: 102 MMOL/L (ref 98–107)
CHOLEST SERPL-MCNC: 143 MG/DL (ref 0–200)
CO2 SERPL-SCNC: 22.1 MMOL/L (ref 22–29)
CREAT SERPL-MCNC: 2 MG/DL (ref 0.76–1.27)
DEPRECATED RDW RBC AUTO: 53.8 FL (ref 37–54)
EGFRCR SERPLBLD CKD-EPI 2021: 34.2 ML/MIN/1.73
EOSINOPHIL # BLD AUTO: 0.13 10*3/MM3 (ref 0–0.4)
EOSINOPHIL NFR BLD AUTO: 1.5 % (ref 0.3–6.2)
ERYTHROCYTE [DISTWIDTH] IN BLOOD BY AUTOMATED COUNT: 15.8 % (ref 12.3–15.4)
GLOBULIN UR ELPH-MCNC: 3.3 GM/DL
GLUCOSE SERPL-MCNC: 125 MG/DL (ref 65–99)
HCT VFR BLD AUTO: 45.5 % (ref 37.5–51)
HDLC SERPL-MCNC: 29 MG/DL (ref 40–60)
HGB BLD-MCNC: 14.6 G/DL (ref 13–17.7)
IMM GRANULOCYTES # BLD AUTO: 0.03 10*3/MM3 (ref 0–0.05)
IMM GRANULOCYTES NFR BLD AUTO: 0.4 % (ref 0–0.5)
LDLC SERPL CALC-MCNC: 53 MG/DL (ref 0–100)
LDLC/HDLC SERPL: 1.12 {RATIO}
LYMPHOCYTES # BLD AUTO: 1.53 10*3/MM3 (ref 0.7–3.1)
LYMPHOCYTES NFR BLD AUTO: 17.9 % (ref 19.6–45.3)
MCH RBC QN AUTO: 29.6 PG (ref 26.6–33)
MCHC RBC AUTO-ENTMCNC: 32.1 G/DL (ref 31.5–35.7)
MCV RBC AUTO: 92.3 FL (ref 79–97)
MONOCYTES # BLD AUTO: 0.56 10*3/MM3 (ref 0.1–0.9)
MONOCYTES NFR BLD AUTO: 6.5 % (ref 5–12)
NEUTROPHILS NFR BLD AUTO: 6.3 10*3/MM3 (ref 1.7–7)
NEUTROPHILS NFR BLD AUTO: 73.5 % (ref 42.7–76)
PLATELET # BLD AUTO: 285 10*3/MM3 (ref 140–450)
PMV BLD AUTO: 9.3 FL (ref 6–12)
POTASSIUM SERPL-SCNC: 4.9 MMOL/L (ref 3.5–5.2)
PROT SERPL-MCNC: 7.1 G/DL (ref 6–8.5)
RBC # BLD AUTO: 4.93 10*6/MM3 (ref 4.14–5.8)
SODIUM SERPL-SCNC: 136 MMOL/L (ref 136–145)
TRIGL SERPL-MCNC: 407 MG/DL (ref 0–150)
TSH SERPL DL<=0.05 MIU/L-ACNC: 3.65 UIU/ML (ref 0.27–4.2)
VLDLC SERPL-MCNC: 61 MG/DL (ref 5–40)
WBC NRBC COR # BLD AUTO: 8.57 10*3/MM3 (ref 3.4–10.8)

## 2025-07-01 PROCEDURE — 84443 ASSAY THYROID STIM HORMONE: CPT

## 2025-07-01 PROCEDURE — 36415 COLL VENOUS BLD VENIPUNCTURE: CPT

## 2025-07-01 PROCEDURE — 80053 COMPREHEN METABOLIC PANEL: CPT

## 2025-07-01 PROCEDURE — 85025 COMPLETE CBC W/AUTO DIFF WBC: CPT

## 2025-07-01 PROCEDURE — 80061 LIPID PANEL: CPT

## 2025-07-01 RX ORDER — ERGOCALCIFEROL (VITAMIN D2) 10 MCG
400 TABLET ORAL DAILY
COMMUNITY

## 2025-07-01 NOTE — ASSESSMENT & PLAN NOTE
{Hypertension is (optional):[id number]}continue meds as listed in hpi. Low Na diet.f/u with cardiology     Orders:    Comprehensive Metabolic Panel; Future    TSH; Future    CBC Auto Differential; Future    Lipid Panel; Future    Lipid Panel; Future    Comprehensive Metabolic Panel; Future    TSH; Future

## 2025-07-01 NOTE — PROGRESS NOTES
Subjective   The ABCs of the Annual Wellness Visit  Medicare Wellness Visit      Kyler Staley is a 75 y.o. patient who presents for a Medicare Wellness Visit.    The following portions of the patient's history were reviewed and   updated as appropriate: allergies, current medications, past family history, past medical history, past social history, past surgical history, and problem list.    Compared to one year ago, the patient's physical   health is better.  Compared to one year ago, the patient's mental   health is the same.    Recent Hospitalizations:  He was admitted within the past 365 days at [facility]. 7/8/24-7/12/24    Current Medical Providers:  Patient Care Team:  Marisol Sellers APRN as PCP - General (Nurse Practitioner)  Baltazar Hardy APRN (Family Medicine)  Sola Torres MD (Pain Medicine)  Ede Nelson MD as Consulting Physician (Urology)  Keyonna Mcdowell MD as Consulting Physician (Pulmonary Disease)  Kristina Borrego MD as Consulting Physician (Nephrology)  Rajeev Nguyen DPM as Consulting Physician (Podiatry)  Fransisco Horta OD (Optometry)  Deb Moody APRN as Nurse Practitioner (Nurse Practitioner)    Outpatient Medications Prior to Visit   Medication Sig Dispense Refill    allopurinol (ZYLOPRIM) 300 MG tablet Take 1 tablet by mouth once daily 90 tablet 0    ALPRAZolam (XANAX) 0.5 MG tablet Take 1 tablet by mouth At Night As Needed for Anxiety. 30 tablet 0    amLODIPine (NORVASC) 5 MG tablet Take 1 tablet by mouth Daily.      apixaban (ELIQUIS) 5 MG tablet tablet Take 1 tablet by mouth 2 (Two) Times a Day.      atorvastatin (LIPITOR) 80 MG tablet Take 1 tablet by mouth every night at bedtime.      carvedilol (COREG) 12.5 MG tablet Take 1 tablet by mouth 2 (Two) Times a Day With Meals. 180 tablet 1    doxycycline (VIBRAMYCIN) 100 MG capsule Take 1 capsule by mouth 2 (Two) Times a Day. 20 capsule 0    empagliflozin (JARDIANCE) 10 MG tablet tablet Take  1 tablet by mouth Daily.      eplerenone (INSPRA) 25 MG tablet Take 1 tablet by mouth Daily.      fexofenadine (ALLEGRA) 60 MG tablet Take 1 tablet by mouth Daily.      finasteride (PROSCAR) 5 MG tablet Take 1 tablet by mouth Daily. 90 tablet 4    Flaxseed, Linseed, 1000 MG capsule Take 1,200 mg by mouth 2 (Two) Times a Day.      glucose blood test strip       imipramine (TOFRANIL) 50 MG tablet TAKE 1 TABLET BY MOUTH ONCE DAILY IN THE MORNING AND 2 ONCE DAILY IN THE EVENING 270 tablet 1    insulin aspart (novoLOG FLEXPEN) 100 UNIT/ML solution pen-injector sc pen Inject 7 Units under the skin into the appropriate area as directed 2 (Two) Times a Day. 1/14 units BID (sliding scale 1-15 units)      insulin glargine (LANTUS, SEMGLEE) 100 UNIT/ML injection Inject 65 Units under the skin into the appropriate area as directed Daily.      isosorbide mononitrate (IMDUR) 60 MG 24 hr tablet Take 1 tablet by mouth once daily 90 tablet 0    ketoconazole (NIZORAL) 2 % shampoo SHAMPOO TOPICALLY 3 TIMES WEEKLY      levothyroxine (SYNTHROID, LEVOTHROID) 137 MCG tablet TAKE 1 TABLET BY MOUTH ONCE DAILY IN THE MORNING 90 tablet 0    losartan (COZAAR) 50 MG tablet Take 1 tablet by mouth Daily.      Melatonin 5 MG chewable tablet Chew 1 tablet Daily As Needed.      nitroglycerin (NITROSTAT) 0.4 MG SL tablet Place 1 tablet under the tongue Every 5 (Five) Minutes As Needed for Chest Pain. do not exceed a total of 3 doses in 15 minutes      ondansetron ODT (ZOFRAN-ODT) 4 MG disintegrating tablet Place 1 tablet on the tongue Every 8 (Eight) Hours As Needed for Nausea or Vomiting. 30 tablet 0    pantoprazole (PROTONIX) 40 MG EC tablet Take 1 tablet by mouth once daily 90 tablet 0    Plavix 75 MG tablet Take 1 tablet by mouth Daily.      tamsulosin (FLOMAX) 0.4 MG capsule 24 hr capsule Take 2 capsules by mouth Daily. 180 capsule 4    Tirzepatide (MOUNJARO SC) Inject  under the skin into the appropriate area as directed 1 (One) Time Per Week.       Vitamin D, Cholecalciferol, (CHOLECALCIFEROL) 10 MCG (400 UNIT) tablet Take 1 tablet by mouth Daily.      bacitracin 500 UNIT/GM ointment Apply 1 Application topically to the appropriate area as directed 2 (Two) Times a Day. (Patient not taking: Reported on 7/1/2025) 28 g 0     No facility-administered medications prior to visit.     No opioid medication identified on active medication list. I have reviewed chart for other potential  high risk medication/s and harmful drug interactions in the elderly.      Aspirin is not on active medication list.  Aspirin use is contraindicated for this patient due to: current use of Eliquis.  .    Patient Active Problem List   Diagnosis    Obstructive sleep apnea treated with BiPAP    Type 2 diabetes mellitus with kidney complication, with long-term current use of insulin    Paroxysmal atrial fibrillation    Primary hypertension    Stage 3b chronic kidney disease    Acquired hypothyroidism    Presence of aortocoronary bypass graft    Left ventricular hypertrophy    Gastroesophageal reflux disease without esophagitis    Depressive disorder    Degeneration of intervertebral disc of lumbar region    CHF (congestive heart failure)    Chronic ischemic heart disease    Cataracts, bilateral    Anxiety    Abdominal hernia    Arthritis    Bilateral carotid artery stenosis    Cardiac pacemaker in situ    Spondylosis of lumbar spine    Essential familial hypercholesterolemia    Fusion of spine, lumbar region    Peripheral vascular disease    Sick sinus syndrome    Benign prostatic hyperplasia with lower urinary tract symptoms    Chronic midline low back pain without sciatica    Seasonal allergies    Mid back pain    Acute bilateral low back pain without sciatica    Urinary frequency     Advance Care Planning Advance Directive is not on file.  ACP discussion was held with the patient during this visit. Patient does not have an advance directive, declines further assistance.   "          Objective   Vitals:    25 1100   BP: 108/69   Pulse: 60   Temp: 97.1 °F (36.2 °C)   TempSrc: Temporal   SpO2: 94%   Weight: 117 kg (258 lb)   Height: 185.4 cm (72.99\")   PainSc: 8    PainLoc: Foot       Estimated body mass index is 34.05 kg/m² as calculated from the following:    Height as of this encounter: 185.4 cm (72.99\").    Weight as of this encounter: 117 kg (258 lb).               Does the patient have evidence of cognitive impairment? No  Lab Results   Component Value Date    TRIG 407 (H) 2025    HDL 29 (L) 2025    LDL 53 2025    VLDL 61 (H) 2025                                                                                                Health  Risk Assessment    Smoking Status:  Social History     Tobacco Use   Smoking Status Former    Current packs/day: 0.00    Average packs/day: 3.0 packs/day for 15.0 years (45.0 ttl pk-yrs)    Types: Cigarettes    Start date: 1960    Quit date: 1975    Years since quittin.5    Passive exposure: Past   Smokeless Tobacco Never     Alcohol Consumption:  Social History     Substance and Sexual Activity   Alcohol Use Not Currently       Fall Risk Screen  STEADI Fall Risk Assessment was completed, and patient is at HIGH risk for falls. Assessment completed on:2025    Depression Screening   Little interest or pleasure in doing things? Not at all   Feeling down, depressed, or hopeless? Not at all   PHQ-2 Total Score 0      Health Habits and Functional and Cognitive Screenin/1/2025    11:07 AM   Functional & Cognitive Status   Do you have difficulty preparing food and eating? No   Do you have difficulty bathing yourself, getting dressed or grooming yourself? No   Do you have difficulty using the toilet? No   Do you have difficulty moving around from place to place? Yes   Do you have trouble with steps or getting out of a bed or a chair? Yes   Current Diet Other   Dental Exam Not up to date   Eye Exam Up to date "   Exercise (times per week) 0 times per week   Current Exercises Include No Regular Exercise   Do you need help using the phone?  No   Are you deaf or do you have serious difficulty hearing?  Yes   Do you need help to go to places out of walking distance? Yes   Do you need help shopping? No   Do you need help preparing meals?  No   Do you need help with housework?  No   Do you need help with laundry? No   Do you need help taking your medications? No   Do you need help managing money? No   Do you ever drive or ride in a car without wearing a seat belt? No   Have you felt unusual fatigue (could be tiredness), stress, anger or loneliness in the last month? No   Who do you live with? Spouse   If you need help, do you have trouble finding someone available to you? No   Have you been bothered in the last four weeks by sexual problems? No   Do you have difficulty concentrating, remembering or making decisions? Yes           Age-appropriate Screening Schedule:  Refer to the list below for future screening recommendations based on patient's age, sex and/or medical conditions. Orders for these recommended tests are listed in the plan section. The patient has been provided with a written plan.    Health Maintenance List  Health Maintenance   Topic Date Due    DIABETIC FOOT EXAM  Never done    DIABETIC EYE EXAM  02/27/2025    HEMOGLOBIN A1C  10/18/2025    COVID-19 Vaccine (8 - 2024-25 season) 01/01/2026 (Originally 4/17/2025)    INFLUENZA VACCINE  10/01/2025    URINE MICROALBUMIN-CREATININE RATIO (uACR)  02/24/2026    ANNUAL WELLNESS VISIT  07/01/2026    LIPID PANEL  07/01/2026    COLORECTAL CANCER SCREENING  09/14/2031    TDAP/TD VACCINES (3 - Td or Tdap) 09/04/2034    HEPATITIS C SCREENING  Completed    RSV Vaccine - Adults  Completed    Pneumococcal Vaccine 50+  Completed    AAA SCREEN ONCE  Completed    ZOSTER VACCINE  Completed    LUNG CANCER SCREENING  Discontinued                                                                                                                                                 CMS Preventative Services Quick Reference  Risk Factors Identified During Encounter  Fall Risk-High or Moderate: Discussed Fall Prevention in the home  Hearing Problem: declines  Immunizations Discussed/Encouraged: Tdap, Prevnar 20 (Pneumococcal 20-valent conjugate), Shingrix, COVID19, and RSV (Respiratory Syncytial Virus)  Polypharmacy: Medication List reviewed  Dental Screening Recommended  Vision Screening Recommended    The above risks/problems have been discussed with the patient.  Pertinent information has been shared with the patient in the After Visit Summary.  An After Visit Summary and PPPS were made available to the patient.    Follow Up:   Next Medicare Wellness visit to be scheduled in 1 year.         Additional E&M Note during same encounter follows:  Patient has additional, significant, and separately identifiable condition(s)/problem(s) that require work above and beyond the Medicare Wellness Visit     Chief Complaint  Medicare Wellness-subsequent and Shortness of Breath (Patient has echo scheduled 7/1/25, but does report that he thinks his pacemaker is having problems)    Subjective   HPI    Pt is also being seen for routine visit.     He is seeing urology, CASSIDY Greer, for BPH and it on flomax 0.8mg at night and proscar 5mg daily. PSA UTD.     Patient is seeing CASSIDY Tim for DM. [facility]. He is on Lantus 65 units daily and novolog 1-14 units before meals. Pt has started Mounjaro 2.5 weekly. First dose was this past Sunday. Was not able to tolerate ozempic. He is on Jardiance 10mg daily.     He is taking losartan 50 mg, amlodipine 5mg daily and Inspra 25 mg daily, (ordered by nephrology) and carvedilol 12.5 mg twice daily.  Patient is also on Eliquis 5 mg twice daily and aspirin 81 mg for his A-fib. He sees Baltazar Hardy for cardiology.  Hx of CABG x  "4, pacemaker and multiple stents. Pt is taking plavix 75mg daily. Pt is rx imdur 60mg daily and has nitro on hand for chest pain.      Hld treated with atorvastatin 80mg and flax seed supplement.     Chronic gout treated with allopurinol 300mg daily. Denies recent flair.     Hypothyroid treated with levothyroxine 137mcg daily.     Allergies controlled with allegra.     Patient is seeing Dr. Kruger for Derm (every 6 months for precancerous skin lesions).     He sees CASSIDY Neumann at [facility] for his chronic kidney disease.  He is on Jardiance 10mg daily.      Patient has chronic back pain and has had multiple surgeries.  He uses cane for ambulation of longer distances.     Patient does have sleep apnea and uses BiPap. Uses melatonin 5mg to help with insomnia.      Pt has [employer] hearing aids.      Pt's anxiety and depression controlled with tofranil 50mg in the am and 100mg in the pm. He also has xanax 0.5mg BID as needed for anxiety. We have successfully weaned off the initial 1mg dose when he transferred care. Denies any adverse reactions or falls. Has been on this medication for years.     He was seen last by gastro in July 2024. UTD on colonoscopy and had recent EGD. He was to f/u with them with any worsening pain. GERD treated with protonix 40mg daily.      Pt is seeing Dr. Keyes with podiatry and was recently rx doxycycline 100mg BID x 10 days for infection of left foot. Has 4 .     Pt is on vit d for deficiency.               Objective   Vital Signs:  /69   Pulse 60   Temp 97.1 °F (36.2 °C) (Temporal)   Ht 185.4 cm (72.99\")   Wt 117 kg (258 lb)   SpO2 94%   BMI 34.05 kg/m²   Physical Exam  Vitals reviewed.   Constitutional:       General: He is not in acute distress.     Appearance: He is well-developed.   HENT:      Head: Normocephalic and atraumatic.   Eyes:      Conjunctiva/sclera: Conjunctivae normal.      Pupils: Pupils are equal, round, and reactive to light.   Neck:     "  Vascular: No carotid bruit.   Cardiovascular:      Rate and Rhythm: Normal rate and regular rhythm.      Heart sounds: Normal heart sounds. No murmur heard.  Pulmonary:      Effort: Pulmonary effort is normal. No respiratory distress.      Breath sounds: Normal breath sounds.   Skin:     General: Skin is warm and dry.   Neurological:      Mental Status: He is alert and oriented to person, place, and time.   Psychiatric:         Mood and Affect: Mood and affect normal.         Behavior: Behavior normal.         Thought Content: Thought content normal.         Judgment: Judgment normal.                    Assessment and Plan      Primary hypertension  continue meds as listed in hpi. Low Na diet.f/u with cardiology     Orders:    Comprehensive Metabolic Panel; Future    TSH; Future    CBC Auto Differential; Future    Lipid Panel; Future    Lipid Panel; Future    Comprehensive Metabolic Panel; Future    TSH; Future    Type 2 diabetes mellitus with stage 3 chronic kidney disease, with long-term current use of insulin, unspecified whether stage 3a or 3b CKD  Continue meds as listed in hpi.          Gastroesophageal reflux disease without esophagitis  Stable protonix 40mg daily.        Medicare annual wellness visit, subsequent         Prostate cancer screening  Lab orders placed for next routine follow up.     Orders:    PSA Screen; Future        Lab orders placed for next routine follow up.     Patient to notify office with any acute concerns or issues.  Patient verbalizes understanding, agrees with plan of care and has no further questions upon discharge.    Please note that portions of this note were completed with a voice recognition program.      Follow Up   Return in about 6 months (around 1/1/2026) for Recheck.  Patient was given instructions and counseling regarding his condition or for health maintenance advice. Please see specific information pulled into the AVS if appropriate.

## 2025-07-08 ENCOUNTER — RESULTS FOLLOW-UP (OUTPATIENT)
Dept: FAMILY MEDICINE CLINIC | Age: 75
End: 2025-07-08
Payer: MEDICARE

## 2025-07-14 ENCOUNTER — TRANSCRIBE ORDERS (OUTPATIENT)
Dept: ADMINISTRATIVE | Facility: HOSPITAL | Age: 75
End: 2025-07-14
Payer: MEDICARE

## 2025-07-14 ENCOUNTER — LAB (OUTPATIENT)
Dept: LAB | Facility: HOSPITAL | Age: 75
End: 2025-07-14
Payer: MEDICARE

## 2025-07-14 DIAGNOSIS — N18.30 STAGE 3 CHRONIC KIDNEY DISEASE, UNSPECIFIED WHETHER STAGE 3A OR 3B CKD: Primary | ICD-10-CM

## 2025-07-14 DIAGNOSIS — N18.30 STAGE 3 CHRONIC KIDNEY DISEASE, UNSPECIFIED WHETHER STAGE 3A OR 3B CKD: ICD-10-CM

## 2025-07-14 LAB
25(OH)D3 SERPL-MCNC: 35.2 NG/ML (ref 30–100)
ALBUMIN SERPL-MCNC: 3.8 G/DL (ref 3.5–5.2)
ALBUMIN/GLOB SERPL: 1.2 G/DL
ALP SERPL-CCNC: 113 U/L (ref 39–117)
ALT SERPL W P-5'-P-CCNC: 23 U/L (ref 1–41)
ANION GAP SERPL CALCULATED.3IONS-SCNC: 10 MMOL/L (ref 5–15)
AST SERPL-CCNC: 26 U/L (ref 1–40)
BACTERIA UR QL AUTO: NORMAL /HPF
BILIRUB SERPL-MCNC: 0.3 MG/DL (ref 0–1.2)
BILIRUB UR QL STRIP: NEGATIVE
BUN SERPL-MCNC: 19 MG/DL (ref 8–23)
BUN/CREAT SERPL: 11.5 (ref 7–25)
CALCIUM SPEC-SCNC: 9.4 MG/DL (ref 8.6–10.5)
CHLORIDE SERPL-SCNC: 101 MMOL/L (ref 98–107)
CLARITY UR: CLEAR
CO2 SERPL-SCNC: 23 MMOL/L (ref 22–29)
COLOR UR: YELLOW
CREAT SERPL-MCNC: 1.65 MG/DL (ref 0.76–1.27)
CREAT UR-MCNC: 76.4 MG/DL
DEPRECATED RDW RBC AUTO: 52.8 FL (ref 37–54)
EGFRCR SERPLBLD CKD-EPI 2021: 43 ML/MIN/1.73
ERYTHROCYTE [DISTWIDTH] IN BLOOD BY AUTOMATED COUNT: 15.5 % (ref 12.3–15.4)
GLOBULIN UR ELPH-MCNC: 3.1 GM/DL
GLUCOSE SERPL-MCNC: 180 MG/DL (ref 65–99)
GLUCOSE UR STRIP-MCNC: ABNORMAL MG/DL
HCT VFR BLD AUTO: 44.1 % (ref 37.5–51)
HGB BLD-MCNC: 14.4 G/DL (ref 13–17.7)
HGB UR QL STRIP.AUTO: NEGATIVE
KETONES UR QL STRIP: NEGATIVE
LEUKOCYTE ESTERASE UR QL STRIP.AUTO: NEGATIVE
MCH RBC QN AUTO: 30.1 PG (ref 26.6–33)
MCHC RBC AUTO-ENTMCNC: 32.7 G/DL (ref 31.5–35.7)
MCV RBC AUTO: 92.1 FL (ref 79–97)
NITRITE UR QL STRIP: NEGATIVE
PH UR STRIP.AUTO: 6 [PH] (ref 5–8)
PLATELET # BLD AUTO: 240 10*3/MM3 (ref 140–450)
PMV BLD AUTO: 9.7 FL (ref 6–12)
POTASSIUM SERPL-SCNC: 4.7 MMOL/L (ref 3.5–5.2)
PROT ?TM UR-MCNC: 35.1 MG/DL
PROT SERPL-MCNC: 6.9 G/DL (ref 6–8.5)
PROT UR QL STRIP: ABNORMAL
PROT/CREAT UR: 0.46 MG/G{CREAT}
PTH-INTACT SERPL-MCNC: 55.6 PG/ML (ref 15–65)
RBC # BLD AUTO: 4.79 10*6/MM3 (ref 4.14–5.8)
RBC # UR STRIP: NORMAL /HPF
REF LAB TEST METHOD: NORMAL
SODIUM SERPL-SCNC: 134 MMOL/L (ref 136–145)
SP GR UR STRIP: 1.01 (ref 1–1.03)
SQUAMOUS #/AREA URNS HPF: NORMAL /HPF
UROBILINOGEN UR QL STRIP: ABNORMAL
WBC # UR STRIP: NORMAL /HPF
WBC NRBC COR # BLD AUTO: 7.79 10*3/MM3 (ref 3.4–10.8)

## 2025-07-14 PROCEDURE — 82570 ASSAY OF URINE CREATININE: CPT

## 2025-07-14 PROCEDURE — 82306 VITAMIN D 25 HYDROXY: CPT

## 2025-07-14 PROCEDURE — 83970 ASSAY OF PARATHORMONE: CPT

## 2025-07-14 PROCEDURE — 84156 ASSAY OF PROTEIN URINE: CPT

## 2025-07-14 PROCEDURE — 85027 COMPLETE CBC AUTOMATED: CPT

## 2025-07-14 PROCEDURE — 36415 COLL VENOUS BLD VENIPUNCTURE: CPT

## 2025-07-14 PROCEDURE — 80053 COMPREHEN METABOLIC PANEL: CPT

## 2025-07-14 PROCEDURE — 81001 URINALYSIS AUTO W/SCOPE: CPT

## 2025-07-17 ENCOUNTER — OFFICE VISIT (OUTPATIENT)
Dept: PODIATRY | Facility: CLINIC | Age: 75
End: 2025-07-17
Payer: MEDICARE

## 2025-07-17 VITALS
OXYGEN SATURATION: 96 % | SYSTOLIC BLOOD PRESSURE: 121 MMHG | HEART RATE: 57 BPM | WEIGHT: 258 LBS | DIASTOLIC BLOOD PRESSURE: 57 MMHG | HEIGHT: 73 IN | BODY MASS INDEX: 34.19 KG/M2

## 2025-07-17 DIAGNOSIS — L97.511 ULCER OF RIGHT FOOT, LIMITED TO BREAKDOWN OF SKIN: ICD-10-CM

## 2025-07-17 DIAGNOSIS — L97.521 ULCER OF LEFT FOOT, LIMITED TO BREAKDOWN OF SKIN: ICD-10-CM

## 2025-07-17 DIAGNOSIS — E11.65 TYPE 2 DIABETES MELLITUS WITH HYPERGLYCEMIA, UNSPECIFIED WHETHER LONG TERM INSULIN USE: ICD-10-CM

## 2025-07-17 DIAGNOSIS — I73.9 PAD (PERIPHERAL ARTERY DISEASE): Primary | ICD-10-CM

## 2025-07-21 NOTE — PROGRESS NOTES
[facility] - PODIATRY    Today's Date: 07/20/25    Patient Name: Kyler Staley  MRN: [id number]  CSN: [id number]  PCP: Marisol Sellers APRN,   Referring Provider: No ref. provider found    SUBJECTIVE     Chief Complaint   Patient presents with    Left Foot - Follow-up     Blister still very sore    Right Foot - Follow-up     Blister  still sore     HPI: Kyler Staley, a 75 y.o.male, presents to clinic.    History of Present Illness  Wounds have improved to both of his feet. They hurt on both of heels. Right is better than the left.          Past Medical History:   Diagnosis Date    Abnormal ECG     Acne     Alcoholism     Allergic     Anemia     Anxiety     Arthritis     Arthritis of back     Arthritis of neck     Asthma     Atrial fibrillation     Back pain     Benign prostatic hyperplasia     Callus     Cardiac disease     Cataracts, bilateral     Cervical disc disorder     CHF (congestive heart failure)     Cholelithiasis     Chronic kidney disease, stage 3     moderate    CKD (chronic kidney disease)     Clotting disorder     Condition not found     hernia    Coronary artery disease     Depression     Diabetes     Difficulty walking     Enlarged prostate     Erectile dysfunction     Fatigue     GERD (gastroesophageal reflux disease)     Gout     Hearing loss     Hematospermia 12/09/2015    Hernia     High blood pressure     High cholesterol     Hip arthrosis     History of cold sores     History of gastritis     [place] (hard of hearing)     Hyperlipidemia     Hypertension     Hypoglycemia     Hypothyroidism     Ingrown toenail     Irregular heart rhythm     Joint pain     Kidney disease     Knee swelling     Low back pain     Lumbosacral disc disease     Narrowing of airway 2012    PER ENT    Neuropathy     Neuropathy in diabetes     Obesity     Osteopenia     Pancreatitis     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     Plantar fasciitis     Pneumonia     PONV  (postoperative nausea and vomiting)     Psychiatric diagnosis     Renal insufficiency     Rotator cuff syndrome     REPAIRED BILATERAL    Shin splints     Shortness of breath     Sinus trouble     Sleep apnea     BIPAP    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)     Substance abuse     Swallowing difficulty     Testosterone deficiency     Thin skin     Thoracic disc disorder     Thyroid condition     Type 2 diabetes mellitus     Visual impairment     Vitamin D deficiency     Vocal cord dysfunction      HISTORY OF DISORDER ON ONE SIDE AFTER TRACHEA     Past Surgical History:   Procedure Laterality Date    BACK SURGERY  2018    X2    BACK SURGERY  2011    CARDIAC CATHETERIZATION      CARDIAC SURGERY      CHOLECYSTECTOMY      COLONOSCOPY      CORONARY ARTERY BYPASS GRAFT  2012    CORONARY STENT PLACEMENT      CYSTOSCOPY      EYE SURGERY      HERNIA REPAIR      INGUINAL HERNIA REPAIR      JOINT REPLACEMENT      KNEE SURGERY      right and left    LUMBAR DISCECTOMY FUSION INSTRUMENTATION N/A 02/11/2021    Procedure: EXPLORATION OF FUSION T-12 S-1 REMOVAL OF HARDWARE REVISION OF FUSION AND INSTRUMENTATION T12-S1 WITH APPLICATION OF BONE MORPHOGENIC PROTEIN;  Surgeon: Baltazar Blankenship MD;  Location: [facility];  Service: Orthopedic Spine;  Laterality: N/A;    NOSE SURGERY      PACEMAKER IMPLANTATION      REPLACEMENT TOTAL KNEE Bilateral     SHOULDER SURGERY Bilateral     rotator cuff repair    SINUS SURGERY      SPINE SURGERY      TOENAIL EXCISION      UPPER GASTROINTESTINAL ENDOSCOPY       Family History   Problem Relation Age of Onset    Heart attack Mother     Arthritis Mother     Cancer Mother     Heart disease Mother     Hypertension Mother     Cancer Father     Hypertension Sister     Hypertension Brother     Malig Hyperthermia Neg Hx      Social History     Socioeconomic History    Marital status:    Tobacco Use    Smoking status: Former     Current packs/day: 0.00     Average  packs/day: 3.0 packs/day for 15.0 years (45.0 ttl pk-yrs)     Types: Cigarettes     Start date: 1960     Quit date: 1975     Years since quittin.5     Passive exposure: Past    Smokeless tobacco: Never   Vaping Use    Vaping status: Never Used   Substance and Sexual Activity    Alcohol use: Not Currently    Drug use: Yes     Types: Hydrocodone    Sexual activity: Not Currently     Partners: Female     Allergies   Allergen Reactions    Bupropion Other (See Comments) and Unknown - Low Severity     MAKES PATIENT VERY AGGRESSIVE      Iodinated Contrast Media Other (See Comments)     Other reaction(s): Other (See Comments)  Chronic kidney disease - stage 3  Chronic kidney disease - stage 3    Lorazepam Other (See Comments) and Mental Status Change     MADE PATIENT VERY AGGRESSIVE      Propoxyphene Nausea And Vomiting and Unknown - Low Severity    Adhesive Tape Rash     Current Outpatient Medications   Medication Sig Dispense Refill    allopurinol (ZYLOPRIM) 300 MG tablet Take 1 tablet by mouth once daily 90 tablet 0    ALPRAZolam (XANAX) 0.5 MG tablet Take 1 tablet by mouth At Night As Needed for Anxiety. 30 tablet 0    amLODIPine (NORVASC) 5 MG tablet Take 1 tablet by mouth Daily.      apixaban (ELIQUIS) 5 MG tablet tablet Take 1 tablet by mouth 2 (Two) Times a Day.      atorvastatin (LIPITOR) 80 MG tablet Take 1 tablet by mouth every night at bedtime.      carvedilol (COREG) 12.5 MG tablet Take 1 tablet by mouth 2 (Two) Times a Day With Meals. 180 tablet 1    doxycycline (VIBRAMYCIN) 100 MG capsule Take 1 capsule by mouth 2 (Two) Times a Day. 20 capsule 0    empagliflozin (JARDIANCE) 10 MG tablet tablet Take 1 tablet by mouth Daily.      eplerenone (INSPRA) 25 MG tablet Take 1 tablet by mouth Daily.      fexofenadine (ALLEGRA) 60 MG tablet Take 1 tablet by mouth Daily.      finasteride (PROSCAR) 5 MG tablet Take 1 tablet by mouth Daily. 90 tablet 4    Flaxseed, Linseed, 1000 MG capsule Take 1,200 mg by  mouth 2 (Two) Times a Day.      glucose blood test strip       imipramine (TOFRANIL) 50 MG tablet TAKE 1 TABLET BY MOUTH ONCE DAILY IN THE MORNING AND 2 ONCE DAILY IN THE EVENING 270 tablet 1    insulin aspart (novoLOG FLEXPEN) 100 UNIT/ML solution pen-injector sc pen Inject 7 Units under the skin into the appropriate area as directed 2 (Two) Times a Day. 1/14 units BID (sliding scale 1-15 units)      insulin glargine (LANTUS, SEMGLEE) 100 UNIT/ML injection Inject 65 Units under the skin into the appropriate area as directed Daily.      isosorbide mononitrate (IMDUR) 60 MG 24 hr tablet Take 1 tablet by mouth once daily 90 tablet 0    ketoconazole (NIZORAL) 2 % shampoo SHAMPOO TOPICALLY 3 TIMES WEEKLY      levothyroxine (SYNTHROID, LEVOTHROID) 137 MCG tablet TAKE 1 TABLET BY MOUTH ONCE DAILY IN THE MORNING 90 tablet 0    losartan (COZAAR) 50 MG tablet Take 1 tablet by mouth Daily.      Melatonin 5 MG chewable tablet Chew 1 tablet Daily As Needed.      nitroglycerin (NITROSTAT) 0.4 MG SL tablet Place 1 tablet under the tongue Every 5 (Five) Minutes As Needed for Chest Pain. do not exceed a total of 3 doses in 15 minutes      ondansetron ODT (ZOFRAN-ODT) 4 MG disintegrating tablet Place 1 tablet on the tongue Every 8 (Eight) Hours As Needed for Nausea or Vomiting. 30 tablet 0    pantoprazole (PROTONIX) 40 MG EC tablet Take 1 tablet by mouth once daily 90 tablet 0    Plavix 75 MG tablet Take 1 tablet by mouth Daily.      tamsulosin (FLOMAX) 0.4 MG capsule 24 hr capsule Take 2 capsules by mouth Daily. 180 capsule 4    Tirzepatide (MOUNJARO SC) Inject  under the skin into the appropriate area as directed 1 (One) Time Per Week.      tiZANidine (Zanaflex) 4 MG tablet Take 1/2 to 1 tab po q 8hrs prn muscle spasm 15 tablet 0    Vitamin D, Cholecalciferol, (CHOLECALCIFEROL) 10 MCG (400 UNIT) tablet Take 1 tablet by mouth Daily.       No current facility-administered medications for this visit.         OBJECTIVE     Vitals:     07/17/25 1247   BP: 121/57   Pulse: 57   SpO2: 96%       WBC   Date Value Ref Range Status   07/14/2025 7.79 3.40 - 10.80 10*3/mm3 Final   01/20/2021 7.08 4.80 - 10.80 10*3/uL Final   02/19/2018 6.00 4.5 - 11.0 10*3/uL Final     RBC   Date Value Ref Range Status   07/14/2025 4.79 4.14 - 5.80 10*6/mm3 Final   02/19/2018 4.83 4.5 - 5.9 10*6/uL Final     Hemoglobin   Date Value Ref Range Status   07/14/2025 14.4 13.0 - 17.7 g/dL Final   03/09/2018 10.7 (L) 13.5 - 17.5 g/dL Final     Hematocrit   Date Value Ref Range Status   07/14/2025 44.1 37.5 - 51.0 % Final   03/09/2018 33.5 (L) 41.0 - 53.0 % Final     MCV   Date Value Ref Range Status   07/14/2025 92.1 79.0 - 97.0 fL Final   02/19/2018 86.7 80.0 - 100.0 fL Final     MCH   Date Value Ref Range Status   07/14/2025 30.1 26.6 - 33.0 pg Final   02/19/2018 29.2 26.0 - 34.0 pg Final     MCHC   Date Value Ref Range Status   07/14/2025 32.7 31.5 - 35.7 g/dL Final   02/19/2018 33.7 31.0 - 37.0 g/dL Final     RDW   Date Value Ref Range Status   07/14/2025 15.5 (H) 12.3 - 15.4 % Final   02/19/2018 14.4 12.0 - 16.8 % Final     RDW-SD   Date Value Ref Range Status   07/14/2025 52.8 37.0 - 54.0 fl Final     MPV   Date Value Ref Range Status   07/14/2025 9.7 6.0 - 12.0 fL Final   02/19/2018 10.1 6.7 - 10.8 fL Final     Platelets   Date Value Ref Range Status   07/14/2025 240 140 - 450 10*3/mm3 Final   03/09/2018 266 140 - 440 10*3/uL Final     Neutrophil Rel %   Date Value Ref Range Status   02/19/2018 54.7 45 - 80 % Final     Neutrophil %   Date Value Ref Range Status   07/01/2025 73.5 42.7 - 76.0 % Final     Lymphocyte Rel %   Date Value Ref Range Status   02/19/2018 35.0 15 - 50 % Final     Lymphocyte %   Date Value Ref Range Status   07/01/2025 17.9 (L) 19.6 - 45.3 % Final     Monocyte Rel %   Date Value Ref Range Status   02/19/2018 7.0 0 - 15 % Final     Monocyte %   Date Value Ref Range Status   07/01/2025 6.5 5.0 - 12.0 % Final     Eosinophil %   Date Value Ref Range  Status   07/01/2025 1.5 0.3 - 6.2 % Final   02/19/2018 2.3 0 - 7 % Final     Basophil Rel %   Date Value Ref Range Status   02/19/2018 0.7 0 - 2 % Final     Basophil %   Date Value Ref Range Status   07/01/2025 0.2 0.0 - 1.5 % Final     Immature Grans %   Date Value Ref Range Status   07/01/2025 0.4 0.0 - 0.5 % Final   02/19/2018 0.3 (H) 0 % Final     Neutrophils Absolute   Date Value Ref Range Status   03/17/2022 5.00 1.70 - 6.00 x10(3)/ul Final   02/19/2018 3.28 2.0 - 8.8 10*3/uL Final     Neutrophils, Absolute   Date Value Ref Range Status   07/01/2025 6.30 1.70 - 7.00 10*3/mm3 Final     Lymphocytes Absolute   Date Value Ref Range Status   02/19/2018 2.10 0.7 - 5.5 10*3/uL Final     Lymphocytes, Absolute   Date Value Ref Range Status   07/01/2025 1.53 0.70 - 3.10 10*3/mm3 Final     Monocytes Absolute   Date Value Ref Range Status   02/19/2018 0.42 0.0 - 1.7 10*3/uL Final     Monocytes, Absolute   Date Value Ref Range Status   07/01/2025 0.56 0.10 - 0.90 10*3/mm3 Final     Eosinophils Absolute   Date Value Ref Range Status   03/17/2022 0.2 0.0 - 0.6 x10(3)/ul Final   02/19/2018 0.14 0.0 - 0.8 10*3/uL Final     Eosinophils, Absolute   Date Value Ref Range Status   07/01/2025 0.13 0.00 - 0.40 10*3/mm3 Final     Basophils Absolute   Date Value Ref Range Status   03/17/2022 0.0 0.0 - 0.3 x10(3)/ul Final   02/19/2018 0.04 0.0 - 0.2 10*3/uL Final     Basophils, Absolute   Date Value Ref Range Status   07/01/2025 0.02 0.00 - 0.20 10*3/mm3 Final     Immature Grans, Absolute   Date Value Ref Range Status   07/01/2025 0.03 0.00 - 0.05 10*3/mm3 Final   02/19/2018 0.02 <1 10*3/uL Final     nRBC   Date Value Ref Range Status   02/12/2021 0.0 0.0 - 0.2 /100 WBC Final         Lab Results   Component Value Date    GLUCOSE 180 (H) 07/14/2025    BUN 19.0 07/14/2025    CREATININE 1.65 (H) 07/14/2025    EGFRIFNONA 23 (L) 02/01/2022    BCR 11.5 07/14/2025    K 4.7 07/14/2025    CO2 23.0 07/14/2025    CALCIUM 9.4 07/14/2025    ALBUMIN  3.8 07/14/2025    LABIL2 1.4 02/19/2018    AST 26 07/14/2025    ALT 23 07/14/2025       Patient seen in no apparent distress.      PHYSICAL EXAM:     Foot/Ankle Exam    GENERAL  Appearance:  appears stated age  Orientation:  AAOx3  Affect:  appropriate  Gait:  unimpaired  Assistance:  independent  Right shoe gear: casual shoe  Left shoe gear: casual shoe    VASCULAR     Right Foot Vascularity   Normal vascular exam    Dorsalis pedis:  2+  Posterior tibial:  2+  Skin temperature:  warm  Edema grading:  None  CFT:  < 3 seconds  Pedal hair growth:  Present  Varicosities:  none     Left Foot Vascularity   Normal vascular exam    Dorsalis pedis:  2+  Posterior tibial:  2+  Skin temperature:  warm  Edema grading:  None  CFT:  < 3 seconds  Pedal hair growth:  Present  Varicosities:  none     NEUROLOGIC     Right Foot Neurologic   Light touch sensation: diminished  Vibratory sensation: diminished  Hot/Cold sensation: diminished     Left Foot Neurologic   Light touch sensation: diminished  Vibratory sensation: diminished  Hot/Cold sensation:  diminished    MUSCLE STRENGTH     Right Foot Muscle Strength   Foot dorsiflexion:  4  Foot plantar flexion:  4  Foot inversion:  4  Foot eversion:  4     Left Foot Muscle Strength   Foot dorsiflexion:  4  Foot plantar flexion:  4  Foot inversion:  4  Foot eversion:  4    RANGE OF MOTION     Right Foot Range of Motion   Foot and ankle ROM within normal limits       Left Foot Range of Motion   Foot and ankle ROM within normal limits      DERMATOLOGIC      Right Foot Dermatologic   Skin  Right foot skin is intact.      Left Foot Dermatologic   Skin  Left foot skin is intact.      Right foot additional comments: Wound to plantar foot without erythema, malodor, or drainage      Left foot additional comments: Wound to plantar foot with dry eschar       RADIOLOGY:          No results found.    ASSESSMENT/PLAN     Diagnoses and all orders for this visit:    1. PAD (peripheral artery disease)  (Primary)    2. Type 2 diabetes mellitus with hyperglycemia, unspecified whether long term insulin use    3. Ulcer of left foot, limited to breakdown of skin    4. Ulcer of right foot, limited to breakdown of skin        Comprehensive lower extremity examination and evaluation was performed.    Assessment & Plan  Dressing changes daily. Continue with current treatment. Wounds improved.     Follow-up  The patient will follow up in 1 month.        Discussed findings and treatment plan including risks, benefits, and treatment options with patient in detail. Patient agreed with treatment plan.    Medications and allergies reviewed.  Reviewed available lab values along with other pertinent labs.  These were discussed with the patient.    All questions were answered to patient/family satisfaction. Should symptoms fail to improve or worsen they agree to call or return to clinic or to go to the Emergency Department. Discussed the importance of following up with any needed screening tests/labs/specialist appointments and any requested follow-up recommended by me today. Importance of maintaining follow-up discussed and patient accepts that missed appointments can delay diagnosis and potentially lead to worsening of conditions.    Return in about 1 month (around 8/17/2025)., or sooner if acute issues arise.    Patient or patient representative verbalized consent for the use of Ambient Listening during the visit with  Pablo Keyes DPM for chart documentation. 7/20/2025  07:08 EDT    This document has been electronically signed by Pablo Keyes DPM on July 20, 2025 20:03 EDT

## 2025-07-22 DIAGNOSIS — K21.9 GASTROESOPHAGEAL REFLUX DISEASE, UNSPECIFIED WHETHER ESOPHAGITIS PRESENT: ICD-10-CM

## 2025-07-22 RX ORDER — PANTOPRAZOLE SODIUM 40 MG/1
40 TABLET, DELAYED RELEASE ORAL DAILY
Qty: 90 TABLET | Refills: 1 | Status: SHIPPED | OUTPATIENT
Start: 2025-07-22

## 2025-07-23 DIAGNOSIS — F41.9 ANXIETY: ICD-10-CM

## 2025-07-23 RX ORDER — ALPRAZOLAM 0.5 MG
0.5 TABLET ORAL NIGHTLY PRN
Qty: 30 TABLET | Refills: 0 | Status: SHIPPED | OUTPATIENT
Start: 2025-07-23

## 2025-08-01 ENCOUNTER — OFFICE VISIT (OUTPATIENT)
Dept: DIABETES SERVICES | Facility: CLINIC | Age: 75
End: 2025-08-01
Payer: MEDICARE

## 2025-08-01 ENCOUNTER — TELEPHONE (OUTPATIENT)
Dept: FAMILY MEDICINE CLINIC | Age: 75
End: 2025-08-01
Payer: MEDICARE

## 2025-08-01 VITALS
HEART RATE: 60 BPM | WEIGHT: 258 LBS | DIASTOLIC BLOOD PRESSURE: 59 MMHG | OXYGEN SATURATION: 98 % | BODY MASS INDEX: 34.19 KG/M2 | HEIGHT: 73 IN | SYSTOLIC BLOOD PRESSURE: 111 MMHG

## 2025-08-01 DIAGNOSIS — N18.32 TYPE 2 DIABETES MELLITUS WITH STAGE 3B CHRONIC KIDNEY DISEASE, WITH LONG-TERM CURRENT USE OF INSULIN: ICD-10-CM

## 2025-08-01 DIAGNOSIS — E11.40 TYPE 2 DIABETES MELLITUS WITH DIABETIC NEUROPATHY, WITH LONG-TERM CURRENT USE OF INSULIN: ICD-10-CM

## 2025-08-01 DIAGNOSIS — E66.9 OBESITY (BMI 30-39.9): ICD-10-CM

## 2025-08-01 DIAGNOSIS — Z79.4 CONTROLLED TYPE 2 DIABETES MELLITUS WITH HYPERGLYCEMIA, WITH LONG-TERM CURRENT USE OF INSULIN: Primary | ICD-10-CM

## 2025-08-01 DIAGNOSIS — E11.65 CONTROLLED TYPE 2 DIABETES MELLITUS WITH HYPERGLYCEMIA, WITH LONG-TERM CURRENT USE OF INSULIN: Primary | ICD-10-CM

## 2025-08-01 DIAGNOSIS — E11.65 UNCONTROLLED TYPE 2 DIABETES MELLITUS WITH HYPERGLYCEMIA: ICD-10-CM

## 2025-08-01 DIAGNOSIS — E11.22 TYPE 2 DIABETES MELLITUS WITH STAGE 3B CHRONIC KIDNEY DISEASE, WITH LONG-TERM CURRENT USE OF INSULIN: ICD-10-CM

## 2025-08-01 DIAGNOSIS — Z79.4 TYPE 2 DIABETES MELLITUS WITH DIABETIC NEUROPATHY, WITH LONG-TERM CURRENT USE OF INSULIN: ICD-10-CM

## 2025-08-01 DIAGNOSIS — Z79.4 TYPE 2 DIABETES MELLITUS WITH STAGE 3B CHRONIC KIDNEY DISEASE, WITH LONG-TERM CURRENT USE OF INSULIN: ICD-10-CM

## 2025-08-01 LAB
EXPIRATION DATE: ABNORMAL
HBA1C MFR BLD: 7.5 % (ref 4.5–5.7)
Lab: ABNORMAL

## 2025-08-01 RX ORDER — TIRZEPATIDE 5 MG/.5ML
5 INJECTION, SOLUTION SUBCUTANEOUS
Start: 2025-08-01 | End: 2025-10-30

## 2025-08-01 NOTE — PATIENT INSTRUCTIONS
Increase Mounjaro to 5mg once wk. You can finish your current dose of Mounjaro from 2.5mg by taking 2 injections to equal 5mg until you get your new script for the 5mg dose.

## 2025-08-01 NOTE — TELEPHONE ENCOUNTER
Caller: Kyler  Relationship: self   Best call back number:   Who are you requesting to speak with (clinical staff, provider, specific staff member):    MA    What was the call regarding:    Patient is calling with pain under ribcage and dizziness and low bp episodes. Attempted transfer to backside of office several times with no success. Patient call dropped and he called back, again tried transfer to back, did leave note with provider's nursing staff and she saw note upon his return call.
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
## 2025-08-05 ENCOUNTER — HOSPITAL ENCOUNTER (OUTPATIENT)
Dept: CT IMAGING | Facility: HOSPITAL | Age: 75
Discharge: HOME | End: 2025-08-05
Payer: MEDICARE

## 2025-08-05 ENCOUNTER — HOSPITAL ENCOUNTER (OUTPATIENT)
Dept: GENERAL RADIOLOGY | Facility: HOSPITAL | Age: 75
Discharge: HOME | End: 2025-08-05
Payer: MEDICARE

## 2025-08-05 ENCOUNTER — OFFICE VISIT (OUTPATIENT)
Dept: FAMILY MEDICINE CLINIC | Age: 75
End: 2025-08-05
Payer: MEDICARE

## 2025-08-05 ENCOUNTER — LAB (OUTPATIENT)
Dept: LAB | Facility: HOSPITAL | Age: 75
End: 2025-08-05
Payer: MEDICARE

## 2025-08-05 VITALS
HEIGHT: 73 IN | SYSTOLIC BLOOD PRESSURE: 138 MMHG | TEMPERATURE: 96.1 F | HEART RATE: 60 BPM | WEIGHT: 258 LBS | DIASTOLIC BLOOD PRESSURE: 77 MMHG | BODY MASS INDEX: 34.19 KG/M2 | OXYGEN SATURATION: 95 %

## 2025-08-05 DIAGNOSIS — R07.81 PLEURITIC CHEST PAIN: ICD-10-CM

## 2025-08-05 DIAGNOSIS — R10.11 RIGHT UPPER QUADRANT PAIN: ICD-10-CM

## 2025-08-05 DIAGNOSIS — Z79.899 HIGH RISK MEDICATION USE: ICD-10-CM

## 2025-08-05 DIAGNOSIS — F41.9 ANXIETY: ICD-10-CM

## 2025-08-05 DIAGNOSIS — R07.81 PLEURITIC CHEST PAIN: Primary | ICD-10-CM

## 2025-08-05 LAB
ALBUMIN SERPL-MCNC: 4 G/DL (ref 3.5–5.2)
ALBUMIN/GLOB SERPL: 1.3 G/DL
ALP SERPL-CCNC: 136 U/L (ref 39–117)
ALT SERPL W P-5'-P-CCNC: 19 U/L (ref 1–41)
AMPHET+METHAMPHET UR QL: NEGATIVE
AMPHETAMINES UR QL: NEGATIVE
AMYLASE SERPL-CCNC: 65 U/L (ref 28–100)
ANION GAP SERPL CALCULATED.3IONS-SCNC: 13 MMOL/L (ref 5–15)
AST SERPL-CCNC: 21 U/L (ref 1–40)
BARBITURATES UR QL SCN: NEGATIVE
BASOPHILS # BLD AUTO: 0.03 10*3/MM3 (ref 0–0.2)
BASOPHILS NFR BLD AUTO: 0.4 % (ref 0–1.5)
BENZODIAZ UR QL SCN: POSITIVE
BILIRUB BLD-MCNC: NEGATIVE MG/DL
BILIRUB SERPL-MCNC: 0.3 MG/DL (ref 0–1.2)
BUN SERPL-MCNC: 18 MG/DL (ref 8–23)
BUN/CREAT SERPL: 9.1 (ref 7–25)
BUPRENORPHINE SERPL-MCNC: NEGATIVE NG/ML
CALCIUM SPEC-SCNC: 9.8 MG/DL (ref 8.6–10.5)
CANNABINOIDS SERPL QL: NEGATIVE
CHLORIDE SERPL-SCNC: 99 MMOL/L (ref 98–107)
CLARITY, POC: CLEAR
CO2 SERPL-SCNC: 24 MMOL/L (ref 22–29)
COCAINE UR QL: NEGATIVE
COLOR UR: YELLOW
CREAT SERPL-MCNC: 1.97 MG/DL (ref 0.76–1.27)
DEPRECATED RDW RBC AUTO: 55.1 FL (ref 37–54)
EGFRCR SERPLBLD CKD-EPI 2021: 34.8 ML/MIN/1.73
EOSINOPHIL # BLD AUTO: 0.18 10*3/MM3 (ref 0–0.4)
EOSINOPHIL NFR BLD AUTO: 2.2 % (ref 0.3–6.2)
ERYTHROCYTE [DISTWIDTH] IN BLOOD BY AUTOMATED COUNT: 16.1 % (ref 12.3–15.4)
EXPIRATION DATE: ABNORMAL
EXPIRATION DATE: ABNORMAL
GLOBULIN UR ELPH-MCNC: 3.2 GM/DL
GLUCOSE SERPL-MCNC: 161 MG/DL (ref 65–99)
GLUCOSE UR STRIP-MCNC: ABNORMAL MG/DL
HCT VFR BLD AUTO: 46.7 % (ref 37.5–51)
HGB BLD-MCNC: 15.4 G/DL (ref 13–17.7)
IMM GRANULOCYTES # BLD AUTO: 0.03 10*3/MM3 (ref 0–0.05)
IMM GRANULOCYTES NFR BLD AUTO: 0.4 % (ref 0–0.5)
KETONES UR QL: NEGATIVE
LEUKOCYTE EST, POC: NEGATIVE
LIPASE SERPL-CCNC: 9 U/L (ref 13–60)
LYMPHOCYTES # BLD AUTO: 1.49 10*3/MM3 (ref 0.7–3.1)
LYMPHOCYTES NFR BLD AUTO: 18.5 % (ref 19.6–45.3)
Lab: ABNORMAL
Lab: ABNORMAL
MCH RBC QN AUTO: 30.3 PG (ref 26.6–33)
MCHC RBC AUTO-ENTMCNC: 33 G/DL (ref 31.5–35.7)
MCV RBC AUTO: 91.9 FL (ref 79–97)
MDMA UR QL SCN: NEGATIVE
METHADONE UR QL SCN: NEGATIVE
MONOCYTES # BLD AUTO: 0.58 10*3/MM3 (ref 0.1–0.9)
MONOCYTES NFR BLD AUTO: 7.2 % (ref 5–12)
MORPHINE/OPIATES SCREEN, URINE: NEGATIVE
NEUTROPHILS NFR BLD AUTO: 5.75 10*3/MM3 (ref 1.7–7)
NEUTROPHILS NFR BLD AUTO: 71.3 % (ref 42.7–76)
NITRITE UR-MCNC: NEGATIVE MG/ML
OXYCODONE UR QL SCN: NEGATIVE
PCP UR QL SCN: NEGATIVE
PH UR: 6.5 [PH] (ref 5–8)
PLATELET # BLD AUTO: 291 10*3/MM3 (ref 140–450)
PMV BLD AUTO: 8.8 FL (ref 6–12)
POTASSIUM SERPL-SCNC: 5.1 MMOL/L (ref 3.5–5.2)
PROT SERPL-MCNC: 7.2 G/DL (ref 6–8.5)
PROT UR STRIP-MCNC: ABNORMAL MG/DL
RBC # BLD AUTO: 5.08 10*6/MM3 (ref 4.14–5.8)
RBC # UR STRIP: NEGATIVE /UL
SODIUM SERPL-SCNC: 136 MMOL/L (ref 136–145)
SP GR UR: 1.01 (ref 1–1.03)
UROBILINOGEN UR QL: ABNORMAL
WBC NRBC COR # BLD AUTO: 8.06 10*3/MM3 (ref 3.4–10.8)

## 2025-08-05 PROCEDURE — 74176 CT ABD & PELVIS W/O CONTRAST: CPT

## 2025-08-05 PROCEDURE — 83690 ASSAY OF LIPASE: CPT

## 2025-08-05 PROCEDURE — 85025 COMPLETE CBC W/AUTO DIFF WBC: CPT

## 2025-08-05 PROCEDURE — 80053 COMPREHEN METABOLIC PANEL: CPT

## 2025-08-05 PROCEDURE — 71046 X-RAY EXAM CHEST 2 VIEWS: CPT

## 2025-08-05 PROCEDURE — 82150 ASSAY OF AMYLASE: CPT

## 2025-08-05 PROCEDURE — 36415 COLL VENOUS BLD VENIPUNCTURE: CPT

## 2025-08-05 RX ORDER — TRIAMCINOLONE ACETONIDE 40 MG/ML
40 INJECTION, SUSPENSION INTRA-ARTICULAR; INTRAMUSCULAR ONCE
Status: COMPLETED | OUTPATIENT
Start: 2025-08-05 | End: 2025-08-05

## 2025-08-05 RX ADMIN — TRIAMCINOLONE ACETONIDE 40 MG: 40 INJECTION, SUSPENSION INTRA-ARTICULAR; INTRAMUSCULAR at 10:33

## 2025-08-06 DIAGNOSIS — M1A.9XX0 CHRONIC GOUT WITHOUT TOPHUS, UNSPECIFIED CAUSE, UNSPECIFIED SITE: ICD-10-CM

## 2025-08-06 RX ORDER — ALLOPURINOL 300 MG/1
300 TABLET ORAL DAILY
Qty: 90 TABLET | Refills: 0 | Status: SHIPPED | OUTPATIENT
Start: 2025-08-06

## 2025-08-11 RX ORDER — CARVEDILOL 12.5 MG/1
12.5 TABLET ORAL 2 TIMES DAILY WITH MEALS
Qty: 180 TABLET | Refills: 0 | Status: SHIPPED | OUTPATIENT
Start: 2025-08-11

## 2025-08-13 ENCOUNTER — HOSPITAL ENCOUNTER (OUTPATIENT)
Dept: CT IMAGING | Facility: HOSPITAL | Age: 75
Discharge: HOME | End: 2025-08-13
Admitting: NURSE PRACTITIONER
Payer: MEDICARE

## 2025-08-13 ENCOUNTER — OFFICE VISIT (OUTPATIENT)
Dept: PODIATRY | Facility: CLINIC | Age: 75
End: 2025-08-13
Payer: MEDICARE

## 2025-08-13 VITALS
TEMPERATURE: 96.9 F | BODY MASS INDEX: 34.19 KG/M2 | OXYGEN SATURATION: 91 % | SYSTOLIC BLOOD PRESSURE: 117 MMHG | HEIGHT: 73 IN | DIASTOLIC BLOOD PRESSURE: 71 MMHG | HEART RATE: 62 BPM | WEIGHT: 258 LBS

## 2025-08-13 DIAGNOSIS — I73.9 PAD (PERIPHERAL ARTERY DISEASE): Primary | ICD-10-CM

## 2025-08-13 DIAGNOSIS — E11.65 TYPE 2 DIABETES MELLITUS WITH HYPERGLYCEMIA, UNSPECIFIED WHETHER LONG TERM INSULIN USE: ICD-10-CM

## 2025-08-13 DIAGNOSIS — R07.89 RIGHT-SIDED CHEST WALL PAIN: ICD-10-CM

## 2025-08-13 DIAGNOSIS — L97.521 ULCER OF LEFT FOOT, LIMITED TO BREAKDOWN OF SKIN: ICD-10-CM

## 2025-08-13 PROCEDURE — 71250 CT THORAX DX C-: CPT

## 2025-08-19 DIAGNOSIS — I48.0 PAROXYSMAL ATRIAL FIBRILLATION: ICD-10-CM

## 2025-08-19 RX ORDER — ISOSORBIDE MONONITRATE 60 MG/1
60 TABLET, EXTENDED RELEASE ORAL DAILY
Qty: 90 TABLET | Refills: 0 | Status: SHIPPED | OUTPATIENT
Start: 2025-08-19

## 2025-08-20 ENCOUNTER — RESULTS FOLLOW-UP (OUTPATIENT)
Dept: FAMILY MEDICINE CLINIC | Age: 75
End: 2025-08-20
Payer: MEDICARE

## 2025-08-20 DIAGNOSIS — R07.89 RIGHT-SIDED CHEST WALL PAIN: Primary | ICD-10-CM

## 2025-08-23 DIAGNOSIS — F41.9 ANXIETY: ICD-10-CM

## 2025-08-25 RX ORDER — ALPRAZOLAM 0.5 MG
0.5 TABLET ORAL NIGHTLY PRN
Qty: 30 TABLET | Refills: 0 | Status: SHIPPED | OUTPATIENT
Start: 2025-08-25

## (undated) DEVICE — SUT MONOCRYL PLS 3/0 CT1 UD/MF 90CM MCP944H

## (undated) DEVICE — CODMAN® SURGICAL PATTIES 1/2" X 1" (1.27CM X 2.54CM): Brand: CODMAN®

## (undated) DEVICE — DRSNG BURN ACTICOAT FLEX 7 1X24IN

## (undated) DEVICE — DRP C/ARMOR

## (undated) DEVICE — PENCL E/S ULTRAVAC TELESCP NOSE HOLSTR 10FT

## (undated) DEVICE — SPNG GZ WOVN 4X4IN 12PLY 10/BX STRL

## (undated) DEVICE — CODMAN® SURGICAL PATTIES 1/2" X 1/2" (1.27CM X 1.27CM): Brand: CODMAN®

## (undated) DEVICE — PK ATS CUST W CARDIOTOMY RESEVOIR

## (undated) DEVICE — 3M™ STERI-DRAPE™ INSTRUMENT POUCH 1018L: Brand: STERI-DRAPE™

## (undated) DEVICE — TOOL MR8-14MH30D MR8 14CM MATCH DMD 3MM: Brand: MIDAS REX MR8

## (undated) DEVICE — MEDI-VAC YANKAUER SUCTION HANDLE W/BULBOUS TIP: Brand: CARDINAL HEALTH

## (undated) DEVICE — GLV SURG SENSICARE PI LF PF 8 GRN STRL

## (undated) DEVICE — APPL CHLORAPREP HI/LITE 26ML ORNG

## (undated) DEVICE — EQUIPMENT COVER BAG TYPE 48” X 36” (122CM X 91CM): Brand: EQUIPMENT COVER BAG TYPE

## (undated) DEVICE — ANTIBACTERIAL UNDYED BRAIDED (POLYGLACTIN 910), SYNTHETIC ABSORBABLE SUTURE: Brand: COATED VICRYL

## (undated) DEVICE — THE MILL DISPOSABLE - MEDIUM

## (undated) DEVICE — 1010 S-DRAPE TOWEL DRAPE 10/BX: Brand: STERI-DRAPE™

## (undated) DEVICE — STPLR SKIN VISISTAT WD 35CT

## (undated) DEVICE — ELECTRD BLD EZ CLN MOD XLNG 2.75IN

## (undated) DEVICE — GLV SURG PREMIERPRO ORTHO LTX PF SZ8 BRN

## (undated) DEVICE — POOLE SUCTION HANDLE: Brand: CARDINAL HEALTH

## (undated) DEVICE — DRAIN JACKSON PRATT 10FR 1/8END: Brand: CARDINAL HEALTH

## (undated) DEVICE — NEEDLE, QUINCKE, 18GX3.5": Brand: MEDLINE

## (undated) DEVICE — PICO 7 10CM X 30CM: Brand: PICO™ 7

## (undated) DEVICE — CABLE 2344000 POWEREASE NIM: Brand: POWEREASE™ INSTRUMENTS

## (undated) DEVICE — SUT PDS 1 CT1 36IN Z347H

## (undated) DEVICE — TOTAL TRAY, 16FR 10ML SIL FOLEY, URN: Brand: MEDLINE

## (undated) DEVICE — TRAP FLD MINIVAC MEGADYNE 100ML

## (undated) DEVICE — DISPOSABLE BIPOLAR CABLE 12FT. (3.6M): Brand: KIRWAN

## (undated) DEVICE — SET SCREW 7540020 BREAK OFF TI
Type: IMPLANTABLE DEVICE | Site: SPINE LUMBAR | Status: NON-FUNCTIONAL
Brand: CD HORIZON® SPINAL SYSTEM
Removed: 2021-02-11

## (undated) DEVICE — SUT VIC 0 CT1 CR8 18IN J840D

## (undated) DEVICE — 3.0MM PRECISION NEURO (MATCH HEAD)

## (undated) DEVICE — PK NEURO SPINE 40